# Patient Record
Sex: FEMALE | Race: WHITE | NOT HISPANIC OR LATINO | Employment: OTHER | URBAN - METROPOLITAN AREA
[De-identification: names, ages, dates, MRNs, and addresses within clinical notes are randomized per-mention and may not be internally consistent; named-entity substitution may affect disease eponyms.]

---

## 2018-03-12 ENCOUNTER — HOSPITAL ENCOUNTER (OUTPATIENT)
Dept: MAMMOGRAPHY | Facility: CLINIC | Age: 68
Discharge: HOME | End: 2018-03-12
Payer: MEDICARE

## 2018-03-12 DIAGNOSIS — Z12.31 SCREENING MAMMOGRAM, ENCOUNTER FOR: ICD-10-CM

## 2018-03-12 PROCEDURE — 77067 SCR MAMMO BI INCL CAD: CPT

## 2018-04-09 ENCOUNTER — TRANSCRIBE ORDERS (OUTPATIENT)
Dept: ADMINISTRATIVE | Facility: HOSPITAL | Age: 68
End: 2018-04-09

## 2018-04-09 DIAGNOSIS — M54.32 BILATERAL SCIATICA: ICD-10-CM

## 2018-04-09 DIAGNOSIS — M54.5 LOW BACK PAIN, UNSPECIFIED BACK PAIN LATERALITY, UNSPECIFIED CHRONICITY, WITH SCIATICA PRESENCE UNSPECIFIED: Primary | ICD-10-CM

## 2018-04-09 DIAGNOSIS — M54.31 BILATERAL SCIATICA: ICD-10-CM

## 2018-06-01 ENCOUNTER — APPOINTMENT (OUTPATIENT)
Dept: PHYSICAL THERAPY | Facility: CLINIC | Age: 68
End: 2018-06-01
Payer: MEDICARE

## 2018-08-31 ENCOUNTER — TRANSCRIBE ORDERS (OUTPATIENT)
Dept: ADMINISTRATIVE | Facility: HOSPITAL | Age: 68
End: 2018-08-31

## 2018-08-31 DIAGNOSIS — R10.9 STOMACH ACHE: Primary | ICD-10-CM

## 2018-09-02 ENCOUNTER — HOSPITAL ENCOUNTER (OUTPATIENT)
Dept: ULTRASOUND IMAGING | Facility: HOSPITAL | Age: 68
Discharge: HOME | End: 2018-09-02
Payer: MEDICARE

## 2018-09-02 DIAGNOSIS — R10.9 STOMACH ACHE: ICD-10-CM

## 2018-09-02 PROCEDURE — 76700 US EXAM ABDOM COMPLETE: CPT

## 2018-11-15 ENCOUNTER — APPOINTMENT (OUTPATIENT)
Dept: LAB | Facility: CLINIC | Age: 68
End: 2018-11-15
Payer: MEDICARE

## 2018-11-15 ENCOUNTER — TRANSCRIBE ORDERS (OUTPATIENT)
Dept: ADMINISTRATIVE | Facility: HOSPITAL | Age: 68
End: 2018-11-15

## 2018-11-15 DIAGNOSIS — I51.9 MYXEDEMA HEART DISEASE: ICD-10-CM

## 2018-11-15 DIAGNOSIS — E03.9 MYXEDEMA HEART DISEASE: ICD-10-CM

## 2018-11-15 DIAGNOSIS — E78.5 HYPERLIPIDEMIA, UNSPECIFIED HYPERLIPIDEMIA TYPE: ICD-10-CM

## 2018-11-15 DIAGNOSIS — R53.83 OTHER FATIGUE: ICD-10-CM

## 2018-11-15 DIAGNOSIS — R52 GENERALIZED BODY ACHES: ICD-10-CM

## 2018-11-15 DIAGNOSIS — K21.9 GASTROESOPHAGEAL REFLUX DISEASE WITHOUT ESOPHAGITIS: ICD-10-CM

## 2018-11-15 DIAGNOSIS — K21.9 GASTROESOPHAGEAL REFLUX DISEASE WITHOUT ESOPHAGITIS: Primary | ICD-10-CM

## 2018-11-15 PROCEDURE — 36415 COLL VENOUS BLD VENIPUNCTURE: CPT

## 2018-11-15 PROCEDURE — 84436 ASSAY OF TOTAL THYROXINE: CPT

## 2018-11-15 PROCEDURE — 85025 COMPLETE CBC W/AUTO DIFF WBC: CPT

## 2018-11-15 PROCEDURE — 80061 LIPID PANEL: CPT

## 2018-11-15 PROCEDURE — 86618 LYME DISEASE ANTIBODY: CPT

## 2018-11-15 PROCEDURE — 80053 COMPREHEN METABOLIC PANEL: CPT

## 2018-11-15 PROCEDURE — 84443 ASSAY THYROID STIM HORMONE: CPT

## 2018-11-16 LAB
ALBUMIN SERPL BCP-MCNC: 4 G/DL (ref 3.5–5)
ALP SERPL-CCNC: 102 U/L (ref 46–116)
ALT SERPL W P-5'-P-CCNC: 29 U/L (ref 12–78)
ANION GAP SERPL CALCULATED.3IONS-SCNC: 7 MMOL/L (ref 4–13)
AST SERPL W P-5'-P-CCNC: 20 U/L (ref 5–45)
BASOPHILS # BLD AUTO: 0.03 THOUSANDS/ΜL (ref 0–0.1)
BASOPHILS NFR BLD AUTO: 1 % (ref 0–1)
BILIRUB SERPL-MCNC: 0.43 MG/DL (ref 0.2–1)
BUN SERPL-MCNC: 10 MG/DL (ref 5–25)
CALCIUM SERPL-MCNC: 8.8 MG/DL (ref 8.3–10.1)
CHLORIDE SERPL-SCNC: 103 MMOL/L (ref 100–108)
CHOLEST SERPL-MCNC: 220 MG/DL (ref 50–200)
CO2 SERPL-SCNC: 28 MMOL/L (ref 21–32)
CREAT SERPL-MCNC: 0.62 MG/DL (ref 0.6–1.3)
EOSINOPHIL # BLD AUTO: 0.04 THOUSAND/ΜL (ref 0–0.61)
EOSINOPHIL NFR BLD AUTO: 1 % (ref 0–6)
ERYTHROCYTE [DISTWIDTH] IN BLOOD BY AUTOMATED COUNT: 12 % (ref 11.6–15.1)
GFR SERPL CREATININE-BSD FRML MDRD: 94 ML/MIN/1.73SQ M
GLUCOSE P FAST SERPL-MCNC: 87 MG/DL (ref 65–99)
HCT VFR BLD AUTO: 40.7 % (ref 34.8–46.1)
HDLC SERPL-MCNC: 54 MG/DL (ref 40–60)
HGB BLD-MCNC: 12.9 G/DL (ref 11.5–15.4)
IMM GRANULOCYTES # BLD AUTO: 0.01 THOUSAND/UL (ref 0–0.2)
IMM GRANULOCYTES NFR BLD AUTO: 0 % (ref 0–2)
LDLC SERPL CALC-MCNC: 148 MG/DL (ref 0–100)
LYMPHOCYTES # BLD AUTO: 1.23 THOUSANDS/ΜL (ref 0.6–4.47)
LYMPHOCYTES NFR BLD AUTO: 25 % (ref 14–44)
MCH RBC QN AUTO: 31.1 PG (ref 26.8–34.3)
MCHC RBC AUTO-ENTMCNC: 31.7 G/DL (ref 31.4–37.4)
MCV RBC AUTO: 98 FL (ref 82–98)
MONOCYTES # BLD AUTO: 0.41 THOUSAND/ΜL (ref 0.17–1.22)
MONOCYTES NFR BLD AUTO: 9 % (ref 4–12)
NEUTROPHILS # BLD AUTO: 3.13 THOUSANDS/ΜL (ref 1.85–7.62)
NEUTS SEG NFR BLD AUTO: 64 % (ref 43–75)
NONHDLC SERPL-MCNC: 166 MG/DL
NRBC BLD AUTO-RTO: 0 /100 WBCS
PLATELET # BLD AUTO: 263 THOUSANDS/UL (ref 149–390)
PMV BLD AUTO: 10.3 FL (ref 8.9–12.7)
POTASSIUM SERPL-SCNC: 4.8 MMOL/L (ref 3.5–5.3)
PROT SERPL-MCNC: 7.4 G/DL (ref 6.4–8.2)
RBC # BLD AUTO: 4.15 MILLION/UL (ref 3.81–5.12)
SODIUM SERPL-SCNC: 138 MMOL/L (ref 136–145)
T4 SERPL-MCNC: 10.1 UG/DL (ref 4.7–13.3)
TRIGL SERPL-MCNC: 90 MG/DL
TSH SERPL DL<=0.05 MIU/L-ACNC: 1.94 UIU/ML (ref 0.36–3.74)
WBC # BLD AUTO: 4.85 THOUSAND/UL (ref 4.31–10.16)

## 2018-11-17 LAB
B BURGDOR IGG SER IA-ACNC: 0.07
B BURGDOR IGM SER IA-ACNC: 0.79

## 2018-12-06 ENCOUNTER — OFFICE VISIT (OUTPATIENT)
Dept: FAMILY MEDICINE CLINIC | Facility: CLINIC | Age: 68
End: 2018-12-06
Payer: MEDICARE

## 2018-12-06 VITALS
WEIGHT: 161 LBS | OXYGEN SATURATION: 98 % | HEART RATE: 69 BPM | BODY MASS INDEX: 25.88 KG/M2 | HEIGHT: 66 IN | SYSTOLIC BLOOD PRESSURE: 112 MMHG | DIASTOLIC BLOOD PRESSURE: 78 MMHG

## 2018-12-06 DIAGNOSIS — K21.9 GASTROESOPHAGEAL REFLUX DISEASE WITHOUT ESOPHAGITIS: Primary | ICD-10-CM

## 2018-12-06 DIAGNOSIS — K13.0 RASH ON LIPS: ICD-10-CM

## 2018-12-06 DIAGNOSIS — M54.16 LUMBAR RADICULOPATHY: ICD-10-CM

## 2018-12-06 PROBLEM — M25.559 HIP PAIN: Status: ACTIVE | Noted: 2018-12-06

## 2018-12-06 PROBLEM — M54.50 LOW BACK PAIN: Status: ACTIVE | Noted: 2018-12-06

## 2018-12-06 PROBLEM — IMO0002 DEGENERATION OF INTERVERTEBRAL DISC: Status: ACTIVE | Noted: 2018-12-06

## 2018-12-06 PROCEDURE — 99214 OFFICE O/P EST MOD 30 MIN: CPT | Performed by: FAMILY MEDICINE

## 2018-12-06 RX ORDER — PANTOPRAZOLE SODIUM 20 MG/1
10 TABLET, DELAYED RELEASE ORAL DAILY
COMMUNITY
Start: 2018-08-31 | End: 2019-02-21

## 2018-12-06 RX ORDER — OMEPRAZOLE 20 MG/1
20 CAPSULE, DELAYED RELEASE ORAL DAILY
Qty: 30 CAPSULE | Refills: 5 | Status: SHIPPED | OUTPATIENT
Start: 2018-12-06 | End: 2020-06-22 | Stop reason: ALTCHOICE

## 2018-12-06 NOTE — PROGRESS NOTES
Assessment/Plan:       Problem List Items Addressed This Visit     Lumbar radiculopathy     Patient is medically stable from the low back pain standpoint will continue to do home exercises and stretching         Gastroesophageal reflux disease without esophagitis - Primary     Patient is doing well regarding dyspepsia and GERD symptoms on the omeprazole will continue this and refill this at this time         Relevant Medications    pantoprazole (PROTONIX) 20 mg tablet    Rash on lips     Patient will use a prescription for hydrocortisone to have percent for her lips uses each night  If this does not resolve within 1 week she will contact the office                 Subjective:      Patient ID: Bear Estvees is a 79 y o  female  Patient presents for general checkup today review of lab work at this time  Renewal of medications  No new medical issues to discuss beyond current medications and blood work  Rash   Pertinent negatives include no congestion, cough, fatigue, fever, rhinorrhea, shortness of breath or sore throat  The following portions of the patient's history were reviewed and updated as appropriate: allergies, current medications, past family history, past medical history, past social history, past surgical history and problem list     Review of Systems   Constitutional: Negative for chills, fatigue and fever  HENT: Negative for congestion, nosebleeds, rhinorrhea, sinus pressure and sore throat  Eyes: Negative for discharge and redness  Respiratory: Negative for cough and shortness of breath  Cardiovascular: Negative for chest pain, palpitations and leg swelling  Gastrointestinal: Negative for abdominal pain, blood in stool and nausea  Endocrine: Negative for cold intolerance, heat intolerance and polyuria  Genitourinary: Negative for dysuria and frequency  Musculoskeletal: Positive for back pain  Negative for arthralgias and myalgias  Skin: Positive for rash  Neurological: Negative for dizziness, weakness and headaches  Hematological: Negative for adenopathy  Psychiatric/Behavioral: Negative for behavioral problems and sleep disturbance  The patient is not nervous/anxious  Objective:      /78   Pulse 69   Ht 5' 5 5" (1 664 m)   Wt 73 kg (161 lb)   SpO2 98%   BMI 26 38 kg/m²        Physical Exam   Constitutional: She is oriented to person, place, and time  She appears well-developed and well-nourished  No distress  HENT:   Head: Normocephalic and atraumatic  Right Ear: External ear normal    Left Ear: External ear normal    Nose: Nose normal    Mouth/Throat: Oropharynx is clear and moist  No oropharyngeal exudate  Eyes: Pupils are equal, round, and reactive to light  Conjunctivae and EOM are normal  Right eye exhibits no discharge  Left eye exhibits no discharge  No scleral icterus  Neck: Normal range of motion  No JVD present  No thyromegaly present  Cardiovascular: Normal rate, regular rhythm and normal heart sounds  No murmur heard  Pulmonary/Chest: Effort normal  She has no wheezes  She has no rales  She exhibits no tenderness  Abdominal: Soft  Bowel sounds are normal  She exhibits no distension and no mass  There is no tenderness  Musculoskeletal: Normal range of motion  She exhibits no edema, tenderness or deformity  Lymphadenopathy:     She has no cervical adenopathy  Neurological: She is alert and oriented to person, place, and time  She has normal reflexes  No cranial nerve deficit  Coordination normal    Skin: Skin is warm and dry  No rash noted  Psychiatric: She has a normal mood and affect  Her behavior is normal  Judgment and thought content normal    Nursing note and vitals reviewed  Data:    Laboratory Results: I have personally reviewed the pertinent laboratory results/reports   Radiology/Other Diagnostic Testing Results: I have personally reviewed pertinent reports         Lab Results   Component Value Date    WBC 4 85 11/15/2018    HGB 12 9 11/15/2018    HCT 40 7 11/15/2018    MCV 98 11/15/2018     11/15/2018     Lab Results   Component Value Date    K 4 8 11/15/2018     11/15/2018    CO2 28 11/15/2018    BUN 10 11/15/2018    CREATININE 0 62 11/15/2018    GLUF 87 11/15/2018    CALCIUM 8 8 11/15/2018    AST 20 11/15/2018    ALT 29 11/15/2018    ALKPHOS 102 11/15/2018    EGFR 94 11/15/2018     Lab Results   Component Value Date    CHOLESTEROL 220 (H) 11/15/2018     Lab Results   Component Value Date    HDL 54 11/15/2018     Lab Results   Component Value Date    LDLCALC 148 (H) 11/15/2018     Lab Results   Component Value Date    TRIG 90 11/15/2018     No results found for: [place], [place]  Lab Results   Component Value Date    XYY9YDJLAASK 1 940 11/15/2018     No results found for: HGBA1C  No results found for: GABE Bustillos DO

## 2018-12-06 NOTE — ASSESSMENT & PLAN NOTE
Patient will use a prescription for hydrocortisone to have percent for her lips uses each night    If this does not resolve within 1 week she will contact the office

## 2018-12-06 NOTE — ASSESSMENT & PLAN NOTE
Patient is doing well regarding dyspepsia and GERD symptoms on the omeprazole will continue this and refill this at this time

## 2018-12-06 NOTE — PATIENT INSTRUCTIONS
Acute Low Back Pain   AMBULATORY CARE:   Acute low back pain  is sudden discomfort in your lower back area that lasts for up to 6 weeks  The discomfort makes it difficult to tolerate activity  Common symptoms include the following:   · Back stiffness or spasms    · Pain down the back or side of one leg    · Holding yourself in an unusual position or posture to decrease your back pain    · Not being able to find a sitting position that is comfortable    · Slow increase in your pain for 24 to 48 hours after you stress your back    · Tenderness on your lower back or severe pain when you move your back  Seek immediate care for the following symptoms:   · Severe pain    · Sudden stiffness and heaviness in both buttocks down to both legs    · Numbness or weakness in one leg, or pain in both legs    · Numbness in your genital area or across your lower back    · Unable to control your urine or bowel movements  Contact your healthcare provider if:   · You have a fever  · You have pain at night or when you rest     · Your pain does not get better with treatment  · You have pain that worsens when you cough or sneeze  · You suddenly feel something pop or snap in your back  · You have questions or concerns about your condition or care  The goal of treatment for acute low back pain  is to relieve your pain and help you tolerate activity  Most people with acute lower back pain get better within 4 to 6 weeks  You may need any of the following:  · Acetaminophen  decreases pain  It is available without a doctor's order  Ask how much to take and how often to take it  Follow directions  Acetaminophen can cause liver damage if not taken correctly  · NSAIDs  help decrease swelling and pain  This medicine is available with or without a doctor's order  NSAIDs can cause stomach bleeding or kidney problems in certain people   If you take blood thinner medicine, always ask your healthcare provider if NSAIDs are safe for you  Always read the medicine label and follow directions  · Prescription pain medicine  may be given  Ask your healthcare provider how to take this medicine safely  · Muscle relaxers  decrease pain by relaxing the muscles in your lower spine  Manage your symptoms:   · Stay active  as much as you can without causing more pain  Bed rest could make your back pain worse  Start with some light exercises such as walking  Avoid heavy lifting until your pain is gone  Ask for more information about the activities or exercises that are right for you  · Ice  helps decrease swelling, pain, and muscle spams  Put crushed ice in a plastic bag  Cover it with a towel  Place it on your lower back for 20 to 30 minutes every 2 hours  Do this for about 2 to 3 days after your pain starts, or as directed  · Heat  helps decrease pain and muscle spasms  Start to use heat after treatment with ice has stopped  Use a small towel dampened with warm water or a heating pad, or sit in a warm bath  Apply heat on the area for 20 to 30 minutes every 2 hours for as many days as directed  Alternate heat and ice  Prevent acute low back pain:   · Use proper body mechanics  ¨ Bend at the hips and knees when you  objects  Do not bend from the waist  Use your leg muscles as you lift the load  Do not use your back  Keep the object close to your chest as you lift it  Try not to twist or lift anything above your waist     ¨ Change your position often when you stand for long periods of time  Rest one foot on a small box or footrest, and then switch to the other foot often  ¨ Try not to sit for long periods of time  When you do, sit in a straight-backed chair with your feet flat on the floor  Never reach, pull, or push while you are sitting  · Do exercises that strengthen your back muscles  Warm up before you exercise  Ask your healthcare provider the best exercises for you  · Maintain a healthy weight    Ask your healthcare provider how much you should weigh  Ask him to help you create a weight loss plan if you are overweight  Follow up with your healthcare provider as directed:  Return for a follow-up visit if you still have pain after 1 to 3 weeks of treatment  You may need to visit an orthopedist if your back pain lasts more than 12 weeks  Write down your questions so you remember to ask them during your visits  © 2017 [employer] Information is for End User's use only and may not be sold, redistributed or otherwise used for commercial purposes  All illustrations and images included in CareNotes® are the copyrighted property of [employer]  or Y[employer]uf [employer]  The above information is an  only  It is not intended as medical advice for individual conditions or treatments  Talk to your doctor, nurse or pharmacist before following any medical regimen to see if it is safe and effective for you

## 2018-12-06 NOTE — ASSESSMENT & PLAN NOTE
Patient is medically stable from the low back pain standpoint will continue to do home exercises and stretching

## 2019-02-21 ENCOUNTER — OFFICE VISIT (OUTPATIENT)
Dept: URGENT CARE | Facility: CLINIC | Age: 69
End: 2019-02-21
Payer: MEDICARE

## 2019-02-21 VITALS
HEIGHT: 62 IN | SYSTOLIC BLOOD PRESSURE: 132 MMHG | RESPIRATION RATE: 16 BRPM | DIASTOLIC BLOOD PRESSURE: 82 MMHG | HEART RATE: 83 BPM | TEMPERATURE: 97.4 F | WEIGHT: 160 LBS | OXYGEN SATURATION: 97 % | BODY MASS INDEX: 29.44 KG/M2

## 2019-02-21 DIAGNOSIS — H69.82 EUSTACHIAN TUBE DYSFUNCTION, LEFT: Primary | ICD-10-CM

## 2019-02-21 PROCEDURE — 99203 OFFICE O/P NEW LOW 30 MIN: CPT | Performed by: PHYSICIAN ASSISTANT

## 2019-02-21 PROCEDURE — G0463 HOSPITAL OUTPT CLINIC VISIT: HCPCS | Performed by: PHYSICIAN ASSISTANT

## 2019-02-21 RX ORDER — LORATADINE 10 MG/1
10 TABLET ORAL DAILY
COMMUNITY

## 2019-02-21 RX ORDER — UBIDECARENONE 75 MG
CAPSULE ORAL AS NEEDED
COMMUNITY
End: 2022-05-30

## 2019-02-21 RX ORDER — MELATONIN
1000 DAILY
COMMUNITY

## 2019-02-21 RX ORDER — DOXYCYCLINE HYCLATE 50 MG/1
324 CAPSULE, GELATIN COATED ORAL
COMMUNITY
End: 2020-06-22 | Stop reason: ALTCHOICE

## 2019-02-21 RX ORDER — METHYLPREDNISOLONE 4 MG/1
TABLET ORAL
Qty: 1 EACH | Refills: 0 | Status: SHIPPED | OUTPATIENT
Start: 2019-02-21 | End: 2019-12-12 | Stop reason: ALTCHOICE

## 2019-02-21 RX ORDER — CHLORAL HYDRATE 500 MG
1000 CAPSULE ORAL DAILY
COMMUNITY
End: 2020-06-22 | Stop reason: ALTCHOICE

## 2019-02-22 NOTE — PROGRESS NOTES
[facility]        NAME: Bhupendra Brown is a 79 y o  female  : 1950    MRN: [id number]  DATE: 2019  TIME: 7:35 PM    Assessment and Plan   Eustachian tube dysfunction, left [H69 82]  1  Eustachian tube dysfunction, left  methylPREDNISolone 4 MG tablet therapy pack     Patient will follow up with ENT in 2 weeks    Patient Instructions       Follow up with PCP in 3-5 days  Proceed to  ER if symptoms worsen  Chief Complaint     Chief Complaint   Patient presents with    Earache     left ear x 2 weeks, tried a warm compress/claritin         History of Present Illness       82-year-old female presents with left ear pain for 2 weeks  Patient states she wears headphones on a daily basis for work she works for [employer] for the last year  She has been using warm compresses and ferritin with relief  She states it feels like her ears needs a pop  She has not seen ENT for this  She does complain of slight decrease in hearing  Denies tinnitus  Denies fever or chills  Review of Systems   Review of Systems   Constitutional: Negative for chills, fatigue and fever  HENT: Positive for ear pain (blocked ears)  Negative for congestion, sinus pain, sore throat and trouble swallowing  Eyes: Negative for pain, discharge and redness  Respiratory: Negative for cough, chest tightness, shortness of breath and wheezing  Cardiovascular: Negative for chest pain, palpitations and leg swelling  Gastrointestinal: Negative for abdominal pain, diarrhea, nausea and vomiting  Musculoskeletal: Negative for arthralgias, joint swelling and myalgias  Skin: Negative for rash  Neurological: Negative for dizziness, weakness, numbness and headaches           Current Medications       Current Outpatient Medications:     Biotin 5000 MCG CAPS, 5000 mcg 1 qd, Disp: , Rfl:     cholecalciferol (VITAMIN D3) 1,000 units tablet, Take 1,000 Units by mouth daily, Disp: , Rfl:     co-enzyme Q-10 30 MG capsule, Take 30 mg by mouth 3 (three) times a day, Disp: , Rfl:     cyanocobalamin (VITAMIN B-12) 100 mcg tablet, Take by mouth daily, Disp: , Rfl:     Evening Primrose Oil 1000 MG CAPS, Take by mouth, Disp: , Rfl:     ferrous gluconate (FERGON) 324 mg tablet, Take 324 mg by mouth daily with breakfast, Disp: , Rfl:     Garlic 6008 MG CAPS, 6368 mg 1 qd, Disp: , Rfl:     loratadine (CLARITIN) 10 mg tablet, Take 10 mg by mouth daily, Disp: , Rfl:     Omega-3 Fatty Acids (FISH OIL) 1,000 mg, Take 1,000 mg by mouth daily, Disp: , Rfl:     Probiotic Product (PROBIOTIC ACIDOPHILUS BEADS PO), Take by mouth, Disp: , Rfl:     vitamin E 100 UNIT capsule, Take 100 Units by mouth daily, Disp: , Rfl:     hydrocortisone 2 5 % cream, Apply at nighttime and in a m   To affected area on the lips, Disp: 30 g, Rfl: 0    methylPREDNISolone 4 MG tablet therapy pack, Use as directed on package, Disp: 1 each, Rfl: 0    omeprazole (PriLOSEC) 20 mg delayed release capsule, Take 1 capsule (20 mg total) by mouth daily, Disp: 30 capsule, Rfl: 5    Current Allergies     Allergies as of 02/21/2019 - Reviewed 02/21/2019   Allergen Reaction Noted    Etodolac  02/14/2016    Nitrofurantoin Other (See Comments)     Sulfa antibiotics Hives     Levofloxacin Dizziness     Pregabalin Chest Pain     Aspirin      Eggs or egg-derived products  11/24/2015    Ibuprofen      Naproxen      Penicillin g  02/14/2016            The following portions of the patient's history were reviewed and updated as appropriate: allergies, current medications, past family history, past medical history, past social history, past surgical history and problem list      Past Medical History:   Diagnosis Date    GERD (gastroesophageal reflux disease)     Hyperlipidemia        Past Surgical History:   Procedure Laterality Date    BLADDER REPAIR      sling    REMOVAL OF INTRAUTERINE DEVICE (IUD)      in abd area    TONSILLECTOMY         Family History Problem Relation Age of Onset    Hypertension Mother         Pulmonary    Osteoporosis Mother     Heart failure Mother     Heart attack Father     Hypertension Father     Stroke Father          Medications have been verified  Objective   /82 (BP Location: Left arm, Patient Position: Sitting, Cuff Size: Standard)   Pulse 83   Temp (!) 97 4 °F (36 3 °C) (Tympanic)   Resp 16   Ht 5' 1 5" (1 562 m)   Wt 72 6 kg (160 lb)   SpO2 97%   BMI 29 74 kg/m²        Physical Exam     Physical Exam   Constitutional: She is oriented to person, place, and time  She appears well-developed and well-nourished  No distress  HENT:   Head: Normocephalic  Right Ear: External ear normal    Left Ear: External ear normal  No foreign bodies  No mastoid tenderness  Tympanic membrane is scarred  Tympanic membrane is not injected, not erythematous, not retracted and not bulging  Tympanic membrane mobility is abnormal   No middle ear effusion  Decreased hearing is noted  Mouth/Throat: Oropharynx is clear and moist    Eyes: Pupils are equal, round, and reactive to light  Conjunctivae and EOM are normal    Neck: Normal range of motion  Neck supple  Cardiovascular: Normal rate, regular rhythm and normal heart sounds  No murmur heard  Pulmonary/Chest: Effort normal and breath sounds normal  No respiratory distress  She has no wheezes  Abdominal: Soft  Bowel sounds are normal  There is no tenderness  Musculoskeletal: Normal range of motion  Lymphadenopathy:     She has no cervical adenopathy  Neurological: She is alert and oriented to person, place, and time  She has normal reflexes  Skin: Skin is warm and dry  Psychiatric: She has a normal mood and affect  Nursing note and vitals reviewed

## 2019-02-23 ENCOUNTER — APPOINTMENT (EMERGENCY)
Dept: RADIOLOGY | Facility: HOSPITAL | Age: 69
End: 2019-02-23
Payer: MEDICARE

## 2019-02-23 ENCOUNTER — HOSPITAL ENCOUNTER (EMERGENCY)
Facility: HOSPITAL | Age: 69
Discharge: HOME | End: 2019-02-23
Attending: EMERGENCY MEDICINE | Admitting: EMERGENCY MEDICINE
Payer: MEDICARE

## 2019-02-23 VITALS
OXYGEN SATURATION: 97 % | RESPIRATION RATE: 18 BRPM | BODY MASS INDEX: 29.74 KG/M2 | SYSTOLIC BLOOD PRESSURE: 137 MMHG | WEIGHT: 160 LBS | HEART RATE: 66 BPM | TEMPERATURE: 97.5 F | DIASTOLIC BLOOD PRESSURE: 78 MMHG

## 2019-02-23 DIAGNOSIS — R51.9 CEPHALALGIA: Primary | ICD-10-CM

## 2019-02-23 DIAGNOSIS — R51.9 HEADACHE: ICD-10-CM

## 2019-02-23 LAB
ALBUMIN SERPL BCP-MCNC: 3.6 G/DL (ref 3.5–5)
ALP SERPL-CCNC: 102 U/L (ref 46–116)
ALT SERPL W P-5'-P-CCNC: 31 U/L (ref 12–78)
ANION GAP SERPL CALCULATED.3IONS-SCNC: 6 MMOL/L (ref 4–13)
AST SERPL W P-5'-P-CCNC: 18 U/L (ref 5–45)
ATRIAL RATE: 65 BPM
BASOPHILS # BLD AUTO: 0.03 THOUSANDS/ΜL (ref 0–0.1)
BASOPHILS NFR BLD AUTO: 1 % (ref 0–1)
BILIRUB SERPL-MCNC: 0.26 MG/DL (ref 0.2–1)
BUN SERPL-MCNC: 13 MG/DL (ref 5–25)
CALCIUM SERPL-MCNC: 8.6 MG/DL (ref 8.3–10.1)
CHLORIDE SERPL-SCNC: 105 MMOL/L (ref 100–108)
CK SERPL-CCNC: 56 U/L (ref 26–192)
CO2 SERPL-SCNC: 29 MMOL/L (ref 21–32)
CREAT SERPL-MCNC: 0.75 MG/DL (ref 0.6–1.3)
EOSINOPHIL # BLD AUTO: 0.06 THOUSAND/ΜL (ref 0–0.61)
EOSINOPHIL NFR BLD AUTO: 1 % (ref 0–6)
ERYTHROCYTE [DISTWIDTH] IN BLOOD BY AUTOMATED COUNT: 12.5 % (ref 11.6–15.1)
GFR SERPL CREATININE-BSD FRML MDRD: 83 ML/MIN/1.73SQ M
GLUCOSE SERPL-MCNC: 102 MG/DL (ref 65–140)
HCT VFR BLD AUTO: 36.6 % (ref 34.8–46.1)
HGB BLD-MCNC: 12.1 G/DL (ref 11.5–15.4)
IMM GRANULOCYTES # BLD AUTO: 0.01 THOUSAND/UL (ref 0–0.2)
IMM GRANULOCYTES NFR BLD AUTO: 0 % (ref 0–2)
LYMPHOCYTES # BLD AUTO: 1.89 THOUSANDS/ΜL (ref 0.6–4.47)
LYMPHOCYTES NFR BLD AUTO: 30 % (ref 14–44)
MCH RBC QN AUTO: 30.6 PG (ref 26.8–34.3)
MCHC RBC AUTO-ENTMCNC: 33.1 G/DL (ref 31.4–37.4)
MCV RBC AUTO: 92 FL (ref 82–98)
MONOCYTES # BLD AUTO: 0.56 THOUSAND/ΜL (ref 0.17–1.22)
MONOCYTES NFR BLD AUTO: 9 % (ref 4–12)
NEUTROPHILS # BLD AUTO: 3.66 THOUSANDS/ΜL (ref 1.85–7.62)
NEUTS SEG NFR BLD AUTO: 59 % (ref 43–75)
NRBC BLD AUTO-RTO: 0 /100 WBCS
P AXIS: 58 DEGREES
PLATELET # BLD AUTO: 228 THOUSANDS/UL (ref 149–390)
PMV BLD AUTO: 9.8 FL (ref 8.9–12.7)
POTASSIUM SERPL-SCNC: 3.6 MMOL/L (ref 3.5–5.3)
PR INTERVAL: 188 MS
PROT SERPL-MCNC: 6.9 G/DL (ref 6.4–8.2)
QRS AXIS: 38 DEGREES
QRSD INTERVAL: 80 MS
QT INTERVAL: 412 MS
QTC INTERVAL: 428 MS
RBC # BLD AUTO: 3.96 MILLION/UL (ref 3.81–5.12)
SODIUM SERPL-SCNC: 140 MMOL/L (ref 136–145)
T WAVE AXIS: 60 DEGREES
TROPONIN I SERPL-MCNC: <0.02 NG/ML
TSH SERPL DL<=0.05 MIU/L-ACNC: 3.01 UIU/ML (ref 0.36–3.74)
VENTRICULAR RATE: 65 BPM
WBC # BLD AUTO: 6.21 THOUSAND/UL (ref 4.31–10.16)

## 2019-02-23 PROCEDURE — 36415 COLL VENOUS BLD VENIPUNCTURE: CPT | Performed by: EMERGENCY MEDICINE

## 2019-02-23 PROCEDURE — 71046 X-RAY EXAM CHEST 2 VIEWS: CPT

## 2019-02-23 PROCEDURE — 82550 ASSAY OF CK (CPK): CPT | Performed by: EMERGENCY MEDICINE

## 2019-02-23 PROCEDURE — 70450 CT HEAD/BRAIN W/O DYE: CPT

## 2019-02-23 PROCEDURE — 93010 ELECTROCARDIOGRAM REPORT: CPT | Performed by: INTERNAL MEDICINE

## 2019-02-23 PROCEDURE — 84484 ASSAY OF TROPONIN QUANT: CPT | Performed by: EMERGENCY MEDICINE

## 2019-02-23 PROCEDURE — 85025 COMPLETE CBC W/AUTO DIFF WBC: CPT | Performed by: EMERGENCY MEDICINE

## 2019-02-23 PROCEDURE — 80053 COMPREHEN METABOLIC PANEL: CPT | Performed by: EMERGENCY MEDICINE

## 2019-02-23 PROCEDURE — 93005 ELECTROCARDIOGRAM TRACING: CPT

## 2019-02-23 PROCEDURE — 84443 ASSAY THYROID STIM HORMONE: CPT | Performed by: EMERGENCY MEDICINE

## 2019-02-23 PROCEDURE — 99284 EMERGENCY DEPT VISIT MOD MDM: CPT

## 2019-02-23 RX ORDER — LIDOCAINE 50 MG/G
1 PATCH TOPICAL ONCE
Status: DISCONTINUED | OUTPATIENT
Start: 2019-02-23 | End: 2019-02-23 | Stop reason: HOSPADM

## 2019-02-23 RX ORDER — ACETAMINOPHEN 325 MG/1
650 TABLET ORAL ONCE
Status: COMPLETED | OUTPATIENT
Start: 2019-02-23 | End: 2019-02-23

## 2019-02-23 RX ADMIN — ACETAMINOPHEN 650 MG: 325 TABLET ORAL at 20:41

## 2019-02-23 RX ADMIN — LIDOCAINE 1 PATCH: 50 PATCH TOPICAL at 20:41

## 2019-02-24 NOTE — ED PROVIDER NOTES
History  Chief Complaint   Patient presents with    Headache     Pt c/o head pressure and left arm pain  Pt was at urgent care for left ear pain a few days ago  Patient was seen about a week ago for a left ear complaint was called in steroids but did not fill it  She states she comes in for 5 days of head pressure  Has not had this prior to 5 days ago  Checked her blood pressure as thought possibly could be the etiology  Her systolic blood pressures in the 140s which she attributes to why she is having a squeezing sensation  She states these 5 days feels pain shooting down her neck into her shoulder and into her arm  Denies any chest pain shortness of breath palpitations; denies any abdominal pain nausea vomiting  No personal history of ACS but there is a family history  No hypertension dyslipidemia diabetes or smoking  She has not really tried any alleviating factors as she likes doing all natural remedies at home  No fevers chills or neck stiffness  No falls accidents or trauma  Prior to Admission Medications   Prescriptions Last Dose Informant Patient Reported? Taking? Biotin 5000 MCG CAPS   Yes No   Si mcg 1 qd   Evening Primrose Oil 1000 MG CAPS  Self Yes No   Sig: Take by mouth   Garlic 3454 MG CAPS   Yes No   Si mg 1 qd   Omega-3 Fatty Acids (FISH OIL) 1,000 mg  Self Yes No   Sig: Take 1,000 mg by mouth daily   Probiotic Product (PROBIOTIC ACIDOPHILUS BEADS PO)  Self Yes No   Sig: Take by mouth   cholecalciferol (VITAMIN D3) 1,000 units tablet  Self Yes No   Sig: Take 1,000 Units by mouth daily   co-enzyme Q-10 30 MG capsule  Self Yes No   Sig: Take 30 mg by mouth 3 (three) times a day   cyanocobalamin (VITAMIN B-12) 100 mcg tablet  Self Yes No   Sig: Take by mouth daily   ferrous gluconate (FERGON) 324 mg tablet  Self Yes No   Sig: Take 324 mg by mouth daily with breakfast   hydrocortisone 2 5 % cream   No No   Sig: Apply at nighttime and in a m   To affected area on the lips   loratadine (CLARITIN) 10 mg tablet  Self Yes No   Sig: Take 10 mg by mouth daily   methylPREDNISolone 4 MG tablet therapy pack   No No   Sig: Use as directed on package   omeprazole (PriLOSEC) 20 mg delayed release capsule   No No   Sig: Take 1 capsule (20 mg total) by mouth daily   vitamin E 100 UNIT capsule  Self Yes No   Sig: Take 100 Units by mouth daily      Facility-Administered Medications: None       Past Medical History:   Diagnosis Date    GERD (gastroesophageal reflux disease)     Hyperlipidemia        Past Surgical History:   Procedure Laterality Date    BLADDER REPAIR      sling    REMOVAL OF INTRAUTERINE DEVICE (IUD)      in abd area    TONSILLECTOMY         Family History   Problem Relation Age of Onset    Hypertension Mother         Pulmonary    Osteoporosis Mother     Heart failure Mother     Heart attack Father     Hypertension Father     Stroke Father      I have reviewed and agree with the history as documented  Social History     Tobacco Use    Smoking status: Never Smoker    Smokeless tobacco: Never Used   Substance Use Topics    Alcohol use: Never     Frequency: Never    Drug use: Never        Review of Systems   Constitutional: Negative for activity change, appetite change, chills and fever  HENT: Negative for congestion, ear pain, rhinorrhea, sore throat and trouble swallowing  Eyes: Negative for photophobia and pain  Respiratory: Negative for cough, chest tightness, shortness of breath and wheezing  Cardiovascular: Negative for chest pain and palpitations  Gastrointestinal: Negative for abdominal distention, abdominal pain, constipation, diarrhea, nausea and vomiting  Genitourinary: Negative for dysuria, flank pain, hematuria and urgency  Musculoskeletal: Positive for myalgias  Negative for arthralgias, back pain, joint swelling, neck pain and neck stiffness  Skin: Negative for color change, pallor and rash     Neurological: Positive for headaches  Negative for dizziness, seizures, weakness, light-headedness and numbness  Psychiatric/Behavioral: Negative for confusion, hallucinations and self-injury  Physical Exam  ED Triage Vitals [02/23/19 1935]   Temperature Pulse Respirations Blood Pressure SpO2   97 5 °F (36 4 °C) 76 18 155/79 99 %      Temp Source Heart Rate Source Patient Position - Orthostatic VS BP Location FiO2 (%)   Oral Monitor Sitting Left arm --      Pain Score       7           Orthostatic Vital Signs  Vitals:    02/23/19 1935 02/23/19 1956 02/23/19 2100   BP: 155/79 146/78 137/78   Pulse: 76 67 66   Patient Position - Orthostatic VS: Sitting         Physical Exam   Constitutional: She is oriented to person, place, and time  She appears well-developed and well-nourished  No distress  No distress talkative   HENT:   Head: Normocephalic and atraumatic  Mouth/Throat: No oropharyngeal exudate  Eyes: EOM are normal  Right eye exhibits no discharge  Left eye exhibits no discharge  Neck: Normal range of motion  Neck supple  No tracheal deviation present  No thyromegaly present  No neck tenderness  Is very tender along the left trapezius muscle   Cardiovascular: Normal rate and normal heart sounds  No murmur heard  Pulmonary/Chest: Effort normal and breath sounds normal  No respiratory distress  She has no wheezes  She has no rales  Abdominal: Soft  Bowel sounds are normal  She exhibits no distension  There is no tenderness  There is no rebound and no guarding  Soft no tenderness   Musculoskeletal: Normal range of motion  She exhibits no edema or deformity  Motor symmetrical no swelling no rash upper extremities   Lymphadenopathy:     She has no cervical adenopathy  Neurological: She is alert and oriented to person, place, and time  She exhibits normal muscle tone  GCS 15 talkative no dysarthria normal cranial nerve exam motor 5/5 symmetrical upper and lower extremities   Skin: Skin is warm   Capillary refill takes less than 2 seconds  No rash noted  She is not diaphoretic  Psychiatric: She has a normal mood and affect  Her behavior is normal        ED Medications  Medications   acetaminophen (TYLENOL) tablet 650 mg (650 mg Oral Given 2/23/19 2041)       Diagnostic Studies  Results Reviewed     Procedure Component Value Units Date/Time    Troponin I [[id number]]  (Normal) Collected:  02/23/19 2028    Lab Status:  Final result Specimen:  Blood from Arm, Right Updated:  02/23/19 2115     Troponin I <0 02 ng/mL     TSH, 3rd generation with Free T4 reflex [[id number]]  (Normal) Collected:  02/23/19 2028    Lab Status:  Final result Specimen:  Blood from Arm, Right Updated:  02/23/19 2109     TSH 3RD GENERATON 3 010 uIU/mL     Narrative:       Patients undergoing fluorescein dye angiography may retain small amounts of fluorescein in the body for 48-72 hours post procedure  Samples containing fluorescein can produce falsely depressed TSH values  If the patient had this procedure,a specimen should be resubmitted post fluorescein clearance      CK (with reflex to MB) [[id number]]  (Normal) Collected:  02/23/19 2028    Lab Status:  Final result Specimen:  Blood from Arm, Right Updated:  02/23/19 2109     Total CK 56 U/L     Comprehensive metabolic panel [[id number]] Collected:  02/23/19 2028    Lab Status:  Final result Specimen:  Blood from Arm, Right Updated:  02/23/19 2056     Sodium 140 mmol/L      Potassium 3 6 mmol/L      Chloride 105 mmol/L      CO2 29 mmol/L      ANION GAP 6 mmol/L      BUN 13 mg/dL      Creatinine 0 75 mg/dL      Glucose 102 mg/dL      Calcium 8 6 mg/dL      AST 18 U/L      ALT 31 U/L      Alkaline Phosphatase 102 U/L      Total Protein 6 9 g/dL      Albumin 3 6 g/dL      Total Bilirubin 0 26 mg/dL      eGFR 83 ml/min/1 73sq m     Narrative:       National Kidney Disease Education Program recommendations are as follows:  GFR calculation is accurate only with a steady state creatinine  Chronic Kidney disease less than 60 ml/min/1 73 sq  meters  Kidney failure less than 15 ml/min/1 73 sq  meters  CBC and differential [[id number]] Collected:  02/23/19 2028    Lab Status:  Final result Specimen:  Blood from Arm, Right Updated:  02/23/19 2040     WBC 6 21 Thousand/uL      RBC 3 96 Million/uL      Hemoglobin 12 1 g/dL      Hematocrit 36 6 %      MCV 92 fL      MCH 30 6 pg      MCHC 33 1 g/dL      RDW 12 5 %      MPV 9 8 fL      Platelets 229 Thousands/uL      nRBC 0 /100 WBCs      Neutrophils Relative 59 %      Immat GRANS % 0 %      Lymphocytes Relative 30 %      Monocytes Relative 9 %      Eosinophils Relative 1 %      Basophils Relative 1 %      Neutrophils Absolute 3 66 Thousands/µL      Immature Grans Absolute 0 01 Thousand/uL      Lymphocytes Absolute 1 89 Thousands/µL      Monocytes Absolute 0 56 Thousand/µL      Eosinophils Absolute 0 06 Thousand/µL      Basophils Absolute 0 03 Thousands/µL                  CT head without contrast   Final Result by Pio Estrada MD (02/23 2125)      No acute intracranial hemorrhage, midline shift, or mass effect  Workstation performed: [id number]         XR chest 2 views   ED Interpretation by Prabha Lombardi DO (02/23 2109)   Wet read no acute findings      Final Result by Rudi Litten, MD (02/24 1424)      No acute cardiopulmonary disease  Workstation performed: [id number]               Procedures  Procedures      Phone Consults  ED Phone Contact    ED Course  ED Course as of Feb 25 0642   Sat Feb 23, 2019 2048 EKG shows sinus rhythm at a rate of 65  Normal axis  No ST or T-wave changes  2127 CTH normal              Identification of Seniors at Risk      Most Recent Value   (ISAR) Identification of Seniors at Risk   Before the illness or injury that brought you to the Emergency, did you need someone to help you on a regular basis?   0 Filed at: 02/23/2019 1937   In the last 24 hours, have you needed more help than usual?  0 Filed at: 02/23/2019 1937   Have you been hospitalized for one or more nights during the past 6 months? 0 Filed at: 02/23/2019 1937   In general, do you see well?  0 Filed at: 02/23/2019 1937   In general, do you have serious problems with your memory? 0 Filed at: 02/23/2019 0234   Do you take more than three different medications every day?  0 Filed at: 02/23/2019 1937   ISAR Score  0 Filed at: 02/23/2019 1937                          [facility]  Number of Diagnoses or Management Options  Cephalalgia:   Headache:   Diagnosis management comments: Possibly tension headache  However no history of similar  CT head performed with no acute findings  Patient concerned about her blood pressure however systolics in the 737M while she is in the department for the most part  She has no focal findings on neurological exam she also has possible radicular symptoms shooting down from her head to her neck to her shoulder and into her arm  No significant ACS risk factors personally although there is a family history - EKG troponin and labs sent without acute findings  Discussed PCP follow-up  Disposition  Final diagnoses:   Cephalalgia   Headache     Time reflects when diagnosis was documented in both MDM as applicable and the Disposition within this note     Time User Action Codes Description Comment    2/23/2019  9:36 PM Yakov Sonja Add [R51] Cephalalgia     2/23/2019  9:36 PM Yakov Sonja Add [R51] Headache       ED Disposition     ED Disposition Condition Date/Time Comment    Discharge Good Sat Feb 23, 2019  9:36 PM Adrian Zamarripa discharge to home/self care  Follow-up Information     Follow up With Specialties Details Why Contact Info Additional Information    Josefina Robertson DO Family Medicine Schedule an appointment as soon as possible for a visit in 2 days Make follow-up appointment with PCP on Monday [address]  [address]  [address] [facility] Emergency Medicine Go in 1 day As needed [address]  [address], [address] , [place], [place], [address]          Discharge Medication List as of 2/23/2019  9:38 PM      START taking these medications    Details   diclofenac sodium (VOLTAREN) 1 % Apply 2 g topically 4 (four) times a day, Starting Sat 2/23/2019, Print         CONTINUE these medications which have NOT CHANGED    Details   Biotin 5000 MCG CAPS 5000 mcg 1 qd, Historical Med      cholecalciferol (VITAMIN D3) 1,000 units tablet Take 1,000 Units by mouth daily, Historical Med      co-enzyme Q-10 30 MG capsule Take 30 mg by mouth 3 (three) times a day, Historical Med      cyanocobalamin (VITAMIN B-12) 100 mcg tablet Take by mouth daily, Historical Med      Evening Primrose Oil 1000 MG CAPS Take by mouth, Historical Med      ferrous gluconate (FERGON) 324 mg tablet Take 324 mg by mouth daily with breakfast, Historical Med      Garlic 9157 MG CAPS 0212 mg 1 qd, Historical Med      hydrocortisone 2 5 % cream Apply at nighttime and in a m  To affected area on the lips, Normal      loratadine (CLARITIN) 10 mg tablet Take 10 mg by mouth daily, Historical Med      methylPREDNISolone 4 MG tablet therapy pack Use as directed on package, Normal      Omega-3 Fatty Acids (FISH OIL) 1,000 mg Take 1,000 mg by mouth daily, Historical Med      omeprazole (PriLOSEC) 20 mg delayed release capsule Take 1 capsule (20 mg total) by mouth daily, Starting Thu 12/6/2018, Normal      Probiotic Product (PROBIOTIC ACIDOPHILUS BEADS PO) Take by mouth, Historical Med      vitamin E 100 UNIT capsule Take 100 Units by mouth daily, Historical Med           No discharge procedures on file  ED Provider  Attending physically available and evaluated Ngocderickrahul Bard [facility] managed the patient along with the ED Attending      Electronically Signed by         Ursula Cummings DO  02/25/19 5167

## 2019-02-24 NOTE — ED ATTENDING ATTESTATION
Moshe Mackey MD, saw and evaluated the patient  I have discussed the patient with the resident/non-physician practitioner and agree with the resident's/non-physician practitioner's findings, Plan of Care, and MDM as documented in the resident's/non-physician practitioner's note, except where noted  All available labs and Radiology studies were reviewed  At this point I agree with the current assessment done in the Emergency Department  I have conducted an independent evaluation of this patient a history and physical is as follows:       The patient presents for evaluation of headache that she describes the pressure in her head no neck pain no visual changes no difficulty speaking no focal neurologic symptoms no vomiting no fever or chills no chest pain or shortness of breath patient also complains of pain in her left trapezius area that is worse if she touches the area or moves her shoulder  No difficulty walking no vertigo  Only medical problems patient has are related to cervical disc disease and lumbar disc disease  She has no complaints of back pain  Exam well-appearing female no acute distress vital signs are stable she is afebrile HEENT pupils equal reactive to light extraocular muscles are intact face is symmetric no JVD noted neck supple no carotid bruits noted lungs clear heart regular with no murmurs gallops rubs abdomen soft nontender extremities no calf tenderness normal pulses symmetric   Tender left trapezius from of left shoulder   Neurologic exam cranial nerves 2-12 intact motor 5/5 sensory grossly intact gait normal toe walking heel walking normal  CT scan of head Negative  EKG shows no acute ischemic changes troponin negative labs including TSH normal  Impression headache    Critical Care Time  Procedures

## 2019-02-28 ENCOUNTER — OFFICE VISIT (OUTPATIENT)
Dept: FAMILY MEDICINE CLINIC | Facility: CLINIC | Age: 69
End: 2019-02-28
Payer: MEDICARE

## 2019-02-28 VITALS
WEIGHT: 158.2 LBS | HEART RATE: 88 BPM | SYSTOLIC BLOOD PRESSURE: 120 MMHG | BODY MASS INDEX: 29.11 KG/M2 | HEIGHT: 62 IN | OXYGEN SATURATION: 98 % | DIASTOLIC BLOOD PRESSURE: 84 MMHG

## 2019-02-28 DIAGNOSIS — M54.5 LOW BACK PAIN, UNSPECIFIED BACK PAIN LATERALITY, UNSPECIFIED CHRONICITY, WITH SCIATICA PRESENCE UNSPECIFIED: ICD-10-CM

## 2019-02-28 DIAGNOSIS — M54.2 NECK PAIN: Primary | ICD-10-CM

## 2019-02-28 DIAGNOSIS — J32.0 CHRONIC MAXILLARY SINUSITIS: ICD-10-CM

## 2019-02-28 PROCEDURE — 99213 OFFICE O/P EST LOW 20 MIN: CPT | Performed by: FAMILY MEDICINE

## 2019-02-28 NOTE — ASSESSMENT & PLAN NOTE
Low back pain is chronic she will continue and follow up with chiropractic care do home exercises and stretching for this

## 2019-02-28 NOTE — PATIENT INSTRUCTIONS
Neck Pain   WHAT YOU NEED TO KNOW:   You may have sudden neck pain that increases quickly  You may instead feel pain build slowly over time  Neck pain may go away in a few days or weeks, or it may continue for months  The pain may come and go, or be worse with certain movements  The pain may be only in your neck, or it may move to your arms, back, or shoulders  You may also have pain that starts in another body area and moves to your neck  Some types of neck pain are permanent  DISCHARGE INSTRUCTIONS:   Return to the emergency department if:   · You have an injury that causes neck pain and shooting pain down your arms or legs  · Your neck pain suddenly becomes severe  · You have neck pain along with numbness, tingling, or weakness in your arms or legs  · You have a stiff neck, a headache, and a fever  Contact your healthcare provider if:   · You have new or worsening symptoms  · Your symptoms continue even after treatment  · You have questions or concerns about your condition or care  Medicines: You may need any of the following:  · NSAIDs  , such as ibuprofen, help decrease swelling, pain, and fever  This medicine is available without a doctor's order  Ask your healthcare provider which medicine to take and how often to take it  Follow directions  NSAIDs can cause stomach bleeding or kidney problems if not taken correctly  If you take blood thinner medicine, always ask if NSAIDs are safe for you  · Acetaminophen  helps decrease pain and fever  Ask your healthcare provider how much to take and how often to take it  Follow directions  Acetaminophen can cause liver damage if not taken correctly  · Steroid medicine  may be used to reduce inflammation  This can help relieve pain caused by swelling  · Take your medicine as directed  Contact your healthcare provider if you think your medicine is not helping or if you have side effects  Tell him or her if you are allergic to any medicine  Keep a list of the medicines, vitamins, and herbs you take  Include the amounts, and when and why you take them  Bring the list or the pill bottles to follow-up visits  Carry your medicine list with you in case of an emergency  Manage or prevent neck pain:   · Rest your neck as directed  Do not make sudden movements, such as turning your head quickly  Your healthcare provider may recommend you wear a cervical collar for a short time  The collar will prevent you from moving your head  This will give your neck time to heal if an injury is causing your neck pain  Ask your healthcare provider when you can return to sports or other normal daily activities  · Apply heat as directed  Heat helps relieve pain and swelling  Use a heat wrap, or soak a small towel in warm water  Wring out the extra water  Apply the heat wrap or towel for 20 minutes every hour, or as directed  · Apply ice as directed  Ice helps relieve pain and swelling, and can help prevent tissue damage  Use an ice pack, or put ice in a bag  Cover the ice pack or back with a towel before you apply it to your neck  Apply the ice pack or ice for 15 minutes every hour, or as directed  Your healthcare provider can tell you how often to apply ice  · Do neck exercises as directed  Neck exercises help strengthen the muscles and increase range of motion  Your healthcare provider will tell you which exercises are right for you  He may give you instructions, or he may recommend that you work with a physical therapist  Your healthcare provider or therapist can make sure you are doing the exercises correctly  · Maintain good posture  Try to keep your head and shoulders lifted when you sit  If you work in front of a computer, make sure the monitor is at the right level  You should not need to look up down to see the screen  You should also not have to lean forward to be able to read what is on the screen   Make sure your keyboard, mouse, and other computer items are placed where you do not have to extend your shoulder to reach them  Get up often if you work in front of a computer or sit for long periods of time  Stretch or walk around to keep your neck muscles loose  Follow up with your healthcare provider as directed: Your healthcare provider may refer you to a specialist if your pain does not get better with treatment  Write down your questions so you remember to ask them during your visits  © 2017 [employer] Information is for End User's use only and may not be sold, redistributed or otherwise used for commercial purposes  All illustrations and images included in CareNotes® are the copyrighted property of [employer]  or [employer]uf [employer]  The above information is an  only  It is not intended as medical advice for individual conditions or treatments  Talk to your doctor, nurse or pharmacist before following any medical regimen to see if it is safe and effective for you

## 2019-02-28 NOTE — ASSESSMENT & PLAN NOTE
Maxillary sinusitis allergic in nature she is continuing with Claritin-D will take Benadryl at night for this, add nasal saline

## 2019-02-28 NOTE — ASSESSMENT & PLAN NOTE
The patient has neck pain she was seen in the emergency room last week with severe headache and pain radiating into the left arm and sent home after that for musculoskeletal issues  She will follow up with chiropractic manipulation next week  I did some manipulation therapy on her at this time with minimal relief  She still has discomfort but the pain is better than last week

## 2019-03-20 PROBLEM — J32.0 CHRONIC MAXILLARY SINUSITIS: Status: RESOLVED | Noted: 2019-02-28 | Resolved: 2019-03-20

## 2019-03-27 ENCOUNTER — OFFICE VISIT (OUTPATIENT)
Dept: FAMILY MEDICINE CLINIC | Facility: CLINIC | Age: 69
End: 2019-03-27
Payer: MEDICARE

## 2019-03-27 VITALS
BODY MASS INDEX: 30.02 KG/M2 | OXYGEN SATURATION: 99 % | WEIGHT: 159 LBS | DIASTOLIC BLOOD PRESSURE: 80 MMHG | HEART RATE: 79 BPM | SYSTOLIC BLOOD PRESSURE: 118 MMHG | TEMPERATURE: 98.4 F | HEIGHT: 61 IN

## 2019-03-27 DIAGNOSIS — J01.00 ACUTE NON-RECURRENT MAXILLARY SINUSITIS: Primary | ICD-10-CM

## 2019-03-27 PROCEDURE — 99213 OFFICE O/P EST LOW 20 MIN: CPT | Performed by: FAMILY MEDICINE

## 2019-03-27 RX ORDER — CEPHALEXIN 500 MG/1
500 CAPSULE ORAL EVERY 8 HOURS SCHEDULED
Qty: 30 CAPSULE | Refills: 0 | Status: SHIPPED | OUTPATIENT
Start: 2019-03-27 | End: 2019-04-06

## 2019-03-27 NOTE — ASSESSMENT & PLAN NOTE
Patient comes in for acute sinusitis sore throat cough over the past 3-4 days her  has had similar symptoms in the presented together at this time  She has no specific fevers or chills but admits to being more fatigued and has some dizziness associated with this  At this point on examination she reveals a maxillary sinusitis with pain over the maxillary sinuses she has difficulty with pain full teeth in the upper posterior molar region bilaterally  I will prescribe antibiotics at this point if she does not improve in 3-5 days she will call the office or return for follow-up evaluation

## 2019-03-27 NOTE — PROGRESS NOTES
Assessment/Plan:       Problem List Items Addressed This Visit        Respiratory    Acute non-recurrent maxillary sinusitis - Primary      Patient comes in for acute sinusitis sore throat cough over the past 3-4 days her  has had similar symptoms in the presented together at this time  She has no specific fevers or chills but admits to being more fatigued and has some dizziness associated with this  At this point on examination she reveals a maxillary sinusitis with pain over the maxillary sinuses she has difficulty with pain full teeth in the upper posterior molar region bilaterally  I will prescribe antibiotics at this point if she does not improve in 3-5 days she will call the office or return for follow-up evaluation  Relevant Medications    cephalexin (KEFLEX) 500 mg capsule            Subjective:      Patient ID: Mally Wolff is a 79 y o  female  Patient presents today for 4-5 days of nasal congestion sore throat and cough that has not improve she feels generally weak with this as a result she has not had significant fevers or chills associated with this but is concerned about worsening infection      The following portions of the patient's history were reviewed and updated as appropriate: allergies, current medications, past family history, past medical history, past social history, past surgical history and problem list     Review of Systems   Constitutional: Positive for fatigue  Negative for chills and fever  HENT: Positive for postnasal drip, rhinorrhea, sinus pressure and sore throat  Negative for congestion and nosebleeds  Eyes: Negative for discharge and redness  Respiratory: Positive for cough  Negative for shortness of breath  Cardiovascular: Negative for chest pain, palpitations and leg swelling  Gastrointestinal: Negative for abdominal pain, blood in stool and nausea  Endocrine: Negative for cold intolerance, heat intolerance and polyuria     Genitourinary: Negative for dysuria and frequency  Musculoskeletal: Negative for arthralgias, back pain and myalgias  Skin: Negative for rash  Neurological: Positive for dizziness and weakness  Negative for headaches  Hematological: Negative for adenopathy  Psychiatric/Behavioral: Negative for behavioral problems and sleep disturbance  The patient is not nervous/anxious  Objective:      /80 (BP Location: Left arm, Patient Position: Sitting)   Pulse 79   Temp 98 4 °F (36 9 °C) (Tympanic)   Ht 5' 1" (1 549 m)   Wt 72 1 kg (159 lb)   SpO2 99%   BMI 30 04 kg/m²        Physical Exam   Constitutional: She is oriented to person, place, and time  She appears well-developed and well-nourished  No distress  HENT:   Head: Normocephalic and atraumatic  Right Ear: External ear normal    Left Ear: External ear normal    Nose: Nose normal    Mouth/Throat: Oropharynx is clear and moist  No oropharyngeal exudate  Conjunctival hyperemia on the right eye with nasal congestion bilaterally with turbinate hypertrophy and thick mucus exudates in both the nose and throat   Eyes: Pupils are equal, round, and reactive to light  Conjunctivae and EOM are normal  Right eye exhibits no discharge  Left eye exhibits no discharge  No scleral icterus  Neck: Normal range of motion  No JVD present  No thyromegaly present  Cardiovascular: Normal rate, regular rhythm and normal heart sounds  No murmur heard  Pulmonary/Chest: Effort normal  She has no wheezes  She has no rales  She exhibits no tenderness  Abdominal: Soft  Bowel sounds are normal  She exhibits no distension and no mass  There is no tenderness  Musculoskeletal: Normal range of motion  She exhibits no edema, tenderness or deformity  Lymphadenopathy:     She has no cervical adenopathy  Neurological: She is alert and oriented to person, place, and time  She has normal reflexes  She displays normal reflexes  No cranial nerve deficit   Coordination normal  Skin: Skin is warm and dry  No rash noted  Psychiatric: She has a normal mood and affect  Her behavior is normal  Judgment and thought content normal    Nursing note and vitals reviewed  Data:    Laboratory Results: I have personally reviewed the pertinent laboratory results/reports   Radiology/Other Diagnostic Testing Results: I have personally reviewed pertinent reports         Lab Results   Component Value Date    WBC 6 21 02/23/2019    HGB 12 1 02/23/2019    HCT 36 6 02/23/2019    MCV 92 02/23/2019     02/23/2019     Lab Results   Component Value Date    K 3 6 02/23/2019     02/23/2019    CO2 29 02/23/2019    BUN 13 02/23/2019    CREATININE 0 75 02/23/2019    GLUF 87 11/15/2018    CALCIUM 8 6 02/23/2019    AST 18 02/23/2019    ALT 31 02/23/2019    ALKPHOS 102 02/23/2019    EGFR 83 02/23/2019     Lab Results   Component Value Date    CHOLESTEROL 220 (H) 11/15/2018     Lab Results   Component Value Date    HDL 54 11/15/2018     Lab Results   Component Value Date    LDLCALC 148 (H) 11/15/2018     Lab Results   Component Value Date    TRIG 90 11/15/2018     No results found for: [place], [place]  Lab Results   Component Value Date    IWO8PTSCUFDI 3 010 02/23/2019     No results found for: HGBA1C  No results found for: GABE Luis DO

## 2019-03-27 NOTE — PATIENT INSTRUCTIONS
Acute Cough   AMBULATORY CARE:   An acute cough  can last up to 3 weeks  Common causes of an acute cough include a cold, allergies, or a lung infection  Seek care immediately if:   · You have trouble breathing or feel short of breath  · You cough up blood, or you see blood in your mucus  · You faint or feel weak or dizzy  · You have chest pain when you cough or take a deep breath  · You have new wheezing  Contact your healthcare provider if:   · You have a fever  · Your cough lasts longer than 4 weeks  · Your symptoms do not improve with treatment  · You have questions or concerns about your condition or care  Treatment:  An acute cough usually goes away on its own  Ask your healthcare provider about medicines you can take to decrease your cough  You may need medicine to stop the cough, decrease swelling in your airways, or help open your airways  Medicine may also be given to help you cough up mucus  If you have an infection caused by bacteria, you may need antibiotics  Manage your symptoms:   · Do not smoke and stay away from others who smoke  Nicotine and other chemicals in cigarettes and cigars can cause lung damage and make your cough worse  Ask your healthcare provider for information if you currently smoke and need help to quit  E-cigarettes or smokeless tobacco still contain nicotine  Talk to your healthcare provider before you use these products  · Drink extra liquids as directed  Liquids will help thin and loosen mucus so you can cough it up  Liquids will also help prevent dehydration  Examples of good liquids to drink include water, fruit juice, and broth  Do not drink liquids that contain caffeine  Caffeine can increase your risk for dehydration  Ask your healthcare provider how much liquid to drink each day  · Rest as directed  Do not do activities that make your cough worse, such as exercise  · Use a humidifier or vaporizer    Use a cool mist humidifier or a vaporizer to increase air moisture in your home  This may make it easier for you to breathe and help decrease your cough  · Eat 2 to 5 mL of honey 2 times each day  Honey can help thin mucus and decrease your cough  · Use cough drops or lozenges  These can help decrease throat irritation and your cough  Follow up with your healthcare provider as directed:  Write down your questions so you remember to ask them during your visits  © 2017 [facility] Information is for End User's use only and may not be sold, redistributed or otherwise used for commercial purposes  All illustrations and images included in CareNotes® are the copyrighted property of A D A M , Inc  or Yusuf Ramirez  The above information is an  only  It is not intended as medical advice for individual conditions or treatments  Talk to your doctor, nurse or pharmacist before following any medical regimen to see if it is safe and effective for you

## 2019-04-09 ENCOUNTER — TELEPHONE (OUTPATIENT)
Dept: FAMILY MEDICINE CLINIC | Facility: CLINIC | Age: 69
End: 2019-04-09

## 2019-05-03 ENCOUNTER — TELEPHONE (OUTPATIENT)
Dept: FAMILY MEDICINE CLINIC | Facility: CLINIC | Age: 69
End: 2019-05-03

## 2019-05-06 DIAGNOSIS — Z01.84 IMMUNITY TO MEASLES, MUMPS, AND RUBELLA DETERMINED BY SEROLOGIC TEST: Primary | ICD-10-CM

## 2019-05-06 DIAGNOSIS — Z01.84 IMMUNITY STATUS TESTING: Primary | ICD-10-CM

## 2019-05-09 ENCOUNTER — TELEPHONE (OUTPATIENT)
Dept: FAMILY MEDICINE CLINIC | Facility: CLINIC | Age: 69
End: 2019-05-09

## 2019-05-29 ENCOUNTER — CLINICAL SUPPORT (OUTPATIENT)
Dept: FAMILY MEDICINE CLINIC | Facility: CLINIC | Age: 69
End: 2019-05-29
Payer: MEDICARE

## 2019-05-29 DIAGNOSIS — Z23 ENCOUNTER FOR VACCINATION: Primary | ICD-10-CM

## 2019-05-29 PROCEDURE — 90471 IMMUNIZATION ADMIN: CPT

## 2019-05-29 PROCEDURE — 90715 TDAP VACCINE 7 YRS/> IM: CPT

## 2019-11-24 ENCOUNTER — APPOINTMENT (EMERGENCY)
Dept: CT IMAGING | Facility: HOSPITAL | Age: 69
End: 2019-11-24
Payer: MEDICARE

## 2019-11-24 ENCOUNTER — HOSPITAL ENCOUNTER (EMERGENCY)
Facility: HOSPITAL | Age: 69
Discharge: HOME | End: 2019-11-24
Attending: EMERGENCY MEDICINE
Payer: MEDICARE

## 2019-11-24 VITALS
DIASTOLIC BLOOD PRESSURE: 61 MMHG | HEIGHT: 62 IN | OXYGEN SATURATION: 95 % | HEART RATE: 66 BPM | WEIGHT: 160 LBS | SYSTOLIC BLOOD PRESSURE: 108 MMHG | BODY MASS INDEX: 29.44 KG/M2 | TEMPERATURE: 98.4 F | RESPIRATION RATE: 18 BRPM

## 2019-11-24 DIAGNOSIS — R11.0 NAUSEA: ICD-10-CM

## 2019-11-24 DIAGNOSIS — J34.89 SINUS PRESSURE: ICD-10-CM

## 2019-11-24 DIAGNOSIS — R11.10 VOMITING: ICD-10-CM

## 2019-11-24 DIAGNOSIS — R51.9 HEADACHE: Primary | ICD-10-CM

## 2019-11-24 LAB
ALBUMIN SERPL BCP-MCNC: 4.2 G/DL (ref 3.5–5.7)
ALP SERPL-CCNC: 73 U/L (ref 55–165)
ALT SERPL W P-5'-P-CCNC: 14 U/L (ref 7–52)
ANION GAP SERPL CALCULATED.3IONS-SCNC: 10 MMOL/L (ref 4–13)
APTT PPP: 38 SECONDS (ref 23–37)
AST SERPL W P-5'-P-CCNC: 15 U/L (ref 13–39)
BASOPHILS # BLD AUTO: 0 THOUSANDS/ΜL (ref 0–0.1)
BASOPHILS NFR BLD AUTO: 1 % (ref 0–2)
BILIRUB SERPL-MCNC: 0.8 MG/DL (ref 0.2–1)
BUN SERPL-MCNC: 11 MG/DL (ref 7–25)
CALCIUM SERPL-MCNC: 9.2 MG/DL (ref 8.6–10.5)
CHLORIDE SERPL-SCNC: 93 MMOL/L (ref 98–107)
CO2 SERPL-SCNC: 24 MMOL/L (ref 21–31)
CREAT SERPL-MCNC: 0.56 MG/DL (ref 0.6–1.2)
EOSINOPHIL # BLD AUTO: 0 THOUSAND/ΜL (ref 0–0.61)
EOSINOPHIL NFR BLD AUTO: 0 % (ref 0–5)
ERYTHROCYTE [DISTWIDTH] IN BLOOD BY AUTOMATED COUNT: 12.6 % (ref 11.5–14.5)
GFR SERPL CREATININE-BSD FRML MDRD: 97 ML/MIN/1.73SQ M
GLUCOSE SERPL-MCNC: 129 MG/DL (ref 65–99)
HCT VFR BLD AUTO: 38.2 % (ref 42–47)
HGB BLD-MCNC: 12.8 G/DL (ref 12–16)
INR PPP: 1.09 (ref 0.9–1.5)
LIPASE SERPL-CCNC: <10 U/L (ref 11–82)
LYMPHOCYTES # BLD AUTO: 1 THOUSANDS/ΜL (ref 0.6–4.47)
LYMPHOCYTES NFR BLD AUTO: 14 % (ref 21–51)
MCH RBC QN AUTO: 31.5 PG (ref 26–34)
MCHC RBC AUTO-ENTMCNC: 33.5 G/DL (ref 31–37)
MCV RBC AUTO: 94 FL (ref 81–99)
MONOCYTES # BLD AUTO: 0.7 THOUSAND/ΜL (ref 0.17–1.22)
MONOCYTES NFR BLD AUTO: 9 % (ref 2–12)
NEUTROPHILS # BLD AUTO: 5.5 THOUSANDS/ΜL (ref 1.4–6.5)
NEUTS SEG NFR BLD AUTO: 77 % (ref 42–75)
PLATELET # BLD AUTO: 219 THOUSANDS/UL (ref 149–390)
PMV BLD AUTO: 7.9 FL (ref 8.6–11.7)
POTASSIUM SERPL-SCNC: 3.6 MMOL/L (ref 3.5–5.5)
PROT SERPL-MCNC: 7.1 G/DL (ref 6.4–8.9)
PROTHROMBIN TIME: 12.6 SECONDS (ref 10.2–13)
RBC # BLD AUTO: 4.06 MILLION/UL (ref 3.9–5.2)
SODIUM SERPL-SCNC: 127 MMOL/L (ref 134–143)
TROPONIN I SERPL-MCNC: <0.03 NG/ML
WBC # BLD AUTO: 7.2 THOUSAND/UL (ref 4.8–10.8)

## 2019-11-24 PROCEDURE — 96365 THER/PROPH/DIAG IV INF INIT: CPT

## 2019-11-24 PROCEDURE — 99284 EMERGENCY DEPT VISIT MOD MDM: CPT

## 2019-11-24 PROCEDURE — 85610 PROTHROMBIN TIME: CPT | Performed by: EMERGENCY MEDICINE

## 2019-11-24 PROCEDURE — 96375 TX/PRO/DX INJ NEW DRUG ADDON: CPT

## 2019-11-24 PROCEDURE — 99284 EMERGENCY DEPT VISIT MOD MDM: CPT | Performed by: EMERGENCY MEDICINE

## 2019-11-24 PROCEDURE — 80053 COMPREHEN METABOLIC PANEL: CPT | Performed by: EMERGENCY MEDICINE

## 2019-11-24 PROCEDURE — 36415 COLL VENOUS BLD VENIPUNCTURE: CPT | Performed by: EMERGENCY MEDICINE

## 2019-11-24 PROCEDURE — 85025 COMPLETE CBC W/AUTO DIFF WBC: CPT | Performed by: EMERGENCY MEDICINE

## 2019-11-24 PROCEDURE — 84484 ASSAY OF TROPONIN QUANT: CPT | Performed by: EMERGENCY MEDICINE

## 2019-11-24 PROCEDURE — 93005 ELECTROCARDIOGRAM TRACING: CPT

## 2019-11-24 PROCEDURE — 83690 ASSAY OF LIPASE: CPT | Performed by: EMERGENCY MEDICINE

## 2019-11-24 PROCEDURE — 85730 THROMBOPLASTIN TIME PARTIAL: CPT | Performed by: EMERGENCY MEDICINE

## 2019-11-24 PROCEDURE — 70496 CT ANGIOGRAPHY HEAD: CPT

## 2019-11-24 RX ORDER — MAGNESIUM SULFATE HEPTAHYDRATE 40 MG/ML
2 INJECTION, SOLUTION INTRAVENOUS ONCE
Status: COMPLETED | OUTPATIENT
Start: 2019-11-24 | End: 2019-11-24

## 2019-11-24 RX ORDER — ONDANSETRON 2 MG/ML
4 INJECTION INTRAMUSCULAR; INTRAVENOUS ONCE
Status: COMPLETED | OUTPATIENT
Start: 2019-11-24 | End: 2019-11-24

## 2019-11-24 RX ORDER — DIPHENHYDRAMINE HYDROCHLORIDE 50 MG/ML
25 INJECTION INTRAMUSCULAR; INTRAVENOUS ONCE
Status: COMPLETED | OUTPATIENT
Start: 2019-11-24 | End: 2019-11-24

## 2019-11-24 RX ORDER — METOCLOPRAMIDE HYDROCHLORIDE 5 MG/ML
10 INJECTION INTRAMUSCULAR; INTRAVENOUS ONCE
Status: COMPLETED | OUTPATIENT
Start: 2019-11-24 | End: 2019-11-24

## 2019-11-24 RX ORDER — ONDANSETRON 4 MG/1
4 TABLET, FILM COATED ORAL EVERY 6 HOURS
Qty: 12 TABLET | Refills: 0 | Status: SHIPPED | OUTPATIENT
Start: 2019-11-24 | End: 2020-06-22 | Stop reason: ALTCHOICE

## 2019-11-24 RX ORDER — ACETAMINOPHEN 325 MG/1
650 TABLET ORAL ONCE
Status: COMPLETED | OUTPATIENT
Start: 2019-11-24 | End: 2019-11-24

## 2019-11-24 RX ORDER — FENTANYL CITRATE 50 UG/ML
50 INJECTION, SOLUTION INTRAMUSCULAR; INTRAVENOUS ONCE
Status: DISCONTINUED | OUTPATIENT
Start: 2019-11-24 | End: 2019-11-24

## 2019-11-24 RX ADMIN — ONDANSETRON 4 MG: 2 INJECTION INTRAMUSCULAR; INTRAVENOUS at 19:27

## 2019-11-24 RX ADMIN — SODIUM CHLORIDE 1000 ML: 0.9 INJECTION, SOLUTION INTRAVENOUS at 20:43

## 2019-11-24 RX ADMIN — DIPHENHYDRAMINE HYDROCHLORIDE 25 MG: 50 INJECTION INTRAMUSCULAR; INTRAVENOUS at 20:47

## 2019-11-24 RX ADMIN — ACETAMINOPHEN 650 MG: 325 TABLET ORAL at 19:26

## 2019-11-24 RX ADMIN — IOHEXOL 85 ML: 350 INJECTION, SOLUTION INTRAVENOUS at 20:51

## 2019-11-24 RX ADMIN — MAGNESIUM SULFATE HEPTAHYDRATE 2 G: 40 INJECTION, SOLUTION INTRAVENOUS at 20:52

## 2019-11-24 RX ADMIN — METOCLOPRAMIDE 10 MG: 5 INJECTION, SOLUTION INTRAMUSCULAR; INTRAVENOUS at 20:48

## 2019-11-24 NOTE — ED PROVIDER NOTES
History  Chief Complaint   Patient presents with    Nausea     nausea, headache since this am       History provided by:  Patient  Headache   Pain location:  Generalized  Radiates to:  Does not radiate  Severity currently:  9/10  Onset quality:  Gradual  Duration:  1 day  Timing:  Constant  Progression:  Worsening  Chronicity:  New  Context: not activity, not caffeine, not coughing, not eating, not stress, not exposure to cold air, not intercourse, not loud noise and not straining    Relieved by:  Nothing  Worsened by:  Light and activity  Ineffective treatments:  Acetaminophen  Associated symptoms: diarrhea, nausea, photophobia and vomiting    Associated symptoms: no abdominal pain, no back pain, no congestion, no dizziness, no fever, no sore throat and no weakness    Risk factors: no family hx of SAH       Exposed to dirty house x 2 days, wearing masks  Yesterday sinus pressure, runny nose  This am +nausea, fatigue, and the headache  Pain has getting worse through out the day  Pt does not normally get headaches  Pt has taken no medicine for pain, decreased po intake  Prior to Admission Medications   Prescriptions Last Dose Informant Patient Reported? Taking?    Biotin 5000 MCG CAPS  Self Yes No   Si mcg 1 qd   Calcium Citrate-Vitamin D (CITRACAL/VITAMIN D) 250-200 MG-UNIT TABS   Yes No   Sig: Take by mouth   Cranberry-Vitamin C 36385-718 MG CAPS   Yes No   Sig: Take by mouth   Evening Primrose Oil 1000 MG CAPS  Self Yes No   Sig: Take by mouth   Garlic 2891 MG CAPS  Self Yes No   Si mg 1 qd   Omega-3 Fatty Acids (FISH OIL) 1,000 mg  Self Yes No   Sig: Take 1,000 mg by mouth daily   Probiotic Product (PROBIOTIC ACIDOPHILUS BEADS PO)  Self Yes No   Sig: Take by mouth   cholecalciferol (VITAMIN D3) 1,000 units tablet  Self Yes No   Sig: Take 1,000 Units by mouth daily   co-enzyme Q-10 30 MG capsule  Self Yes No   Sig: Take 30 mg by mouth 3 (three) times a day   cyanocobalamin (VITAMIN B-12) 100 mcg tablet  Self Yes No   Sig: Take by mouth daily   diclofenac sodium (VOLTAREN) 1 %  Self No No   Sig: Apply 2 g topically 4 (four) times a day   ferrous gluconate (FERGON) 324 mg tablet  Self Yes No   Sig: Take 324 mg by mouth daily with breakfast   hydrocortisone 2 5 % cream  Self No No   Sig: Apply at nighttime and in a m  To affected area on the lips   loratadine (CLARITIN) 10 mg tablet  Self Yes No   Sig: Take 10 mg by mouth daily   methylPREDNISolone 4 MG tablet therapy pack  Self No No   Sig: Use as directed on package   Patient not taking: Reported on 3/20/2019   omeprazole (PriLOSEC) 20 mg delayed release capsule  Self No No   Sig: Take 1 capsule (20 mg total) by mouth daily   Patient not taking: Reported on 3/20/2019   vitamin E 100 UNIT capsule  Self Yes No   Sig: Take 100 Units by mouth daily      Facility-Administered Medications: None       Past Medical History:   Diagnosis Date    GERD (gastroesophageal reflux disease)     Herniated disc, cervical     Hyperlipidemia        Past Surgical History:   Procedure Laterality Date    BLADDER REPAIR      sling    REMOVAL OF INTRAUTERINE DEVICE (IUD)      in abd area    TONSILLECTOMY      TUMOR REMOVAL      pinky finger 1977    WISDOM TOOTH EXTRACTION         Family History   Problem Relation Age of Onset    Hypertension Mother         Pulmonary    Osteoporosis Mother     Heart failure Mother     Heart attack Father     Hypertension Father     Stroke Father      I have reviewed and agree with the history as documented  Social History     Tobacco Use    Smoking status: Never Smoker    Smokeless tobacco: Never Used   Substance Use Topics    Alcohol use: Never     Frequency: Never    Drug use: Never        Review of Systems   Constitutional: Negative  Negative for chills and fever  HENT: Negative  Negative for congestion and sore throat  Eyes: Positive for photophobia  Negative for discharge and redness  Respiratory: Negative  Negative for chest tightness and shortness of breath  Cardiovascular: Negative  Negative for chest pain and palpitations  Gastrointestinal: Positive for diarrhea, nausea and vomiting  Negative for abdominal pain  Endocrine: Negative  Negative for cold intolerance and polyphagia  Genitourinary: Negative  Negative for difficulty urinating and dysuria  Musculoskeletal: Negative  Negative for arthralgias and back pain  Skin: Negative  Negative for color change and wound  Allergic/Immunologic: Negative  Negative for environmental allergies  Neurological: Positive for headaches  Negative for dizziness and weakness  Hematological: Negative  Psychiatric/Behavioral: Negative  Negative for behavioral problems  The patient is not nervous/anxious  All other systems reviewed and are negative  Physical Exam  Physical Exam   Constitutional: She is oriented to person, place, and time  She appears well-developed and well-nourished  No distress  HENT:   Head: Normocephalic and atraumatic  Right Ear: External ear normal    Left Ear: External ear normal    Mouth/Throat: Oropharynx is clear and moist    Eyes: Pupils are equal, round, and reactive to light  Conjunctivae and EOM are normal  Right eye exhibits no discharge  Left eye exhibits no discharge  No scleral icterus  Neck: Normal range of motion  Neck supple  No tracheal deviation present  No thyromegaly present  Cardiovascular: Normal rate, regular rhythm and intact distal pulses  Exam reveals no gallop and no friction rub  No murmur heard  Pulmonary/Chest: Effort normal and breath sounds normal  No stridor  No respiratory distress  She has no wheezes  She has no rales  Abdominal: Soft  Bowel sounds are normal  She exhibits no distension  There is no tenderness  There is no rebound and no guarding  Musculoskeletal: Normal range of motion  She exhibits no edema or deformity     Neurological: She is alert and oriented to person, place, and time  No cranial nerve deficit  Skin: Skin is warm and dry  No rash noted  She is not diaphoretic  No erythema  Psychiatric: She has a normal mood and affect  Her behavior is normal  Thought content normal    Nursing note and vitals reviewed        Vital Signs  ED Triage Vitals   Temperature Pulse Respirations Blood Pressure SpO2   11/24/19 1848 11/24/19 1848 11/24/19 1848 11/24/19 1848 11/24/19 1848   98 4 °F (36 9 °C) 76 16 110/62 99 %      Temp src Heart Rate Source Patient Position - Orthostatic VS BP Location FiO2 (%)   -- 11/24/19 2100 11/24/19 2000 11/24/19 2000 --    Monitor Lying Right arm       Pain Score       11/24/19 1848       Worst Possible Pain           Vitals:    11/24/19 2000 11/24/19 2045 11/24/19 2100 11/24/19 2130   BP: 117/69 118/59 107/56 108/61   Pulse: 62 65 71 66   Patient Position - Orthostatic VS: Lying Lying Lying Lying         Visual Acuity      ED Medications  Medications   ondansetron (ZOFRAN) injection 4 mg (4 mg Intravenous Given 11/24/19 1927)   acetaminophen (TYLENOL) tablet 650 mg (650 mg Oral Given 11/24/19 1926)   sodium chloride 0 9 % bolus 1,000 mL (0 mL Intravenous Stopped 11/24/19 2143)   magnesium sulfate 2 g/50 mL IVPB (premix) 2 g (0 g Intravenous Stopped 11/24/19 2122)   metoclopramide (REGLAN) injection 10 mg (10 mg Intravenous Given 11/24/19 2048)   diphenhydrAMINE (BENADRYL) injection 25 mg (25 mg Intravenous Given 11/24/19 2047)   iohexol (OMNIPAQUE) 350 MG/ML injection (SINGLE-DOSE) 85 mL (85 mL Intravenous Given 11/24/19 2051)       Diagnostic Studies  Results Reviewed     Procedure Component Value Units Date/Time    Troponin I [[id number]]  (Normal) Collected:  11/24/19 1922    Lab Status:  Final result Specimen:  Blood from Arm, Left Updated:  11/24/19 1950     Troponin I <0 03 ng/mL     Lipase [[id number]]  (Abnormal) Collected:  11/24/19 1922    Lab Status:  Final result Specimen:  Blood from Arm, Left Updated:  11/24/19 1946     Lipase <10 u/L Comprehensive metabolic panel [[id number]]  (Abnormal) Collected:  11/24/19 1922    Lab Status:  Final result Specimen:  Blood from Arm, Left Updated:  11/24/19 1946     Sodium 127 mmol/L      Potassium 3 6 mmol/L      Chloride 93 mmol/L      CO2 24 mmol/L      ANION GAP 10 mmol/L      BUN 11 mg/dL      Creatinine 0 56 mg/dL      Glucose 129 mg/dL      Calcium 9 2 mg/dL      AST 15 U/L      ALT 14 U/L      Alkaline Phosphatase 73 U/L      Total Protein 7 1 g/dL      Albumin 4 2 g/dL      Total Bilirubin 0 80 mg/dL      eGFR 97 ml/min/1 73sq m     Narrative:       National Kidney Disease Foundation guidelines for Chronic Kidney Disease (CKD):     Stage 1 with normal or high GFR (GFR > 90 mL/min/1 73 square meters)    Stage 2 Mild CKD (GFR = 60-89 mL/min/1 73 square meters)    Stage 3A Moderate CKD (GFR = 45-59 mL/min/1 73 square meters)    Stage 3B Moderate CKD (GFR = 30-44 mL/min/1 73 square meters)    Stage 4 Severe CKD (GFR = 15-29 mL/min/1 73 square meters)    Stage 5 End Stage CKD (GFR <15 mL/min/1 73 square meters)  Note: GFR calculation is accurate only with a steady state creatinine    Protime-INR [[id number]]  (Normal) Collected:  11/24/19 1922    Lab Status:  Final result Specimen:  Blood from Arm, Left Updated:  11/24/19 1946     Protime 12 6 seconds      INR 1 09    APTT [[id number]]  (Abnormal) Collected:  11/24/19 1922    Lab Status:  Final result Specimen:  Blood from Arm, Left Updated:  11/24/19 1946     PTT 38 seconds     CBC and differential [[id number]]  (Abnormal) Collected:  11/24/19 1922    Lab Status:  Final result Specimen:  Blood from Arm, Left Updated:  11/24/19 1932     WBC 7 20 Thousand/uL      RBC 4 06 Million/uL      Hemoglobin 12 8 g/dL      Hematocrit 38 2 %      MCV 94 fL      MCH 31 5 pg      MCHC 33 5 g/dL      RDW 12 6 %      MPV 7 9 fL      Platelets 629 Thousands/uL      Neutrophils Relative 77 %      Lymphocytes Relative 14 %      Monocytes Relative 9 %      Eosinophils Relative 0 %      Basophils Relative 1 %      Neutrophils Absolute 5 50 Thousands/µL      Lymphocytes Absolute 1 00 Thousands/µL      Monocytes Absolute 0 70 Thousand/µL      Eosinophils Absolute 0 00 Thousand/µL      Basophils Absolute 0 00 Thousands/µL                  CTA head with and without contrast   Final Result by Valeria Crawford MD (11/24 2124)      1  No acute intracranial hemorrhage, midline shift, or mass effect  2   Chronic small vessel ischemic changes  3   Mucosal thickening within the left maxillary sinus and scattered ethmoid air cells  4   1 to 2 mm infundibulum are seen at the bilateral posterior communicating artery origins otherwise no significant intracranial aneurysm  Workstation performed: [id number]                    Procedures  Procedures       ED Course  ED Course as of Nov 24 2159   Albino Scale Nov 24, 2019   241 81year-old woman comes in for evaluation of headache with nausea  Patient states that she was working this morning in her uncle's house which was quite 30, she had sinus pressure yesterday and then started with a runny nose pain with nausea  Does not have history of headaches does have history of sinus disease  She cannot tell me when the pain was maximal in onset  Denies any neck pain, pain was nonexertional   Denies chest pain shortness of breath endorses nausea, denies abdominal pain  Discussed with the patient that given her age and how the pain started, that she could have a head bleed to evaluate this I would need to do a CT of her head  Given the duration of symptoms though I cannot rule out her bleed completely with just a cleaned CT without contrast   Discussed risks benefits alternatives of doing a CTA verses an LP  I did discuss that standard of care would be to do an LP and that is slightly more sensitive for picking up a subarachnoid hemorrhage however doing of CTA could get us within 99%  Patient was fine with this    I did state that this risk of doing this test was that we could find something incidentally and then would need to follow-up  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  Case was discussed with on-call Radiology  Anything less than 3 mm is subclinical and technically not any aneurysm  Will tell the patient, will have her med should to primary care but likely requires no further workup  Reassess the patient after Zofran, Reglan Benadryl magnesium IV fluids Tylenol  Her headache has completely resolved  She is still slightly nauseous  Will give her 1 dose of Toradol and will discharge her on Zofran  We did discuss return precautions and patient verbalized understanding  2145 I personally discussed return precautions with this patient and family  I provided the patient with written discharge instructions and particularly highlighted specific areas of interest to this patient, including but not limited to: medications for symptom managment, follow up recommendations, and return precautions  Patient and family are in agreement with this plan as outlined above  2145 This EKG was interpreted by me  The EKG demonstrates Normal sinus rhythm, normal intervals and axis, normal QRS, no acute ST changes present  2147 Patient does not want Toradol  Will just discharged on the Zofran              HEART Risk Score      Most Recent Value   History  0 Filed at: 11/24/2019 2145   ECG  0 Filed at: 11/24/2019 2145   Age  2 Filed at: 11/24/2019 2145   Risk Factors  1 Filed at: 11/24/2019 2145   Troponin  0 Filed at: 11/24/2019 2145   Heart Score Risk Calculator   History  0 Filed at: 11/24/2019 2145   ECG  0 Filed at: 11/24/2019 2145   Age  2 Filed at: 11/24/2019 2145   Risk Factors  1 Filed at: 11/24/2019 2145   Troponin  0 Filed at: 11/24/2019 2145   HEART Score  3 Filed at: 11/24/2019 2145   HEART Score  3 Filed at: 11/24/2019 2145 MDM  Number of Diagnoses or Management Options  Headache:   Nausea:   Sinus pressure:   Vomiting:   Diagnosis management comments: This is a 82-year-old woman who presents for evaluation of nausea vomiting and headache  Cannot tell me chronicity  Will do a CT CTA  Did discuss LP versus CTA  Patient decided with CTA  Will get a CBC, CMP, coags, troponin EKG  She has no chest pains willing do 1 troponin EKg  Will treat with migraine cocktail  Disposition will be pending results of workup  Disposition  Final diagnoses:   Nausea   Vomiting   Headache   Sinus pressure     Time reflects when diagnosis was documented in both MDM as applicable and the Disposition within this note     Time User Action Codes Description Comment    11/24/2019  9:48 PM Jessica Barrettyd [R11 0] Nausea     11/24/2019  9:48 PM Jessica Quinton [R11 10] Vomiting     11/24/2019  9:49 PM Toula Isael Add [R51] Headache     11/24/2019  9:49 PM Jailyn Chant [R11 0] Nausea     11/24/2019  9:49 PM Toula Isael Modify [R51] Headache     11/24/2019  9:50 PM Toula Isael Add [C67 31] Sinus pressure       ED Disposition     ED Disposition Condition Date/Time Comment    Discharge Stable Sun Nov 24, 2019  9:48 PM Horace Meals discharge to home/self care  Follow-up Information     Follow up With Specialties Details Why Contact Rich Holland DO Family Medicine Schedule an appointment as soon as possible for a visit in 3 days follow up being seen in the emergency department [address]  [address]   [address]  Emergency Department Emergency Medicine Go to  As needed, If symptoms worsen [address] [facility] [address] [phone]          Patient's Medications   Discharge Prescriptions    ONDANSETRON (ZOFRAN) 4 MG TABLET    Take 1 tablet (4 mg total) by mouth every 6 (six) hours       Start Date: 11/24/2019End Date: --       Order Dose: 4 mg       Quantity: 12 tablet    Refills: 0     No discharge procedures on file      ED Provider  Electronically Signed by           Sonia Cruz MD  11/24/19 3362

## 2019-11-25 LAB
ATRIAL RATE: 65 BPM
P AXIS: 35 DEGREES
PR INTERVAL: 190 MS
QRS AXIS: 43 DEGREES
QRSD INTERVAL: 80 MS
QT INTERVAL: 430 MS
QTC INTERVAL: 447 MS
T WAVE AXIS: 62 DEGREES
VENTRICULAR RATE: 65 BPM

## 2019-11-25 PROCEDURE — 93010 ELECTROCARDIOGRAM REPORT: CPT | Performed by: INTERNAL MEDICINE

## 2019-11-25 NOTE — DISCHARGE INSTRUCTIONS
Please follow-up with the primary care provider in the next 3 days, anything changes or worsens, please return emergency department

## 2019-11-26 ENCOUNTER — OFFICE VISIT (OUTPATIENT)
Dept: FAMILY MEDICINE CLINIC | Facility: CLINIC | Age: 69
End: 2019-11-26
Payer: MEDICARE

## 2019-11-26 VITALS
HEART RATE: 87 BPM | TEMPERATURE: 99.2 F | HEIGHT: 62 IN | SYSTOLIC BLOOD PRESSURE: 120 MMHG | WEIGHT: 158.2 LBS | OXYGEN SATURATION: 96 % | BODY MASS INDEX: 29.11 KG/M2 | DIASTOLIC BLOOD PRESSURE: 80 MMHG

## 2019-11-26 DIAGNOSIS — J06.9 VIRAL URI: ICD-10-CM

## 2019-11-26 DIAGNOSIS — E87.1 HYPONATREMIA: Primary | ICD-10-CM

## 2019-11-26 PROCEDURE — 99213 OFFICE O/P EST LOW 20 MIN: CPT | Performed by: NURSE PRACTITIONER

## 2019-11-26 NOTE — LETTER
November 26, 2019     Patient: Magdiel Whyte   YOB: 1950   Date of Visit: 11/26/2019       To Whom it May Concern:    Татьяна Jenkins is under my professional care  She was seen in my office on 11/26/2019  If you have any questions or concerns, please don't hesitate to call           Sincerely,          KATHRIN Laguna        CC: No Recipients

## 2019-11-26 NOTE — PATIENT INSTRUCTIONS
Hyponatremia   WHAT YOU NEED TO KNOW:   Hyponatremia occurs when the amount of sodium (salt) in your blood is lower than normal  Sodium is an electrolyte (mineral) that helps your muscles, heart, and digestive system work properly  It helps control blood pressure and fluid balance  DISCHARGE INSTRUCTIONS:   Follow up with your healthcare provider as directed: You may need to return for more tests  Write down your questions so you remember to ask them during your visits  Nutrition:  You may need to increase your intake of sodium  Foods that are high in sodium include milk, packaged snacks such as pretzels, or processed meats (seo, sausage, and ham)  Ask your dietitian to help you create a meal plan that is right for you  Liquids: Follow your healthcare provider's advice if you need to limit the amount of liquid you drink  Ask how much liquid to drink each day and which liquids are best for you  You may be asked to drink liquids that have water, sugar, and salt, such as juices, milk, or sports drinks  These liquids help your body hold in fluid and prevent dehydration  Contact your healthcare provider if:   · You have muscle cramps or twitching  · You feel very weak or tired  · You have nausea or are vomiting  · You have questions or concerns about your condition or care  Return to the emergency department if:   · You have a seizure  · You have an irregular heartbeat  · You have trouble breathing  · You cannot move your arms and legs  · You are confused or cannot think clearly  © 2017 [address] [employer] Information is for End User's use only and may not be sold, redistributed or otherwise used for commercial purposes  All illustrations and images included in CareNotes® are the copyrighted property of A D [employer] M [employer]  or Yusuf Ramirez  The above information is an  only  It is not intended as medical advice for individual conditions or treatments   Talk to your doctor, nurse or pharmacist before following any medical regimen to see if it is safe and effective for you

## 2019-11-26 NOTE — PROGRESS NOTES
OFFICE VISIT  Dex Ryan 81 y o  female MRN: [id number]          Assessment / Plan:  Problem List Items Addressed This Visit        Respiratory    Viral URI     Rest, fluids, supportive care  Other    Hyponatremia - Primary     Sodium level in , will repeat level next week  May eat salty foods for one week  Relevant Orders    Basic metabolic panel            Reason For Visit / Chief Complaint  Chief Complaint   Patient presents with    Follow-up     pt is in office today for [place] and head ache pt states that she has some chills  pt was seen 11/24 [facility]        HPI:  Dex Ryan is a 81 y o  female who presents today for follow up from ED  She reports having a URI on Saturday, feeling ill, chills  She reports getting a headache, nausea, sinus pain, She went to ED  She received IV fluids, zofran and Reglan  Nausea subsided  She had a CT scan completed  Today, she reports trying to eat and increase fluids  She has a slight cough  She reports feeling slightly better  She has not taken any further zofran       Historical Information   Past Medical History:   Diagnosis Date    GERD (gastroesophageal reflux disease)     Herniated disc, cervical     Hyperlipidemia      Past Surgical History:   Procedure Laterality Date    BLADDER REPAIR      sling    REMOVAL OF INTRAUTERINE DEVICE (IUD)      in abd area    TONSILLECTOMY      TUMOR REMOVAL      pinky finger 1977    WISDOM TOOTH EXTRACTION       Social History   Social History     Substance and Sexual Activity   Alcohol Use Never    Frequency: Never     Social History     Substance and Sexual Activity   Drug Use Never     Social History     Tobacco Use   Smoking Status Never Smoker   Smokeless Tobacco Never Used     Family History   Problem Relation Age of Onset    Hypertension Mother         Pulmonary    Osteoporosis Mother     Heart failure Mother     Heart attack Father     Hypertension Father     Stroke Father Meds/Allergies   Allergies   Allergen Reactions    Etodolac      Other reaction(s): chest pain, dizziness, nausea    Nitrofurantoin Other (See Comments)    Sulfa Antibiotics Hives    Levofloxacin Dizziness    Pregabalin Chest Pain    Aspirin      Other reaction(s): GI upset    Eggs Or Egg-Derived Products     Ibuprofen      Other reaction(s): GI upset    Naproxen      Other reaction(s): GI upset    Penicillin G      Other reaction(s): swollen lips, tingling around mouth       Meds:    Current Outpatient Medications:     Biotin 5000 MCG CAPS, 5000 mcg 1 qd, Disp: , Rfl:     Calcium Citrate-Vitamin D (CITRACAL/VITAMIN D) 250-200 MG-UNIT TABS, Take by mouth, Disp: , Rfl:     cholecalciferol (VITAMIN D3) 1,000 units tablet, Take 1,000 Units by mouth daily, Disp: , Rfl:     co-enzyme Q-10 30 MG capsule, Take 30 mg by mouth 3 (three) times a day, Disp: , Rfl:     Cranberry-Vitamin C 89443-907 MG CAPS, Take by mouth, Disp: , Rfl:     cyanocobalamin (VITAMIN B-12) 100 mcg tablet, Take by mouth daily, Disp: , Rfl:     diclofenac sodium (VOLTAREN) 1 %, Apply 2 g topically 4 (four) times a day, Disp: 1 Tube, Rfl: 0    Evening Primrose Oil 1000 MG CAPS, Take by mouth, Disp: , Rfl:     ferrous gluconate (FERGON) 324 mg tablet, Take 324 mg by mouth daily with breakfast, Disp: , Rfl:     Garlic 5177 MG CAPS, 1543 mg 1 qd, Disp: , Rfl:     hydrocortisone 2 5 % cream, Apply at nighttime and in a m   To affected area on the lips, Disp: 30 g, Rfl: 0    loratadine (CLARITIN) 10 mg tablet, Take 10 mg by mouth daily, Disp: , Rfl:     Omega-3 Fatty Acids (FISH OIL) 1,000 mg, Take 1,000 mg by mouth daily, Disp: , Rfl:     ondansetron (ZOFRAN) 4 mg tablet, Take 1 tablet (4 mg total) by mouth every 6 (six) hours, Disp: 12 tablet, Rfl: 0    Probiotic Product (PROBIOTIC ACIDOPHILUS BEADS PO), Take by mouth, Disp: , Rfl:     vitamin E 100 UNIT capsule, Take 100 Units by mouth daily, Disp: , Rfl:    methylPREDNISolone 4 MG tablet therapy pack, Use as directed on package (Patient not taking: Reported on 3/20/2019), Disp: 1 each, Rfl: 0    omeprazole (PriLOSEC) 20 mg delayed release capsule, Take 1 capsule (20 mg total) by mouth daily (Patient not taking: Reported on 3/20/2019), Disp: 30 capsule, Rfl: 5      REVIEW OF SYSTEMS  Review of Systems   Constitutional: Negative for chills, fatigue and fever  HENT: Negative for congestion, ear discharge, ear pain, sore throat, trouble swallowing and voice change  Eyes: Negative for pain and redness  Respiratory: Negative for cough, chest tightness, shortness of breath and wheezing  Gastrointestinal: Positive for nausea  Negative for abdominal pain, blood in stool, constipation, diarrhea and vomiting  Endocrine: Negative for cold intolerance, heat intolerance, polydipsia, polyphagia and polyuria  Genitourinary: Negative for decreased urine volume, dysuria, frequency and urgency  Musculoskeletal: Negative for arthralgias, back pain, myalgias and neck pain  Skin: Negative for color change and rash  Neurological: Positive for dizziness  Negative for syncope, weakness, light-headedness, numbness and headaches  Psychiatric/Behavioral: Negative for sleep disturbance and suicidal ideas  The patient is not nervous/anxious  Current Vitals:   Blood Pressure: 120/80 (11/26/19 1211)  Pulse: 87 (11/26/19 1211)  Temperature: 99 2 °F (37 3 °C) (11/26/19 1211)  Height: 5' 2" (157 5 cm) (11/26/19 1211)  Weight - Scale: 71 8 kg (158 lb 3 2 oz)(dizzy couldnt stand) (11/26/19 1211)  SpO2: 96 % (11/26/19 1211)  [unfilled]    PHYSICAL EXAMS:  Physical Exam   Constitutional: She is oriented to person, place, and time  She appears well-developed and well-nourished  HENT:   Head: Normocephalic     Right Ear: External ear normal    Left Ear: External ear normal    Mouth/Throat: Oropharynx is clear and moist    Eyes: Pupils are equal, round, and reactive to light  Conjunctivae are normal    Neck: Neck supple  Cardiovascular: Normal rate and regular rhythm  Pulmonary/Chest: Effort normal and breath sounds normal    Abdominal: Soft  Bowel sounds are normal  She exhibits no distension  There is no tenderness  Musculoskeletal: Normal range of motion  Neurological: She is alert and oriented to person, place, and time  Skin: Skin is warm and dry  Psychiatric: She has a normal mood and affect  Lab, imaging and other studies: I have personally reviewed pertinent reports  Ruel Esquivel

## 2019-12-03 ENCOUNTER — APPOINTMENT (OUTPATIENT)
Dept: LAB | Facility: CLINIC | Age: 69
End: 2019-12-03
Payer: MEDICARE

## 2019-12-03 DIAGNOSIS — E87.1 HYPONATREMIA: ICD-10-CM

## 2019-12-03 PROCEDURE — 36415 COLL VENOUS BLD VENIPUNCTURE: CPT

## 2019-12-04 ENCOUNTER — TELEPHONE (OUTPATIENT)
Dept: FAMILY MEDICINE CLINIC | Facility: CLINIC | Age: 69
End: 2019-12-04

## 2019-12-05 ENCOUNTER — APPOINTMENT (OUTPATIENT)
Dept: LAB | Facility: HOSPITAL | Age: 69
End: 2019-12-05
Payer: MEDICARE

## 2019-12-05 DIAGNOSIS — E87.1 HYPONATREMIA: ICD-10-CM

## 2019-12-05 LAB
ANION GAP SERPL CALCULATED.3IONS-SCNC: 2 MMOL/L (ref 4–13)
BUN SERPL-MCNC: 13 MG/DL (ref 5–25)
CALCIUM SERPL-MCNC: 9.4 MG/DL (ref 8.3–10.1)
CHLORIDE SERPL-SCNC: 106 MMOL/L (ref 100–108)
CO2 SERPL-SCNC: 32 MMOL/L (ref 21–32)
CREAT SERPL-MCNC: 0.72 MG/DL (ref 0.6–1.3)
GFR SERPL CREATININE-BSD FRML MDRD: 87 ML/MIN/1.73SQ M
GLUCOSE P FAST SERPL-MCNC: 95 MG/DL (ref 65–99)
POTASSIUM SERPL-SCNC: 4.7 MMOL/L (ref 3.5–5.3)
SODIUM SERPL-SCNC: 140 MMOL/L (ref 136–145)

## 2019-12-05 PROCEDURE — 36415 COLL VENOUS BLD VENIPUNCTURE: CPT

## 2019-12-05 PROCEDURE — 80048 BASIC METABOLIC PNL TOTAL CA: CPT

## 2019-12-12 ENCOUNTER — OFFICE VISIT (OUTPATIENT)
Dept: FAMILY MEDICINE CLINIC | Facility: CLINIC | Age: 69
End: 2019-12-12
Payer: MEDICARE

## 2019-12-12 VITALS
HEART RATE: 74 BPM | HEIGHT: 62 IN | BODY MASS INDEX: 30.07 KG/M2 | DIASTOLIC BLOOD PRESSURE: 80 MMHG | SYSTOLIC BLOOD PRESSURE: 138 MMHG | OXYGEN SATURATION: 97 % | WEIGHT: 163.4 LBS

## 2019-12-12 DIAGNOSIS — K21.9 GASTROESOPHAGEAL REFLUX DISEASE WITHOUT ESOPHAGITIS: ICD-10-CM

## 2019-12-12 DIAGNOSIS — K13.0 RASH ON LIPS: Primary | ICD-10-CM

## 2019-12-12 DIAGNOSIS — K59.01 SLOW TRANSIT CONSTIPATION: ICD-10-CM

## 2019-12-12 PROCEDURE — 99214 OFFICE O/P EST MOD 30 MIN: CPT | Performed by: FAMILY MEDICINE

## 2019-12-12 RX ORDER — BETA-CAROTENE 7500 MCG
CAPSULE ORAL
COMMUNITY
End: 2020-06-22 | Stop reason: ALTCHOICE

## 2019-12-12 RX ORDER — PYRIDOXINE HCL (VITAMIN B6) 100 MG
TABLET ORAL
COMMUNITY
End: 2020-06-22 | Stop reason: ALTCHOICE

## 2019-12-12 NOTE — PROGRESS NOTES
Assessment/Plan:       Problem List Items Addressed This Visit        Digestive    Gastroesophageal reflux disease without esophagitis     GERD symptoms patient notes that she has had more acid reflux but she is only taking the omeprazole every other day or about 3 times a week I did recommend that she take this daily especially with the stress in her life and resume and follow up with me in 3 months         Rash on lips - Primary     Swollen irritated lower lip read inflamed I discussed snoring saliva excessive licking of her lips she will use Vaseline lip therapy hydrocortisone cream she will avoid touching her lips and watch her hands closely and follow up with me give me a call in 1 week if not improved can also refer to Dermatology if needed but I will in decrease her hydrocortisone cream if necessary         Relevant Medications    Liniments (SALONPAS ARTHRITIS PAIN RELIEF EX)    Slow transit constipation     Slow transit constipation continue with current medications stool softeners and follow up with me in 3 months at fiber to the diet and I will see her back in 3 months                 Subjective:      Patient ID: Radha Pena is a 81 y o  female  Patient presents today for follow-up evaluation she has had a swollen lower lip and is concerned about what it is causing this additionally more stress in her life with her 's recent surgery and an uncle with dementia that they are trying to care for    On questioning she has been using [employer]  at work frequently she uses the microphone to speak in to through her job as a  and additionally notes that she does touch ir lip and face at times with her hand while she is working      The following portions of the patient's history were reviewed and updated as appropriate: allergies, current medications, past family history, past medical history, past social history, past surgical history and problem list     Review of Systems Constitutional: Negative for chills, fatigue and fever  HENT: Negative for congestion, nosebleeds, rhinorrhea, sinus pressure and sore throat  Eyes: Negative for discharge and redness  Respiratory: Negative for cough and shortness of breath  Cardiovascular: Negative for chest pain, palpitations and leg swelling  Gastrointestinal: Negative for abdominal pain, blood in stool and nausea  Endocrine: Negative for cold intolerance, heat intolerance and polyuria  Genitourinary: Negative for dysuria and frequency  Musculoskeletal: Negative for arthralgias, back pain and myalgias  Skin: Negative for rash  Swollen dry cracked lower lip for 2 months   Neurological: Negative for dizziness, weakness and headaches  Hematological: Negative for adenopathy  Psychiatric/Behavioral: Negative for behavioral problems and sleep disturbance  The patient is not nervous/anxious  Objective:      /80   Pulse 74   Ht 5' 2" (1 575 m)   Wt 74 1 kg (163 lb 6 4 oz)   SpO2 97%   BMI 29 89 kg/m²        Physical Exam   Constitutional: She is oriented to person, place, and time  She appears well-developed and well-nourished  No distress  HENT:   Head: Normocephalic and atraumatic  Right Ear: External ear normal    Left Ear: External ear normal    Nose: Nose normal    Mouth/Throat: Oropharynx is clear and moist  No oropharyngeal exudate  Eyes: Pupils are equal, round, and reactive to light  Conjunctivae and EOM are normal  Right eye exhibits no discharge  Left eye exhibits no discharge  No scleral icterus  Neck: Normal range of motion  No JVD present  No thyromegaly present  Cardiovascular: Normal rate, regular rhythm and normal heart sounds  No murmur heard  Pulmonary/Chest: Effort normal  She has no wheezes  She has no rales  She exhibits no tenderness  Abdominal: Soft  Bowel sounds are normal  She exhibits no distension and no mass  There is no tenderness     Musculoskeletal: Normal range of motion  She exhibits no edema, tenderness or deformity  Lymphadenopathy:     She has no cervical adenopathy  Neurological: She is alert and oriented to person, place, and time  She has normal reflexes  She displays normal reflexes  No cranial nerve deficit  Coordination normal    Skin: Skin is warm and dry  No rash noted  Swollen dry lower lip inflamed cracking   Psychiatric: She has a normal mood and affect  Her behavior is normal  Judgment and thought content normal    Nursing note and vitals reviewed  Data:    Laboratory Results: I have personally reviewed the pertinent laboratory results/reports   Radiology/Other Diagnostic Testing Results: I have personally reviewed pertinent reports         Lab Results   Component Value Date    WBC 7 20 11/24/2019    HGB 12 8 11/24/2019    HCT 38 2 (L) 11/24/2019    MCV 94 11/24/2019     11/24/2019     Lab Results   Component Value Date    K 4 7 12/05/2019     12/05/2019    CO2 32 12/05/2019    BUN 13 12/05/2019    CREATININE 0 72 12/05/2019    GLUF 95 12/05/2019    CALCIUM 9 4 12/05/2019    AST 15 11/24/2019    ALT 14 11/24/2019    ALKPHOS 73 11/24/2019    EGFR 87 12/05/2019     Lab Results   Component Value Date    CHOLESTEROL 220 (H) 11/15/2018     Lab Results   Component Value Date    HDL 54 11/15/2018     Lab Results   Component Value Date    LDLCALC 148 (H) 11/15/2018     Lab Results   Component Value Date    TRIG 90 11/15/2018     No results found for: [place], [place]  Lab Results   Component Value Date    [id number] 3 010 02/23/2019     No results found for: HGBA1C  No results found for: GABE Gramajo DO

## 2019-12-12 NOTE — ASSESSMENT & PLAN NOTE
Swollen irritated lower lip read inflamed I discussed snoring saliva excessive licking of her lips she will use Vaseline lip therapy hydrocortisone cream she will avoid touching her lips and watch her hands closely and follow up with me give me a call in 1 week if not improved can also refer to Dermatology if needed but I will in decrease her hydrocortisone cream if necessary

## 2019-12-12 NOTE — ASSESSMENT & PLAN NOTE
Slow transit constipation continue with current medications stool softeners and follow up with me in 3 months at fiber to the diet and I will see her back in 3 months

## 2019-12-12 NOTE — ASSESSMENT & PLAN NOTE
GERD symptoms patient notes that she has had more acid reflux but she is only taking the omeprazole every other day or about 3 times a week I did recommend that she take this daily especially with the stress in her life and resume and follow up with me in 3 months

## 2019-12-12 NOTE — PROGRESS NOTES
BMI Counseling: Body mass index is 29 89 kg/m²  The BMI is above normal  Nutrition recommendations include 3-5 servings of fruits/vegetables daily

## 2019-12-17 ENCOUNTER — TELEPHONE (OUTPATIENT)
Dept: FAMILY MEDICINE CLINIC | Facility: CLINIC | Age: 69
End: 2019-12-17

## 2019-12-17 NOTE — TELEPHONE ENCOUNTER
You had her start omeprazole and she is feeling weak and nauseous, headache, intestinal pain so she wants to stop it

## 2019-12-19 NOTE — TELEPHONE ENCOUNTER
Patient notified, she is not sure if has taken before so she will try it and let us know on Monday how she did with it

## 2019-12-23 ENCOUNTER — TELEPHONE (OUTPATIENT)
Dept: FAMILY MEDICINE CLINIC | Facility: CLINIC | Age: 69
End: 2019-12-23

## 2019-12-23 NOTE — TELEPHONE ENCOUNTER
Bottom lip slightly swelling   Using the cream  No as "Rashy"  Did not start the pepic yet  Will start on Friday  Watching diet  Just an FYI

## 2020-01-20 ENCOUNTER — OFFICE VISIT (OUTPATIENT)
Dept: FAMILY MEDICINE CLINIC | Facility: CLINIC | Age: 70
End: 2020-01-20
Payer: MEDICARE

## 2020-01-20 VITALS
WEIGHT: 161.4 LBS | DIASTOLIC BLOOD PRESSURE: 84 MMHG | HEART RATE: 80 BPM | HEIGHT: 62 IN | OXYGEN SATURATION: 98 % | SYSTOLIC BLOOD PRESSURE: 124 MMHG | BODY MASS INDEX: 29.7 KG/M2

## 2020-01-20 DIAGNOSIS — K12.1: ICD-10-CM

## 2020-01-20 DIAGNOSIS — Z12.39 SCREENING FOR BREAST CANCER: Primary | ICD-10-CM

## 2020-01-20 PROCEDURE — 99213 OFFICE O/P EST LOW 20 MIN: CPT | Performed by: FAMILY MEDICINE

## 2020-01-20 RX ORDER — ECHINACEA 400 MG
CAPSULE ORAL AS NEEDED
COMMUNITY
End: 2022-05-30

## 2020-01-20 RX ORDER — CALCIUM CARBONATE 200(500)MG
1 TABLET,CHEWABLE ORAL AS NEEDED
COMMUNITY
End: 2022-02-17 | Stop reason: HOSPADM

## 2020-01-20 RX ORDER — FLUCONAZOLE 100 MG/1
100 TABLET ORAL DAILY
Qty: 7 TABLET | Refills: 1 | Status: SHIPPED | OUTPATIENT
Start: 2020-01-20 | End: 2020-01-27

## 2020-01-20 NOTE — ASSESSMENT & PLAN NOTE
This contact stomatitis possibly as a result of yeast on the skin mouth lips and near her nose at this point she will stop the hydrocortisone cream she notes tingling in her lips at times I would like her to try 1 week of antifungal tablets I will give her fluconazole 100 mg daily for 1 week and see if symptoms improve overall  She can use Aquaphor lip therapy on her lips and keep in touch with me if symptoms do not improve    I can have her follow-up with Dermatology if needed

## 2020-01-20 NOTE — PROGRESS NOTES
Assessment/Plan:       Problem List Items Addressed This Visit        Digestive    Contact stomatitis     This contact stomatitis possibly as a result of yeast on the skin mouth lips and near her nose at this point she will stop the hydrocortisone cream she notes tingling in her lips at times I would like her to try 1 week of antifungal tablets I will give her fluconazole 100 mg daily for 1 week and see if symptoms improve overall  She can use Aquaphor lip therapy on her lips and keep in touch with me if symptoms do not improve  I can have her follow-up with Dermatology if needed         Relevant Medications    fluconazole (DIFLUCAN) 100 mg tablet      Other Visit Diagnoses     Screening for breast cancer    -  Primary    Relevant Orders    Mammo screening bilateral w 3d & cad            Subjective:      Patient ID: Maureen Land is a 81 y o  female  Patient presents today for continuous rash it has improved somewhat but it is near her nose and she has tingling around the lips at times and still has itching with this she was using hydrocortisone cream with some improvement  The following portions of the patient's history were reviewed and updated as appropriate: allergies, current medications, past family history, past medical history, past social history, past surgical history and problem list     Review of Systems   Skin: Positive for rash  Objective:      /84   Pulse 80   Ht 5' 2" (1 575 m)   Wt 73 2 kg (161 lb 6 4 oz)   SpO2 98%   BMI 29 52 kg/m²        Physical Exam   Constitutional: She is oriented to person, place, and time  She appears well-developed and well-nourished  No distress  HENT:   Head: Normocephalic and atraumatic  Right Ear: External ear normal    Left Ear: External ear normal    Nose: Nose normal    Mouth/Throat: Oropharynx is clear and moist  No oropharyngeal exudate  Eyes: Pupils are equal, round, and reactive to light   Conjunctivae and EOM are normal  Right eye exhibits no discharge  Left eye exhibits no discharge  No scleral icterus  Neck: Normal range of motion  No JVD present  No thyromegaly present  Cardiovascular: Normal rate, regular rhythm and normal heart sounds  No murmur heard  Pulmonary/Chest: Effort normal  She has no wheezes  She has no rales  She exhibits no tenderness  Abdominal: Soft  Bowel sounds are normal  She exhibits no distension and no mass  There is no tenderness  Musculoskeletal: Normal range of motion  She exhibits no edema, tenderness or deformity  Lymphadenopathy:     She has no cervical adenopathy  Neurological: She is alert and oriented to person, place, and time  She has normal reflexes  She displays normal reflexes  No cranial nerve deficit  Coordination normal    Skin: Skin is warm and dry  No rash noted  Psychiatric: She has a normal mood and affect  Her behavior is normal  Judgment and thought content normal    Nursing note and vitals reviewed         Data:    Laboratory Results:  Radiology/Other Diagnostic Testing Results:      Lab Results   Component Value Date    WBC 7 20 11/24/2019    HGB 12 8 11/24/2019    HCT 38 2 (L) 11/24/2019    MCV 94 11/24/2019     11/24/2019     Lab Results   Component Value Date    K 4 7 12/05/2019     12/05/2019    CO2 32 12/05/2019    BUN 13 12/05/2019    CREATININE 0 72 12/05/2019    GLUF 95 12/05/2019    CALCIUM 9 4 12/05/2019    AST 15 11/24/2019    ALT 14 11/24/2019    ALKPHOS 73 11/24/2019    EGFR 87 12/05/2019     Lab Results   Component Value Date    CHOLESTEROL 220 (H) 11/15/2018     Lab Results   Component Value Date    HDL 54 11/15/2018     Lab Results   Component Value Date    LDLCALC 148 (H) 11/15/2018     Lab Results   Component Value Date    TRIG 90 11/15/2018     No results found for: [place], [place]  Lab Results   Component Value Date    [id number] 3 010 02/23/2019     No results found for: HGBA1C  No results found for: PSA    Daniel MITCHELL'Pao, DO

## 2020-03-04 ENCOUNTER — APPOINTMENT (EMERGENCY)
Dept: CT IMAGING | Facility: HOSPITAL | Age: 70
End: 2020-03-04
Payer: MEDICARE

## 2020-03-04 ENCOUNTER — HOSPITAL ENCOUNTER (EMERGENCY)
Facility: HOSPITAL | Age: 70
Discharge: HOME | End: 2020-03-04
Attending: EMERGENCY MEDICINE | Admitting: EMERGENCY MEDICINE
Payer: MEDICARE

## 2020-03-04 VITALS
SYSTOLIC BLOOD PRESSURE: 136 MMHG | DIASTOLIC BLOOD PRESSURE: 70 MMHG | HEART RATE: 60 BPM | WEIGHT: 159.61 LBS | BODY MASS INDEX: 29.37 KG/M2 | OXYGEN SATURATION: 100 % | RESPIRATION RATE: 14 BRPM | TEMPERATURE: 97.6 F | HEIGHT: 62 IN

## 2020-03-04 DIAGNOSIS — R10.9 ABDOMINAL PAIN: Primary | ICD-10-CM

## 2020-03-04 DIAGNOSIS — R11.0 NAUSEA: ICD-10-CM

## 2020-03-04 LAB
ALBUMIN SERPL BCP-MCNC: 3.8 G/DL (ref 3.5–5)
ALP SERPL-CCNC: 89 U/L (ref 46–116)
ALT SERPL W P-5'-P-CCNC: 33 U/L (ref 12–78)
ANION GAP SERPL CALCULATED.3IONS-SCNC: 9 MMOL/L (ref 4–13)
AST SERPL W P-5'-P-CCNC: 21 U/L (ref 5–45)
BASOPHILS # BLD AUTO: 0.03 THOUSANDS/ΜL (ref 0–0.1)
BASOPHILS NFR BLD AUTO: 1 % (ref 0–1)
BILIRUB SERPL-MCNC: 0.4 MG/DL (ref 0.2–1)
BILIRUB UR QL STRIP: NEGATIVE
BUN SERPL-MCNC: 9 MG/DL (ref 5–25)
CALCIUM SERPL-MCNC: 9.1 MG/DL (ref 8.3–10.1)
CHLORIDE SERPL-SCNC: 102 MMOL/L (ref 100–108)
CLARITY UR: CLEAR
CO2 SERPL-SCNC: 29 MMOL/L (ref 21–32)
COLOR UR: NORMAL
CREAT SERPL-MCNC: 0.68 MG/DL (ref 0.6–1.3)
EOSINOPHIL # BLD AUTO: 0.01 THOUSAND/ΜL (ref 0–0.61)
EOSINOPHIL NFR BLD AUTO: 0 % (ref 0–6)
ERYTHROCYTE [DISTWIDTH] IN BLOOD BY AUTOMATED COUNT: 13 % (ref 11.6–15.1)
GFR SERPL CREATININE-BSD FRML MDRD: 91 ML/MIN/1.73SQ M
GLUCOSE SERPL-MCNC: 107 MG/DL (ref 65–140)
GLUCOSE UR STRIP-MCNC: NEGATIVE MG/DL
HCT VFR BLD AUTO: 43 % (ref 34.8–46.1)
HGB BLD-MCNC: 14.1 G/DL (ref 11.5–15.4)
HGB UR QL STRIP.AUTO: NEGATIVE
IMM GRANULOCYTES # BLD AUTO: 0.01 THOUSAND/UL (ref 0–0.2)
IMM GRANULOCYTES NFR BLD AUTO: 0 % (ref 0–2)
KETONES UR STRIP-MCNC: NEGATIVE MG/DL
LEUKOCYTE ESTERASE UR QL STRIP: NEGATIVE
LIPASE SERPL-CCNC: 69 U/L (ref 73–393)
LYMPHOCYTES # BLD AUTO: 1.34 THOUSANDS/ΜL (ref 0.6–4.47)
LYMPHOCYTES NFR BLD AUTO: 22 % (ref 14–44)
MCH RBC QN AUTO: 31.2 PG (ref 26.8–34.3)
MCHC RBC AUTO-ENTMCNC: 32.8 G/DL (ref 31.4–37.4)
MCV RBC AUTO: 95 FL (ref 82–98)
MONOCYTES # BLD AUTO: 0.37 THOUSAND/ΜL (ref 0.17–1.22)
MONOCYTES NFR BLD AUTO: 6 % (ref 4–12)
NEUTROPHILS # BLD AUTO: 4.21 THOUSANDS/ΜL (ref 1.85–7.62)
NEUTS SEG NFR BLD AUTO: 71 % (ref 43–75)
NITRITE UR QL STRIP: NEGATIVE
NRBC BLD AUTO-RTO: 0 /100 WBCS
PH UR STRIP.AUTO: 5.5 [PH]
PLATELET # BLD AUTO: 262 THOUSANDS/UL (ref 149–390)
PMV BLD AUTO: 9.5 FL (ref 8.9–12.7)
POTASSIUM SERPL-SCNC: 4.1 MMOL/L (ref 3.5–5.3)
PROT SERPL-MCNC: 7.9 G/DL (ref 6.4–8.2)
PROT UR STRIP-MCNC: NEGATIVE MG/DL
RBC # BLD AUTO: 4.52 MILLION/UL (ref 3.81–5.12)
SODIUM SERPL-SCNC: 140 MMOL/L (ref 136–145)
SP GR UR STRIP.AUTO: <=1.005 (ref 1–1.03)
UROBILINOGEN UR QL STRIP.AUTO: 0.2 E.U./DL
WBC # BLD AUTO: 5.97 THOUSAND/UL (ref 4.31–10.16)

## 2020-03-04 PROCEDURE — 80053 COMPREHEN METABOLIC PANEL: CPT | Performed by: EMERGENCY MEDICINE

## 2020-03-04 PROCEDURE — 74177 CT ABD & PELVIS W/CONTRAST: CPT

## 2020-03-04 PROCEDURE — 96374 THER/PROPH/DIAG INJ IV PUSH: CPT

## 2020-03-04 PROCEDURE — 83690 ASSAY OF LIPASE: CPT | Performed by: EMERGENCY MEDICINE

## 2020-03-04 PROCEDURE — 81003 URINALYSIS AUTO W/O SCOPE: CPT | Performed by: EMERGENCY MEDICINE

## 2020-03-04 PROCEDURE — 99284 EMERGENCY DEPT VISIT MOD MDM: CPT | Performed by: EMERGENCY MEDICINE

## 2020-03-04 PROCEDURE — 36415 COLL VENOUS BLD VENIPUNCTURE: CPT | Performed by: EMERGENCY MEDICINE

## 2020-03-04 PROCEDURE — 85025 COMPLETE CBC W/AUTO DIFF WBC: CPT | Performed by: EMERGENCY MEDICINE

## 2020-03-04 PROCEDURE — 99284 EMERGENCY DEPT VISIT MOD MDM: CPT

## 2020-03-04 RX ORDER — ONDANSETRON 4 MG/1
4 TABLET, ORALLY DISINTEGRATING ORAL EVERY 8 HOURS PRN
Qty: 20 TABLET | Refills: 0 | Status: SHIPPED | OUTPATIENT
Start: 2020-03-04 | End: 2020-06-22 | Stop reason: ALTCHOICE

## 2020-03-04 RX ORDER — DICYCLOMINE HCL 20 MG
20 TABLET ORAL 2 TIMES DAILY
Qty: 20 TABLET | Refills: 0 | Status: SHIPPED | OUTPATIENT
Start: 2020-03-04 | End: 2020-06-22 | Stop reason: ALTCHOICE

## 2020-03-04 RX ORDER — ONDANSETRON 2 MG/ML
4 INJECTION INTRAMUSCULAR; INTRAVENOUS ONCE
Status: COMPLETED | OUTPATIENT
Start: 2020-03-04 | End: 2020-03-04

## 2020-03-04 RX ORDER — DICYCLOMINE HCL 20 MG
20 TABLET ORAL ONCE
Status: COMPLETED | OUTPATIENT
Start: 2020-03-04 | End: 2020-03-04

## 2020-03-04 RX ADMIN — ONDANSETRON 4 MG: 2 INJECTION INTRAMUSCULAR; INTRAVENOUS at 07:35

## 2020-03-04 RX ADMIN — IOHEXOL 100 ML: 350 INJECTION, SOLUTION INTRAVENOUS at 08:30

## 2020-03-04 RX ADMIN — DICYCLOMINE HYDROCHLORIDE 20 MG: 20 TABLET ORAL at 07:36

## 2020-03-04 NOTE — DISCHARGE INSTRUCTIONS
Take Zofran as needed for nausea, Bentyl for abdominal pain, call the number to follow up with GI as soon as possible and return to ED for worsening

## 2020-03-04 NOTE — ED PROVIDER NOTES
History  Chief Complaint   Patient presents with    Abdominal Pain     pt c/o RLQ abd pain for "weeks" that has gotten worse  states that she has been constipated and now c/o N/V     HPI  59-year-old female presents with right lower quadrant abdominal pain for several weeks originally with constipation and now with diarrhea and nausea/vomiting  She states that she was taking multiple stool softeners in order to improve her constipation, this morning had diarrhea and nausea with episode of vomiting  She states the pain is in right lower quadrant, radiates to back, is sharp and constant, moderate  She denies dysuria, frequency, fevers or chills  No chest pain or shortness of breath  Prior to Admission Medications   Prescriptions Last Dose Informant Patient Reported? Taking?    Biotin 5000 MCG CAPS  Self Yes No   Si mcg 1 qd   Black Elderberry,Berry-Flower, 575 MG CAPS   Yes No   Sig: Take by mouth   Calcium Citrate-Vitamin D (CITRACAL/VITAMIN D) 250-200 MG-UNIT TABS   Yes No   Sig: Take by mouth   Cranberry 500 MG CAPS   Yes No   Sig: cranberry   15,000mg 1 qd   Cranberry-Vitamin C 78891-409 MG CAPS   Yes No   Sig: Take by mouth   Evening Primrose Oil 1000 MG CAPS  Self Yes No   Sig: Take by mouth   Garlic 3451 MG CAPS  Self Yes No   Si mg 1 qd   Xenia, Zingiber officinalis, (XENIA ROOT PO)   Yes No   Si mg 1 qd   Liniments (SALONPAS ARTHRITIS PAIN RELIEF EX)   Yes No   Sig: Salonpas (capsaicin-menthol) 0 025 %-1 25 % topical patch   Omega-3 Fatty Acids (FISH OIL) 1,000 mg  Self Yes No   Sig: Take 1,000 mg by mouth daily   Probiotic Product (PROBIOTIC ACIDOPHILUS BEADS PO)  Self Yes No   Sig: Take by mouth   beta carotene 39113 UNIT capsule   Yes No   Si,000 IU 1 qd   calcium carbonate (TUMS) 500 mg chewable tablet   Yes No   Sig: Chew 1 tablet daily   cholecalciferol (VITAMIN D3) 1,000 units tablet  Self Yes No   Sig: Take 1,000 Units by mouth daily   co-enzyme Q-10 30 MG capsule  Self Yes No   Sig: Take 30 mg by mouth 3 (three) times a day   cyanocobalamin (VITAMIN B-12) 100 mcg tablet  Self Yes No   Sig: Take by mouth daily   diclofenac sodium (VOLTAREN) 1 %  Self No No   Sig: Apply 2 g topically 4 (four) times a day   ferrous gluconate (FERGON) 324 mg tablet  Self Yes No   Sig: Take 324 mg by mouth daily with breakfast   hydrocortisone 2 5 % cream  Self No No   Sig: Apply at nighttime and in a m  To affected area on the lips   loratadine (CLARITIN) 10 mg tablet  Self Yes No   Sig: Take 10 mg by mouth daily   omeprazole (PriLOSEC) 20 mg delayed release capsule  Self No No   Sig: Take 1 capsule (20 mg total) by mouth daily   Patient not taking: Reported on 1/20/2020   ondansetron (ZOFRAN) 4 mg tablet   No No   Sig: Take 1 tablet (4 mg total) by mouth every 6 (six) hours   vitamin E 100 UNIT capsule  Self Yes No   Sig: Take 100 Units by mouth daily      Facility-Administered Medications: None       Past Medical History:   Diagnosis Date    GERD (gastroesophageal reflux disease)     Herniated disc, cervical     Hyperlipidemia        Past Surgical History:   Procedure Laterality Date    BLADDER REPAIR      sling    REMOVAL OF INTRAUTERINE DEVICE (IUD)      in abd area    TONSILLECTOMY      TUMOR REMOVAL      pinky finger 1977    WISDOM TOOTH EXTRACTION         Family History   Problem Relation Age of Onset    Hypertension Mother         Pulmonary    Osteoporosis Mother     Heart failure Mother     Heart attack Father     Hypertension Father     Stroke Father      I have reviewed and agree with the history as documented  E-Cigarette/Vaping    E-Cigarette Use Never User      E-Cigarette/Vaping Substances     Social History     Tobacco Use    Smoking status: Never Smoker    Smokeless tobacco: Never Used   Substance Use Topics    Alcohol use: Never     Frequency: Never    Drug use: Never       Review of Systems   Constitutional: Negative for chills and fever     HENT: Negative for dental problem and ear pain  Eyes: Negative for pain and redness  Respiratory: Negative for cough and shortness of breath  Cardiovascular: Negative for chest pain and palpitations  Gastrointestinal: Positive for abdominal pain, constipation, diarrhea, nausea and vomiting  Endocrine: Negative for polydipsia and polyphagia  Genitourinary: Negative for dysuria and frequency  Musculoskeletal: Negative for arthralgias and joint swelling  Skin: Negative for color change and rash  Neurological: Negative for dizziness and headaches  Psychiatric/Behavioral: Negative for behavioral problems and confusion  All other systems reviewed and are negative  Physical Exam  Physical Exam   Constitutional: She is oriented to person, place, and time  She appears well-developed and well-nourished  No distress  HENT:   Head: Atraumatic  Right Ear: External ear normal    Left Ear: External ear normal    Nose: Nose normal    Eyes: Pupils are equal, round, and reactive to light  Conjunctivae and EOM are normal    Neck: Normal range of motion  Neck supple  No JVD present  Cardiovascular: Normal rate, regular rhythm and normal heart sounds  No murmur heard  Pulmonary/Chest: Effort normal and breath sounds normal  No respiratory distress  She has no wheezes  Abdominal: Soft  Bowel sounds are normal  She exhibits no distension  There is tenderness in the right lower quadrant  Musculoskeletal: Normal range of motion  She exhibits no edema  Neurological: She is alert and oriented to person, place, and time  No cranial nerve deficit  Skin: Skin is warm and dry  Capillary refill takes less than 2 seconds  She is not diaphoretic  Psychiatric: She has a normal mood and affect  Her behavior is normal    Nursing note and vitals reviewed        Vital Signs  ED Triage Vitals [03/04/20 0714]   Temperature Pulse Respirations Blood Pressure SpO2   97 6 °F (36 4 °C) 70 16 140/73 98 %      Temp Source Heart Rate Source Patient Position - Orthostatic VS BP Location FiO2 (%)   Oral Monitor Sitting Right arm --      Pain Score       6           Vitals:    03/04/20 0714 03/04/20 0830 03/04/20 0942   BP: 140/73 133/63 136/70   Pulse: 70 66 60   Patient Position - Orthostatic VS: Sitting Sitting Sitting         Visual Acuity      ED Medications  Medications   dicyclomine (BENTYL) tablet 20 mg (20 mg Oral Given 3/4/20 0736)   ondansetron (ZOFRAN) injection 4 mg (4 mg Intravenous Given 3/4/20 0735)   iohexol (OMNIPAQUE) 350 MG/ML injection (MULTI-DOSE) 100 mL (100 mL Intravenous Given 3/4/20 0830)       Diagnostic Studies  Results Reviewed     Procedure Component Value Units Date/Time    UA w Reflex to Microscopic w Reflex to Culture [[id number]] Collected:  03/04/20 0847    Lab Status:  Final result Specimen:  Urine, Clean Catch Updated:  03/04/20 0854     Color, UA Light Yellow     Clarity, UA Clear     Specific Gravity, UA <=1 005     pH, UA 5 5     Leukocytes, UA Negative     Nitrite, UA Negative     Protein, UA Negative mg/dl      Glucose, UA Negative mg/dl      Ketones, UA Negative mg/dl      Urobilinogen, UA 0 2 E U /dl      Bilirubin, UA Negative     Blood, UA Negative    Comprehensive metabolic panel [[id number]] Collected:  03/04/20 0735    Lab Status:  Final result Specimen:  Blood from Arm, Right Updated:  03/04/20 3420     Sodium 140 mmol/L      Potassium 4 1 mmol/L      Chloride 102 mmol/L      CO2 29 mmol/L      ANION GAP 9 mmol/L      BUN 9 mg/dL      Creatinine 0 68 mg/dL      Glucose 107 mg/dL      Calcium 9 1 mg/dL      AST 21 U/L      ALT 33 U/L      Alkaline Phosphatase 89 U/L      Total Protein 7 9 g/dL      Albumin 3 8 g/dL      Total Bilirubin 0 40 mg/dL      eGFR 91 ml/min/1 73sq m     Narrative:       [place] guidelines for Chronic Kidney Disease (CKD):     Stage 1 with normal or high GFR (GFR > 90 mL/min/1 73 square meters)    Stage 2 Mild CKD (GFR = 60-89 mL/min/1 73 square meters)    Stage 3A Moderate CKD (GFR = 45-59 mL/min/1 73 square meters)    Stage 3B Moderate CKD (GFR = 30-44 mL/min/1 73 square meters)    Stage 4 Severe CKD (GFR = 15-29 mL/min/1 73 square meters)    Stage 5 End Stage CKD (GFR <15 mL/min/1 73 square meters)  Note: GFR calculation is accurate only with a steady state creatinine    Lipase [[id number]]  (Abnormal) Collected:  03/04/20 0735    Lab Status:  Final result Specimen:  Blood from Arm, Right Updated:  03/04/20 0812     Lipase 69 u/L     CBC and differential [[id number]] Collected:  03/04/20 0735    Lab Status:  Final result Specimen:  Blood from Arm, Right Updated:  03/04/20 0742     WBC 5 97 Thousand/uL      RBC 4 52 Million/uL      Hemoglobin 14 1 g/dL      Hematocrit 43 0 %      MCV 95 fL      MCH 31 2 pg      MCHC 32 8 g/dL      RDW 13 0 %      MPV 9 5 fL      Platelets 944 Thousands/uL      nRBC 0 /100 WBCs      Neutrophils Relative 71 %      Immat GRANS % 0 %      Lymphocytes Relative 22 %      Monocytes Relative 6 %      Eosinophils Relative 0 %      Basophils Relative 1 %      Neutrophils Absolute 4 21 Thousands/µL      Immature Grans Absolute 0 01 Thousand/uL      Lymphocytes Absolute 1 34 Thousands/µL      Monocytes Absolute 0 37 Thousand/µL      Eosinophils Absolute 0 01 Thousand/µL      Basophils Absolute 0 03 Thousands/µL                  CT abdomen pelvis with contrast   Final Result by Tiana Simmonds, MD (24/07 3390)   No acute findings  Workstation performed: [id number]                    Procedures  Procedures         ED Course           Identification of Seniors at Risk      Most Recent Value   (ISAR) Identification of Seniors at Risk   Before the illness or injury that brought you to the Emergency, did you need someone to help you on a regular basis?   0 Filed at: 03/04/2020 0718   In the last 24 hours, have you needed more help than usual?  0 Filed at: 03/04/2020 5544   Have you been hospitalized for one or more nights during the past 6 months? 0 Filed at: 03/04/2020 1480   In general, do you see well?  0 Filed at: 03/04/2020 2949   In general, do you have serious problems with your memory? 0 Filed at: 03/04/2020 5094   Do you take more than three different medications every day?  0 Filed at: 03/04/2020 1638   ISAR Score  0 Filed at: 03/04/2020 2692                          [facility]  43-year-old female presents with abdominal pain for the last several weeks, initially constipated and now with episode of diarrhea  CT scan shows no acute findings  Labs are unremarkable  Abdominal exam is benign, doubt surgical etiology  Discussed follow-up with GI for recheck  Will treat with Bentyl and Zofran as needed and return precautions given  Disposition  Final diagnoses:   Abdominal pain   Nausea     Time reflects when diagnosis was documented in both MDM as applicable and the Disposition within this note     Time User Action Codes Description Comment    3/4/2020  9:36 AM Chalice [place] Add [R10 9] Abdominal pain     3/4/2020  9:36 AM Chalice [place] Add [R11 0] Nausea       ED Disposition     ED Disposition Condition Date/Time Comment    Discharge Stable Wed Mar 4, 2020  9:36 AM Melvenia Allis discharge to home/self care              Follow-up Information     Follow up With Specialties Details Why Contact Info Additional [facility] Gastroenterology Call  schedule appointment [address] [place] [address]  [address] [place] [phone]  [address] [facility], [address] [place] [address]  [address], [facility], [place], [address] [facility]          Discharge Medication List as of 3/4/2020  9:37 AM      START taking these medications    Details   dicyclomine (BENTYL) 20 mg tablet Take 1 tablet (20 mg total) by mouth 2 (two) times a day, Starting Wed 3/4/2020, Print      ondansetron (ZOFRAN-ODT) 4 mg disintegrating tablet Take 1 tablet (4 mg total) by mouth every 8 (eight) hours as needed for nausea, Starting Wed 3/4/2020, Print         CONTINUE these medications which have NOT CHANGED    Details   beta carotene 06989 UNIT capsule 25,000 IU 1 qd, Historical Med      Biotin 5000 MCG CAPS 5000 mcg 1 qd, Historical Med      Black Elderberry,Berry-Flower, 575 MG CAPS Take by mouth, Historical Med      calcium carbonate (TUMS) 500 mg chewable tablet Chew 1 tablet daily, Historical Med      Calcium Citrate-Vitamin D (CITRACAL/VITAMIN D) 250-200 MG-UNIT TABS Take by mouth, Historical Med      cholecalciferol (VITAMIN D3) 1,000 units tablet Take 1,000 Units by mouth daily, Historical Med      co-enzyme Q-10 30 MG capsule Take 30 mg by mouth 3 (three) times a day, Historical Med      Cranberry 500 MG CAPS cranberry   15,000mg 1 qd, Historical Med      Cranberry-Vitamin C 45164-967 MG CAPS Take by mouth, Historical Med      cyanocobalamin (VITAMIN B-12) 100 mcg tablet Take by mouth daily, Historical Med      diclofenac sodium (VOLTAREN) 1 % Apply 2 g topically 4 (four) times a day, Starting Sat 2/23/2019, Print      Evening Primrose Oil 1000 MG CAPS Take by mouth, Historical Med      ferrous gluconate (FERGON) 324 mg tablet Take 324 mg by mouth daily with breakfast, Historical Med      Garlic 3344 MG CAPS 9689 mg 1 qd, Historical Med      Ginger, Zingiber officinalis, (GINGER ROOT PO) 550 mg 1 qd, Historical Med      hydrocortisone 2 5 % cream Apply at nighttime and in a m   To affected area on the lips, Normal      Liniments (SALONPAS ARTHRITIS PAIN RELIEF EX) Salonpas (capsaicin-menthol) 0 025 %-1 25 % topical patch, Historical Med      loratadine (CLARITIN) 10 mg tablet Take 10 mg by mouth daily, Historical Med      Omega-3 Fatty Acids (FISH OIL) 1,000 mg Take 1,000 mg by mouth daily, Historical Med      omeprazole (PriLOSEC) 20 mg delayed release capsule Take 1 capsule (20 mg total) by mouth daily, Starting u 12/6/2018, Normal      ondansetron (ZOFRAN) 4 mg tablet Take 1 tablet (4 mg total) by mouth every 6 (six) hours, Starting Sun 11/24/2019, Normal      Probiotic Product (PROBIOTIC ACIDOPHILUS BEADS PO) Take by mouth, Historical Med      vitamin E 100 UNIT capsule Take 100 Units by mouth daily, Historical Med           No discharge procedures on file      PDMP Review     None          ED Provider  Electronically Signed by           Emelyn Flores MD  03/04/20 2065

## 2020-03-06 ENCOUNTER — OFFICE VISIT (OUTPATIENT)
Dept: GASTROENTEROLOGY | Facility: CLINIC | Age: 70
End: 2020-03-06
Payer: MEDICARE

## 2020-03-06 VITALS
HEART RATE: 71 BPM | SYSTOLIC BLOOD PRESSURE: 128 MMHG | BODY MASS INDEX: 29.56 KG/M2 | WEIGHT: 159 LBS | DIASTOLIC BLOOD PRESSURE: 80 MMHG

## 2020-03-06 DIAGNOSIS — R19.8 CHANGE IN BOWEL FUNCTION: ICD-10-CM

## 2020-03-06 DIAGNOSIS — R10.31 RIGHT LOWER QUADRANT ABDOMINAL PAIN: ICD-10-CM

## 2020-03-06 DIAGNOSIS — K21.9 GASTROESOPHAGEAL REFLUX DISEASE WITHOUT ESOPHAGITIS: Primary | ICD-10-CM

## 2020-03-06 DIAGNOSIS — R10.11 RIGHT UPPER QUADRANT ABDOMINAL PAIN: ICD-10-CM

## 2020-03-06 PROCEDURE — 99203 OFFICE O/P NEW LOW 30 MIN: CPT | Performed by: PHYSICIAN ASSISTANT

## 2020-03-06 RX ORDER — SUCRALFATE ORAL 1 G/10ML
1 SUSPENSION ORAL 4 TIMES DAILY
Qty: 420 ML | Refills: 0 | Status: SHIPPED | OUTPATIENT
Start: 2020-03-06 | End: 2020-06-22 | Stop reason: ALTCHOICE

## 2020-03-06 NOTE — PATIENT INSTRUCTIONS
Abdominal Pain   WHAT YOU NEED TO KNOW:   Abdominal pain can be dull, achy, or sharp  You may have pain in one area of your abdomen, or in your entire abdomen  Your pain may be caused by a condition such as constipation, food sensitivity or poisoning, infection, or a blockage  Abdominal pain can also be from a hernia, appendicitis, or an ulcer  Liver, gallbladder, or kidney conditions can also cause abdominal pain  The cause of your abdominal pain may be unknown  DISCHARGE INSTRUCTIONS:   Return to the emergency department if:   · You have new chest pain or shortness of breath  · You have pulsing pain in your upper abdomen or lower back that suddenly becomes constant  · Your pain is in the right lower abdominal area and worsens with movement  · You have a fever over 100 4°F (38°C) or shaking chills  · You are vomiting and cannot keep food or liquids down  · Your pain does not improve or gets worse over the next 8 to 12 hours  · You see blood in your vomit or bowel movements, or they look black and tarry  · Your skin or the whites of your eyes turn yellow  · You are a woman and have a large amount of vaginal bleeding that is not your monthly period  Contact your healthcare provider if:   · You have pain in your lower back  · You are a man and have pain in your testicles  · You have pain when you urinate  · You have questions or concerns about your condition or care  Follow up with your healthcare provider within 24 hours or as directed:  Write down your questions so you remember to ask them during your visits  Medicines:   · Medicines  may be given to calm your stomach and prevent vomiting or to decrease pain  Ask how to take pain medicine safely  · Take your medicine as directed  Contact your healthcare provider if you think your medicine is not helping or if you have side effects  Tell him of her if you are allergic to any medicine   Keep a list of the medicines, vitamins, and herbs you take  Include the amounts, and when and why you take them  Bring the list or the pill bottles to follow-up visits  Carry your medicine list with you in case of an emergency  © 2017 [employer] Information is for End User's use only and may not be sold, redistributed or otherwise used for commercial purposes  All illustrations and images included in CareNotes® are the copyrighted property of [employer]  or Yusuf Ramirez  The above information is an  only  It is not intended as medical advice for individual conditions or treatments  Talk to your doctor, nurse or pharmacist before following any medical regimen to see if it is safe and effective for you

## 2020-03-06 NOTE — PROGRESS NOTES
[facility] - Outpatient Consultation  Skip Richard 81 y o  female MRN: [id number]  Encounter: [id number]          ASSESSMENT AND PLAN:      1  Gastroesophageal reflux disease without esophagitis  2  Right upper quadrant abdominal pain  3  Right lower quadrant abdominal pain  4  Change in bowel function  Will plan EGD and colonoscopy with biopsies  Will plan right upper quadrant  Will start Carafate q i d   Patient will utilize Maalox and Mylanta  Ultrasound hepatobiliary scan with CCK  Follow-up after all testing     ______________________________________________________________________    HPI:    17-year-old female who is here with 6 months of gastrointestinal symptoms  Patient reports that there has been a significant amount of stress in her life in her home life and work life  She reports that the tipping point is when she had to go to the emergency department on Wednesday after sitting on the toilet having acute onset of nausea diarrhea right lower quadrant pain and sweating  Patient did go to the [address] [place] [address] Emergency Department CT scan was performed that showed no acute intra-abdominal abnormality  Patient reports daily GERD symptoms and epigastric abdominal pain  She reports she has failed Prilosec and Protonix  She reports that the Pepcid actually made her feel worse  Patient has never had upper endoscopy in the past   Patient reports nightly regurgitation  Patient's last colonoscopy was in 2014  Patient denies any documented fevers at home  Patient did have a right upper quadrant ultrasound performed in 2018 that showed 2 hepatic cysts  REVIEW OF SYSTEMS:    CONSTITUTIONAL: Denies any fever, chills, rigors, and weight loss  HEENT: No earache or tinnitus  Denies hearing loss or visual disturbances  CARDIOVASCULAR: No chest pain or palpitations  RESPIRATORY: Denies any cough, hemoptysis, shortness of breath or dyspnea on exertion    GASTROINTESTINAL: As noted in the History of Present Illness  GENITOURINARY: No problems with urination  Denies any hematuria or dysuria  NEUROLOGIC: No dizziness or vertigo, denies headaches  MUSCULOSKELETAL: Denies any muscle or joint pain  SKIN: Denies skin rashes or itching  ENDOCRINE: Denies excessive thirst  Denies intolerance to heat or cold  PSYCHOSOCIAL: Denies depression or anxiety  Denies any recent memory loss         Historical Information   Past Medical History:   Diagnosis Date    GERD (gastroesophageal reflux disease)     Herniated disc, cervical     Hyperlipidemia      Past Surgical History:   Procedure Laterality Date    BLADDER REPAIR      sling    REMOVAL OF INTRAUTERINE DEVICE (IUD)      in abd area    TONSILLECTOMY      TUMOR REMOVAL      pinky finger 1977    WISDOM TOOTH EXTRACTION       Social History   Social History     Substance and Sexual Activity   Alcohol Use Never    Frequency: Never     Social History     Substance and Sexual Activity   Drug Use Never     Social History     Tobacco Use   Smoking Status Never Smoker   Smokeless Tobacco Never Used     Family History   Problem Relation Age of Onset    Hypertension Mother         Pulmonary    Osteoporosis Mother     Heart failure Mother     Heart attack Father     Hypertension Father     Stroke Father        Meds/Allergies       Current Outpatient Medications:     beta carotene 33603 UNIT capsule    Biotin 5000 MCG CAPS    Black Elderberry,Berry-Flower, 575 MG CAPS    calcium carbonate (TUMS) 500 mg chewable tablet    Calcium Citrate-Vitamin D (CITRACAL/VITAMIN D) 250-200 MG-UNIT TABS    cholecalciferol (VITAMIN D3) 1,000 units tablet    co-enzyme Q-10 30 MG capsule    Cranberry 500 MG CAPS    Cranberry-Vitamin C 07173-446 MG CAPS    cyanocobalamin (VITAMIN B-12) 100 mcg tablet    diclofenac sodium (VOLTAREN) 1 %    dicyclomine (BENTYL) 20 mg tablet    Evening Primrose Oil 1000 MG CAPS    ferrous gluconate (FERGON) 324 mg tablet    Garlic 5147 MG CAPS    Xenia, Zingiber officinalis, (XENIA ROOT PO)    hydrocortisone 2 5 % cream    Liniments (SALONPAS ARTHRITIS PAIN RELIEF EX)    loratadine (CLARITIN) 10 mg tablet    Omega-3 Fatty Acids (FISH OIL) 1,000 mg    omeprazole (PriLOSEC) 20 mg delayed release capsule    ondansetron (ZOFRAN) 4 mg tablet    ondansetron (ZOFRAN-ODT) 4 mg disintegrating tablet    Probiotic Product (PROBIOTIC ACIDOPHILUS BEADS PO)    vitamin E 100 UNIT capsule    sucralfate (CARAFATE) 1 g/10 mL suspension    Allergies   Allergen Reactions    Eggs Or Egg-Derived Products Other (See Comments)    Etodolac      Other reaction(s): chest pain, dizziness, nausea    Nitrofurantoin Other (See Comments)    Sulfa Antibiotics Hives and Other (See Comments)    Levofloxacin Dizziness    Pregabalin Chest Pain    Aspirin      Other reaction(s): GI upset    Chocolate     Ibuprofen      Other reaction(s): GI upset    Naproxen      Other reaction(s): GI upset    Penicillin G      Other reaction(s): swollen lips, tingling around mouth           Objective     Blood pressure 128/80, pulse 71, weight 72 1 kg (159 lb)  Body mass index is 29 56 kg/m²  PHYSICAL EXAM:      General Appearance:   Alert, cooperative, no distress   HEENT:   Normocephalic, atraumatic, anicteric      Neck:  Supple, symmetrical, trachea midline   Lungs:   Clear to auscultation bilaterally; no rales, rhonchi or wheezing; respirations unlabored    Heart[de-identified]   Regular rate and rhythm; no murmur, rub, or gallop  Abdomen:   Soft, non-tender, non-distended; normal bowel sounds; no masses, no organomegaly    Genitalia:   Deferred    Rectal:   Deferred    Extremities:  No cyanosis, clubbing or edema    Pulses:  2+ and symmetric    Skin:  No jaundice, rashes, or lesions    Lymph nodes:  No palpable cervical lymphadenopathy        Lab Results:   No visits with results within 1 Day(s) from this visit     Latest known visit with results is: Admission on 03/04/2020, Discharged on 03/04/2020   Component Date Value    WBC 03/04/2020 5 97     RBC 03/04/2020 4 52     Hemoglobin 03/04/2020 14 1     Hematocrit 03/04/2020 43 0     MCV 03/04/2020 95     MCH 03/04/2020 31 2     MCHC 03/04/2020 32 8     RDW 03/04/2020 13 0     MPV 03/04/2020 9 5     Platelets 34/56/6635 262     nRBC 03/04/2020 0     Neutrophils Relative 03/04/2020 71     Immat GRANS % 03/04/2020 0     Lymphocytes Relative 03/04/2020 22     Monocytes Relative 03/04/2020 6     Eosinophils Relative 03/04/2020 0     Basophils Relative 03/04/2020 1     Neutrophils Absolute 03/04/2020 4 21     Immature Grans Absolute 03/04/2020 0 01     Lymphocytes Absolute 03/04/2020 1 34     Monocytes Absolute 03/04/2020 0 37     Eosinophils Absolute 03/04/2020 0 01     Basophils Absolute 03/04/2020 0 03     Sodium 03/04/2020 140     Potassium 03/04/2020 4 1     Chloride 03/04/2020 102     CO2 03/04/2020 29     ANION GAP 03/04/2020 9     BUN 03/04/2020 9     Creatinine 03/04/2020 0 68     Glucose 03/04/2020 107     Calcium 03/04/2020 9 1     AST 03/04/2020 21     ALT 03/04/2020 33     Alkaline Phosphatase 03/04/2020 89     Total Protein 03/04/2020 7 9     Albumin 03/04/2020 3 8     Total Bilirubin 03/04/2020 0 40     eGFR 03/04/2020 91     Lipase 03/04/2020 69*    Color, UA 03/04/2020 Light Yellow     Clarity, UA 03/04/2020 Clear     Specific Gravity, UA 03/04/2020 <=1 005     pH, UA 03/04/2020 5 5     Leukocytes, UA 03/04/2020 Negative     Nitrite, UA 03/04/2020 Negative     Protein, UA 03/04/2020 Negative     Glucose, UA 03/04/2020 Negative     Ketones, UA 03/04/2020 Negative     Urobilinogen, UA 03/04/2020 0 2     Bilirubin, UA 03/04/2020 Negative     Blood, UA 03/04/2020 Negative          Radiology Results:   Ct Abdomen Pelvis With Contrast    Result Date: 3/4/2020  Narrative: CT ABDOMEN AND PELVIS WITH IV CONTRAST INDICATION:   RLQ abdominal pain   COMPARISON: None  TECHNIQUE:  CT examination of the abdomen and pelvis was performed  Axial, sagittal, and coronal 2D reformatted images were created from the source data and submitted for interpretation  Radiation dose length product (DLP) for this visit:  468 mGy-cm   This examination, like all CT scans performed in the [facility], was performed utilizing techniques to minimize radiation dose exposure, including the use of iterative reconstruction and automated exposure control  IV Contrast:  100 mL of iohexol (OMNIPAQUE) Enteric Contrast:  Enteric contrast was not administered  FINDINGS: ABDOMEN LOWER CHEST:  No clinically significant abnormality identified in the visualized lower chest  LIVER/BILIARY TREE:  Fatty liver with small cysts noted  GALLBLADDER:  No calcified gallstones  No pericholecystic inflammatory change  SPLEEN:  Unremarkable  PANCREAS:  Unremarkable  ADRENAL GLANDS:  Unremarkable  KIDNEYS/URETERS:  One or more sharply circumscribed subcentimeter renal hypodensities are noted  These lesions are too small to accurately characterize, but are statistically most likely to represent benign cortical renal cyst(s)  According to the guidelines published in the [employer] [facility] Paper of the ACR Incidental Findings Committee (Radiology 2010), no further workup of these lesions is recommended  STOMACH AND BOWEL:  Unremarkable  APPENDIX:  No findings to suggest appendicitis  ABDOMINOPELVIC CAVITY:  No ascites or free intraperitoneal air  No lymphadenopathy  VESSELS:  Unremarkable for patient's age  PELVIS REPRODUCTIVE ORGANS:  Unremarkable for patient's age  URINARY BLADDER:  Unremarkable  ABDOMINAL WALL/INGUINAL REGIONS:  Small fat-containing umbilical hernia noted  OSSEOUS STRUCTURES:  No acute fracture or destructive osseous lesion  Impression: No acute findings   Workstation performed: [id number]

## 2020-03-06 NOTE — LETTER
March 6, 2020     Indira De La Cruz, [address] [address]    Patient: Charlie Peña   YOB: 1950   Date of Visit: 3/6/2020       Dear Dr Dyana Zabala: Thank you for referring Macho Stock to me for evaluation  Below are my notes for this consultation  If you have questions, please do not hesitate to call me  I look forward to following your patient along with you  Sincerely,        James Harp PA-C        CC: Indira De La Cruz, DO  James Harp PA-C  3/6/2020 12:13 PM  Sign at close encounter  [facility] - Outpatient Consultation  Charlie Peña 81 y o  female MRN: [id number]  Encounter: [id number]          ASSESSMENT AND PLAN:      1  Gastroesophageal reflux disease without esophagitis  2  Right upper quadrant abdominal pain  3  Right lower quadrant abdominal pain  4  Change in bowel function  Will plan EGD and colonoscopy with biopsies  Will plan right upper quadrant  Will start Carafate q i d   Patient will utilize Maalox and Mylanta  Ultrasound hepatobiliary scan with CCK  Follow-up after all testing     ______________________________________________________________________    HPI:    26-year-old female who is here with 6 months of gastrointestinal symptoms  Patient reports that there has been a significant amount of stress in her life in her home life and work life  She reports that the tipping point is when she had to go to the emergency department on Wednesday after sitting on the toilet having acute onset of nausea diarrhea right lower quadrant pain and sweating  Patient did go to the [facility] Emergency Department CT scan was performed that showed no acute intra-abdominal abnormality  Patient reports daily GERD symptoms and epigastric abdominal pain  She reports she has failed Prilosec and Protonix  She reports that the Pepcid actually made her feel worse    Patient has never had upper endoscopy in the past  Patient reports nightly regurgitation  Patient's last colonoscopy was in 2014  Patient denies any documented fevers at home  Patient did have a right upper quadrant ultrasound performed in 2018 that showed 2 hepatic cysts  REVIEW OF SYSTEMS:    CONSTITUTIONAL: Denies any fever, chills, rigors, and weight loss  HEENT: No earache or tinnitus  Denies hearing loss or visual disturbances  CARDIOVASCULAR: No chest pain or palpitations  RESPIRATORY: Denies any cough, hemoptysis, shortness of breath or dyspnea on exertion  GASTROINTESTINAL: As noted in the History of Present Illness  GENITOURINARY: No problems with urination  Denies any hematuria or dysuria  NEUROLOGIC: No dizziness or vertigo, denies headaches  MUSCULOSKELETAL: Denies any muscle or joint pain  SKIN: Denies skin rashes or itching  ENDOCRINE: Denies excessive thirst  Denies intolerance to heat or cold  PSYCHOSOCIAL: Denies depression or anxiety  Denies any recent memory loss         Historical Information   Past Medical History:   Diagnosis Date    GERD (gastroesophageal reflux disease)     Herniated disc, cervical     Hyperlipidemia      Past Surgical History:   Procedure Laterality Date    BLADDER REPAIR      sling    REMOVAL OF INTRAUTERINE DEVICE (IUD)      in abd area    TONSILLECTOMY      TUMOR REMOVAL      pinky finger 1977    WISDOM TOOTH EXTRACTION       Social History   Social History     Substance and Sexual Activity   Alcohol Use Never    Frequency: Never     Social History     Substance and Sexual Activity   Drug Use Never     Social History     Tobacco Use   Smoking Status Never Smoker   Smokeless Tobacco Never Used     Family History   Problem Relation Age of Onset    Hypertension Mother         Pulmonary    Osteoporosis Mother     Heart failure Mother     Heart attack Father     Hypertension Father     Stroke Father        Meds/Allergies       Current Outpatient Medications:     beta carotene 33732 UNIT capsule    Biotin 5000 MCG CAPS    Black Elderberry,Berry-Flower, 575 MG CAPS    calcium carbonate (TUMS) 500 mg chewable tablet    Calcium Citrate-Vitamin D (CITRACAL/VITAMIN D) 250-200 MG-UNIT TABS    cholecalciferol (VITAMIN D3) 1,000 units tablet    co-enzyme Q-10 30 MG capsule    Cranberry 500 MG CAPS    Cranberry-Vitamin C 30918-586 MG CAPS    cyanocobalamin (VITAMIN B-12) 100 mcg tablet    diclofenac sodium (VOLTAREN) 1 %    dicyclomine (BENTYL) 20 mg tablet    Evening Primrose Oil 1000 MG CAPS    ferrous gluconate (FERGON) 324 mg tablet    Garlic 2485 MG CAPS    Xenia, Zingiber officinalis, (XENIA ROOT PO)    hydrocortisone 2 5 % cream    Liniments (SALONPAS ARTHRITIS PAIN RELIEF EX)    loratadine (CLARITIN) 10 mg tablet    Omega-3 Fatty Acids (FISH OIL) 1,000 mg    omeprazole (PriLOSEC) 20 mg delayed release capsule    ondansetron (ZOFRAN) 4 mg tablet    ondansetron (ZOFRAN-ODT) 4 mg disintegrating tablet    Probiotic Product (PROBIOTIC ACIDOPHILUS BEADS PO)    vitamin E 100 UNIT capsule    sucralfate (CARAFATE) 1 g/10 mL suspension    Allergies   Allergen Reactions    Eggs Or Egg-Derived Products Other (See Comments)    Etodolac      Other reaction(s): chest pain, dizziness, nausea    Nitrofurantoin Other (See Comments)    Sulfa Antibiotics Hives and Other (See Comments)    Levofloxacin Dizziness    Pregabalin Chest Pain    Aspirin      Other reaction(s): GI upset    Chocolate     Ibuprofen      Other reaction(s): GI upset    Naproxen      Other reaction(s): GI upset    Penicillin G      Other reaction(s): swollen lips, tingling around mouth           Objective     Blood pressure 128/80, pulse 71, weight 72 1 kg (159 lb)  Body mass index is 29 56 kg/m²          PHYSICAL EXAM:      General Appearance:   Alert, cooperative, no distress   HEENT:   Normocephalic, atraumatic, anicteric      Neck:  Supple, symmetrical, trachea midline   Lungs:   Clear to auscultation bilaterally; no rales, rhonchi or wheezing; respirations unlabored    Heart[de-identified]   Regular rate and rhythm; no murmur, rub, or gallop  Abdomen:   Soft, non-tender, non-distended; normal bowel sounds; no masses, no organomegaly    Genitalia:   Deferred    Rectal:   Deferred    Extremities:  No cyanosis, clubbing or edema    Pulses:  2+ and symmetric    Skin:  No jaundice, rashes, or lesions    Lymph nodes:  No palpable cervical lymphadenopathy        Lab Results:   No visits with results within 1 Day(s) from this visit     Latest known visit with results is:   Admission on 03/04/2020, Discharged on 03/04/2020   Component Date Value    WBC 03/04/2020 5 97     RBC 03/04/2020 4 52     Hemoglobin 03/04/2020 14 1     Hematocrit 03/04/2020 43 0     MCV 03/04/2020 95     MCH 03/04/2020 31 2     MCHC 03/04/2020 32 8     RDW 03/04/2020 13 0     MPV 03/04/2020 9 5     Platelets 99/09/5739 262     nRBC 03/04/2020 0     Neutrophils Relative 03/04/2020 71     Immat GRANS % 03/04/2020 0     Lymphocytes Relative 03/04/2020 22     Monocytes Relative 03/04/2020 6     Eosinophils Relative 03/04/2020 0     Basophils Relative 03/04/2020 1     Neutrophils Absolute 03/04/2020 4 21     Immature Grans Absolute 03/04/2020 0 01     Lymphocytes Absolute 03/04/2020 1 34     Monocytes Absolute 03/04/2020 0 37     Eosinophils Absolute 03/04/2020 0 01     Basophils Absolute 03/04/2020 0 03     Sodium 03/04/2020 140     Potassium 03/04/2020 4 1     Chloride 03/04/2020 102     CO2 03/04/2020 29     ANION GAP 03/04/2020 9     BUN 03/04/2020 9     Creatinine 03/04/2020 0 68     Glucose 03/04/2020 107     Calcium 03/04/2020 9 1     AST 03/04/2020 21     ALT 03/04/2020 33     Alkaline Phosphatase 03/04/2020 89     Total Protein 03/04/2020 7 9     Albumin 03/04/2020 3 8     Total Bilirubin 03/04/2020 0 40     eGFR 03/04/2020 91     Lipase 03/04/2020 69*    Color, UA 03/04/2020 Light Yellow     Clarity, UA 03/04/2020 Clear     Specific Gravity, UA 03/04/2020 <=1 005     pH, UA 03/04/2020 5 5     Leukocytes, UA 03/04/2020 Negative     Nitrite, UA 03/04/2020 Negative     Protein, UA 03/04/2020 Negative     Glucose, UA 03/04/2020 Negative     Ketones, UA 03/04/2020 Negative     Urobilinogen, UA 03/04/2020 0 2     Bilirubin, UA 03/04/2020 Negative     Blood, UA 03/04/2020 Negative          Radiology Results:   Ct Abdomen Pelvis With Contrast    Result Date: 3/4/2020  Narrative: CT ABDOMEN AND PELVIS WITH IV CONTRAST INDICATION:   RLQ abdominal pain  COMPARISON:  None  TECHNIQUE:  CT examination of the abdomen and pelvis was performed  Axial, sagittal, and coronal 2D reformatted images were created from the source data and submitted for interpretation  Radiation dose length product (DLP) for this visit:  468 mGy-cm   This examination, like all CT scans performed in the [facility], was performed utilizing techniques to minimize radiation dose exposure, including the use of iterative reconstruction and automated exposure control  IV Contrast:  100 mL of iohexol (OMNIPAQUE) Enteric Contrast:  Enteric contrast was not administered  FINDINGS: ABDOMEN LOWER CHEST:  No clinically significant abnormality identified in the visualized lower chest  LIVER/BILIARY TREE:  Fatty liver with small cysts noted  GALLBLADDER:  No calcified gallstones  No pericholecystic inflammatory change  SPLEEN:  Unremarkable  PANCREAS:  Unremarkable  ADRENAL GLANDS:  Unremarkable  KIDNEYS/URETERS:  One or more sharply circumscribed subcentimeter renal hypodensities are noted  These lesions are too small to accurately characterize, but are statistically most likely to represent benign cortical renal cyst(s)  According to the guidelines published in the [facility] Paper of the ACR Incidental Findings Committee (Radiology 2010), no further workup of these lesions is recommended  STOMACH AND BOWEL:  Unremarkable   APPENDIX:  No findings to suggest appendicitis  ABDOMINOPELVIC CAVITY:  No ascites or free intraperitoneal air  No lymphadenopathy  VESSELS:  Unremarkable for patient's age  PELVIS REPRODUCTIVE ORGANS:  Unremarkable for patient's age  URINARY BLADDER:  Unremarkable  ABDOMINAL WALL/INGUINAL REGIONS:  Small fat-containing umbilical hernia noted  OSSEOUS STRUCTURES:  No acute fracture or destructive osseous lesion  Impression: No acute findings   Workstation performed: [id number]

## 2020-03-09 ENCOUNTER — TELEPHONE (OUTPATIENT)
Dept: GASTROENTEROLOGY | Facility: CLINIC | Age: 70
End: 2020-03-09

## 2020-03-09 NOTE — TELEPHONE ENCOUNTER
Luh Husbands - Patient went to  the carafate, $175  Patient would like to know if there in anyway we can get the ramirez down, since Luh Husbands told patient this is the best medication   Please call Tyler Zuniga at [phone] ty

## 2020-03-16 ENCOUNTER — TELEPHONE (OUTPATIENT)
Dept: GASTROENTEROLOGY | Facility: CLINIC | Age: 70
End: 2020-03-16

## 2020-03-16 NOTE — TELEPHONE ENCOUNTER
[place]-patient called to let you know she stopped her sucralfate it was giving her head and neck pain but what she did take relieved her symptoms

## 2020-03-25 NOTE — TELEPHONE ENCOUNTER
Called pt to see how we can help  Pt stated she will keep her US that is scheduled due to she also has to bring in her Mother in Law for a CT at the same time  Pt wants to space out her other tests such as her Hyrdo test and her Colon/EGD     Gave pt central scheduling number and told pt will have Talat Waite call her to reschedule her Colon/EGD  Routing to Talat Waite

## 2020-03-25 NOTE — TELEPHONE ENCOUNTER
[address] [place] [facility] [place] wanted to talk to lester about what is going on   Please call Elly Cunha at [phone] ty

## 2020-03-27 ENCOUNTER — HOSPITAL ENCOUNTER (OUTPATIENT)
Dept: ULTRASOUND IMAGING | Facility: HOSPITAL | Age: 70
Discharge: HOME | End: 2020-03-27
Payer: MEDICARE

## 2020-03-27 DIAGNOSIS — K21.9 GASTROESOPHAGEAL REFLUX DISEASE WITHOUT ESOPHAGITIS: ICD-10-CM

## 2020-03-27 PROCEDURE — 76705 ECHO EXAM OF ABDOMEN: CPT

## 2020-03-30 ENCOUNTER — TELEPHONE (OUTPATIENT)
Dept: GASTROENTEROLOGY | Facility: CLINIC | Age: 70
End: 2020-03-30

## 2020-03-30 NOTE — TELEPHONE ENCOUNTER
----- Message from Aranza Santana PA-C sent at 3/30/2020  3:48 PM EDT -----  Please inform patient that her US was normal Thanks

## 2020-04-08 ENCOUNTER — TELEPHONE (OUTPATIENT)
Dept: GASTROENTEROLOGY | Facility: CLINIC | Age: 70
End: 2020-04-08

## 2020-05-05 ENCOUNTER — TELEPHONE (OUTPATIENT)
Dept: GASTROENTEROLOGY | Facility: CLINIC | Age: 70
End: 2020-05-05

## 2020-05-11 ENCOUNTER — TELEMEDICINE (OUTPATIENT)
Dept: FAMILY MEDICINE CLINIC | Facility: CLINIC | Age: 70
End: 2020-05-11
Payer: MEDICARE

## 2020-05-11 ENCOUNTER — TELEPHONE (OUTPATIENT)
Dept: FAMILY MEDICINE CLINIC | Facility: CLINIC | Age: 70
End: 2020-05-11

## 2020-05-11 DIAGNOSIS — Z20.828 EXPOSURE TO SARS-ASSOCIATED CORONAVIRUS: ICD-10-CM

## 2020-05-11 DIAGNOSIS — Z20.822 SUSPECTED COVID-19 VIRUS INFECTION: Primary | ICD-10-CM

## 2020-05-11 PROCEDURE — 99442 PR PHYS/QHP TELEPHONE EVALUATION 11-20 MIN: CPT | Performed by: FAMILY MEDICINE

## 2020-05-11 PROCEDURE — U0003 INFECTIOUS AGENT DETECTION BY NUCLEIC ACID (DNA OR RNA); SEVERE ACUTE RESPIRATORY SYNDROME CORONAVIRUS 2 (SARS-COV-2) (CORONAVIRUS DISEASE [COVID-19]), AMPLIFIED PROBE TECHNIQUE, MAKING USE OF HIGH THROUGHPUT TECHNOLOGIES AS DESCRIBED BY CMS-2020-01-R: HCPCS

## 2020-05-12 LAB — SARS-COV-2 RNA SPEC QL NAA+PROBE: NOT DETECTED

## 2020-06-16 ENCOUNTER — TELEPHONE (OUTPATIENT)
Dept: FAMILY MEDICINE CLINIC | Facility: CLINIC | Age: 70
End: 2020-06-16

## 2020-06-17 ENCOUNTER — OFFICE VISIT (OUTPATIENT)
Dept: FAMILY MEDICINE CLINIC | Facility: CLINIC | Age: 70
End: 2020-06-17
Payer: MEDICARE

## 2020-06-17 VITALS
BODY MASS INDEX: 29.08 KG/M2 | TEMPERATURE: 97.6 F | HEIGHT: 62 IN | OXYGEN SATURATION: 98 % | DIASTOLIC BLOOD PRESSURE: 80 MMHG | WEIGHT: 158 LBS | HEART RATE: 82 BPM | SYSTOLIC BLOOD PRESSURE: 122 MMHG

## 2020-06-17 DIAGNOSIS — M85.89 OSTEOPENIA OF MULTIPLE SITES: ICD-10-CM

## 2020-06-17 DIAGNOSIS — K59.01 SLOW TRANSIT CONSTIPATION: ICD-10-CM

## 2020-06-17 DIAGNOSIS — E66.3 OVERWEIGHT WITH BODY MASS INDEX (BMI) OF 29 TO 29.9 IN ADULT: ICD-10-CM

## 2020-06-17 DIAGNOSIS — E55.9 VITAMIN D DEFICIENCY: ICD-10-CM

## 2020-06-17 DIAGNOSIS — Z83.49 FAMILY HISTORY OF THYROID DISEASE IN MOTHER: ICD-10-CM

## 2020-06-17 DIAGNOSIS — Z00.00 MEDICARE ANNUAL WELLNESS VISIT, SUBSEQUENT: ICD-10-CM

## 2020-06-17 DIAGNOSIS — Z11.59 ENCOUNTER FOR HEPATITIS C SCREENING TEST FOR LOW RISK PATIENT: ICD-10-CM

## 2020-06-17 PROBLEM — J01.00 ACUTE NON-RECURRENT MAXILLARY SINUSITIS: Status: RESOLVED | Noted: 2019-03-27 | Resolved: 2020-06-17

## 2020-06-17 PROBLEM — K13.0 RASH ON LIPS: Status: RESOLVED | Noted: 2018-12-06 | Resolved: 2020-06-17

## 2020-06-17 PROBLEM — J06.9 VIRAL URI: Status: RESOLVED | Noted: 2019-11-26 | Resolved: 2020-06-17

## 2020-06-17 PROBLEM — Z20.822 SUSPECTED COVID-19 VIRUS INFECTION: Status: RESOLVED | Noted: 2020-05-11 | Resolved: 2020-06-17

## 2020-06-17 PROCEDURE — 99214 OFFICE O/P EST MOD 30 MIN: CPT | Performed by: NURSE PRACTITIONER

## 2020-06-17 PROCEDURE — 1170F FXNL STATUS ASSESSED: CPT | Performed by: NURSE PRACTITIONER

## 2020-06-17 PROCEDURE — 1123F ACP DISCUSS/DSCN MKR DOCD: CPT | Performed by: NURSE PRACTITIONER

## 2020-06-17 PROCEDURE — 1160F RVW MEDS BY RX/DR IN RCRD: CPT | Performed by: NURSE PRACTITIONER

## 2020-06-17 PROCEDURE — 1036F TOBACCO NON-USER: CPT | Performed by: NURSE PRACTITIONER

## 2020-06-17 PROCEDURE — 1125F AMNT PAIN NOTED PAIN PRSNT: CPT | Performed by: NURSE PRACTITIONER

## 2020-06-17 PROCEDURE — G0438 PPPS, INITIAL VISIT: HCPCS | Performed by: NURSE PRACTITIONER

## 2020-06-17 PROCEDURE — 3008F BODY MASS INDEX DOCD: CPT | Performed by: NURSE PRACTITIONER

## 2020-06-17 RX ORDER — MAGNESIUM HYDROXIDE/ALUMINUM HYDROXICE/SIMETHICONE 120; 1200; 1200 MG/30ML; MG/30ML; MG/30ML
30 SUSPENSION ORAL EVERY 4 HOURS PRN
COMMUNITY
End: 2020-12-14 | Stop reason: ALTCHOICE

## 2020-06-22 ENCOUNTER — OFFICE VISIT (OUTPATIENT)
Dept: FAMILY MEDICINE CLINIC | Facility: CLINIC | Age: 70
End: 2020-06-22
Payer: MEDICARE

## 2020-06-22 VITALS
DIASTOLIC BLOOD PRESSURE: 78 MMHG | SYSTOLIC BLOOD PRESSURE: 124 MMHG | WEIGHT: 157.6 LBS | BODY MASS INDEX: 29.3 KG/M2 | TEMPERATURE: 98 F | HEART RATE: 69 BPM | OXYGEN SATURATION: 97 %

## 2020-06-22 DIAGNOSIS — H25.813 COMBINED FORMS OF AGE-RELATED CATARACT OF BOTH EYES: ICD-10-CM

## 2020-06-22 DIAGNOSIS — H92.02 LEFT EAR PAIN: Primary | ICD-10-CM

## 2020-06-22 DIAGNOSIS — K21.9 GASTROESOPHAGEAL REFLUX DISEASE WITHOUT ESOPHAGITIS: ICD-10-CM

## 2020-06-22 PROCEDURE — 1160F RVW MEDS BY RX/DR IN RCRD: CPT | Performed by: FAMILY MEDICINE

## 2020-06-22 PROCEDURE — 99214 OFFICE O/P EST MOD 30 MIN: CPT | Performed by: FAMILY MEDICINE

## 2020-06-22 PROCEDURE — 1170F FXNL STATUS ASSESSED: CPT | Performed by: FAMILY MEDICINE

## 2020-06-22 PROCEDURE — 1036F TOBACCO NON-USER: CPT | Performed by: FAMILY MEDICINE

## 2020-09-03 DIAGNOSIS — Z12.31 ENCOUNTER FOR SCREENING MAMMOGRAM FOR BREAST CANCER: Primary | ICD-10-CM

## 2020-11-18 ENCOUNTER — TELEMEDICINE (OUTPATIENT)
Dept: FAMILY MEDICINE CLINIC | Facility: CLINIC | Age: 70
End: 2020-11-18
Payer: MEDICARE

## 2020-11-18 DIAGNOSIS — Z20.822 EXPOSURE TO COVID-19 VIRUS: Primary | ICD-10-CM

## 2020-11-18 DIAGNOSIS — Z20.822 EXPOSURE TO COVID-19 VIRUS: ICD-10-CM

## 2020-11-18 PROCEDURE — 99214 OFFICE O/P EST MOD 30 MIN: CPT | Performed by: NURSE PRACTITIONER

## 2020-11-18 PROCEDURE — U0003 INFECTIOUS AGENT DETECTION BY NUCLEIC ACID (DNA OR RNA); SEVERE ACUTE RESPIRATORY SYNDROME CORONAVIRUS 2 (SARS-COV-2) (CORONAVIRUS DISEASE [COVID-19]), AMPLIFIED PROBE TECHNIQUE, MAKING USE OF HIGH THROUGHPUT TECHNOLOGIES AS DESCRIBED BY CMS-2020-01-R: HCPCS | Performed by: NURSE PRACTITIONER

## 2020-11-19 ENCOUNTER — TELEMEDICINE (OUTPATIENT)
Dept: FAMILY MEDICINE CLINIC | Facility: CLINIC | Age: 70
End: 2020-11-19
Payer: MEDICARE

## 2020-11-19 DIAGNOSIS — Z20.822 EXPOSURE TO COVID-19 VIRUS: Primary | ICD-10-CM

## 2020-11-19 PROCEDURE — 99212 OFFICE O/P EST SF 10 MIN: CPT | Performed by: NURSE PRACTITIONER

## 2020-11-20 ENCOUNTER — TELEMEDICINE (OUTPATIENT)
Dept: FAMILY MEDICINE CLINIC | Facility: CLINIC | Age: 70
End: 2020-11-20
Payer: MEDICARE

## 2020-11-20 DIAGNOSIS — B34.9 VIRAL SYNDROME: Primary | ICD-10-CM

## 2020-11-20 LAB — SARS-COV-2 RNA SPEC QL NAA+PROBE: NOT DETECTED

## 2020-11-20 PROCEDURE — 99212 OFFICE O/P EST SF 10 MIN: CPT | Performed by: NURSE PRACTITIONER

## 2020-11-27 ENCOUNTER — TELEPHONE (OUTPATIENT)
Dept: FAMILY MEDICINE CLINIC | Facility: CLINIC | Age: 70
End: 2020-11-27

## 2020-12-14 ENCOUNTER — TELEMEDICINE (OUTPATIENT)
Dept: FAMILY MEDICINE CLINIC | Facility: CLINIC | Age: 70
End: 2020-12-14
Payer: MEDICARE

## 2020-12-14 VITALS
SYSTOLIC BLOOD PRESSURE: 127 MMHG | WEIGHT: 157 LBS | BODY MASS INDEX: 28.89 KG/M2 | TEMPERATURE: 98.3 F | DIASTOLIC BLOOD PRESSURE: 76 MMHG | HEART RATE: 77 BPM | OXYGEN SATURATION: 96 % | HEIGHT: 62 IN

## 2020-12-14 DIAGNOSIS — Z20.822 EXPOSURE TO COVID-19 VIRUS: Primary | ICD-10-CM

## 2020-12-14 PROCEDURE — 99214 OFFICE O/P EST MOD 30 MIN: CPT | Performed by: FAMILY MEDICINE

## 2021-01-05 ENCOUNTER — LAB (OUTPATIENT)
Dept: LAB | Facility: CLINIC | Age: 71
End: 2021-01-05
Payer: MEDICARE

## 2021-01-05 DIAGNOSIS — Z83.49 FAMILY HISTORY OF THYROID DISEASE IN MOTHER: ICD-10-CM

## 2021-01-05 DIAGNOSIS — Z11.59 ENCOUNTER FOR HEPATITIS C SCREENING TEST FOR LOW RISK PATIENT: ICD-10-CM

## 2021-01-05 DIAGNOSIS — E66.3 OVERWEIGHT WITH BODY MASS INDEX (BMI) OF 29 TO 29.9 IN ADULT: ICD-10-CM

## 2021-01-05 DIAGNOSIS — Z20.822 EXPOSURE TO COVID-19 VIRUS: ICD-10-CM

## 2021-01-05 DIAGNOSIS — M85.89 OSTEOPENIA OF MULTIPLE SITES: ICD-10-CM

## 2021-01-05 DIAGNOSIS — E55.9 VITAMIN D DEFICIENCY: ICD-10-CM

## 2021-01-05 DIAGNOSIS — K59.01 SLOW TRANSIT CONSTIPATION: ICD-10-CM

## 2021-01-05 LAB
ALBUMIN SERPL BCP-MCNC: 3.9 G/DL (ref 3.5–5)
ALP SERPL-CCNC: 119 U/L (ref 46–116)
ALT SERPL W P-5'-P-CCNC: 26 U/L (ref 12–78)
ANION GAP SERPL CALCULATED.3IONS-SCNC: 2 MMOL/L (ref 4–13)
AST SERPL W P-5'-P-CCNC: 16 U/L (ref 5–45)
BASOPHILS # BLD AUTO: 0.03 THOUSANDS/ΜL (ref 0–0.1)
BASOPHILS NFR BLD AUTO: 1 % (ref 0–1)
BILIRUB SERPL-MCNC: 0.65 MG/DL (ref 0.2–1)
BUN SERPL-MCNC: 11 MG/DL (ref 5–25)
CALCIUM SERPL-MCNC: 9.2 MG/DL (ref 8.3–10.1)
CHLORIDE SERPL-SCNC: 107 MMOL/L (ref 100–108)
CHOLEST SERPL-MCNC: 220 MG/DL (ref 50–200)
CO2 SERPL-SCNC: 31 MMOL/L (ref 21–32)
CREAT SERPL-MCNC: 0.6 MG/DL (ref 0.6–1.3)
EOSINOPHIL # BLD AUTO: 0.07 THOUSAND/ΜL (ref 0–0.61)
EOSINOPHIL NFR BLD AUTO: 2 % (ref 0–6)
ERYTHROCYTE [DISTWIDTH] IN BLOOD BY AUTOMATED COUNT: 12.2 % (ref 11.6–15.1)
GFR SERPL CREATININE-BSD FRML MDRD: 94 ML/MIN/1.73SQ M
GLUCOSE P FAST SERPL-MCNC: 93 MG/DL (ref 65–99)
HCT VFR BLD AUTO: 39.1 % (ref 34.8–46.1)
HCV AB SER QL: NORMAL
HDLC SERPL-MCNC: 58 MG/DL
HGB BLD-MCNC: 12.7 G/DL (ref 11.5–15.4)
IMM GRANULOCYTES # BLD AUTO: 0.01 THOUSAND/UL (ref 0–0.2)
IMM GRANULOCYTES NFR BLD AUTO: 0 % (ref 0–2)
LDLC SERPL CALC-MCNC: 149 MG/DL (ref 0–100)
LYMPHOCYTES # BLD AUTO: 1.32 THOUSANDS/ΜL (ref 0.6–4.47)
LYMPHOCYTES NFR BLD AUTO: 29 % (ref 14–44)
MCH RBC QN AUTO: 30.7 PG (ref 26.8–34.3)
MCHC RBC AUTO-ENTMCNC: 32.5 G/DL (ref 31.4–37.4)
MCV RBC AUTO: 94 FL (ref 82–98)
MONOCYTES # BLD AUTO: 0.42 THOUSAND/ΜL (ref 0.17–1.22)
MONOCYTES NFR BLD AUTO: 9 % (ref 4–12)
NEUTROPHILS # BLD AUTO: 2.65 THOUSANDS/ΜL (ref 1.85–7.62)
NEUTS SEG NFR BLD AUTO: 59 % (ref 43–75)
NRBC BLD AUTO-RTO: 0 /100 WBCS
PLATELET # BLD AUTO: 273 THOUSANDS/UL (ref 149–390)
PMV BLD AUTO: 9.8 FL (ref 8.9–12.7)
POTASSIUM SERPL-SCNC: 4.8 MMOL/L (ref 3.5–5.3)
PROT SERPL-MCNC: 7.5 G/DL (ref 6.4–8.2)
RBC # BLD AUTO: 4.14 MILLION/UL (ref 3.81–5.12)
SODIUM SERPL-SCNC: 140 MMOL/L (ref 136–145)
TRIGL SERPL-MCNC: 64 MG/DL
TSH SERPL DL<=0.05 MIU/L-ACNC: 2.65 UIU/ML (ref 0.36–3.74)
WBC # BLD AUTO: 4.5 THOUSAND/UL (ref 4.31–10.16)

## 2021-01-05 PROCEDURE — 80061 LIPID PANEL: CPT

## 2021-01-05 PROCEDURE — 36415 COLL VENOUS BLD VENIPUNCTURE: CPT

## 2021-01-05 PROCEDURE — 86769 SARS-COV-2 COVID-19 ANTIBODY: CPT

## 2021-01-05 PROCEDURE — 80053 COMPREHEN METABOLIC PANEL: CPT

## 2021-01-05 PROCEDURE — 84443 ASSAY THYROID STIM HORMONE: CPT

## 2021-01-05 PROCEDURE — 86803 HEPATITIS C AB TEST: CPT

## 2021-01-05 PROCEDURE — 85025 COMPLETE CBC W/AUTO DIFF WBC: CPT

## 2021-01-06 ENCOUNTER — TELEPHONE (OUTPATIENT)
Dept: FAMILY MEDICINE CLINIC | Facility: CLINIC | Age: 71
End: 2021-01-06

## 2021-01-06 LAB — SARS-COV-2 IGG+IGM SERPL QL IA: NORMAL

## 2021-01-06 NOTE — TELEPHONE ENCOUNTER
----- Message from Sweta Garcia DO sent at 1/6/2021  1:59 PM EST -----   The antibiotic should not affect the results of the blood work however I do recommend that she wait 2 weeks before the blood work is done for the antibody if she is interested in that she can contact me in 2 weeks to place an order at that time  ----- Message -----  From: Jaqui Xavier MA  Sent: 1/4/2021   9:57 AM EST  To: Sweta Garcia DO    Pt called stating that her dentist put her on an antibiotic called Cefalexin for a dental infection  She wanted to know if she can get the plasm blood work done while on this med  She stated that you wanted her to get the plasm blood work done for covid  but did not see the order in her chart  If appropriate can you please order blood work as well   Ty

## 2021-01-14 ENCOUNTER — TELEPHONE (OUTPATIENT)
Dept: GASTROENTEROLOGY | Facility: CLINIC | Age: 71
End: 2021-01-14

## 2021-01-14 ENCOUNTER — HOSPITAL ENCOUNTER (OUTPATIENT)
Dept: NUCLEAR MEDICINE | Facility: HOSPITAL | Age: 71
Discharge: HOME | End: 2021-01-14
Payer: MEDICARE

## 2021-01-14 DIAGNOSIS — K21.9 GASTROESOPHAGEAL REFLUX DISEASE WITHOUT ESOPHAGITIS: ICD-10-CM

## 2021-01-14 PROCEDURE — 78227 HEPATOBIL SYST IMAGE W/DRUG: CPT

## 2021-01-14 PROCEDURE — A9537 TC99M MEBROFENIN: HCPCS

## 2021-01-14 RX ADMIN — SINCALIDE 1.4 MCG: 5 INJECTION, POWDER, LYOPHILIZED, FOR SOLUTION INTRAVENOUS at 09:30

## 2021-01-14 NOTE — TELEPHONE ENCOUNTER
----- Message from Jojo Garcia PA-C sent at 1/14/2021 11:26 AM EST -----  Please inform patient this examination is normal   Thank you

## 2021-03-26 ENCOUNTER — OFFICE VISIT (OUTPATIENT)
Dept: FAMILY MEDICINE CLINIC | Facility: CLINIC | Age: 71
End: 2021-03-26
Payer: MEDICARE

## 2021-03-26 VITALS
OXYGEN SATURATION: 98 % | HEART RATE: 70 BPM | HEIGHT: 62 IN | WEIGHT: 159 LBS | TEMPERATURE: 98.6 F | SYSTOLIC BLOOD PRESSURE: 122 MMHG | DIASTOLIC BLOOD PRESSURE: 84 MMHG | BODY MASS INDEX: 29.26 KG/M2

## 2021-03-26 DIAGNOSIS — K59.01 SLOW TRANSIT CONSTIPATION: ICD-10-CM

## 2021-03-26 DIAGNOSIS — K12.1: ICD-10-CM

## 2021-03-26 DIAGNOSIS — M54.16 LUMBAR RADICULOPATHY: ICD-10-CM

## 2021-03-26 DIAGNOSIS — M85.89 OSTEOPENIA OF MULTIPLE SITES: ICD-10-CM

## 2021-03-26 DIAGNOSIS — E87.1 HYPONATREMIA: ICD-10-CM

## 2021-03-26 DIAGNOSIS — K21.9 GASTROESOPHAGEAL REFLUX DISEASE WITHOUT ESOPHAGITIS: ICD-10-CM

## 2021-03-26 DIAGNOSIS — M54.50 LOW BACK PAIN, UNSPECIFIED BACK PAIN LATERALITY, UNSPECIFIED CHRONICITY, UNSPECIFIED WHETHER SCIATICA PRESENT: ICD-10-CM

## 2021-03-26 DIAGNOSIS — M54.2 NECK PAIN: ICD-10-CM

## 2021-03-26 DIAGNOSIS — H25.813 COMBINED FORMS OF AGE-RELATED CATARACT OF BOTH EYES: ICD-10-CM

## 2021-03-26 DIAGNOSIS — Z01.818 PRE-OP EXAMINATION: Primary | ICD-10-CM

## 2021-03-26 PROCEDURE — 93000 ELECTROCARDIOGRAM COMPLETE: CPT | Performed by: FAMILY MEDICINE

## 2021-03-26 PROCEDURE — 99215 OFFICE O/P EST HI 40 MIN: CPT | Performed by: FAMILY MEDICINE

## 2021-03-26 NOTE — PROGRESS NOTES
BMI Counseling: Body mass index is 29 56 kg/m²  The BMI is above normal  Nutrition recommendations include reducing portion sizes, 3-5 servings of fruits/vegetables daily, reducing fast food intake, consuming healthier snacks, decreasing soda and/or juice intake, moderation in carbohydrate intake, increasing intake of lean protein, reducing intake of saturated fat and trans fat and reducing intake of cholesterol  Exercise recommendations include exercising 3-5 times per week

## 2021-03-26 NOTE — LETTER
2021     Barbie Dorado, [address]  [address]    Patient: Ross Gomez   YOB: 1950   Date of Visit: 3/26/2021       Dear Dr Lugo Other Recipients: Thank you for referring Laura Baeza to me for evaluation  Below are my notes for this consultation  If you have questions, please do not hesitate to call me  I look forward to following your patient along with you  Sincerely,        Jonnathan Ocampo DO        CC: No Recipients  Jonnathan Ocampo DO  3/26/2021 11:26 AM  Incomplete  FAMILY MEDICINE PRE-OPERATIVE EVALUATION  [facility] - [address]    NAME: Ross Gomez  AGE: 78 y o  SEX: female  : 1950     DATE: 3/26/2021     Family Medicine Pre-Operative Evaluation:     Chief Complaint: Pre-operative Evaluation     Surgery: Bilateral cataract repair right eye initially followed by left eye  Anticipated Date of Surgery:   Referring Provider:  Dr Fabian He        History of Present Illness:     Ross Gomez is a 78 y o  female who presents to the office today for a preoperative consultation at the request of surgeon, Jacques Dodge, who plans on performing right cataract surgery on 2021 followed by left cataract surgery on 2021  Planned anesthesia is IV sedation  Patient has a bleeding risk of: no recent abnormal bleeding  Patient does not have objections to receiving blood products if needed  Current anti-platelet/anti-coagulation medications that the patient is prescribed includes: none          Review of Systems:     Review of Systems   Constitutional: Negative for chills, fatigue and fever  HENT: Negative for congestion, nosebleeds, rhinorrhea, sinus pressure and sore throat  Eyes: Positive for visual disturbance  Negative for discharge and redness  Respiratory: Negative for cough and shortness of breath      Cardiovascular: Negative for chest pain, palpitations and leg swelling  Gastrointestinal: Negative for abdominal pain, blood in stool and nausea  Endocrine: Negative for cold intolerance, heat intolerance and polyuria  Genitourinary: Negative for dysuria and frequency  Musculoskeletal: Negative for arthralgias, back pain and myalgias  Skin: Negative for rash  Neurological: Negative for dizziness, weakness and headaches  Hematological: Negative for adenopathy  Psychiatric/Behavioral: Negative for behavioral problems and sleep disturbance  The patient is not nervous/anxious  Problem List:     Patient Active Problem List   Diagnosis    Degeneration of intervertebral disc    Hip pain    Low back pain    Lumbar radiculopathy    Gastroesophageal reflux disease without esophagitis    Neck pain    Hyponatremia    Slow transit constipation    Contact stomatitis    Overweight with body mass index (BMI) of 29 to 29 9 in adult    Osteopenia of multiple sites    Family history of thyroid disease in mother    Left ear pain    Combined forms of age-related cataract of both eyes    Exposure to COVID-19 virus    Viral syndrome        Allergies:      Allergies   Allergen Reactions    Eggs Or Egg-Derived Products Other (See Comments)    Etodolac      Other reaction(s): chest pain, dizziness, nausea    Nitrofurantoin Other (See Comments)    Sucralfate Dizziness and Headache    Sulfa Antibiotics Hives and Other (See Comments)    Levofloxacin Dizziness    Omeprazole Headache and Fatigue    Pregabalin Chest Pain    Aspirin      Other reaction(s): GI upset    Chocolate     Ibuprofen      Other reaction(s): GI upset    Naproxen      Other reaction(s): GI upset    Penicillin G      Other reaction(s): swollen lips, tingling around mouth        Current Medications:       Current Outpatient Medications:     Biotin 5000 MCG CAPS, 5000 mcg 1 qd, Disp: , Rfl:     Black Elderberry,Berry-Flower, 575 MG CAPS, Take by mouth as needed , Disp: , Rfl:     calcium carbonate (TUMS) 500 mg chewable tablet, Chew 1 tablet as needed , Disp: , Rfl:     Capsaicin-Menthol 0 025-1 25 % PTCH, Salonpas (capsaicin-menthol) 0 025 %-1 25 % topical patch, Disp: , Rfl:     cholecalciferol (VITAMIN D3) 1,000 units tablet, Take 1,000 Units by mouth daily, Disp: , Rfl:     cyanocobalamin (VITAMIN B-12) 100 mcg tablet, Take by mouth as needed , Disp: , Rfl:     diclofenac sodium (VOLTAREN) 1 %, Apply 2 g topically 4 (four) times a day, Disp: 1 Tube, Rfl: 0    Ginger, Zingiber officinalis, (GINGER ROOT PO), as needed , Disp: , Rfl:     hydrocortisone 2 5 % cream, Apply at nighttime and in a m  To affected area on the lips (Patient taking differently: as needed Apply at nighttime and in a m   To affected area on the lips), Disp: 30 g, Rfl: 0    Liniments (SALONPAS ARTHRITIS PAIN RELIEF EX), as needed , Disp: , Rfl:     loratadine (CLARITIN) 10 mg tablet, Take 10 mg by mouth daily, Disp: , Rfl:     Probiotic Product (PROBIOTIC ACIDOPHILUS BEADS PO), Take by mouth, Disp: , Rfl:      Past History:     Past Medical History:   Diagnosis Date    GERD (gastroesophageal reflux disease)     Herniated disc, cervical     Hyperlipidemia         Past Surgical History:   Procedure Laterality Date    BLADDER REPAIR      sling    REMOVAL OF INTRAUTERINE DEVICE (IUD)      in abd area    TONSILLECTOMY      TUMOR REMOVAL      pinky finger 1977    WISDOM TOOTH EXTRACTION          Family History   Problem Relation Age of Onset    Hypertension Mother         Pulmonary    Osteoporosis Mother     Heart failure Mother     Heart attack Father     Hypertension Father     Stroke Father         Social History     Socioeconomic History    Marital status: /Civil Union     Spouse name: Not on file    Number of children: Not on file    Years of education: Not on file    Highest education level: Not on file   Occupational History    Not on file   Social Needs    Financial resource strain: Not on file    Food insecurity     Worry: Not on file     Inability: Not on file    Transportation needs     Medical: Not on file     Non-medical: Not on file   Tobacco Use    Smoking status: Never Smoker    Smokeless tobacco: Never Used   Substance and Sexual Activity    Alcohol use: Never     Frequency: Never    Drug use: Never    Sexual activity: Yes   Lifestyle    Physical activity     Days per week: Not on file     Minutes per session: Not on file    Stress: Not on file   Relationships    Social connections     Talks on phone: Not on file     Gets together: Not on file     Attends Anabaptism service: Not on file     Active member of club or organization: Not on file     Attends meetings of clubs or organizations: Not on file     Relationship status: Not on file    Intimate partner violence     Fear of current or ex partner: Not on file     Emotionally abused: Not on file     Physically abused: Not on file     Forced sexual activity: Not on file   Other Topics Concern    Not on file   Social History Narrative    Caffeine Consumption-1 cup of hot tea daily        Physical Exam:      /84   Pulse 70   Temp 98 6 °F (37 °C)   Ht 5' 1 5" (1 562 m)   Wt 72 1 kg (159 lb)   SpO2 98%   BMI 29 56 kg/m²     Physical Exam  Vitals signs and nursing note reviewed  Constitutional:       General: She is not in acute distress  Appearance: Normal appearance  She is well-developed and normal weight  HENT:      Head: Normocephalic and atraumatic  Right Ear: Tympanic membrane and external ear normal       Left Ear: Tympanic membrane and external ear normal       Nose: Nose normal       Mouth/Throat:      Mouth: Mucous membranes are moist       Pharynx: Oropharynx is clear  No oropharyngeal exudate  Eyes:      General: No scleral icterus  Right eye: No discharge  Left eye: No discharge        Conjunctiva/sclera: Conjunctivae normal       Pupils: Pupils are equal, round, and reactive to light  Neck:      Musculoskeletal: Normal range of motion  Thyroid: No thyromegaly  Vascular: No JVD  Cardiovascular:      Rate and Rhythm: Normal rate and regular rhythm  Heart sounds: Normal heart sounds  No murmur  Pulmonary:      Effort: Pulmonary effort is normal       Breath sounds: No wheezing or rales  Chest:      Chest wall: No tenderness  Abdominal:      General: Abdomen is flat  Bowel sounds are normal  There is no distension  Palpations: Abdomen is soft  There is no mass  Tenderness: There is no abdominal tenderness  Musculoskeletal: Normal range of motion  General: No tenderness or deformity  Lymphadenopathy:      Cervical: No cervical adenopathy  Skin:     General: Skin is warm and dry  Findings: No rash  Neurological:      General: No focal deficit present  Mental Status: She is alert and oriented to person, place, and time  Cranial Nerves: No cranial nerve deficit  Coordination: Coordination normal       Deep Tendon Reflexes: Reflexes are normal and symmetric  Reflexes normal    Psychiatric:         Mood and Affect: Mood normal          Behavior: Behavior normal          Thought Content: Thought content normal          Judgment: Judgment normal            Data:     Pre-operative work-up    Laboratory Results: I have personally reviewed the pertinent laboratory results/reports     EKG: I have personally reviewed pertinent reports  Chest x-ray: I have personally reviewed pertinent reports  Assessment:     1  Pre-op examination  POCT ECG   2  Gastroesophageal reflux disease without esophagitis     3  Slow transit constipation     4  Contact stomatitis     5  Lumbar radiculopathy     6  Osteopenia of multiple sites     7  Low back pain, unspecified back pain laterality, unspecified chronicity, unspecified whether sciatica present     8  Neck pain     9  Hyponatremia     10   Combined forms of age-related cataract of both eyes          Plan:     78 y o  female with planned surgery:  Bilateral cataract surgery right eye followed by left eye        Clearance  Patient is CLEARED for surgery without any additional cardiac testing  Niesha Engle DO  [place] [address]  [address] [place] [address]  Phone#  [phone]  Fax#  [phone]        Niesha Engle DO  3/26/2021 11:17 AM  Sign when Signing Visit  BMI Counseling: Body mass index is 29 56 kg/m²  The BMI is above normal  Nutrition recommendations include reducing portion sizes, 3-5 servings of fruits/vegetables daily, reducing fast food intake, consuming healthier snacks, decreasing soda and/or juice intake, moderation in carbohydrate intake, increasing intake of lean protein, reducing intake of saturated fat and trans fat and reducing intake of cholesterol  Exercise recommendations include exercising 3-5 times per week

## 2021-03-26 NOTE — PROGRESS NOTES
FAMILY MEDICINE PRE-OPERATIVE EVALUATION  [facility] - [facility]    NAME: Rajni Moseley  AGE: 78 y o  SEX: female  : 1950     DATE: 3/26/2021     Family Medicine Pre-Operative Evaluation:     Chief Complaint: Pre-operative Evaluation     Surgery: Bilateral cataract repair right eye initially followed by left eye  Anticipated Date of Surgery:   Referring Provider:  Dr Gissell Adam        History of Present Illness:     Rajni Moseley is a 78 y o  female who presents to the office today for a preoperative consultation at the request of surgeon, Bernardino Pineda, who plans on performing right cataract surgery on 2021 followed by left cataract surgery on 2021  Planned anesthesia is IV sedation  Patient has a bleeding risk of: no recent abnormal bleeding  Patient does not have objections to receiving blood products if needed  Current anti-platelet/anti-coagulation medications that the patient is prescribed includes: none          Review of Systems:     Review of Systems   Constitutional: Negative for chills, fatigue and fever  HENT: Negative for congestion, nosebleeds, rhinorrhea, sinus pressure and sore throat  Eyes: Positive for visual disturbance  Negative for discharge and redness  Respiratory: Negative for cough and shortness of breath  Cardiovascular: Negative for chest pain, palpitations and leg swelling  Gastrointestinal: Negative for abdominal pain, blood in stool and nausea  Endocrine: Negative for cold intolerance, heat intolerance and polyuria  Genitourinary: Negative for dysuria and frequency  Musculoskeletal: Negative for arthralgias, back pain and myalgias  Skin: Negative for rash  Neurological: Negative for dizziness, weakness and headaches  Hematological: Negative for adenopathy  Psychiatric/Behavioral: Negative for behavioral problems and sleep disturbance  The patient is not nervous/anxious  Problem List:     Patient Active Problem List   Diagnosis    Degeneration of intervertebral disc    Hip pain    Low back pain    Lumbar radiculopathy    Gastroesophageal reflux disease without esophagitis    Neck pain    Hyponatremia    Slow transit constipation    Contact stomatitis    Overweight with body mass index (BMI) of 29 to 29 9 in adult    Osteopenia of multiple sites    Family history of thyroid disease in mother    Left ear pain    Combined forms of age-related cataract of both eyes    Exposure to COVID-19 virus    Viral syndrome        Allergies:      Allergies   Allergen Reactions    Eggs Or Egg-Derived Products Other (See Comments)    Etodolac      Other reaction(s): chest pain, dizziness, nausea    Nitrofurantoin Other (See Comments)    Sucralfate Dizziness and Headache    Sulfa Antibiotics Hives and Other (See Comments)    Levofloxacin Dizziness    Omeprazole Headache and Fatigue    Pregabalin Chest Pain    Aspirin      Other reaction(s): GI upset    Chocolate     Ibuprofen      Other reaction(s): GI upset    Naproxen      Other reaction(s): GI upset    Penicillin G      Other reaction(s): swollen lips, tingling around mouth        Current Medications:       Current Outpatient Medications:     Biotin 5000 MCG CAPS, 5000 mcg 1 qd, Disp: , Rfl:     Black Elderberry,Berry-Flower, 575 MG CAPS, Take by mouth as needed , Disp: , Rfl:     calcium carbonate (TUMS) 500 mg chewable tablet, Chew 1 tablet as needed , Disp: , Rfl:     Capsaicin-Menthol 0 025-1 25 % PTCH, Salonpas (capsaicin-menthol) 0 025 %-1 25 % topical patch, Disp: , Rfl:     cholecalciferol (VITAMIN D3) 1,000 units tablet, Take 1,000 Units by mouth daily, Disp: , Rfl:     cyanocobalamin (VITAMIN B-12) 100 mcg tablet, Take by mouth as needed , Disp: , Rfl:     diclofenac sodium (VOLTAREN) 1 %, Apply 2 g topically 4 (four) times a day, Disp: 1 Tube, Rfl: 0    Ginger, Zingiber officinalis, (GINGER ROOT PO), as needed , Disp: , Rfl:     hydrocortisone 2 5 % cream, Apply at nighttime and in a m  To affected area on the lips (Patient taking differently: as needed Apply at nighttime and in a m   To affected area on the lips), Disp: 30 g, Rfl: 0    Liniments (SALONPAS ARTHRITIS PAIN RELIEF EX), as needed , Disp: , Rfl:     loratadine (CLARITIN) 10 mg tablet, Take 10 mg by mouth daily, Disp: , Rfl:     Probiotic Product (PROBIOTIC ACIDOPHILUS BEADS PO), Take by mouth, Disp: , Rfl:      Past History:     Past Medical History:   Diagnosis Date    GERD (gastroesophageal reflux disease)     Herniated disc, cervical     Hyperlipidemia         Past Surgical History:   Procedure Laterality Date    BLADDER REPAIR      sling    REMOVAL OF INTRAUTERINE DEVICE (IUD)      in abd area    TONSILLECTOMY      TUMOR REMOVAL      pinky finger 1977    WISDOM TOOTH EXTRACTION          Family History   Problem Relation Age of Onset    Hypertension Mother         Pulmonary    Osteoporosis Mother     Heart failure Mother     Heart attack Father     Hypertension Father     Stroke Father         Social History     Socioeconomic History    Marital status: /Civil Union     Spouse name: Not on file    Number of children: Not on file    Years of education: Not on file    Highest education level: Not on file   Occupational History    Not on file   Social Needs    Financial resource strain: Not on file    Food insecurity     Worry: Not on file     Inability: Not on file   [employer] needs     Medical: Not on file     Non-medical: Not on file   Tobacco Use    Smoking status: Never Smoker    Smokeless tobacco: Never Used   Substance and Sexual Activity    Alcohol use: Never     Frequency: Never    Drug use: Never    Sexual activity: Yes   Lifestyle    Physical activity     Days per week: Not on file     Minutes per session: Not on file    Stress: Not on file   Relationships    Social connections     Talks on phone: Not on file     Gets together: Not on file     Attends Latter-day service: Not on file     Active member of club or organization: Not on file     Attends meetings of clubs or organizations: Not on file     Relationship status: Not on file    Intimate partner violence     Fear of current or ex partner: Not on file     Emotionally abused: Not on file     Physically abused: Not on file     Forced sexual activity: Not on file   Other Topics Concern    Not on file   Social History Narrative    Caffeine Consumption-1 cup of hot tea daily        Physical Exam:      /84   Pulse 70   Temp 98 6 °F (37 °C)   Ht 5' 1 5" (1 562 m)   Wt 72 1 kg (159 lb)   SpO2 98%   BMI 29 56 kg/m²     Physical Exam  Vitals signs and nursing note reviewed  Constitutional:       General: She is not in acute distress  Appearance: Normal appearance  She is well-developed and normal weight  HENT:      Head: Normocephalic and atraumatic  Right Ear: Tympanic membrane and external ear normal       Left Ear: Tympanic membrane and external ear normal       Nose: Nose normal       Mouth/Throat:      Mouth: Mucous membranes are moist       Pharynx: Oropharynx is clear  No oropharyngeal exudate  Eyes:      General: No scleral icterus  Right eye: No discharge  Left eye: No discharge  Conjunctiva/sclera: Conjunctivae normal       Pupils: Pupils are equal, round, and reactive to light  Neck:      Musculoskeletal: Normal range of motion  Thyroid: No thyromegaly  Vascular: No JVD  Cardiovascular:      Rate and Rhythm: Normal rate and regular rhythm  Heart sounds: Normal heart sounds  No murmur  Pulmonary:      Effort: Pulmonary effort is normal       Breath sounds: No wheezing or rales  Chest:      Chest wall: No tenderness  Abdominal:      General: Abdomen is flat  Bowel sounds are normal  There is no distension  Palpations: Abdomen is soft  There is no mass        Tenderness: There is no abdominal tenderness  Musculoskeletal: Normal range of motion  General: No tenderness or deformity  Lymphadenopathy:      Cervical: No cervical adenopathy  Skin:     General: Skin is warm and dry  Findings: No rash  Neurological:      General: No focal deficit present  Mental Status: She is alert and oriented to person, place, and time  Cranial Nerves: No cranial nerve deficit  Coordination: Coordination normal       Deep Tendon Reflexes: Reflexes are normal and symmetric  Reflexes normal    Psychiatric:         Mood and Affect: Mood normal          Behavior: Behavior normal          Thought Content: Thought content normal          Judgment: Judgment normal            Data:     Pre-operative work-up    Laboratory Results: I have personally reviewed the pertinent laboratory results/reports     EKG: I have personally reviewed pertinent reports  Chest x-ray: I have personally reviewed pertinent reports  Assessment:     1  Pre-op examination  POCT ECG   2  Gastroesophageal reflux disease without esophagitis     3  Slow transit constipation     4  Contact stomatitis     5  Lumbar radiculopathy     6  Osteopenia of multiple sites     7  Low back pain, unspecified back pain laterality, unspecified chronicity, unspecified whether sciatica present     8  Neck pain     9  Hyponatremia     10  Combined forms of age-related cataract of both eyes          Plan:     78 y o  female with planned surgery:  Bilateral cataract surgery right eye followed by left eye        Clearance  Patient is CLEARED for surgery without any additional cardiac testing       DO TANG [address]  [address] [facility] [address]  Phone#  [phone]  Fax#  [phone]

## 2021-05-08 ENCOUNTER — APPOINTMENT (EMERGENCY)
Dept: RADIOLOGY | Facility: HOSPITAL | Age: 71
End: 2021-05-08
Payer: MEDICARE

## 2021-05-08 ENCOUNTER — HOSPITAL ENCOUNTER (EMERGENCY)
Facility: HOSPITAL | Age: 71
Discharge: HOME | End: 2021-05-08
Attending: EMERGENCY MEDICINE
Payer: MEDICARE

## 2021-05-08 VITALS
WEIGHT: 160.5 LBS | HEIGHT: 62 IN | OXYGEN SATURATION: 98 % | TEMPERATURE: 97.6 F | RESPIRATION RATE: 18 BRPM | BODY MASS INDEX: 29.53 KG/M2 | DIASTOLIC BLOOD PRESSURE: 76 MMHG | HEART RATE: 65 BPM | SYSTOLIC BLOOD PRESSURE: 149 MMHG

## 2021-05-08 DIAGNOSIS — M77.9 TENDONITIS: Primary | ICD-10-CM

## 2021-05-08 PROCEDURE — 73630 X-RAY EXAM OF FOOT: CPT

## 2021-05-08 PROCEDURE — 99284 EMERGENCY DEPT VISIT MOD MDM: CPT | Performed by: EMERGENCY MEDICINE

## 2021-05-08 PROCEDURE — 99283 EMERGENCY DEPT VISIT LOW MDM: CPT

## 2021-05-08 NOTE — ED PROVIDER NOTES
History  Chief Complaint   Patient presents with    Foot Pain     pt c/o left foot pain for the past 3-4 days, states pain is getting worse  pt states pain started after she was gardening  73year old female patient presents emergency department for evaluation of dorsal left foot pain  This been ongoing for the past several days  She was concerned for underlying fracture came in for evaluation  There is no swelling, no ecchymosis, no deformities of the soft tissue underlying bone, no evidence of plantar fasciitis  X-ray will be done the patient be placed on topical anti-inflammatories  History provided by:  Patient   used: No    Foot Injury - Major  Location:  Foot  Foot location:  L foot  Pain details:     Quality:  Aching    Radiates to:  Does not radiate    Severity:  Mild    Onset quality:  Gradual    Timing:  Constant    Progression:  Unchanged  Chronicity:  New  Prior injury to area:  No  Relieved by:  Nothing  Worsened by:  Nothing      Prior to Admission Medications   Prescriptions Last Dose Informant Patient Reported? Taking?    Biotin 5000 MCG CAPS  Self Yes No   Si mcg 1 qd   Black Elderberry,Berry-Flower, 575 MG CAPS  Self Yes No   Sig: Take by mouth as needed    Capsaicin-Menthol 0 025-1 25 % PTCH   Yes No   Sig: Salonpas (capsaicin-menthol) 0 025 %-1 25 % topical patch   Ginger, Zingiber officinalis, (GINGER ROOT PO)  Self Yes No   Sig: as needed    Liniments (SALONPAS ARTHRITIS PAIN RELIEF EX)  Self Yes No   Sig: as needed    Probiotic Product (PROBIOTIC ACIDOPHILUS BEADS PO)  Self Yes No   Sig: Take by mouth   calcium carbonate (TUMS) 500 mg chewable tablet  Self Yes No   Sig: Chew 1 tablet as needed    cholecalciferol (VITAMIN D3) 1,000 units tablet  Self Yes No   Sig: Take 1,000 Units by mouth daily   cyanocobalamin (VITAMIN B-12) 100 mcg tablet  Self Yes No   Sig: Take by mouth as needed    diclofenac sodium (VOLTAREN) 1 %  Self No No   Sig: Apply 2 g topically 4 (four) times a day   hydrocortisone 2 5 % cream  Self No No   Sig: Apply at nighttime and in a m  To affected area on the lips   Patient taking differently: as needed Apply at nighttime and in a m  To affected area on the lips   loratadine (CLARITIN) 10 mg tablet  Self Yes No   Sig: Take 10 mg by mouth daily      Facility-Administered Medications: None       Past Medical History:   Diagnosis Date    GERD (gastroesophageal reflux disease)     Herniated disc, cervical     Hyperlipidemia        Past Surgical History:   Procedure Laterality Date    BLADDER REPAIR      sling    REMOVAL OF INTRAUTERINE DEVICE (IUD)      in abd area    TONSILLECTOMY      TUMOR REMOVAL      pinky finger 1977    WISDOM TOOTH EXTRACTION         Family History   Problem Relation Age of Onset    Hypertension Mother         Pulmonary    Osteoporosis Mother     Heart failure Mother     Heart attack Father     Hypertension Father     Stroke Father      I have reviewed and agree with the history as documented  E-Cigarette/Vaping    E-Cigarette Use Never User      E-Cigarette/Vaping Substances    Nicotine No     THC No     CBD No     Flavoring No     Other No     Unknown No      Social History     Tobacco Use    Smoking status: Never Smoker    Smokeless tobacco: Never Used   Substance Use Topics    Alcohol use: Never     Frequency: Never    Drug use: Never       Review of Systems   All other systems reviewed and are negative  Physical Exam  Physical Exam  Vitals signs and nursing note reviewed  Constitutional:       Appearance: She is well-developed  HENT:      Head: Normocephalic and atraumatic  Right Ear: External ear normal       Left Ear: External ear normal    Eyes:      Conjunctiva/sclera: Conjunctivae normal    Neck:      Thyroid: No thyromegaly  Vascular: No JVD  Trachea: No tracheal deviation  Cardiovascular:      Rate and Rhythm: Normal rate     Pulmonary:      Effort: Pulmonary effort is normal       Breath sounds: Normal breath sounds  No stridor  Abdominal:      General: There is no distension  Palpations: Abdomen is soft  There is no mass  Tenderness: There is no abdominal tenderness  There is no guarding  Hernia: No hernia is present  Musculoskeletal: Normal range of motion  General: No tenderness or deformity  Lymphadenopathy:      Cervical: No cervical adenopathy  Skin:     General: Skin is warm  Coloration: Skin is not pale  Findings: No erythema or rash  Neurological:      Mental Status: She is alert and oriented to person, place, and time  Psychiatric:         Behavior: Behavior normal          Vital Signs  ED Triage Vitals [05/08/21 0846]   Temperature Pulse Respirations Blood Pressure SpO2   97 6 °F (36 4 °C) 65 18 149/76 98 %      Temp Source Heart Rate Source Patient Position - Orthostatic VS BP Location FiO2 (%)   Temporal Monitor Sitting Left arm --      Pain Score       --           Vitals:    05/08/21 0846   BP: 149/76   Pulse: 65   Patient Position - Orthostatic VS: Sitting         Visual Acuity      ED Medications  Medications - No data to display    Diagnostic Studies  Results Reviewed     None                 XR foot 3+ views LEFT   Final Result by Rajan Hsieh MD (05/08 2032)      No acute osseous abnormality  Workstation performed: [id number]                    Procedures  Procedures         ED Course               Identification of Seniors at Risk      Most Recent Value   (ISAR) Identification of Seniors at Risk   Before the illness or injury that brought you to the Emergency, did you need someone to help you on a regular basis? 0 Filed at: 05/08/2021 0848   In the last 24 hours, have you needed more help than usual?  0 Filed at: 05/08/2021 6936   Have you been hospitalized for one or more nights during the past 6 months?   0 Filed at: 05/08/2021 0848   In general, do you see well?  0 Filed at: 05/08/2021 0848   In general, do you have serious problems with your memory? 0 Filed at: 05/08/2021 0848   Do you take more than three different medications every day?  0 Filed at: 05/08/2021 0848   ISAR Score  0 Filed at: 05/08/2021 0848                    SBIRT 22yo+      Most Recent Value   SBIRT (25 yo +)   In order to provide better care to our patients, we are screening all of our patients for alcohol and drug use  Would it be okay to ask you these screening questions? Yes Filed at: 05/08/2021 0848   Initial Alcohol Screen: US AUDIT-C    1  How often do you have a drink containing alcohol?  0 Filed at: 05/08/2021 0848   2  How many drinks containing alcohol do you have on a typical day you are drinking? 0 Filed at: 05/08/2021 0848   3a  Male UNDER 65: How often do you have five or more drinks on one occasion? 0 Filed at: 05/08/2021 0848   3b  FEMALE Any Age, or MALE 65+: How often do you have 4 or more drinks on one occassion? 0 Filed at: 05/08/2021 0848   Audit-C Score  0 Filed at: 05/08/2021 3920   DEVIKA: How many times in the past year have you    Used an illegal drug or used a prescription medication for non-medical reasons? Never Filed at: 05/08/2021 0848                    MDM  Number of Diagnoses or Management Options  Tendonitis: new and requires workup     Amount and/or Complexity of Data Reviewed  Tests in the radiology section of CPT®: reviewed and ordered        Disposition  Final diagnoses:   Tendonitis     Time reflects when diagnosis was documented in both MDM as applicable and the Disposition within this note     Time User Action Codes Description Comment    5/8/2021  8:55 AM Anna Doyle [M77 9] Tendonitis       ED Disposition     ED Disposition Condition Date/Time Comment    Discharge Stable Sat May 8, 2021  8:55 AM Kristin Hernandez discharge to home/self care              Follow-up Information     Follow up With Specialties Details [address], DO Family Medicine   [address]  [address]  [address] [facility] [address]  [phone]            Discharge Medication List as of 5/8/2021  9:14 AM      START taking these medications    Details   Diclofenac Sodium (VOLTAREN) 1 % Apply 2 g topically 4 (four) times a day, Starting Sat 5/8/2021, Normal         CONTINUE these medications which have NOT CHANGED    Details   Biotin 5000 MCG CAPS 5000 mcg 1 qd, Historical Med      Black Elderberry,Berry-Flower, 575 MG CAPS Take by mouth as needed , Historical Med      calcium carbonate (TUMS) 500 mg chewable tablet Chew 1 tablet as needed , Historical Med      Capsaicin-Menthol 0 025-1 25 % PTCH Salonpas (capsaicin-menthol) 0 025 %-1 25 % topical patch, Historical Med      cholecalciferol (VITAMIN D3) 1,000 units tablet Take 1,000 Units by mouth daily, Historical Med      cyanocobalamin (VITAMIN B-12) 100 mcg tablet Take by mouth as needed , Historical Med      diclofenac sodium (VOLTAREN) 1 % Apply 2 g topically 4 (four) times a day, Starting Sat 2/23/2019, Print      Ginger, Zingiber officinalis, (GINGER ROOT PO) as needed , Historical Med      hydrocortisone 2 5 % cream Apply at nighttime and in a m  To affected area on the lips, Normal      Liniments (SALONPAS ARTHRITIS PAIN RELIEF EX) as needed , Historical Med      loratadine (CLARITIN) 10 mg tablet Take 10 mg by mouth daily, Historical Med      Probiotic Product (PROBIOTIC ACIDOPHILUS BEADS PO) Take by mouth, Historical Med           No discharge procedures on file      PDMP Review     None          ED Provider  Electronically Signed by           Berta Walton DO  05/10/21 1333

## 2021-06-08 ENCOUNTER — OFFICE VISIT (OUTPATIENT)
Dept: OBGYN CLINIC | Facility: CLINIC | Age: 71
End: 2021-06-08
Payer: MEDICARE

## 2021-06-08 VITALS
HEART RATE: 61 BPM | SYSTOLIC BLOOD PRESSURE: 152 MMHG | HEIGHT: 61 IN | BODY MASS INDEX: 30.06 KG/M2 | WEIGHT: 159.2 LBS | DIASTOLIC BLOOD PRESSURE: 83 MMHG

## 2021-06-08 DIAGNOSIS — M76.72 PERONEAL TENDONITIS, LEFT: Primary | ICD-10-CM

## 2021-06-08 PROCEDURE — 99203 OFFICE O/P NEW LOW 30 MIN: CPT | Performed by: ORTHOPAEDIC SURGERY

## 2021-06-08 NOTE — PROGRESS NOTES
Orthopaedics Office Visit - New Patient Visit    ASSESSMENT/PLAN:    Assessment:   Left peroneal tendonitis     Plan:   The patient has an examination consistent with left peroneal tendonitis  I have discussed with the patient the pathophysiology of this diagnosis and reviewed how the examination correlates with this diagnosis  Treatment options were discussed at length and after discussing these treatment options, the patient was provided with a script to PT  She was instructed to wear a good supportive sneaker  She can use Voltaren gel OTC  To Do Next Visit:  If symptoms persist we can consider a US guided CS injection    _____________________________________________________  CHIEF COMPLAINT:  Chief Complaint   Patient presents with    Left Ankle - Pain         SUBJECTIVE:  Alejandra Jones is a 73 y o  female who presents chief complaint of left foot and ankle pain  The pain began 1 month(s) ago and is not associated with an acute injury  The patient describes the pain as aching, dull and sharp in intensity,  intermittent in timing, and localizes the pain to the  left lateral midfoot and ankle  The pain is worse with standing, walking and weight bearing activiities  and relieved by rest   The pain is not associated with numbness and tingling  The pain is not associated with constitutional symptoms  The patient is not awoken at night by the pain  The patient was seen in the ED and referred here today for follow up           PAST MEDICAL HISTORY:  Past Medical History:   Diagnosis Date    GERD (gastroesophageal reflux disease)     Herniated disc, cervical     Hyperlipidemia        PAST SURGICAL HISTORY:  Past Surgical History:   Procedure Laterality Date    BLADDER REPAIR      sling    REMOVAL OF INTRAUTERINE DEVICE (IUD)      in abd area    TONSILLECTOMY      TUMOR REMOVAL      pinky finger 1977    WISDOM TOOTH EXTRACTION         FAMILY HISTORY:  Family History   Problem Relation Age of Onset    Hypertension Mother         Pulmonary    Osteoporosis Mother     Heart failure Mother     Heart attack Father     Hypertension Father     Stroke Father        SOCIAL HISTORY:  Social History     Tobacco Use    Smoking status: Never Smoker    Smokeless tobacco: Never Used   Substance Use Topics    Alcohol use: Never     Frequency: Never    Drug use: Never       MEDICATIONS:    Current Outpatient Medications:     Biotin 5000 MCG CAPS, 5000 mcg 1 qd, Disp: , Rfl:     Black Elderberry,Berry-Flower, 575 MG CAPS, Take by mouth as needed , Disp: , Rfl:     calcium carbonate (TUMS) 500 mg chewable tablet, Chew 1 tablet as needed , Disp: , Rfl:     Capsaicin-Menthol 0 025-1 25 % PTCH, Salonpas (capsaicin-menthol) 0 025 %-1 25 % topical patch, Disp: , Rfl:     cholecalciferol (VITAMIN D3) 1,000 units tablet, Take 1,000 Units by mouth daily, Disp: , Rfl:     cyanocobalamin (VITAMIN B-12) 100 mcg tablet, Take by mouth as needed , Disp: , Rfl:     diclofenac sodium (VOLTAREN) 1 %, Apply 2 g topically 4 (four) times a day, Disp: 1 Tube, Rfl: 0    Diclofenac Sodium (VOLTAREN) 1 %, Apply 2 g topically 4 (four) times a day, Disp: 100 g, Rfl: 0    Ginger, Zingiber officinalis, (GINGER ROOT PO), as needed , Disp: , Rfl:     hydrocortisone 2 5 % cream, Apply at nighttime and in a m  To affected area on the lips (Patient taking differently: as needed Apply at nighttime and in a m   To affected area on the lips), Disp: 30 g, Rfl: 0    Liniments (SALONPAS ARTHRITIS PAIN RELIEF EX), as needed , Disp: , Rfl:     loratadine (CLARITIN) 10 mg tablet, Take 10 mg by mouth daily, Disp: , Rfl:     Probiotic Product (PROBIOTIC ACIDOPHILUS BEADS PO), Take by mouth, Disp: , Rfl:     ALLERGIES:  Allergies   Allergen Reactions    Eggs Or Egg-Derived Products - Food Allergy Other (See Comments)    Etodolac      Other reaction(s): chest pain, dizziness, nausea    Nitrofurantoin Other (See Comments)    Sucralfate Dizziness and Headache    Sulfa Antibiotics Hives and Other (See Comments)    Levofloxacin Dizziness    Omeprazole Headache and Fatigue    Pregabalin Chest Pain    Aspirin      Other reaction(s): GI upset    Chocolate - Food Allergy     Ibuprofen      Other reaction(s): GI upset    Naproxen      Other reaction(s): GI upset    Penicillin G      Other reaction(s): swollen lips, tingling around mouth       REVIEW OF SYSTEMS:  MSK: see below  Neuro: normal  Pertinent items are otherwise noted in HPI  A comprehensive review of systems was otherwise negative  LABS:  HgA1c: No results found for: HGBA1C  BMP:   Lab Results   Component Value Date    CALCIUM 9 2 01/05/2021    K 4 8 01/05/2021    CO2 31 01/05/2021     01/05/2021    BUN 11 01/05/2021    CREATININE 0 60 01/05/2021     CBC: No components found for: CBC    _____________________________________________________  PHYSICAL EXAMINATION:  Vital signs: /83   Pulse 61   Ht 5' 1" (1 549 m)   Wt 72 2 kg (159 lb 3 2 oz)   BMI 30 08 kg/m²   General: No acute distress, awake and alert  Psychiatric: Mood and affect appear appropriate  HEENT: Trachea Midline, No torticollis, no apparent facial trauma  Cardiovascular: No audible murmurs; Extremities appear perfused  Pulmonary: No audible wheezing or stridor  Skin: No open lesions; see further details (if any) below    MUSCULOSKELETAL EXAMINATION:  Extremities:  Left foot and ankle  Left Ankle Exam     Tenderness   The patient is experiencing tenderness in the ATF (peroneal tendon, base of the 5th MT)  Swelling: mild    Range of Motion   The patient has normal left ankle ROM       Muscle Strength   Dorsiflexion:  5/5   Plantar flexion:  5/5   Peroneal muscle:  4/5    Tests   Anterior drawer: negative    Other   Erythema: absent  Sensation: normal  Pulse: present              _____________________________________________________  STUDIES REVIEWED:  I personally reviewed the images and interpretation is as follows: left foot x-rays demonstrates no fracture or dislocation       PROCEDURES PERFORMED:  Procedures    Nadir Reilly MD

## 2021-06-08 NOTE — PATIENT INSTRUCTIONS
[employer], [employer], [employer] are good brands but I recommend going to a dedicate shoe store (not [employer] or [employer] ) At these types of stores, they have experts that can fit you for shoes appropriate for your foot problem  [employer]  [address] [place] Ma[address] Jos[address], [facility], [address] [place] [facility], [address] [place], [address] [facility]    [employer]   [address]   [facility], [facility]  [address] [facility], [place], [address] [facility] [place] [address]    [employer]  [address] [place], [place]

## 2021-06-18 ENCOUNTER — OFFICE VISIT (OUTPATIENT)
Dept: FAMILY MEDICINE CLINIC | Facility: CLINIC | Age: 71
End: 2021-06-18
Payer: MEDICARE

## 2021-06-18 VITALS
SYSTOLIC BLOOD PRESSURE: 132 MMHG | DIASTOLIC BLOOD PRESSURE: 84 MMHG | HEART RATE: 66 BPM | BODY MASS INDEX: 30.23 KG/M2 | WEIGHT: 160 LBS | TEMPERATURE: 98.3 F | OXYGEN SATURATION: 98 %

## 2021-06-18 DIAGNOSIS — M85.89 OSTEOPENIA OF MULTIPLE SITES: ICD-10-CM

## 2021-06-18 DIAGNOSIS — K21.9 GASTROESOPHAGEAL REFLUX DISEASE WITHOUT ESOPHAGITIS: ICD-10-CM

## 2021-06-18 DIAGNOSIS — K59.01 SLOW TRANSIT CONSTIPATION: ICD-10-CM

## 2021-06-18 DIAGNOSIS — M76.72 PERONEAL TENDONITIS, LEFT: ICD-10-CM

## 2021-06-18 DIAGNOSIS — E78.2 MIXED HYPERLIPIDEMIA: ICD-10-CM

## 2021-06-18 DIAGNOSIS — Z12.31 ENCOUNTER FOR SCREENING MAMMOGRAM FOR MALIGNANT NEOPLASM OF BREAST: Primary | ICD-10-CM

## 2021-06-18 DIAGNOSIS — M54.16 LUMBAR RADICULOPATHY: ICD-10-CM

## 2021-06-18 DIAGNOSIS — Z00.00 MEDICARE ANNUAL WELLNESS VISIT, SUBSEQUENT: ICD-10-CM

## 2021-06-18 PROCEDURE — 99214 OFFICE O/P EST MOD 30 MIN: CPT | Performed by: FAMILY MEDICINE

## 2021-06-18 PROCEDURE — G0439 PPPS, SUBSEQ VISIT: HCPCS | Performed by: FAMILY MEDICINE

## 2021-06-18 PROCEDURE — 1123F ACP DISCUSS/DSCN MKR DOCD: CPT | Performed by: FAMILY MEDICINE

## 2021-06-18 RX ORDER — VIT C/E/CUPERIC/ZINC/LUTEIN 226-90-0.8
CAPSULE ORAL
COMMUNITY

## 2021-06-18 RX ORDER — OMEGA-3 FATTY ACIDS CAP DELAYED RELEASE 1000 MG 1000 MG
CAPSULE DELAYED RELEASE ORAL
COMMUNITY

## 2021-06-18 NOTE — PROGRESS NOTES
Assessment and Plan:     Problem List Items Addressed This Visit     None           Preventive health issues were discussed with patient, and age appropriate screening tests were ordered as noted in patient's After Visit Summary  Personalized health advice and appropriate referrals for health education or preventive services given if needed, as noted in patient's After Visit Summary       History of Present Illness:     Patient presents for Medicare Annual Wellness visit    Patient Care Team:  Rikki Taylor DO as PCP - General (Family Medicine)  Domenica Mallory PA-C as Physician Assistant (Physician Assistant)     Problem List:     Patient Active Problem List   Diagnosis    Degeneration of intervertebral disc    Hip pain    Low back pain    Lumbar radiculopathy    Gastroesophageal reflux disease without esophagitis    Neck pain    Hyponatremia    Slow transit constipation    Contact stomatitis    Overweight with body mass index (BMI) of 29 to 29 9 in adult    Osteopenia of multiple sites    Family history of thyroid disease in mother    Left ear pain    Combined forms of age-related cataract of both eyes    Exposure to COVID-19 virus    Viral syndrome    Peroneal tendonitis, left      Past Medical and Surgical History:     Past Medical History:   Diagnosis Date    GERD (gastroesophageal reflux disease)     Herniated disc, cervical     Hyperlipidemia      Past Surgical History:   Procedure Laterality Date    BLADDER REPAIR      sling    REMOVAL OF INTRAUTERINE DEVICE (IUD)      in abd area    TONSILLECTOMY      TUMOR REMOVAL      pinky finger 1977    WISDOM TOOTH EXTRACTION        Family History:     Family History   Problem Relation Age of Onset    Hypertension Mother         Pulmonary    Osteoporosis Mother     Heart failure Mother     Heart attack Father     Hypertension Father     Stroke Father       Social History:     Social History     Socioeconomic History    Marital status: /Civil Union     Spouse name: Not on file    Number of children: Not on file    Years of education: Not on file    Highest education level: Not on file   Occupational History    Not on file   Tobacco Use    Smoking status: Never Smoker    Smokeless tobacco: Never Used   Vaping Use    Vaping Use: Never used   Substance and Sexual Activity    Alcohol use: Never    Drug use: Never    Sexual activity: Yes   Other Topics Concern    Not on file   Social History Narrative    Caffeine Consumption-1 cup of hot tea daily     Social Determinants of Health     Financial Resource Strain:     Difficulty of Paying Living Expenses:    Food Insecurity:     Worried About Running Out of Food in the Last Year:     [address] in the Last Year:    Transportation Needs:     Lack of Transportation (Medical):      Lack of Transportation (Non-Medical):    Physical Activity:     Days of Exercise per Week:     Minutes of Exercise per Session:    Stress:     Feeling of Stress :    Social Connections:     Frequency of Communication with Friends and Family:     Frequency of Social Gatherings with Friends and Family:     Attends Denominational Services:     Active Member of Clubs or Organizations:     Attends Club or Organization Meetings:     Marital Status:    Intimate Partner Violence:     Fear of Current or Ex-Partner:     Emotionally Abused:     Physically Abused:     Sexually Abused:       Medications and Allergies:     Current Outpatient Medications   Medication Sig Dispense Refill    Biotin 5000 MCG CAPS 5000 mcg 1 qd      Black Elderberry,Berry-Flower, 575 MG CAPS Take by mouth as needed       calcium carbonate (TUMS) 500 mg chewable tablet Chew 1 tablet as needed       Capsaicin-Menthol 0 025-1 25 % PTCH Salonpas (capsaicin-menthol) 0 025 %-1 25 % topical patch      cholecalciferol (VITAMIN D3) 1,000 units tablet Take 1,000 Units by mouth daily      cyanocobalamin (VITAMIN B-12) 100 mcg tablet Take by mouth as needed       diclofenac sodium (VOLTAREN) 1 % Apply 2 g topically 4 (four) times a day 1 Tube 0    Diclofenac Sodium (VOLTAREN) 1 % Apply 2 g topically 4 (four) times a day 100 g 0    Ginger, Zingiber officinalis, (GINGER ROOT PO) as needed       hydrocortisone 2 5 % cream Apply at nighttime and in a m  To affected area on the lips (Patient taking differently: as needed Apply at nighttime and in a m  To affected area on the lips) 30 g 0    Liniments (SALONPAS ARTHRITIS PAIN RELIEF EX) as needed       loratadine (CLARITIN) 10 mg tablet Take 10 mg by mouth daily      Probiotic Product (PROBIOTIC ACIDOPHILUS BEADS PO) Take by mouth       No current facility-administered medications for this visit  Allergies   Allergen Reactions    Eggs Or Egg-Derived Products - Food Allergy Other (See Comments)    Etodolac      Other reaction(s): chest pain, dizziness, nausea    Nitrofurantoin Other (See Comments)    Sucralfate Dizziness and Headache    Sulfa Antibiotics Hives and Other (See Comments)    Levofloxacin Dizziness    Omeprazole Headache and Fatigue    Pregabalin Chest Pain    Aspirin      Other reaction(s): GI upset    Chocolate - Food Allergy     Ibuprofen      Other reaction(s): GI upset    Naproxen      Other reaction(s): GI upset    Penicillin G      Other reaction(s): swollen lips, tingling around mouth      Immunizations:     Immunization History   Administered Date(s) Administered    Tdap 05/29/2019      Health Maintenance:         Topic Date Due    MAMMOGRAM  03/12/2019    Colorectal Cancer Screening  10/27/2024    Hepatitis C Screening  Completed         Topic Date Due    COVID-19 Vaccine (1) Never done    Pneumococcal Vaccine: 65+ Years (1 of 1 - PPSV23) Never done    Influenza Vaccine (Season Ended) 09/01/2021      Medicare Health Risk Assessment:     There were no vitals taken for this visit  Karen William is here for her Subsequent Wellness visit       Health Risk Assessment:   Patient rates overall health as good  Patient feels that their physical health rating is same  Patient is satisfied with their life  Eyesight was rated as same  Hearing was rated as same  Patient feels that their emotional and mental health rating is slightly better  Patients states they are never, rarely angry  Patient states they are sometimes unusually tired/fatigued  Pain experienced in the last 7 days has been some  Patient's pain rating has been 4/10  Patient states that she has experienced no weight loss or gain in last 6 months  Depression Screening:   PHQ-2 Score: 0      Fall Risk Screening: In the past year, patient has experienced: no history of falling in past year      Urinary Incontinence Screening:   Patient has leaked urine accidently in the last six months  Home Safety:  Patient does not have trouble with stairs inside or outside of their home  Patient has working smoke alarms and has working carbon monoxide detector  Home safety hazards include: none  Nutrition:   Current diet is Regular  Medications:   Patient is currently taking over-the-counter supplements  OTC medications include: see medication list  Patient is able to manage medications  Activities of Daily Living (ADLs)/Instrumental Activities of Daily Living (IADLs):   Walk and transfer into and out of bed and chair?: Yes  Dress and groom yourself?: Yes    Bathe or shower yourself?: Yes    Feed yourself? Yes  Do your laundry/housekeeping?: Yes  Manage your money, pay your bills and track your expenses?: Yes  Make your own meals?: Yes    Do your own shopping?: Yes    Previous Hospitalizations:   Any hospitalizations or ED visits within the last 12 months?: Yes    How many hospitalizations have you had in the last year?: 1-2    Advance Care Planning:   Living will: Yes    Durable POA for healthcare:  Yes    Advanced directive: Yes      PREVENTIVE SCREENINGS      Cardiovascular Screening:    General: Screening Current      Diabetes Screening:     General: Screening Current      Colorectal Cancer Screening:     General: Screening Current      Cervical Cancer Screening:    General: Screening Not Indicated      Lung Cancer Screening:     General: Screening Not Indicated      Hepatitis C Screening:    General: Screening Current    Screening, Brief Intervention, and Referral to Treatment (SBIRT)    Screening  Typical number of drinks in a day: 0  Typical number of drinks in a week: 0  Interpretation: Low risk drinking behavior        Daniel Gramajo DO

## 2021-06-18 NOTE — ASSESSMENT & PLAN NOTE
Continue with low back exercises stretching and strengthening regularly follow-up with physical therapy as needed

## 2021-06-18 NOTE — ASSESSMENT & PLAN NOTE
Patient is doing better overall with her over-the-counter supplements and herbal products recommend continuation of diet high in fiber

## 2021-06-18 NOTE — PROGRESS NOTES
Assessment/Plan:       Problem List Items Addressed This Visit        Digestive    Gastroesophageal reflux disease without esophagitis      GERD symptoms stable continue with current diet regimen avoiding excess nonsteroidal anti-inflammatory medications or foods rich in acid do not  Eat late at night re-evaluate at next visit         Slow transit constipation      Patient is doing better overall with her over-the-counter supplements and herbal products recommend continuation of diet high in fiber            Nervous and Auditory    Lumbar radiculopathy      Continue with low back exercises stretching and strengthening regularly follow-up with physical therapy as needed            Musculoskeletal and Integument    Osteopenia of multiple sites      Follow-up with bone DXA scan continue with calcium and vitamin-D         Peroneal tendonitis, left     Left ankle evaluated xray neg  And now wearing good shoes [employer]  Other    Medicare annual wellness visit, subsequent      Other Visit Diagnoses     Encounter for screening mammogram for malignant neoplasm of breast    -  Primary    Relevant Orders    Mammo screening bilateral w 3d & cad            Subjective:      Patient ID: Jolynn Corley is a 73 y o  female  Patient is here for general checkup Medicare wellness visit      The following portions of the patient's history were reviewed and updated as appropriate: allergies, current medications, past family history, past medical history, past social history, past surgical history and problem list     Review of Systems   Constitutional: Positive for fatigue  Negative for chills and fever  HENT: Negative for congestion, nosebleeds, rhinorrhea, sinus pressure and sore throat  Eyes: Negative for discharge and redness  Respiratory: Negative for cough and shortness of breath  Cardiovascular: Negative for chest pain, palpitations and leg swelling     Gastrointestinal: Negative for abdominal pain, blood in stool and nausea  Endocrine: Negative for cold intolerance, heat intolerance and polyuria  Genitourinary: Negative for dysuria and frequency  Musculoskeletal: Positive for arthralgias  Negative for back pain and myalgias  Skin: Negative for rash  Neurological: Negative for dizziness, weakness and headaches  Hematological: Negative for adenopathy  Psychiatric/Behavioral: Negative for behavioral problems and sleep disturbance  The patient is not nervous/anxious  Objective:      /84   Pulse 66   Temp 98 3 °F (36 8 °C)   Wt 72 6 kg (160 lb)   SpO2 98%   BMI 30 23 kg/m²        Physical Exam  Vitals and nursing note reviewed  Constitutional:       General: She is not in acute distress  Appearance: Normal appearance  She is well-developed and normal weight  HENT:      Head: Normocephalic and atraumatic  Right Ear: Tympanic membrane, ear canal and external ear normal       Left Ear: Tympanic membrane, ear canal and external ear normal       Nose: Nose normal       Mouth/Throat:      Mouth: Mucous membranes are moist       Pharynx: Oropharynx is clear  No oropharyngeal exudate  Eyes:      General: No scleral icterus  Right eye: No discharge  Left eye: No discharge  Conjunctiva/sclera: Conjunctivae normal       Pupils: Pupils are equal, round, and reactive to light  Neck:      Thyroid: No thyromegaly  Vascular: No JVD  Cardiovascular:      Rate and Rhythm: Normal rate and regular rhythm  Pulses: Normal pulses  Heart sounds: Normal heart sounds  No murmur heard  Pulmonary:      Effort: Pulmonary effort is normal       Breath sounds: No wheezing or rales  Chest:      Chest wall: No tenderness  Abdominal:      General: Bowel sounds are normal  There is no distension  Palpations: Abdomen is soft  There is no mass  Tenderness: There is no abdominal tenderness     Musculoskeletal:         General: No tenderness or deformity  Normal range of motion  Cervical back: Normal range of motion  Lymphadenopathy:      Cervical: No cervical adenopathy  Skin:     General: Skin is warm and dry  Capillary Refill: Capillary refill takes less than 2 seconds  Findings: No rash  Neurological:      General: No focal deficit present  Mental Status: She is alert and oriented to person, place, and time  Mental status is at baseline  Cranial Nerves: No cranial nerve deficit  Coordination: Coordination normal       Deep Tendon Reflexes: Reflexes are normal and symmetric  Reflexes normal    Psychiatric:         Mood and Affect: Mood normal          Behavior: Behavior normal          Thought Content: Thought content normal          Judgment: Judgment normal           Data:    Laboratory Results: I have personally reviewed the pertinent laboratory results/reports   Radiology/Other Diagnostic Testing Results: I have personally reviewed pertinent reports         Lab Results   Component Value Date    WBC 4 50 01/05/2021    HGB 12 7 01/05/2021    HCT 39 1 01/05/2021    MCV 94 01/05/2021     01/05/2021     Lab Results   Component Value Date    K 4 8 01/05/2021     01/05/2021    CO2 31 01/05/2021    BUN 11 01/05/2021    CREATININE 0 60 01/05/2021    GLUF 93 01/05/2021    CALCIUM 9 2 01/05/2021    AST 16 01/05/2021    ALT 26 01/05/2021    ALKPHOS 119 (H) 01/05/2021    EGFR 94 01/05/2021     Lab Results   Component Value Date    CHOLESTEROL 220 (H) 01/05/2021    CHOLESTEROL 220 (H) 11/15/2018     Lab Results   Component Value Date    HDL 58 01/05/2021    HDL 54 11/15/2018     Lab Results   Component Value Date    LDLCALC 149 (H) 01/05/2021    LDLCALC 148 (H) 11/15/2018     Lab Results   Component Value Date    TRIG 64 01/05/2021    TRIG 90 11/15/2018     No results found for: [place], [place]  Lab Results   Component Value Date    IAM5JIVZCYRA 2 650 01/05/2021     No results found for: HGBA1C  No results found for: PSA    Obie Wiseman, DO

## 2021-06-18 NOTE — ASSESSMENT & PLAN NOTE
GERD symptoms stable continue with current diet regimen avoiding excess nonsteroidal anti-inflammatory medications or foods rich in acid do not  Eat late at night re-evaluate at next visit

## 2021-06-21 ENCOUNTER — EVALUATION (OUTPATIENT)
Dept: PHYSICAL THERAPY | Age: 71
End: 2021-06-21
Payer: MEDICARE

## 2021-06-21 DIAGNOSIS — M79.672 LEFT FOOT PAIN: ICD-10-CM

## 2021-06-21 DIAGNOSIS — M54.16 LUMBAR RADICULOPATHY: Primary | ICD-10-CM

## 2021-06-21 DIAGNOSIS — M76.72 PERONEAL TENDONITIS, LEFT: ICD-10-CM

## 2021-06-21 PROCEDURE — 97162 PT EVAL MOD COMPLEX 30 MIN: CPT | Performed by: PHYSICAL THERAPIST

## 2021-06-21 PROCEDURE — 97110 THERAPEUTIC EXERCISES: CPT | Performed by: PHYSICAL THERAPIST

## 2021-06-21 PROCEDURE — 97112 NEUROMUSCULAR REEDUCATION: CPT | Performed by: PHYSICAL THERAPIST

## 2021-06-21 PROCEDURE — 97140 MANUAL THERAPY 1/> REGIONS: CPT | Performed by: PHYSICAL THERAPIST

## 2021-06-21 NOTE — PROGRESS NOTES
PT Evaluation     Today's date: 2021  Patient name: Moose Escalante  : 1950  MRN: [id number]  Referring provider: Armen Borrero MD  Dx:   Encounter Diagnosis     ICD-10-CM    1  Lumbar radiculopathy  M54 16    2  Peroneal tendonitis, left  M76 72 Ambulatory referral to Physical Therapy   3  Left foot pain  M79 672        Start Time: 1645  Stop Time: 1750  Total time in clinic (min): 65 minutes    Assessment  Assessment details: Moose Escalante is a 73 y o  female who presents with pain, decreased strength, decreased ROM, ambulatory dysfunction, tenderness and edema  Due to these impairments, Patient has difficulty performing a/iadls and recreational activities  Patient's clinical presentation is consistent with their referring diagnosis of peroneal tendonitis and lumbar radiculapathy  Reviewed HEP with patient  Advised to ice left foot 3xday  Patient would benefit from skilled physical therapy to address their aforementioned impairments, improve their level of function and to improve their overall quality of life  Impairments: abnormal gait, abnormal or restricted ROM, activity intolerance, impaired balance, impaired physical strength, lacks appropriate home exercise program, pain with function, poor posture  and poor body mechanics    Symptom irritability: moderateUnderstanding of Dx/Px/POC: excellent  Goals  ST-3 WEEKS  1  Decrease pain in left foot < 6/10 on VAS at its worst   2   Increase ROM left foot by > 5 deg in all deficients planes  3   Increase strength left foot  by 1/2 MMT grade in all deficient planes  LT-6 WEEKS  1  Patient to be independent with a/iadls  2  Increase functional activities for leisure and home activities to previous LOF    3  Independent with HEP and/or fitness program     Plan  Patient would benefit from: skilled physical therapy  Planned modality interventions: cryotherapy, thermotherapy: hydrocollator packs and unattended electrical stimulation  Planned therapy interventions: activity modification, behavior modification, body mechanics training, aquatic therapy, flexibility, functional ROM exercises, home exercise program, IADL retraining, joint mobilization, manual therapy, neuromuscular re-education, patient education, postural training, strengthening, stretching, therapeutic activities, therapeutic exercise, balance/weight bearing training and gait training  Frequency: 2-3x week  Duration in weeks: 12  Plan of Care beginning date: 2021  Plan of Care expiration date: 2021  Treatment plan discussed with: patient        Subjective Evaluation    History of Present Illness  Mechanism of injury: Patient reports her left ankle was paining  She had cataract surgery and foot was put on hold  Then she went to ER in May due to increased pain  xrays were negative  She went to ortho and She was referred to [facility]  She followed up with PCP who added lumbar radiculopathy  She has had history of back pain and able to manage but since her foot pain, she is unable to resolve her back pain  Pain  Current pain rating: 3  At worst pain ratin  Location: left lateral foot  Quality: radiating and sharp  Relieving factors: rest  Aggravating factors: stair climbing and walking  Progression: no change    Social Support  Steps to enter house: yes  Stairs in house: no   Lives in: [facility]  Lives with: spouse    Employment status: not working    Diagnostic Tests  X-ray: normal    FCE comments: Foot xrays were negative  No recent tests on back  Treatments  Current treatment: chiropractic  Patient Goals  Patient goals for therapy: decreased pain, increased motion, improved balance, increased strength and independence with ADLs/IADLs  Patient goal: walk better, garden        Objective     Static Posture   General Observations  Scoliosis  Lumbar Spine   Increased lordosis       Postural Observations  Seated posture: good  Standing posture: good        Observations   Left Ankle/Foot   Positive for edema  Additional Observation Details  5th left metatarsal swelling    Palpation   Left   Tenderness of the peroneus  Tenderness     Left Hip   Tenderness in the PSIS  Right Hip   Tenderness in the PSIS  Left Ankle/Foot   Tenderness in the fifth metatarsal base and peroneal tendon  Additional Tenderness Details  Lateral lower left leg tenderness and pain to palpation along peroneal tendon    Neurological Testing     Sensation     Lumbar   Left   Intact: light touch and hot/cold discrimination    Right   Intact: light touch and hot/cold discrimination    Ankle/Foot   Left Ankle/Foot   Intact: light touch and hot/cold discrimination    Active Range of Motion     Lumbar   Flexion: Active lumbar flexion: tips to distal shins  Extension:  WFL  Left lateral flexion:  Restriction level: minimal  Right lateral flexion:  WFL  Left Ankle/Foot   Dorsiflexion (ke): 5 degrees with pain  Plantar flexion: 45 degrees   Inversion: 20 degrees   Eversion: 20 degrees with pain    Right Ankle/Foot   Dorsiflexion (ke): 10 degrees   Plantar flexion: 52 degrees   Inversion: 20 degrees   Eversion: 12 degrees     Additional Active Range of Motion Details  Hamstring flexibility: 72° bilateral    Strength/Myotome Testing     Left Hip   Planes of Motion   Flexion: 4+  Extension: 4+  Abduction: 4+  Adduction: 5    Right Hip   Planes of Motion   Flexion: 4+  Extension: 4+  Abduction: 4+  Adduction: 5    Left Knee   Flexion: 4+  Extension: 5    Right Knee   Flexion: 4+  Extension: 5    Left Ankle/Foot   Dorsiflexion: 4+  Plantar flexion: 4+  Inversion: 4+  Eversion: 4    Right Ankle/Foot   Normal strength    Tests     Lumbar   Negative SIJ compression  Left   Positive passive SLR  Left Hip   Negative FCO and FADIR  Right Hip   Negative FCO and FADIR       Ambulation   Weight-Bearing Status   Assistive device used: none    Ambulation: Level Surfaces Ambulation without assistive device: independent    Observational Gait   Gait: antalgic   Decreased walking speed, stride length and left stance time       Functional Assessment        Single Leg Stance   Left: 2 seconds  Right: 6 seconds    General Comments:    Lower quarter screen   Hips: unremarkable  Knees: unremarkable    Ankle/Foot Comments   Pocket of fluid/swelling at 5th metatarsal dorsal aspect      Flowsheet Rows      Most Recent Value   PT/OT G-Codes   Current Score  47 [left foot]   Projected Score  63   Assessment Type  Evaluation             Precautions: HNP L 5-S1/pt, cataract sx,      Manuals 6/21            LB/ LE's 10            L ankle 5            KT lateral L 8                         Neuro Re-Ed                                                                                                        Ther Ex             HEP 8            B heel slides 30x             Hip abd             Hip add             Slant board             Nustep                                       Ther Activity                                       Gait Training                                       Modalities                          Ice after 10'

## 2021-06-23 ENCOUNTER — TELEPHONE (OUTPATIENT)
Dept: FAMILY MEDICINE CLINIC | Facility: CLINIC | Age: 71
End: 2021-06-23

## 2021-06-23 ENCOUNTER — APPOINTMENT (OUTPATIENT)
Dept: PHYSICAL THERAPY | Age: 71
End: 2021-06-23
Payer: MEDICARE

## 2021-06-23 ENCOUNTER — HOSPITAL ENCOUNTER (EMERGENCY)
Facility: HOSPITAL | Age: 71
Discharge: HOME | End: 2021-06-23
Attending: EMERGENCY MEDICINE
Payer: MEDICARE

## 2021-06-23 ENCOUNTER — APPOINTMENT (EMERGENCY)
Dept: RADIOLOGY | Facility: HOSPITAL | Age: 71
End: 2021-06-23
Payer: MEDICARE

## 2021-06-23 VITALS
TEMPERATURE: 99.4 F | HEART RATE: 70 BPM | RESPIRATION RATE: 19 BRPM | DIASTOLIC BLOOD PRESSURE: 79 MMHG | OXYGEN SATURATION: 93 % | SYSTOLIC BLOOD PRESSURE: 121 MMHG

## 2021-06-23 DIAGNOSIS — B34.9 VIRAL SYNDROME: Primary | ICD-10-CM

## 2021-06-23 LAB
ALBUMIN SERPL BCP-MCNC: 3.7 G/DL (ref 3.5–5)
ALP SERPL-CCNC: 108 U/L (ref 46–116)
ALT SERPL W P-5'-P-CCNC: 33 U/L (ref 12–78)
ANION GAP SERPL CALCULATED.3IONS-SCNC: 9 MMOL/L (ref 4–13)
AST SERPL W P-5'-P-CCNC: 20 U/L (ref 5–45)
BASOPHILS # BLD AUTO: 0.02 THOUSANDS/ΜL (ref 0–0.1)
BASOPHILS NFR BLD AUTO: 1 % (ref 0–1)
BILIRUB SERPL-MCNC: 0.3 MG/DL (ref 0.2–1)
BUN SERPL-MCNC: 9 MG/DL (ref 5–25)
CALCIUM SERPL-MCNC: 8.9 MG/DL (ref 8.3–10.1)
CHLORIDE SERPL-SCNC: 99 MMOL/L (ref 100–108)
CO2 SERPL-SCNC: 26 MMOL/L (ref 21–32)
CREAT SERPL-MCNC: 0.75 MG/DL (ref 0.6–1.3)
EOSINOPHIL # BLD AUTO: 0 THOUSAND/ΜL (ref 0–0.61)
EOSINOPHIL NFR BLD AUTO: 0 % (ref 0–6)
ERYTHROCYTE [DISTWIDTH] IN BLOOD BY AUTOMATED COUNT: 12.6 % (ref 11.6–15.1)
GFR SERPL CREATININE-BSD FRML MDRD: 82 ML/MIN/1.73SQ M
GLUCOSE SERPL-MCNC: 122 MG/DL (ref 65–140)
HCT VFR BLD AUTO: 37.9 % (ref 34.8–46.1)
HGB BLD-MCNC: 12.5 G/DL (ref 11.5–15.4)
IMM GRANULOCYTES # BLD AUTO: 0.02 THOUSAND/UL (ref 0–0.2)
IMM GRANULOCYTES NFR BLD AUTO: 1 % (ref 0–2)
INR PPP: 1.05 (ref 0.84–1.19)
LYMPHOCYTES # BLD AUTO: 0.34 THOUSANDS/ΜL (ref 0.6–4.47)
LYMPHOCYTES NFR BLD AUTO: 9 % (ref 14–44)
MCH RBC QN AUTO: 29.9 PG (ref 26.8–34.3)
MCHC RBC AUTO-ENTMCNC: 33 G/DL (ref 31.4–37.4)
MCV RBC AUTO: 91 FL (ref 82–98)
MONOCYTES # BLD AUTO: 0.25 THOUSAND/ΜL (ref 0.17–1.22)
MONOCYTES NFR BLD AUTO: 6 % (ref 4–12)
NEUTROPHILS # BLD AUTO: 3.39 THOUSANDS/ΜL (ref 1.85–7.62)
NEUTS SEG NFR BLD AUTO: 83 % (ref 43–75)
NRBC BLD AUTO-RTO: 0 /100 WBCS
PLATELET # BLD AUTO: 172 THOUSANDS/UL (ref 149–390)
PMV BLD AUTO: 9.5 FL (ref 8.9–12.7)
POTASSIUM SERPL-SCNC: 3.8 MMOL/L (ref 3.5–5.3)
PROT SERPL-MCNC: 7.5 G/DL (ref 6.4–8.2)
PROTHROMBIN TIME: 13.2 SECONDS (ref 11.6–14.5)
RBC # BLD AUTO: 4.18 MILLION/UL (ref 3.81–5.12)
SARS-COV-2 RNA RESP QL NAA+PROBE: NEGATIVE
SODIUM SERPL-SCNC: 134 MMOL/L (ref 136–145)
TROPONIN I SERPL-MCNC: <0.02 NG/ML
WBC # BLD AUTO: 4.02 THOUSAND/UL (ref 4.31–10.16)

## 2021-06-23 PROCEDURE — 71045 X-RAY EXAM CHEST 1 VIEW: CPT

## 2021-06-23 PROCEDURE — 36415 COLL VENOUS BLD VENIPUNCTURE: CPT | Performed by: EMERGENCY MEDICINE

## 2021-06-23 PROCEDURE — 84484 ASSAY OF TROPONIN QUANT: CPT | Performed by: EMERGENCY MEDICINE

## 2021-06-23 PROCEDURE — 85610 PROTHROMBIN TIME: CPT | Performed by: EMERGENCY MEDICINE

## 2021-06-23 PROCEDURE — U0003 INFECTIOUS AGENT DETECTION BY NUCLEIC ACID (DNA OR RNA); SEVERE ACUTE RESPIRATORY SYNDROME CORONAVIRUS 2 (SARS-COV-2) (CORONAVIRUS DISEASE [COVID-19]), AMPLIFIED PROBE TECHNIQUE, MAKING USE OF HIGH THROUGHPUT TECHNOLOGIES AS DESCRIBED BY CMS-2020-01-R: HCPCS | Performed by: EMERGENCY MEDICINE

## 2021-06-23 PROCEDURE — 96375 TX/PRO/DX INJ NEW DRUG ADDON: CPT

## 2021-06-23 PROCEDURE — 86618 LYME DISEASE ANTIBODY: CPT | Performed by: EMERGENCY MEDICINE

## 2021-06-23 PROCEDURE — U0005 INFEC AGEN DETEC AMPLI PROBE: HCPCS | Performed by: EMERGENCY MEDICINE

## 2021-06-23 PROCEDURE — 80053 COMPREHEN METABOLIC PANEL: CPT | Performed by: EMERGENCY MEDICINE

## 2021-06-23 PROCEDURE — 93005 ELECTROCARDIOGRAM TRACING: CPT

## 2021-06-23 PROCEDURE — 99284 EMERGENCY DEPT VISIT MOD MDM: CPT

## 2021-06-23 PROCEDURE — 96361 HYDRATE IV INFUSION ADD-ON: CPT

## 2021-06-23 PROCEDURE — 85025 COMPLETE CBC W/AUTO DIFF WBC: CPT | Performed by: EMERGENCY MEDICINE

## 2021-06-23 PROCEDURE — 96374 THER/PROPH/DIAG INJ IV PUSH: CPT

## 2021-06-23 PROCEDURE — 99284 EMERGENCY DEPT VISIT MOD MDM: CPT | Performed by: EMERGENCY MEDICINE

## 2021-06-23 RX ORDER — ONDANSETRON 2 MG/ML
4 INJECTION INTRAMUSCULAR; INTRAVENOUS ONCE
Status: COMPLETED | OUTPATIENT
Start: 2021-06-23 | End: 2021-06-23

## 2021-06-23 RX ORDER — KETOROLAC TROMETHAMINE 30 MG/ML
15 INJECTION, SOLUTION INTRAMUSCULAR; INTRAVENOUS ONCE
Status: COMPLETED | OUTPATIENT
Start: 2021-06-23 | End: 2021-06-23

## 2021-06-23 RX ORDER — ONDANSETRON 4 MG/1
4 TABLET, ORALLY DISINTEGRATING ORAL EVERY 8 HOURS PRN
Qty: 20 TABLET | Refills: 0 | Status: SHIPPED | OUTPATIENT
Start: 2021-06-23 | End: 2022-02-17 | Stop reason: HOSPADM

## 2021-06-23 RX ADMIN — FAMOTIDINE 20 MG: 10 INJECTION INTRAVENOUS at 22:33

## 2021-06-23 RX ADMIN — SODIUM CHLORIDE 1000 ML: 0.9 INJECTION, SOLUTION INTRAVENOUS at 19:37

## 2021-06-23 RX ADMIN — ONDANSETRON 4 MG: 2 INJECTION INTRAMUSCULAR; INTRAVENOUS at 19:37

## 2021-06-23 RX ADMIN — KETOROLAC TROMETHAMINE 15 MG: 30 INJECTION, SOLUTION INTRAMUSCULAR at 22:33

## 2021-06-23 NOTE — PROGRESS NOTES
Daily Note     Today's date: 2021  Patient name: Celeste Johnson  : 1950  MRN: [id number]  Referring provider: Simone Gale MD  Dx:   Encounter Diagnosis     ICD-10-CM    1  Peroneal tendonitis, left  M76 72    2  Lumbar radiculopathy  M54 16    3  Left foot pain  M79 672                   Subjective: ***      Objective: See treatment diary below      Assessment: Tolerated treatment {Tolerated treatment :[id number]}   Patient {assessment:[id number]}      Plan: {PLAN:[id number]}     Precautions: HNP L 5-S1/pt, cataract sx,      Manuals             LB/ LE's 10            L ankle 5            KT lateral L 8                         Neuro Re-Ed                                                                                                        Ther Ex             HEP 8            B heel slides 30x             Hip abd             Hip add             Slant board             Nustep                                       Ther Activity                                       Gait Training                                       Modalities                          Ice after 10'

## 2021-06-23 NOTE — TELEPHONE ENCOUNTER
She should observe for clinical symptoms over the next 24 hours if change or worsening symptoms she can come in and see myself or Vidya to check the site of the tick bite possibly order a blood test if she is concerned about Lyme  disease but this appears to be unrelated to her fever and she may be fighting off a mild cold which could resolve in the next day or 2   At any time if she would like to be evaluated then she may come in as her symptoms are vague and could represent multiple etiologies

## 2021-06-23 NOTE — TELEPHONE ENCOUNTER
Pt stating that she has a slight fever of 99 3, as she did have a wood tick on her there other day, it did bite her but was attached, but was not engorged  She is achy, tired and had slight congestion in her head  Please advise

## 2021-06-24 ENCOUNTER — HOSPITAL ENCOUNTER (EMERGENCY)
Facility: HOSPITAL | Age: 71
Discharge: HOME | End: 2021-06-24
Attending: EMERGENCY MEDICINE
Payer: MEDICARE

## 2021-06-24 VITALS
SYSTOLIC BLOOD PRESSURE: 98 MMHG | OXYGEN SATURATION: 98 % | HEART RATE: 70 BPM | RESPIRATION RATE: 20 BRPM | TEMPERATURE: 99.9 F | DIASTOLIC BLOOD PRESSURE: 56 MMHG

## 2021-06-24 DIAGNOSIS — R11.2 NAUSEA AND VOMITING: ICD-10-CM

## 2021-06-24 DIAGNOSIS — R53.81 MALAISE: ICD-10-CM

## 2021-06-24 DIAGNOSIS — B34.9 VIRAL SYNDROME: Primary | ICD-10-CM

## 2021-06-24 DIAGNOSIS — A77.49 ANAPLASMOSIS: ICD-10-CM

## 2021-06-24 LAB
% PARASITEMIA: 0
ALBUMIN SERPL BCP-MCNC: 3.5 G/DL (ref 3.5–5)
ALP SERPL-CCNC: 95 U/L (ref 46–116)
ALT SERPL W P-5'-P-CCNC: 31 U/L (ref 12–78)
ANION GAP SERPL CALCULATED.3IONS-SCNC: 8 MMOL/L (ref 4–13)
AST SERPL W P-5'-P-CCNC: 26 U/L (ref 5–45)
ATRIAL RATE: 77 BPM
ATRIAL RATE: 81 BPM
B BURGDOR IGG+IGM SER-ACNC: 12
BACTERIA UR QL AUTO: NORMAL /HPF
BASOPHILS # BLD AUTO: 0.02 THOUSANDS/ΜL (ref 0–0.1)
BASOPHILS NFR BLD AUTO: 1 % (ref 0–1)
BILIRUB SERPL-MCNC: 0.25 MG/DL (ref 0.2–1)
BILIRUB UR QL STRIP: NEGATIVE
BUN SERPL-MCNC: 10 MG/DL (ref 5–25)
CALCIUM SERPL-MCNC: 8.7 MG/DL (ref 8.3–10.1)
CHLORIDE SERPL-SCNC: 101 MMOL/L (ref 100–108)
CLARITY UR: CLEAR
CO2 SERPL-SCNC: 27 MMOL/L (ref 21–32)
COLOR UR: YELLOW
CREAT SERPL-MCNC: 0.72 MG/DL (ref 0.6–1.3)
EOSINOPHIL # BLD AUTO: 0 THOUSAND/ΜL (ref 0–0.61)
EOSINOPHIL NFR BLD AUTO: 0 % (ref 0–6)
ERYTHROCYTE [DISTWIDTH] IN BLOOD BY AUTOMATED COUNT: 12.8 % (ref 11.6–15.1)
GFR SERPL CREATININE-BSD FRML MDRD: 86 ML/MIN/1.73SQ M
GLUCOSE SERPL-MCNC: 116 MG/DL (ref 65–140)
GLUCOSE UR STRIP-MCNC: NEGATIVE MG/DL
HCT VFR BLD AUTO: 37.6 % (ref 34.8–46.1)
HGB BLD-MCNC: 12.5 G/DL (ref 11.5–15.4)
HGB UR QL STRIP.AUTO: NEGATIVE
IMM GRANULOCYTES # BLD AUTO: 0.02 THOUSAND/UL (ref 0–0.2)
IMM GRANULOCYTES NFR BLD AUTO: 1 % (ref 0–2)
KETONES UR STRIP-MCNC: NEGATIVE MG/DL
LACTATE SERPL-SCNC: 0.9 MMOL/L (ref 0.5–2)
LEUKOCYTE ESTERASE UR QL STRIP: ABNORMAL
LIPASE SERPL-CCNC: 46 U/L (ref 73–393)
LYMPHOCYTES # BLD AUTO: 0.28 THOUSANDS/ΜL (ref 0.6–4.47)
LYMPHOCYTES NFR BLD AUTO: 9 % (ref 14–44)
MCH RBC QN AUTO: 30.3 PG (ref 26.8–34.3)
MCHC RBC AUTO-ENTMCNC: 33.2 G/DL (ref 31.4–37.4)
MCV RBC AUTO: 91 FL (ref 82–98)
MONOCYTES # BLD AUTO: 0.13 THOUSAND/ΜL (ref 0.17–1.22)
MONOCYTES NFR BLD AUTO: 4 % (ref 4–12)
NEUTROPHILS # BLD AUTO: 2.67 THOUSANDS/ΜL (ref 1.85–7.62)
NEUTS SEG NFR BLD AUTO: 85 % (ref 43–75)
NITRITE UR QL STRIP: NEGATIVE
NON-SQ EPI CELLS URNS QL MICRO: NORMAL /HPF
NRBC BLD AUTO-RTO: 0 /100 WBCS
P AXIS: 42 DEGREES
P AXIS: 56 DEGREES
PARASITE BLD: NO
PATHOLOGIST INTERPRETATION: NORMAL
PH UR STRIP.AUTO: 6 [PH]
PLATELET # BLD AUTO: 143 THOUSANDS/UL (ref 149–390)
PMV BLD AUTO: 9.7 FL (ref 8.9–12.7)
POTASSIUM SERPL-SCNC: 3.7 MMOL/L (ref 3.5–5.3)
PR INTERVAL: 184 MS
PR INTERVAL: 188 MS
PROT SERPL-MCNC: 7.2 G/DL (ref 6.4–8.2)
PROT UR STRIP-MCNC: NEGATIVE MG/DL
QRS AXIS: 21 DEGREES
QRS AXIS: 48 DEGREES
QRSD INTERVAL: 80 MS
QRSD INTERVAL: 80 MS
QT INTERVAL: 384 MS
QT INTERVAL: 400 MS
QTC INTERVAL: 434 MS
QTC INTERVAL: 464 MS
RBC # BLD AUTO: 4.12 MILLION/UL (ref 3.81–5.12)
RBC #/AREA URNS AUTO: NORMAL /HPF
SARS-COV-2 RNA RESP QL NAA+PROBE: NEGATIVE
SODIUM SERPL-SCNC: 136 MMOL/L (ref 136–145)
SP GR UR STRIP.AUTO: <=1.005 (ref 1–1.03)
T WAVE AXIS: 36 DEGREES
T WAVE AXIS: 63 DEGREES
TROPONIN I SERPL-MCNC: <0.02 NG/ML
UROBILINOGEN UR QL STRIP.AUTO: 0.2 E.U./DL
VENTRICULAR RATE: 77 BPM
VENTRICULAR RATE: 81 BPM
WBC # BLD AUTO: 3.12 THOUSAND/UL (ref 4.31–10.16)
WBC #/AREA URNS AUTO: NORMAL /HPF

## 2021-06-24 PROCEDURE — 83690 ASSAY OF LIPASE: CPT | Performed by: PHYSICIAN ASSISTANT

## 2021-06-24 PROCEDURE — 93005 ELECTROCARDIOGRAM TRACING: CPT

## 2021-06-24 PROCEDURE — 93010 ELECTROCARDIOGRAM REPORT: CPT | Performed by: INTERNAL MEDICINE

## 2021-06-24 PROCEDURE — 85025 COMPLETE CBC W/AUTO DIFF WBC: CPT | Performed by: PHYSICIAN ASSISTANT

## 2021-06-24 PROCEDURE — 96361 HYDRATE IV INFUSION ADD-ON: CPT

## 2021-06-24 PROCEDURE — U0003 INFECTIOUS AGENT DETECTION BY NUCLEIC ACID (DNA OR RNA); SEVERE ACUTE RESPIRATORY SYNDROME CORONAVIRUS 2 (SARS-COV-2) (CORONAVIRUS DISEASE [COVID-19]), AMPLIFIED PROBE TECHNIQUE, MAKING USE OF HIGH THROUGHPUT TECHNOLOGIES AS DESCRIBED BY CMS-2020-01-R: HCPCS | Performed by: PHYSICIAN ASSISTANT

## 2021-06-24 PROCEDURE — 84484 ASSAY OF TROPONIN QUANT: CPT | Performed by: PHYSICIAN ASSISTANT

## 2021-06-24 PROCEDURE — 99284 EMERGENCY DEPT VISIT MOD MDM: CPT

## 2021-06-24 PROCEDURE — 96365 THER/PROPH/DIAG IV INF INIT: CPT

## 2021-06-24 PROCEDURE — U0005 INFEC AGEN DETEC AMPLI PROBE: HCPCS | Performed by: PHYSICIAN ASSISTANT

## 2021-06-24 PROCEDURE — 83605 ASSAY OF LACTIC ACID: CPT | Performed by: PHYSICIAN ASSISTANT

## 2021-06-24 PROCEDURE — 96366 THER/PROPH/DIAG IV INF ADDON: CPT

## 2021-06-24 PROCEDURE — 87207 SMEAR SPECIAL STAIN: CPT | Performed by: PHYSICIAN ASSISTANT

## 2021-06-24 PROCEDURE — 36415 COLL VENOUS BLD VENIPUNCTURE: CPT | Performed by: PHYSICIAN ASSISTANT

## 2021-06-24 PROCEDURE — 81001 URINALYSIS AUTO W/SCOPE: CPT | Performed by: PHYSICIAN ASSISTANT

## 2021-06-24 PROCEDURE — 96375 TX/PRO/DX INJ NEW DRUG ADDON: CPT

## 2021-06-24 PROCEDURE — 99285 EMERGENCY DEPT VISIT HI MDM: CPT | Performed by: PHYSICIAN ASSISTANT

## 2021-06-24 PROCEDURE — 80053 COMPREHEN METABOLIC PANEL: CPT | Performed by: PHYSICIAN ASSISTANT

## 2021-06-24 PROCEDURE — 87798 DETECT AGENT NOS DNA AMP: CPT | Performed by: PHYSICIAN ASSISTANT

## 2021-06-24 RX ORDER — DIPHENHYDRAMINE HYDROCHLORIDE 50 MG/ML
25 INJECTION INTRAMUSCULAR; INTRAVENOUS ONCE
Status: COMPLETED | OUTPATIENT
Start: 2021-06-24 | End: 2021-06-24

## 2021-06-24 RX ORDER — ACETAMINOPHEN 325 MG/1
650 TABLET ORAL ONCE
Status: COMPLETED | OUTPATIENT
Start: 2021-06-24 | End: 2021-06-24

## 2021-06-24 RX ORDER — METOCLOPRAMIDE 10 MG/1
10 TABLET ORAL EVERY 6 HOURS PRN
Qty: 30 TABLET | Refills: 0 | Status: SHIPPED | OUTPATIENT
Start: 2021-06-24 | End: 2021-07-15

## 2021-06-24 RX ORDER — METOCLOPRAMIDE HYDROCHLORIDE 5 MG/ML
10 INJECTION INTRAMUSCULAR; INTRAVENOUS ONCE
Status: COMPLETED | OUTPATIENT
Start: 2021-06-24 | End: 2021-06-24

## 2021-06-24 RX ORDER — MAGNESIUM SULFATE HEPTAHYDRATE 40 MG/ML
2 INJECTION, SOLUTION INTRAVENOUS ONCE
Status: COMPLETED | OUTPATIENT
Start: 2021-06-24 | End: 2021-06-24

## 2021-06-24 RX ADMIN — MAGNESIUM SULFATE HEPTAHYDRATE 2 G: 40 INJECTION, SOLUTION INTRAVENOUS at 09:27

## 2021-06-24 RX ADMIN — SODIUM CHLORIDE 1000 ML: 0.9 INJECTION, SOLUTION INTRAVENOUS at 07:31

## 2021-06-24 RX ADMIN — DIPHENHYDRAMINE HYDROCHLORIDE 25 MG: 50 INJECTION, SOLUTION INTRAMUSCULAR; INTRAVENOUS at 07:32

## 2021-06-24 RX ADMIN — SODIUM CHLORIDE 1000 ML: 0.9 INJECTION, SOLUTION INTRAVENOUS at 09:27

## 2021-06-24 RX ADMIN — METOCLOPRAMIDE HYDROCHLORIDE 10 MG: 5 INJECTION INTRAMUSCULAR; INTRAVENOUS at 07:34

## 2021-06-24 RX ADMIN — ACETAMINOPHEN 650 MG: 325 TABLET, FILM COATED ORAL at 10:45

## 2021-06-24 NOTE — ED NOTES
Discharge instructions reviewed, patient has no new complaints or concerns at time of discharge  Patient ambulated out of department, steady gait, vss  Patient accompanied out of department by her         Arina Vega RN  06/24/21 [id number]

## 2021-06-24 NOTE — DISCHARGE INSTRUCTIONS
Take Reglan as needed for nausea  Drink small sips of Pedialyte every 10-15 minutes  Advance slowly to a bland diet (rice, applesauce, toast, bananas)  Please call your family doctor today to schedule a follow-up appointment in the next 2-3 days  Return to the ER with any worsening symptoms

## 2021-06-24 NOTE — ED PROVIDER NOTES
History  Chief Complaint   Patient presents with    Vomiting     Pt was seen here yesterday for fever  Started today with vomiting and chills      70yo with a history of hyperlipidemia and GERD presenting with her  for evaluation of nausea and vomiting  Patient has been feeling unwell and fatigued over the past several days  She states that she had a tick removed from her right arm several days   states the tick was superficial but somewhat engorged  Yesterday morning, patient began with nausea and vomiting  She is having difficulty with PO intake due to her symptoms and feeling unwell  Patient also endorses low grade fevers at home and a mild-moderate diffuse headache  Patient was seen here last night for the same  She was tested for Lyme and COVID which both came back negative  Patient was diagnosed with a viral syndrome and discharged  She has been vomiting since discharge and decided to come back for re-evaluation  No new symptoms from yesterday  Her main complaint at this time is nausea  No suspicious food intake  No abdominal pain or diarrhea  History provided by:  Patient, medical records and spouse   used: No    Vomiting  Severity:  Moderate  Duration:  1 day  Timing:  Constant  Quality:  Stomach contents  Progression:  Worsening  Chronicity:  New  Recent urination:  Normal  Context: not post-tussive and not self-induced    Relieved by:  Nothing  Worsened by:  Nothing  Associated symptoms: chills, headaches and myalgias    Associated symptoms: no abdominal pain, no cough, no diarrhea, no fever, no sore throat and no URI    Risk factors: no sick contacts and no suspect food intake        Prior to Admission Medications   Prescriptions Last Dose Informant Patient Reported? Taking?    Biotin 5000 MCG CAPS  Self Yes No   Si mcg 1 qd   Black Elderberry,Berry-Flower, 575 MG CAPS  Self Yes No   Sig: Take by mouth as needed    Capsaicin-Menthol 0 025-1 25 % PTCH   Yes No   Sig: Salonpas (capsaicin-menthol) 0 025 %-1 25 % topical patch   Diclofenac Sodium (VOLTAREN) 1 %   No No   Sig: Apply 2 g topically 4 (four) times a day   Ginger, Zingiber officinalis, (GINGER ROOT PO)  Self Yes No   Sig: as needed    Liniments (SALONPAS ARTHRITIS PAIN RELIEF EX)  Self Yes No   Sig: as needed    Multiple Vitamins-Minerals (PreserVision/Lutein) CAPS   Yes No   Sig: Take by mouth   Omega-3 Fatty Acids (Fish Oil) 1000 MG CPDR   Yes No   Sig: Take by mouth   Probiotic Product (PROBIOTIC ACIDOPHILUS BEADS PO)  Self Yes No   Sig: Take by mouth   calcium carbonate (TUMS) 500 mg chewable tablet  Self Yes No   Sig: Chew 1 tablet as needed    cholecalciferol (VITAMIN D3) 1,000 units tablet  Self Yes No   Sig: Take 1,000 Units by mouth daily   cyanocobalamin (VITAMIN B-12) 100 mcg tablet  Self Yes No   Sig: Take by mouth as needed    diclofenac sodium (VOLTAREN) 1 %  Self No No   Sig: Apply 2 g topically 4 (four) times a day   hydrocortisone 2 5 % cream  Self No No   Sig: Apply at nighttime and in a m  To affected area on the lips   Patient taking differently: as needed Apply at nighttime and in a m   To affected area on the lips   loratadine (CLARITIN) 10 mg tablet  Self Yes No   Sig: Take 10 mg by mouth daily   ondansetron (ZOFRAN-ODT) 4 mg disintegrating tablet   No No   Sig: Take 1 tablet (4 mg total) by mouth every 8 (eight) hours as needed for nausea or vomiting      Facility-Administered Medications: None       Past Medical History:   Diagnosis Date    GERD (gastroesophageal reflux disease)     Herniated disc, cervical     Hyperlipidemia        Past Surgical History:   Procedure Laterality Date    BLADDER REPAIR      sling    CATARACT EXTRACTION, BILATERAL      REMOVAL OF INTRAUTERINE DEVICE (IUD)      in abd area    TONSILLECTOMY      TUMOR REMOVAL      pinky finger 1977    WISDOM TOOTH EXTRACTION         Family History   Problem Relation Age of Onset    Hypertension Mother Pulmonary    Osteoporosis Mother     Heart failure Mother     Heart attack Father     Hypertension Father     Stroke Father      I have reviewed and agree with the history as documented  E-Cigarette/Vaping    E-Cigarette Use Never User      E-Cigarette/Vaping Substances    Nicotine No     THC No     CBD No     Flavoring No     Other No     Unknown No      Social History     Tobacco Use    Smoking status: Never Smoker    Smokeless tobacco: Never Used   Vaping Use    Vaping Use: Never used   Substance Use Topics    Alcohol use: Never    Drug use: Never       Review of Systems   Constitutional: Positive for activity change, appetite change, chills and fatigue  Negative for fever  HENT: Negative for drooling, sore throat and voice change  Eyes: Negative for discharge and redness  Respiratory: Negative for cough, shortness of breath and stridor  Cardiovascular: Negative for chest pain and leg swelling  Gastrointestinal: Positive for nausea and vomiting  Negative for abdominal pain and diarrhea  Musculoskeletal: Positive for myalgias  Negative for neck pain and neck stiffness  Skin: Negative for color change and rash  Neurological: Positive for weakness and headaches  Negative for seizures and syncope  Psychiatric/Behavioral: Negative for confusion  The patient is not nervous/anxious  All other systems reviewed and are negative  Physical Exam  Physical Exam  Vitals and nursing note reviewed  Constitutional:       General: She is not in acute distress  Appearance: She is well-developed  She is not diaphoretic  Comments: Non-toxic appearing   HENT:      Head: Normocephalic and atraumatic  Right Ear: External ear normal       Left Ear: External ear normal       Nose: Nose normal    Eyes:      General: No scleral icterus  Right eye: No discharge  Left eye: No discharge        Conjunctiva/sclera: Conjunctivae normal    Neck:      Comments: No meningismus  Cardiovascular:      Rate and Rhythm: Normal rate and regular rhythm  Heart sounds: Normal heart sounds  No murmur heard  Pulmonary:      Effort: Pulmonary effort is normal  No respiratory distress  Breath sounds: Normal breath sounds  No stridor  No wheezing or rales  Abdominal:      General: Bowel sounds are normal  There is no distension  Palpations: Abdomen is soft  Tenderness: There is no abdominal tenderness  There is no guarding  Comments: Abdomen soft, non-tender   Musculoskeletal:         General: No deformity  Normal range of motion  Cervical back: Normal range of motion and neck supple  No rigidity  Right lower leg: No edema  Left lower leg: No edema  Lymphadenopathy:      Cervical: No cervical adenopathy  Skin:     General: Skin is warm and dry  Neurological:      General: No focal deficit present  Mental Status: She is alert  She is not disoriented  GCS: GCS eye subscore is 4  GCS verbal subscore is 5  GCS motor subscore is 6     Psychiatric:         Mood and Affect: Mood normal          Behavior: Behavior normal          Vital Signs  ED Triage Vitals [06/24/21 0637]   Temperature Pulse Respirations Blood Pressure SpO2   99 9 °F (37 7 °C) 87 16 103/57 97 %      Temp Source Heart Rate Source Patient Position - Orthostatic VS BP Location FiO2 (%)   Oral Monitor Sitting Right arm --      Pain Score       --           Vitals:    06/24/21 0637 06/24/21 0830 06/24/21 1030 06/24/21 1100   BP: 103/57 103/56 102/56 98/56   Pulse: 87 68 65 70   Patient Position - Orthostatic VS: Sitting Lying Lying Lying         Visual Acuity      ED Medications  Medications   sodium chloride 0 9 % bolus 1,000 mL (0 mL Intravenous Stopped 6/24/21 0831)   metoclopramide (REGLAN) injection 10 mg (10 mg Intravenous Given 6/24/21 0734)   diphenhydrAMINE (BENADRYL) injection 25 mg (25 mg Intravenous Given 6/24/21 0732)   magnesium sulfate 2 g/50 mL IVPB (premix) 2 g (0 g Intravenous Stopped 6/24/21 1113)   sodium chloride 0 9 % bolus 1,000 mL (0 mL Intravenous Stopped 6/24/21 1045)   acetaminophen (TYLENOL) tablet 650 mg (650 mg Oral Given 6/24/21 1045)       Diagnostic Studies  Results Reviewed     Procedure Component Value Units Date/Time    Path Slide Review [[id number]] Collected: 06/24/21 0926    Lab Status: In process Specimen: Blood from Arm, Right Updated: 06/24/21 1450    Urine Microscopic [[id number]]  (Normal) Collected: 06/24/21 0926    Lab Status: Final result Specimen: Urine, Clean Catch Updated: 06/24/21 1005     RBC, UA 0-1 /hpf      WBC, UA 0-1 /hpf      Epithelial Cells Occasional /hpf      Bacteria, UA Occasional /hpf     UA w Reflex to Microscopic w Reflex to Culture [[id number]]  (Abnormal) Collected: 06/24/21 0926    Lab Status: Final result Specimen: Urine, Clean Catch Updated: 06/24/21 0945     Color, UA Yellow     Clarity, UA Clear     Specific Gravity, UA <=1 005     pH, UA 6 0     Leukocytes, UA Trace     Nitrite, UA Negative     Protein, UA Negative mg/dl      Glucose, UA Negative mg/dl      Ketones, UA Negative mg/dl      Urobilinogen, UA 0 2 E U /dl      Bilirubin, UA Negative     Blood, UA Negative    Anaplasma Phagocytophilum, PCR [[id number]] Collected: 06/24/21 0926    Lab Status: In process Specimen: Blood from Arm, Right Updated: 06/24/21 0933    Blood Parasite smear [[id number]] Collected: 06/24/21 0926    Lab Status: In process Specimen: Blood from Arm, Right Updated: 06/24/21 0933    Novel Coronavirus (Covid-19),PCR [facility] - 2 Hour Stat [[id number]]  (Normal) Collected: 06/24/21 0742    Lab Status: Final result Specimen: Nares from Nose Updated: 06/24/21 0843     SARS-CoV-2 Negative    Narrative:       The specimen collection materials, transport medium, and/or testing methodology utilized in the production of these test results have been proven to be reliable in a limited validation with an abbreviated program under the Emergency Utilization Authorization provided by the FDA  Testing reported as "Presumptive positive" will be confirmed with secondary testing to ensure result accuracy  Clinical caution and judgement should be used with the interpretation of these results with consideration of the clinical impression and other laboratory testing  Testing reported as "Positive" or "Negative" has been proven to be accurate according to standard laboratory validation requirements  All testing is performed with control materials showing appropriate reactivity at standard intervals        Comprehensive metabolic panel [[id number]] Collected: 06/24/21 0728    Lab Status: Final result Specimen: Blood from Arm, Right Updated: 06/24/21 0801     Sodium 136 mmol/L      Potassium 3 7 mmol/L      Chloride 101 mmol/L      CO2 27 mmol/L      ANION GAP 8 mmol/L      BUN 10 mg/dL      Creatinine 0 72 mg/dL      Glucose 116 mg/dL      Calcium 8 7 mg/dL      AST 26 U/L      ALT 31 U/L      Alkaline Phosphatase 95 U/L      Total Protein 7 2 g/dL      Albumin 3 5 g/dL      Total Bilirubin 0 25 mg/dL      eGFR 86 ml/min/1 73sq m     Narrative:      [place] guidelines for Chronic Kidney Disease (CKD):     Stage 1 with normal or high GFR (GFR > 90 mL/min/1 73 square meters)    Stage 2 Mild CKD (GFR = 60-89 mL/min/1 73 square meters)    Stage 3A Moderate CKD (GFR = 45-59 mL/min/1 73 square meters)    Stage 3B Moderate CKD (GFR = 30-44 mL/min/1 73 square meters)    Stage 4 Severe CKD (GFR = 15-29 mL/min/1 73 square meters)    Stage 5 End Stage CKD (GFR <15 mL/min/1 73 square meters)  Note: GFR calculation is accurate only with a steady state creatinine    Lipase [[id number]]  (Abnormal) Collected: 06/24/21 0728    Lab Status: Final result Specimen: Blood from Arm, Right Updated: 06/24/21 0801     Lipase 46 u/L     Lactic acid [[id number]]  (Normal) Collected: 06/24/21 0728    Lab Status: Final result Specimen: Blood from Arm, Right Updated: 06/24/21 0754     LACTIC ACID 0 9 mmol/L     Narrative:      Result may be elevated if tourniquet was used during collection      Troponin I [[id number]]  (Normal) Collected: 06/24/21 0728    Lab Status: Final result Specimen: Blood from Arm, Right Updated: 06/24/21 0753     Troponin I <0 02 ng/mL     CBC and differential [[id number]]  (Abnormal) Collected: 06/24/21 0728    Lab Status: Final result Specimen: Blood from Arm, Right Updated: 06/24/21 0735     WBC 3 12 Thousand/uL      RBC 4 12 Million/uL      Hemoglobin 12 5 g/dL      Hematocrit 37 6 %      MCV 91 fL      MCH 30 3 pg      MCHC 33 2 g/dL      RDW 12 8 %      MPV 9 7 fL      Platelets 409 Thousands/uL      nRBC 0 /100 WBCs      Neutrophils Relative 85 %      Immat GRANS % 1 %      Lymphocytes Relative 9 %      Monocytes Relative 4 %      Eosinophils Relative 0 %      Basophils Relative 1 %      Neutrophils Absolute 2 67 Thousands/µL      Immature Grans Absolute 0 02 Thousand/uL      Lymphocytes Absolute 0 28 Thousands/µL      Monocytes Absolute 0 13 Thousand/µL      Eosinophils Absolute 0 00 Thousand/µL      Basophils Absolute 0 02 Thousands/µL                  No orders to display              Procedures  ECG 12 Lead Documentation Only    Date/Time: 6/24/2021 6:41 PM  Performed by: Errol De La Fuente PA-C  Authorized by: Errol De La Fuente PA-C     Indications / Diagnosis:  Weakness  ECG reviewed by me, the ED Provider: yes    Patient location:  ED  Previous ECG:     Previous ECG:  Compared to current    Comparison ECG info:  EKG from yesterday    Similarity:  No change  Interpretation:     Interpretation: normal    Rate:     ECG rate:  77    ECG rate assessment: normal    Rhythm:     Rhythm: sinus rhythm    Ectopy:     Ectopy: none    QRS:     QRS axis:  Normal    QRS intervals:  Normal  Conduction:     Conduction: normal    ST segments:     ST segments:  Normal  T waves:     T waves: normal               ED Course  ED Course as of Jun 24 1837   Thu Gaetano 24, 2021   6091 SARS-COV-2: Negative   1011 Headache and nausea improved  She is tolerating fluids without difficulty  Patient observed for >4 hours without any episodes of vomiting  Patient stable for discharge  MDM  Number of Diagnoses or Management Options  Malaise: new and requires workup  Nausea and vomiting: new and requires workup  Viral syndrome: new and requires workup  Diagnosis management comments: 73yo female presenting for multiple complaints including fatigue, weakness, nausea, vomiting, headache  Patient reports a tick bite a few days ago  She was seen here yesterday for the same and was diagnosed with a viral syndrome and discharged  COVID and Lyme negative  Patient is afebrile here and hemodynamically stable  Abdomen is soft, non-tender  Differential diagnosis includes but is not limited to: viral syndrome, tick borne illness, gastroenteritis, dehydration, electrolyte abnormality, JADA    Initial ED plan: Check abdominal labs, lactate, troponin/EKG, UA  Will add Anaplasmosis and blood parasite smear to evaluate for other tick borne illness  Recheck COVID to r/o false negative  No indication for abdominal imaging at this time  IV Reglan/Benadryl and fluid bolus for symptoms  Final assessment: Labs reveal a mild leukopenia with a WBC of 3  There is also a very mild thrombocytopenia with a platelet count of 226  Hemoglobin normal  Renal function, electrolytes, LFTs, and lactate are normal  Troponin normal and EKG reveals NSR without ischemic changes  No ketones seen on UA  No evidence of UTI  COVID negative  Patient feeling improved after medications  She was observed for >5 hours without any episodes of vomiting  She is tolerating PO fluids here without difficulty  No indication for admission at this time  Script provided for Reglan  Advised Pedialyte and bland diet  Advised close PCP follow-up  ED return precautions discussed   Patient expressed understanding and is agreeable to plan  Patient discharged in stable condition  Amount and/or Complexity of Data Reviewed  Clinical lab tests: ordered and reviewed  Tests in the medicine section of CPT®: ordered and reviewed  Review and summarize past medical records: yes  Independent visualization of images, tracings, or specimens: yes    Risk of Complications, Morbidity, and/or Mortality  Presenting problems: moderate  Diagnostic procedures: moderate  Management options: moderate    Patient Progress  Patient progress: improved      Disposition  Final diagnoses:   Viral syndrome   Nausea and vomiting   Malaise     Time reflects when diagnosis was documented in both MDM as applicable and the Disposition within this note     Time User Action Codes Description Comment    6/24/2021 10:16 AM Lina Simpson Add [B34 9] Viral syndrome     6/24/2021 10:17 AM Lina Simpson Add [R11 2] Nausea and vomiting     6/24/2021 10:17 AM Lex Simpson Add [R53 81] [facility]       ED Disposition     ED Disposition Condition Date/Time Comment    Discharge Stable Thu Jun 24, 2021 10:16 AM Cristal Forbes discharge to home/self care  Follow-up Information     Follow up With Specialties Details Why Contact Info Additional Information    Isabel Stiles DO Family Medicine Schedule an appointment as soon as possible for a visit   [address]  [phone]  [phone] [facility] Emergency [facility]  If symptoms worsen [address] Emergency Department, [address] [place] [address], [phone]          Discharge Medication List as of 6/24/2021 10:19 AM      START taking these medications    Details   metoclopramide (REGLAN) 10 mg tablet Take 1 tablet (10 mg total) by mouth every 6 (six) hours as needed (nausea), Starting Thu 6/24/2021, Normal         CONTINUE these medications which have NOT CHANGED    Details   Biotin 5000 MCG CAPS 5000 mcg 1 qd, Historical Med      Black Elderberry,Berry-Flower, 575 MG CAPS Take by mouth as needed , Historical Med      calcium carbonate (TUMS) 500 mg chewable tablet Chew 1 tablet as needed , Historical Med      Capsaicin-Menthol 0 025-1 25 % PTCH Salonpas (capsaicin-menthol) 0 025 %-1 25 % topical patch, Historical Med      cholecalciferol (VITAMIN D3) 1,000 units tablet Take 1,000 Units by mouth daily, Historical Med      cyanocobalamin (VITAMIN B-12) 100 mcg tablet Take by mouth as needed , Historical Med      diclofenac sodium (VOLTAREN) 1 % Apply 2 g topically 4 (four) times a day, Starting Sat 2/23/2019, Print      Diclofenac Sodium (VOLTAREN) 1 % Apply 2 g topically 4 (four) times a day, Starting Sat 5/8/2021, Normal      Ginger, Zingiber officinalis, (GINGER ROOT PO) as needed , Historical Med      hydrocortisone 2 5 % cream Apply at nighttime and in a m  To affected area on the lips, Normal      Liniments (SALONPAS ARTHRITIS PAIN RELIEF EX) as needed , Historical Med      loratadine (CLARITIN) 10 mg tablet Take 10 mg by mouth daily, Historical Med      Multiple Vitamins-Minerals (PreserVision/Lutein) CAPS Take by mouth, Historical Med      Omega-3 Fatty Acids (Fish Oil) 1000 MG CPDR Take by mouth, Historical Med      ondansetron (ZOFRAN-ODT) 4 mg disintegrating tablet Take 1 tablet (4 mg total) by mouth every 8 (eight) hours as needed for nausea or vomiting, Starting Wed 6/23/2021, Normal      Probiotic Product (PROBIOTIC ACIDOPHILUS BEADS PO) Take by mouth, Historical Med           No discharge procedures on file      PDMP Review     None          ED Provider  Electronically Signed by           Yanet Chacon PA-C  06/24/21 6778

## 2021-06-26 NOTE — ED PROVIDER NOTES
History  Chief Complaint   Patient presents with    Flu Symptoms     bit by tick 2 days ago  complains of bodyaches, fever, chills, headache and nausea  73year old female patient presents emergency department for evaluation of generalized malaise, fatigue, muscle aches and pains, feeling of unwell  The patient states subjective fevers as well  Plans for evaluation with a differential diagnosis to include but not be limited to viral syndrome, dehydration, acute coronary syndrome  History provided by:  Patient   used: No    Flu Symptoms  Presenting symptoms: fatigue and fever    Severity:  Mild  Onset quality:  Gradual  Progression:  Worsening  Chronicity:  New  Relieved by:  Nothing  Worsened by:  Nothing  Associated symptoms: chills, decreased appetite and decreased physical activity    Associated symptoms: no ear pain        Prior to Admission Medications   Prescriptions Last Dose Informant Patient Reported? Taking?    Biotin 5000 MCG CAPS  Self Yes No   Si mcg 1 qd   Black Elderberry,Berry-Flower, 575 MG CAPS  Self Yes No   Sig: Take by mouth as needed    Capsaicin-Menthol 0 025-1 25 % PTCH   Yes No   Sig: Salonpas (capsaicin-menthol) 0 025 %-1 25 % topical patch   Diclofenac Sodium (VOLTAREN) 1 %   No No   Sig: Apply 2 g topically 4 (four) times a day   Ginger, Zingiber officinalis, (GINGER ROOT PO)  Self Yes No   Sig: as needed    Liniments (SALONPAS ARTHRITIS PAIN RELIEF EX)  Self Yes No   Sig: as needed    Multiple Vitamins-Minerals (PreserVision/Lutein) CAPS   Yes No   Sig: Take by mouth   Omega-3 Fatty Acids (Fish Oil) 1000 MG CPDR   Yes No   Sig: Take by mouth   Probiotic Product (PROBIOTIC ACIDOPHILUS BEADS PO)  Self Yes No   Sig: Take by mouth   calcium carbonate (TUMS) 500 mg chewable tablet  Self Yes No   Sig: Chew 1 tablet as needed    cholecalciferol (VITAMIN D3) 1,000 units tablet  Self Yes No   Sig: Take 1,000 Units by mouth daily   cyanocobalamin (VITAMIN B-12) 100 mcg tablet  Self Yes No   Sig: Take by mouth as needed    diclofenac sodium (VOLTAREN) 1 %  Self No No   Sig: Apply 2 g topically 4 (four) times a day   hydrocortisone 2 5 % cream  Self No No   Sig: Apply at nighttime and in a m  To affected area on the lips   Patient taking differently: as needed Apply at nighttime and in a m  To affected area on the lips   loratadine (CLARITIN) 10 mg tablet  Self Yes No   Sig: Take 10 mg by mouth daily      Facility-Administered Medications: None       Past Medical History:   Diagnosis Date    GERD (gastroesophageal reflux disease)     Herniated disc, cervical     Hyperlipidemia        Past Surgical History:   Procedure Laterality Date    BLADDER REPAIR      sling    CATARACT EXTRACTION, BILATERAL      REMOVAL OF INTRAUTERINE DEVICE (IUD)      in abd area    TONSILLECTOMY      TUMOR REMOVAL      pinky finger 1977    WISDOM TOOTH EXTRACTION         Family History   Problem Relation Age of Onset    Hypertension Mother         Pulmonary    Osteoporosis Mother     Heart failure Mother     Heart attack Father     Hypertension Father     Stroke Father      I have reviewed and agree with the history as documented  E-Cigarette/Vaping    E-Cigarette Use Never User      E-Cigarette/Vaping Substances    Nicotine No     THC No     CBD No     Flavoring No     Other No     Unknown No      Social History     Tobacco Use    Smoking status: Never Smoker    Smokeless tobacco: Never Used   Vaping Use    Vaping Use: Never used   Substance Use Topics    Alcohol use: Never    Drug use: Never       Review of Systems   Constitutional: Positive for chills, decreased appetite, fatigue and fever  HENT: Negative for ear pain  All other systems reviewed and are negative  Physical Exam  Physical Exam  Vitals and nursing note reviewed  Constitutional:       Appearance: She is well-developed  HENT:      Head: Normocephalic and atraumatic        Right Ear: External ear normal       Left Ear: External ear normal    Eyes:      Conjunctiva/sclera: Conjunctivae normal    Neck:      Thyroid: No thyromegaly  Vascular: No JVD  Trachea: No tracheal deviation  Cardiovascular:      Rate and Rhythm: Normal rate  Pulmonary:      Effort: Pulmonary effort is normal       Breath sounds: Normal breath sounds  No stridor  Abdominal:      General: There is no distension  Palpations: Abdomen is soft  There is no mass  Tenderness: There is no abdominal tenderness  There is no guarding  Hernia: No hernia is present  Musculoskeletal:         General: No tenderness or deformity  Normal range of motion  Lymphadenopathy:      Cervical: No cervical adenopathy  Skin:     General: Skin is warm  Coloration: Skin is not pale  Findings: No erythema or rash  Neurological:      Mental Status: She is alert and oriented to person, place, and time     Psychiatric:         Behavior: Behavior normal          Vital Signs  ED Triage Vitals   Temperature Pulse Respirations Blood Pressure SpO2   06/23/21 1827 06/23/21 1827 06/23/21 1827 06/23/21 1827 06/23/21 1827   99 4 °F (37 4 °C) 91 18 131/73 95 %      Temp Source Heart Rate Source Patient Position - Orthostatic VS BP Location FiO2 (%)   06/23/21 1827 06/23/21 1827 06/23/21 1827 06/23/21 1827 --   Oral Monitor Sitting Left arm       Pain Score       06/23/21 2056       No Pain           Vitals:    06/23/21 1827 06/23/21 2045   BP: 131/73 121/79   Pulse: 91 70   Patient Position - Orthostatic VS: Sitting Lying         Visual Acuity      ED Medications  Medications   sodium chloride 0 9 % bolus 1,000 mL (0 mL Intravenous Stopped 6/23/21 2056)   ondansetron (ZOFRAN) injection 4 mg (4 mg Intravenous Given 6/23/21 1937)   ketorolac (TORADOL) injection 15 mg (15 mg Intravenous Given 6/23/21 2233)   famotidine (PEPCID) injection 20 mg (20 mg Intravenous Given 6/23/21 2233)       Diagnostic Studies  Results Reviewed Procedure Component Value Units Date/Time    Lyme Total Antibody Profile with reflex to WB [[id number]]  (Normal) Collected: 06/23/21 1933    Lab Status: Final result Specimen: Blood from Arm, Right Updated: 06/24/21 0457     Lyme total antibody 12    Novel Coronavirus (Covid-19),PCR [facility] [[id number]]  (Normal) Collected: 06/23/21 1933    Lab Status: Final result Specimen: Nares from Nasopharyngeal Swab Updated: 06/23/21 2034     SARS-CoV-2 Negative    Narrative: The specimen collection materials, transport medium, and/or testing methodology utilized in the production of these test results have been proven to be reliable in a limited validation with an abbreviated program under the Emergency Utilization Authorization provided by the FDA  Testing reported as "Presumptive positive" will be confirmed with secondary testing to ensure result accuracy  Clinical caution and judgement should be used with the interpretation of these results with consideration of the clinical impression and other laboratory testing  Testing reported as "Positive" or "Negative" has been proven to be accurate according to standard laboratory validation requirements  All testing is performed with control materials showing appropriate reactivity at standard intervals        [facility]a Screen [[id number]]  (Normal) Collected: 06/23/21 1933    Lab Status: Final result Specimen: Blood from Arm, Right Updated: 06/23/21 2015     Protime 13 2 seconds      INR 1 05    Troponin I [[id number]]  (Normal) Collected: 06/23/21 1933    Lab Status: Final result Specimen: Blood from Arm, Right Updated: 06/23/21 2013     Troponin I <0 02 ng/mL     Comprehensive metabolic panel [[id number]]  (Abnormal) Collected: 06/23/21 1933    Lab Status: Final result Specimen: Blood from Arm, Right Updated: 06/1950     Sodium 134 mmol/L      Potassium 3 8 mmol/L      Chloride 99 mmol/L      CO2 26 mmol/L      ANION GAP 9 mmol/L      BUN 9 mg/dL      Creatinine 0 75 mg/dL      Glucose 122 mg/dL      Calcium 8 9 mg/dL      AST 20 U/L      ALT 33 U/L      Alkaline Phosphatase 108 U/L      Total Protein 7 5 g/dL      Albumin 3 7 g/dL      Total Bilirubin 0 30 mg/dL      eGFR 82 ml/min/1 73sq m     Narrative:      [place] guidelines for Chronic Kidney Disease (CKD):     Stage 1 with normal or high GFR (GFR > 90 mL/min/1 73 square meters)    Stage 2 Mild CKD (GFR = 60-89 mL/min/1 73 square meters)    Stage 3A Moderate CKD (GFR = 45-59 mL/min/1 73 square meters)    Stage 3B Moderate CKD (GFR = 30-44 mL/min/1 73 square meters)    Stage 4 Severe CKD (GFR = 15-29 mL/min/1 73 square meters)    Stage 5 End Stage CKD (GFR <15 mL/min/1 73 square meters)  Note: GFR calculation is accurate only with a steady state creatinine    CBC and differential [[id number]]  (Abnormal) Collected: 06/23/21 1933    Lab Status: Final result Specimen: Blood from Arm, Right Updated: 06/23/21 1945     WBC 4 02 Thousand/uL      RBC 4 18 Million/uL      Hemoglobin 12 5 g/dL      Hematocrit 37 9 %      MCV 91 fL      MCH 29 9 pg      MCHC 33 0 g/dL      RDW 12 6 %      MPV 9 5 fL      Platelets 642 Thousands/uL      nRBC 0 /100 WBCs      Neutrophils Relative 83 %      Immat GRANS % 1 %      Lymphocytes Relative 9 %      Monocytes Relative 6 %      Eosinophils Relative 0 %      Basophils Relative 1 %      Neutrophils Absolute 3 39 Thousands/µL      Immature Grans Absolute 0 02 Thousand/uL      Lymphocytes Absolute 0 34 Thousands/µL      Monocytes Absolute 0 25 Thousand/µL      Eosinophils Absolute 0 00 Thousand/µL      Basophils Absolute 0 02 Thousands/µL                  XR chest 1 view portable   Final Result by Ton Nuñez MD (06/24 0935)      No acute cardiopulmonary disease                  Workstation performed: [id number]                    Procedures  Procedures         ED Course                                           MDM  Number of Diagnoses or Management Options  Viral syndrome: new and requires workup     Amount and/or Complexity of Data Reviewed  Clinical lab tests: ordered and reviewed  Tests in the radiology section of CPT®: reviewed and ordered  Decide to obtain previous medical records or to obtain history from someone other than the patient: yes  Review and summarize past medical records: yes    Patient Progress  Patient progress: stable      Disposition  Final diagnoses:   Viral syndrome     Time reflects when diagnosis was documented in both MDM as applicable and the Disposition within this note     Time User Action Codes Description Comment    6/23/2021 10:12 PM Rigoberto Warren Vivek [B34 9] Viral syndrome       ED Disposition     ED Disposition Condition Date/Time Comment    Discharge Stable Wed Jun 23, 2021 10:12 PM Sherryle Mayhew discharge to home/self care  Follow-up Information     Follow up With Specialties Details Why Contact Info    Bryce Covington DO Family Medicine   [address]  [address]  [address] [facility]  [phone]            Discharge Medication List as of 6/23/2021 10:13 PM      START taking these medications    Details   ondansetron (ZOFRAN-ODT) 4 mg disintegrating tablet Take 1 tablet (4 mg total) by mouth every 8 (eight) hours as needed for nausea or vomiting, Starting Wed 6/23/2021, Normal         CONTINUE these medications which have NOT CHANGED    Details   Biotin 5000 MCG CAPS 5000 mcg 1 qd, Historical Med      Black Elderberry,Berry-Flower, 575 MG CAPS Take by mouth as needed , Historical Med      calcium carbonate (TUMS) 500 mg chewable tablet Chew 1 tablet as needed , Historical Med      Capsaicin-Menthol 0 025-1 25 % PTCH Salonpas (capsaicin-menthol) 0 025 %-1 25 % topical patch, Historical Med      cholecalciferol (VITAMIN D3) 1,000 units tablet Take 1,000 Units by mouth daily, Historical Med      cyanocobalamin (VITAMIN B-12) 100 mcg tablet Take by mouth as needed , Historical Med      diclofenac sodium (VOLTAREN) 1 % Apply 2 g topically 4 (four) times a day, Starting Sat 2/23/2019, Print      Diclofenac Sodium (VOLTAREN) 1 % Apply 2 g topically 4 (four) times a day, Starting Sat 5/8/2021, Normal      Ginger, Zingiber officinalis, (GINGER ROOT PO) as needed , Historical Med      hydrocortisone 2 5 % cream Apply at nighttime and in a m  To affected area on the lips, Normal      Liniments (SALONPAS ARTHRITIS PAIN RELIEF EX) as needed , Historical Med      loratadine (CLARITIN) 10 mg tablet Take 10 mg by mouth daily, Historical Med      Multiple Vitamins-Minerals (PreserVision/Lutein) CAPS Take by mouth, Historical Med      Omega-3 Fatty Acids (Fish Oil) 1000 MG CPDR Take by mouth, Historical Med      Probiotic Product (PROBIOTIC ACIDOPHILUS BEADS PO) Take by mouth, Historical Med           No discharge procedures on file      PDMP Review     None          ED Provider  Electronically Signed by           Florencia Zhao DO  06/26/21 7011

## 2021-06-28 ENCOUNTER — OFFICE VISIT (OUTPATIENT)
Dept: FAMILY MEDICINE CLINIC | Facility: CLINIC | Age: 71
End: 2021-06-28
Payer: MEDICARE

## 2021-06-28 VITALS
OXYGEN SATURATION: 97 % | HEART RATE: 81 BPM | HEIGHT: 61 IN | BODY MASS INDEX: 29.57 KG/M2 | DIASTOLIC BLOOD PRESSURE: 70 MMHG | WEIGHT: 156.6 LBS | TEMPERATURE: 99.2 F | SYSTOLIC BLOOD PRESSURE: 110 MMHG

## 2021-06-28 DIAGNOSIS — K21.9 CHRONIC GERD: ICD-10-CM

## 2021-06-28 DIAGNOSIS — D69.6 PLATELETS DECREASED (HCC): ICD-10-CM

## 2021-06-28 DIAGNOSIS — B34.9 VIRAL SYNDROME: Primary | ICD-10-CM

## 2021-06-28 PROCEDURE — 99214 OFFICE O/P EST MOD 30 MIN: CPT | Performed by: NURSE PRACTITIONER

## 2021-06-28 NOTE — ASSESSMENT & PLAN NOTE
Improving since ED visit, energy level is growing along with appetite   Stay hydrated, discussed options of electrolyte drinks

## 2021-06-28 NOTE — PROGRESS NOTES
OFFICE VISIT  Jeanmarie Dewey 73 y o  female MRN: [id number]          Assessment / Plan:  Problem List Items Addressed This Visit        Digestive    Chronic GERD     referral for GI and appt made for EGD, gerd lifestyle changes discussed          Relevant Orders    Ambulatory referral to Gastroenterology       Other    Viral syndrome - Primary     Improving since ED visit, energy level is growing along with appetite  Stay hydrated, discussed options of electrolyte drinks         Platelets decreased (HCC)     platelets on 1/70 878, dropped to 142 on 6/24  Will repeat for follow up at next office visit  Reason For Visit / Chief Complaint  Chief Complaint   Patient presents with    Follow-up     ER visit 6/23 & 6/24        HPI:  Jeanmarie Dewey is a 73 y o  female who presents today for followup  She reports on Monday she found a tick, engorged, removed  She had a lyme titer in the ED which was normal, she repots waking wednesday with body aches, weakness, nausea, HA  She went home with viral like symptoms, she went to the ED the following days, she reports having "violent shakes"  , vomiting  She was given reglan, last dose on Saturday  Toda,m she feeling jamie  Appetite is fair, today she ate oatmeal with apples, she also ate smashed yam in broth  She had a BM yesterday, hard stool      She complains of chronci gerd, unable to tolerate any PPIs, she reports a lot belching, had a hida scan 1/2021, did not have follow up with gastro       Historical Information   Past Medical History:   Diagnosis Date    GERD (gastroesophageal reflux disease)     Herniated disc, cervical     Hyperlipidemia      Past Surgical History:   Procedure Laterality Date    BLADDER REPAIR      sling    CATARACT EXTRACTION, BILATERAL      REMOVAL OF INTRAUTERINE DEVICE (IUD)      in abd area    TONSILLECTOMY      TUMOR REMOVAL      pinky finger 1977    WISDOM TOOTH EXTRACTION       Social History   Social History Substance and Sexual Activity   Alcohol Use Never     Social History     Substance and Sexual Activity   Drug Use Never     Social History     Tobacco Use   Smoking Status Never Smoker   Smokeless Tobacco Never Used     Family History   Problem Relation Age of Onset    Hypertension Mother         Pulmonary    Osteoporosis Mother     Heart failure Mother     Heart attack Father     Hypertension Father     Stroke Father        Meds/Allergies   Allergies   Allergen Reactions    Eggs Or Egg-Derived Products - Food Allergy Other (See Comments)    Etodolac      Other reaction(s): chest pain, dizziness, nausea    Nitrofurantoin Other (See Comments)    Sucralfate Dizziness and Headache    Sulfa Antibiotics Hives and Other (See Comments)    Levofloxacin Dizziness    Omeprazole Headache and Fatigue    Pregabalin Chest Pain    Aspirin      Other reaction(s): GI upset    Chocolate - Food Allergy     Ibuprofen      Other reaction(s): GI upset    Naproxen      Other reaction(s): GI upset    Penicillin G      Other reaction(s): swollen lips, tingling around mouth       Meds:    Current Outpatient Medications:     Biotin 5000 MCG CAPS, 5000 mcg 1 qd, Disp: , Rfl:     Black Elderberry,Berry-Flower, 575 MG CAPS, Take by mouth as needed , Disp: , Rfl:     calcium carbonate (TUMS) 500 mg chewable tablet, Chew 1 tablet as needed , Disp: , Rfl:     Capsaicin-Menthol 0 025-1 25 % PTCH, Salonpas (capsaicin-menthol) 0 025 %-1 25 % topical patch, Disp: , Rfl:     cholecalciferol (VITAMIN D3) 1,000 units tablet, Take 1,000 Units by mouth daily, Disp: , Rfl:     cyanocobalamin (VITAMIN B-12) 100 mcg tablet, Take by mouth as needed , Disp: , Rfl:     diclofenac sodium (VOLTAREN) 1 %, Apply 2 g topically 4 (four) times a day, Disp: 1 Tube, Rfl: 0    Diclofenac Sodium (VOLTAREN) 1 %, Apply 2 g topically 4 (four) times a day, Disp: 100 g, Rfl: 0    Ginger, Zingiber officinalis, (GINGER ROOT PO), as needed , Disp: , Rfl:     hydrocortisone 2 5 % cream, Apply at nighttime and in a m  To affected area on the lips (Patient taking differently: as needed Apply at nighttime and in a m  To affected area on the lips), Disp: 30 g, Rfl: 0    Liniments (SALONPAS ARTHRITIS PAIN RELIEF EX), as needed , Disp: , Rfl:     loratadine (CLARITIN) 10 mg tablet, Take 10 mg by mouth daily, Disp: , Rfl:     metoclopramide (REGLAN) 10 mg tablet, Take 1 tablet (10 mg total) by mouth every 6 (six) hours as needed (nausea), Disp: 30 tablet, Rfl: 0    Multiple Vitamins-Minerals (PreserVision/Lutein) CAPS, Take by mouth, Disp: , Rfl:     Omega-3 Fatty Acids (Fish Oil) 1000 MG CPDR, Take by mouth, Disp: , Rfl:     ondansetron (ZOFRAN-ODT) 4 mg disintegrating tablet, Take 1 tablet (4 mg total) by mouth every 8 (eight) hours as needed for nausea or vomiting, Disp: 20 tablet, Rfl: 0    Probiotic Product (PROBIOTIC ACIDOPHILUS BEADS PO), Take by mouth, Disp: , Rfl:       REVIEW OF SYSTEMS  Review of Systems   Constitutional: Negative for activity change, chills, fatigue and fever  HENT: Negative for congestion, ear discharge, ear pain, sinus pressure, sinus pain, sore throat, tinnitus and trouble swallowing  Eyes: Negative for photophobia, pain, discharge, itching and visual disturbance  Respiratory: Negative for cough, chest tightness, shortness of breath and wheezing  Cardiovascular: Negative for chest pain and leg swelling  Gastrointestinal: Positive for nausea  Negative for abdominal distention, abdominal pain, constipation, diarrhea and vomiting  Endocrine: Negative for polydipsia, polyphagia and polyuria  Genitourinary: Negative for dysuria and frequency  Musculoskeletal: Negative for arthralgias, myalgias, neck pain and neck stiffness  Skin: Negative for color change  Neurological: Positive for weakness  Negative for dizziness, syncope, numbness and headaches  Hematological: Does not bruise/bleed easily  Psychiatric/Behavioral: Negative for behavioral problems, confusion, self-injury, sleep disturbance and suicidal ideas  The patient is not nervous/anxious  Current Vitals:   Blood Pressure: 110/70 (06/28/21 1257)  Pulse: 81 (06/28/21 1257)  Temperature: 99 2 °F (37 3 °C) (06/28/21 1257)  Height: 5' 1" (154 9 cm) (06/28/21 1257)  Weight - Scale: 71 kg (156 lb 9 6 oz) (06/28/21 1257)  SpO2: 97 % (06/28/21 1257)  [unfilled]    PHYSICAL EXAMS:  Physical Exam  Constitutional:       Appearance: Normal appearance  She is well-developed  HENT:      Head: Normocephalic  Right Ear: Tympanic membrane, ear canal and external ear normal       Left Ear: Tympanic membrane, ear canal and external ear normal       Nose: Nose normal  No congestion or rhinorrhea  Mouth/Throat:      Mouth: Mucous membranes are moist       Pharynx: No oropharyngeal exudate or posterior oropharyngeal erythema  Eyes:      Extraocular Movements: Extraocular movements intact  Conjunctiva/sclera: Conjunctivae normal       Pupils: Pupils are equal, round, and reactive to light  Cardiovascular:      Rate and Rhythm: Normal rate and regular rhythm  Pulses: Normal pulses  Heart sounds: Normal heart sounds  Pulmonary:      Effort: Pulmonary effort is normal       Breath sounds: Normal breath sounds  No wheezing or rhonchi  Abdominal:      General: Bowel sounds are normal  There is no distension  Palpations: Abdomen is soft  Tenderness: There is no abdominal tenderness  Musculoskeletal:         General: No swelling, tenderness, deformity or signs of injury  Normal range of motion  Cervical back: Normal range of motion and neck supple  Right lower leg: No edema  Left lower leg: No edema  Skin:     General: Skin is warm and dry  Findings: No bruising, erythema, lesion or rash  Neurological:      General: No focal deficit present        Mental Status: She is alert and oriented to person, place, and time  Psychiatric:         Mood and Affect: Mood normal          Behavior: Behavior normal          Thought Content: Thought content normal          Judgment: Judgment normal              Lab, imaging and other studies: I have personally reviewed pertinent reports  Pablo Rankin

## 2021-06-29 LAB — A PHAGOCYTOPH DNA BLD QL NAA+PROBE: POSITIVE

## 2021-06-29 RX ORDER — DOXYCYCLINE 100 MG/1
100 CAPSULE ORAL 2 TIMES DAILY
Qty: 28 CAPSULE | Refills: 0 | Status: SHIPPED | OUTPATIENT
Start: 2021-06-29 | End: 2021-07-13

## 2021-06-29 RX ORDER — ONDANSETRON 4 MG/1
4 TABLET, ORALLY DISINTEGRATING ORAL EVERY 6 HOURS PRN
Qty: 20 TABLET | Refills: 0 | Status: SHIPPED | OUTPATIENT
Start: 2021-06-29 | End: 2021-12-09 | Stop reason: HOSPADM

## 2021-07-01 ENCOUNTER — TELEPHONE (OUTPATIENT)
Dept: FAMILY MEDICINE CLINIC | Facility: CLINIC | Age: 71
End: 2021-07-01

## 2021-07-01 NOTE — TELEPHONE ENCOUNTER
Pt called asking about the results she was given from the testing that was done on, medication is helping but still tired   If you like to speak to her she can be reached on her cell

## 2021-07-01 NOTE — TELEPHONE ENCOUNTER
The ED doctor did message me, if she would like a followup appt she can certainly have one, most importantly is taking the medication as prescribed and finishing the entire course

## 2021-07-06 ENCOUNTER — OFFICE VISIT (OUTPATIENT)
Dept: PHYSICAL THERAPY | Age: 71
End: 2021-07-06
Payer: MEDICARE

## 2021-07-06 DIAGNOSIS — M76.72 PERONEAL TENDONITIS, LEFT: Primary | ICD-10-CM

## 2021-07-06 DIAGNOSIS — M54.16 LUMBAR RADICULOPATHY: ICD-10-CM

## 2021-07-06 DIAGNOSIS — M79.672 LEFT FOOT PAIN: ICD-10-CM

## 2021-07-06 PROCEDURE — 97110 THERAPEUTIC EXERCISES: CPT | Performed by: PHYSICAL THERAPIST

## 2021-07-06 PROCEDURE — 97140 MANUAL THERAPY 1/> REGIONS: CPT | Performed by: PHYSICAL THERAPIST

## 2021-07-06 NOTE — PROGRESS NOTES
Daily Note     Today's date: 2021  Patient name: Ankush Villafana  : 1950  MRN: [id number]  Referring provider: Ana Cowan MD  Dx:   Encounter Diagnosis     ICD-10-CM    1  Peroneal tendonitis, left  M76 72    2  Lumbar radiculopathy  M54 16    3  Left foot pain  M79 672        Start Time: 1345  Stop Time: 1425  Total time in clinic (min): 40 minutes    Subjective: Patient reports she got bit by a tick 2 weeks ago and she developed a tick borne disease and was symptomatic  C/o fatigue and disconnected  Objective: See treatment diary below      Assessment: Tolerated treatment fair  Patient would benefit from continued PT  Patient has fatigue today  Plan: Continue per plan of care        Precautions: HNP L 5-S1/pt, cataract sx,      Manuals  7/6           LB/ LE's 10 10           L ankle 5 5           KT lateral L 8 10                        Neuro Re-Ed                                                                                                        Ther Ex             HEP 8            B heel slides 30x  20x           Hip abd  20x           Hip add  20x           Slant board  4x           Nustep  3'                                     Ther Activity                                       Gait Training                                       Modalities                          Ice after 10' home

## 2021-07-09 ENCOUNTER — OFFICE VISIT (OUTPATIENT)
Dept: PHYSICAL THERAPY | Age: 71
End: 2021-07-09
Payer: MEDICARE

## 2021-07-09 DIAGNOSIS — M54.16 LUMBAR RADICULOPATHY: ICD-10-CM

## 2021-07-09 DIAGNOSIS — M79.672 LEFT FOOT PAIN: ICD-10-CM

## 2021-07-09 DIAGNOSIS — M76.72 PERONEAL TENDONITIS, LEFT: Primary | ICD-10-CM

## 2021-07-09 PROCEDURE — 97140 MANUAL THERAPY 1/> REGIONS: CPT | Performed by: PHYSICAL THERAPIST

## 2021-07-09 PROCEDURE — 97110 THERAPEUTIC EXERCISES: CPT | Performed by: PHYSICAL THERAPIST

## 2021-07-09 NOTE — PROGRESS NOTES
Daily Note     Today's date: 2021  Patient name: Charmaine Pino  : 1950  MRN: [id number]  Referring provider: Graham Miguel MD  Dx:   Encounter Diagnosis     ICD-10-CM    1  Peroneal tendonitis, left  M76 72    2  Lumbar radiculopathy  M54 16    3  Left foot pain  M79 672        Start Time: 745  Stop Time: 825  Total time in clinic (min): 40 minutes    Subjective: Patient reports her neurological symptoms are better and meds are helping  She also feels the KT tape is helping  Objective: See treatment diary below      Assessment: Tolerated treatment well  Patient would benefit from continued PT  Advised patient to remove the tape this weekend, as still intact  Ankle IV and EV difficult  Tender along lateral lower leg with tightness in peroneal area on left  Plan: Continue per plan of care        Precautions: HNP L 5-S1/pt, cataract sx,      Manuals  7 7/          LB/ LE's 10 10 10          L ankle 5 5 5          KT lateral L 8 10 nt                       Neuro Re-Ed                                                                                                        Ther Ex             HEP 8            B heel slides 30x  20x 30x          Hip abd-band  20x 30x          Hip add-ball  20x 30x          Slant board  4x 4x          Nustep  3' 6'          bridge   25          Isolator x3   1/2# 30x          Ther Activity             Stand ball press   nv          rows   nv          Gait Training                                       Modalities                          Ice after 10' home

## 2021-07-12 ENCOUNTER — OFFICE VISIT (OUTPATIENT)
Dept: PHYSICAL THERAPY | Age: 71
End: 2021-07-12
Payer: MEDICARE

## 2021-07-12 DIAGNOSIS — M54.16 LUMBAR RADICULOPATHY: ICD-10-CM

## 2021-07-12 DIAGNOSIS — M79.672 LEFT FOOT PAIN: ICD-10-CM

## 2021-07-12 DIAGNOSIS — M76.72 PERONEAL TENDONITIS, LEFT: Primary | ICD-10-CM

## 2021-07-12 PROCEDURE — 97110 THERAPEUTIC EXERCISES: CPT | Performed by: PHYSICAL THERAPIST

## 2021-07-12 NOTE — PROGRESS NOTES
Daily Note     Today's date: 2021  Patient name: Archie Hendricks  : 1950  MRN: [id number]  Referring provider: Taylor Mejias MD  Dx:   Encounter Diagnosis     ICD-10-CM    1  Peroneal tendonitis, left  M76 72    2  Lumbar radiculopathy  M54 16    3  Left foot pain  M79 672        Start Time: 1610  Stop Time: 1700  Total time in clinic (min): 50 minutes    Subjective: Patient tired today, afternoon appt, better in the am        Objective: See treatment diary below      Assessment: Tolerated treatment well  Patient would benefit from continued PT less pain lateral left foot  Plan: Continue per plan of care        Precautions: HNP L 5-S1/pt, cataract sx,      Manuals          LB/ LE's 10 10 10 10         L ankle 5 5 5 5         KT lateral L 8 10 nt 10                      Neuro Re-Ed             SLS    nv                                                                                       Ther Ex             HEP 8            B heel slides 30x  20x 30x 30x         Hip abd-band  20x 30x 30x         Hip add-ball  20x 30x 30x         Slant board  4x 4x 4x         Nustep  3' 6' 6'         bridge   25 20x         Isolator x3   1/2# 30x 1/2# 30x         Ther Activity             Stand ball press   nv 3" 30x         rows   nv Blue 30x         Gait Training                                       Modalities                          Ice after 10' home

## 2021-07-15 ENCOUNTER — OFFICE VISIT (OUTPATIENT)
Dept: GASTROENTEROLOGY | Facility: CLINIC | Age: 71
End: 2021-07-15
Payer: MEDICARE

## 2021-07-15 VITALS
WEIGHT: 158 LBS | HEART RATE: 114 BPM | DIASTOLIC BLOOD PRESSURE: 80 MMHG | HEIGHT: 61 IN | SYSTOLIC BLOOD PRESSURE: 130 MMHG | BODY MASS INDEX: 29.83 KG/M2

## 2021-07-15 DIAGNOSIS — K21.9 CHRONIC GERD: ICD-10-CM

## 2021-07-15 DIAGNOSIS — K62.5 GASTROINTESTINAL HEMORRHAGE ASSOCIATED WITH ANORECTAL SOURCE: Primary | ICD-10-CM

## 2021-07-15 PROCEDURE — 99213 OFFICE O/P EST LOW 20 MIN: CPT | Performed by: PHYSICIAN ASSISTANT

## 2021-07-15 NOTE — PROGRESS NOTES
[facility] - Outpatient Follow-up Note  Archie Hendricks 73 y o  female MRN: [id number]  Encounter: [id number]          ASSESSMENT AND PLAN:      1  Chronic GERD  She has either not responded to or had side effects with most PPIs  She is taking Tums BID which controls the symptom for the most part  She does not eat late at night and sleeps elevated  Discussed possible trial of Reglan however she took this recently with an exacerbation of the neurological symptoms she was already experiencing  Will plan EGD    2  Gastrointestinal hemorrhage associated with anorectal source  Occasional  Likely hemorrhoidal  Will plan colonoscopy  ______________________________________________________________________    SUBJECTIVE:  65-year-old female with history of acid reflux presents for follow-up evaluation she was evaluated by [employer] last March just prior to the COVID-19 pandemic  At that time she was advised to undergo both an EGD and a colonoscopy which were scheduled however they were then canceled due to the  pandemic  By the time we were rescheduling she had too much going on in her life and did not have time to do this  At that time she was having dramatic symptoms such as abdominal pain, nausea, vomiting, dizziness and diarrhea  She reports that over the past year the symptoms have improved  She believes that they were secondary to stress both from her personal life and her work life  For her work she had a rotating shift where she would go from 1st and then to 2nd and 3rd shift  This obviously did not allow her to have a routine eating schedule  She did and scheduling HIDA scan in January and was reported as normal   She reports that at present she is having episodes of acid reflux and regurgitation  She has been on multiple proton pump inhibitors and H2 blockers in the past   She reports that even did not work or gave her side effects    She recently was in the emergency room with a viral type illness after suffering a tick bite  She was prescribed Reglan for nausea reports that her worsened her neurological symptoms  REVIEW OF SYSTEMS IS OTHERWISE NEGATIVE        Historical Information   Past Medical History:   Diagnosis Date    GERD (gastroesophageal reflux disease)     Herniated disc, cervical     Hyperlipidemia      Past Surgical History:   Procedure Laterality Date    BLADDER REPAIR      sling    CATARACT EXTRACTION, BILATERAL      REMOVAL OF INTRAUTERINE DEVICE (IUD)      in abd area    TONSILLECTOMY      TUMOR REMOVAL      pinky finger 1977    WISDOM TOOTH EXTRACTION       Social History   Social History     Substance and Sexual Activity   Alcohol Use Never     Social History     Substance and Sexual Activity   Drug Use Never     Social History     Tobacco Use   Smoking Status Never Smoker   Smokeless Tobacco Never Used     Family History   Problem Relation Age of Onset    Hypertension Mother         Pulmonary    Osteoporosis Mother     Heart failure Mother     Heart attack Father     Hypertension Father     Stroke Father        Meds/Allergies       Current Outpatient Medications:     Biotin 5000 MCG CAPS    Black Elderberry,Berry-Flower, 575 MG CAPS    calcium carbonate (TUMS) 500 mg chewable tablet    Capsaicin-Menthol 0 025-1 25 % PTCH    cholecalciferol (VITAMIN D3) 1,000 units tablet    cyanocobalamin (VITAMIN B-12) 100 mcg tablet    diclofenac sodium (VOLTAREN) 1 %    Diclofenac Sodium (VOLTAREN) 1 %    Xenia, Zingiber officinalis, (XENIA ROOT PO)    hydrocortisone 2 5 % cream    Liniments (SALONPAS ARTHRITIS PAIN RELIEF EX)    loratadine (CLARITIN) 10 mg tablet    Multiple Vitamins-Minerals (PreserVision/Lutein) CAPS    Omega-3 Fatty Acids (Fish Oil) 1000 MG CPDR    ondansetron (ZOFRAN-ODT) 4 mg disintegrating tablet    ondansetron (ZOFRAN-ODT) 4 mg disintegrating tablet    Probiotic Product (PROBIOTIC ACIDOPHILUS BEADS PO)    Allergies Allergen Reactions    Eggs Or Egg-Derived Products - Food Allergy Other (See Comments)    Etodolac      Other reaction(s): chest pain, dizziness, nausea    Nitrofurantoin Other (See Comments)    Sucralfate Dizziness and Headache    Sulfa Antibiotics Hives and Other (See Comments)    Levofloxacin Dizziness    Omeprazole Headache and Fatigue    Pregabalin Chest Pain    Aspirin      Other reaction(s): GI upset    Chocolate - Food Allergy     Ibuprofen      Other reaction(s): GI upset    Naproxen      Other reaction(s): GI upset    Penicillin G      Other reaction(s): swollen lips, tingling around mouth           Objective     Blood pressure 130/80, pulse (!) 114, height 5' 1" (1 549 m), weight 71 7 kg (158 lb)  Body mass index is 29 85 kg/m²  PHYSICAL EXAM:      General Appearance:   Alert, cooperative, no distress   HEENT:   Normocephalic, atraumatic, anicteric      Neck:  Supple, symmetrical, trachea midline   Lungs:   Clear to auscultation bilaterally; no rales, rhonchi or wheezing; respirations unlabored    Heart[de-identified]   Regular rate and rhythm; no murmur, rub, or gallop  Abdomen:   Soft, non-tender, non-distended; normal bowel sounds; no masses, no organomegaly    Genitalia:   Deferred    Rectal:   Deferred    Extremities:  No cyanosis, clubbing or edema    Pulses:  2+ and symmetric    Skin:  No jaundice, rashes, or lesions    Lymph nodes:  No palpable cervical lymphadenopathy        Lab Results:   No visits with results within 1 Day(s) from this visit     Latest known visit with results is:   Admission on 06/24/2021, Discharged on 06/24/2021   Component Date Value    WBC 06/24/2021 3 12*    RBC 06/24/2021 4 12     Hemoglobin 06/24/2021 12 5     Hematocrit 06/24/2021 37 6     MCV 06/24/2021 91     MCH 06/24/2021 30 3     MCHC 06/24/2021 33 2     RDW 06/24/2021 12 8     MPV 06/24/2021 9 7     Platelets 81/07/0678 143*    nRBC 06/24/2021 0     Neutrophils Relative 06/24/2021 85*    Immat GRANS % 06/24/2021 1     Lymphocytes Relative 06/24/2021 9*    Monocytes Relative 06/24/2021 4     Eosinophils Relative 06/24/2021 0     Basophils Relative 06/24/2021 1     Neutrophils Absolute 06/24/2021 2 67     Immature Grans Absolute 06/24/2021 0 02     Lymphocytes Absolute 06/24/2021 0 28*    Monocytes Absolute 06/24/2021 0 13*    Eosinophils Absolute 06/24/2021 0 00     Basophils Absolute 06/24/2021 0 02     Sodium 06/24/2021 136     Potassium 06/24/2021 3 7     Chloride 06/24/2021 101     CO2 06/24/2021 27     ANION GAP 06/24/2021 8     BUN 06/24/2021 10     Creatinine 06/24/2021 0 72     Glucose 06/24/2021 116     Calcium 06/24/2021 8 7     AST 06/24/2021 26     ALT 06/24/2021 31     Alkaline Phosphatase 06/24/2021 95     Total Protein 06/24/2021 7 2     Albumin 06/24/2021 3 5     Total Bilirubin 06/24/2021 0 25     eGFR 06/24/2021 86     LACTIC ACID 06/24/2021 0 9     Troponin I 06/24/2021 <0 02     Lipase 06/24/2021 46*    Color, UA 06/24/2021 Yellow     Clarity, UA 06/24/2021 Clear     Specific Gravity, UA 06/24/2021 <=1 005     pH, UA 06/24/2021 6 0     Leukocytes, UA 06/24/2021 Trace*    Nitrite, UA 06/24/2021 Negative     Protein, UA 06/24/2021 Negative     Glucose, UA 06/24/2021 Negative     Ketones, UA 06/24/2021 Negative     Urobilinogen, UA 06/24/2021 0 2     Bilirubin, UA 06/24/2021 Negative     Blood, UA 06/24/2021 Negative     SARS-CoV-2 06/24/2021 Negative     Ventricular Rate 06/24/2021 77     Atrial Rate 06/24/2021 77     TN Interval 06/24/2021 188     QRSD Interval 06/24/2021 80     QT Interval 06/24/2021 384     QTC Interval 06/24/2021 434     P Axis 06/24/2021 42     QRS Axis 06/24/2021 21     T Wave Axis 06/24/2021 36     Anaplasma phagocytophilum 06/24/2021 Positive*    % Parasitemia 06/24/2021 0     Is Blood Parasite Present 06/24/2021 No     RBC, UA 06/24/2021 0-1     WBC, UA 06/24/2021 0-1     Epithelial Cells 06/24/2021 Occasional     Bacteria, UA 06/24/2021 Occasional     Path Review 06/24/2021 No parasites seen  Dr Selene Gomez          Radiology Results:   XR chest 1 view portable    Result Date: 6/24/2021  Narrative: CHEST INDICATION:   covid  COMPARISON: Chest radiograph from 2/23/2019  EXAM PERFORMED/VIEWS:  XR CHEST PORTABLE  FINDINGS: Cardiomediastinal silhouette normal  Lungs clear  No effusion or pneumothorax  Osseous structures normal for age  Impression: No acute cardiopulmonary disease    Workstation performed: [id number]

## 2021-07-16 ENCOUNTER — OFFICE VISIT (OUTPATIENT)
Dept: PHYSICAL THERAPY | Age: 71
End: 2021-07-16
Payer: MEDICARE

## 2021-07-16 DIAGNOSIS — M76.72 PERONEAL TENDONITIS, LEFT: Primary | ICD-10-CM

## 2021-07-16 DIAGNOSIS — M54.16 LUMBAR RADICULOPATHY: ICD-10-CM

## 2021-07-16 DIAGNOSIS — M79.672 LEFT FOOT PAIN: ICD-10-CM

## 2021-07-16 PROCEDURE — 97110 THERAPEUTIC EXERCISES: CPT | Performed by: PHYSICAL THERAPIST

## 2021-07-16 PROCEDURE — 97112 NEUROMUSCULAR REEDUCATION: CPT | Performed by: PHYSICAL THERAPIST

## 2021-07-16 PROCEDURE — 97140 MANUAL THERAPY 1/> REGIONS: CPT | Performed by: PHYSICAL THERAPIST

## 2021-07-16 NOTE — PROGRESS NOTES
Daily Note     Today's date: 2021  Patient name: Ankush Villafana  : 1950  MRN: [id number]  Referring provider: Ana Cowan MD  Dx:   Encounter Diagnosis     ICD-10-CM    1  Peroneal tendonitis, left  M76 72    2  Lumbar radiculopathy  M54 16    3  Left foot pain  M79 672        Start Time: 0700  Stop Time: 0800  Total time in clinic (min): 60 minutes    Subjective: Patient report she is tired yet, lightheaded this am  She will follow up with PCP today  Objective: See treatment diary below      Assessment: Tolerated treatment fair  Patient would benefit from continued PT  Patient  C/o pain in neck and back on left side  She feels she is overdoing it  Plan: Continue per plan of care        Precautions: HNP L 5-S1/pt, cataract sx,      Manuals  7 7/16        LB/ LE's 10 10 10 10 10        L ankle 5 5 5 5 5        KT lateral L 8 10 nt 10 10                     Neuro Re-Ed             SLS    nv 10' 3x        Side/side     1x        Tandem for/back     1x                                                            Ther Ex             HEP 8            B heel slides 30x  20x 30x 30x 30x        Hip abd-band  20x 30x 30x 30x        Hip add-ball  20x 30x 30x 30x        Slant board  4x 4x 4x 4x        Nustep  3' 6' 6' 6'        bridge   25 20x 20x        Isolator x3   1/2# 30x 1/2# 30x 30x ea        Ther Activity             Stand ball press   nv 3" 30x 3" 30x        rows   nv Blue 30x 30x        Gait Training                                       Modalities                          Ice after 10' home

## 2021-07-20 ENCOUNTER — OFFICE VISIT (OUTPATIENT)
Dept: PHYSICAL THERAPY | Age: 71
End: 2021-07-20
Payer: MEDICARE

## 2021-07-20 DIAGNOSIS — M54.16 LUMBAR RADICULOPATHY: ICD-10-CM

## 2021-07-20 DIAGNOSIS — M76.72 PERONEAL TENDONITIS, LEFT: Primary | ICD-10-CM

## 2021-07-20 DIAGNOSIS — M79.672 LEFT FOOT PAIN: ICD-10-CM

## 2021-07-20 PROCEDURE — 97140 MANUAL THERAPY 1/> REGIONS: CPT | Performed by: PHYSICAL THERAPIST

## 2021-07-20 PROCEDURE — 97110 THERAPEUTIC EXERCISES: CPT | Performed by: PHYSICAL THERAPIST

## 2021-07-20 NOTE — PROGRESS NOTES
Daily Note     Today's date: 2021  Patient name: Celeste Johnson  : 1950  MRN: [id number]  Referring provider: Simone Gale MD  Dx:   Encounter Diagnosis     ICD-10-CM    1  Peroneal tendonitis, left  M76 72    2  Lumbar radiculopathy  M54 16    3  Left foot pain  M79 672        Start Time: 0700  Stop Time: 0744  Total time in clinic (min): 44 minutes    Subjective: Patient c/o head and neck pressure and stiffness bad over the weekend  She did contact PCP last week and yesterday again  She also stated she feels her swelling in LB also and using ice  She states the bridges caused neck pain  Objective: See treatment diary below      Assessment: Tolerated treatment well  Patient would benefit from continued PT omitted bridges from program today  Patient notices symptoms in lateral left foot and leg  Plan: Continue per plan of care        Precautions: HNP L 5-S1/pt, cataract sx,      Manuals        LB/ LE's 10 10 10 10 10 10       L ankle 5 5 5 5 5 5       KT lateral L 8 10 nt 10 10 nt                    Neuro Re-Ed             SLS    nv 10' 3x 10" 3x       Side/side     1x 2x       Tandem for/back     1x 2x                                                           Ther Ex             HEP 8            B heel slides 30x  20x 30x 30x 30x 30x       Hip abd-band  20x 30x 30x 30x 30x       Hip add-ball  20x 30x 30x 30x 30x       Slant board  4x 4x 4x 4x 4x       Nustep  3' 6' 6' 6' 5'       bridge   25 20x 20x deffer       Isolator x3   1/2# 30x 1/2# 30x 30x ea 30x       Ther Activity             Stand ball press   nv 3" 30x 3" 30x 3" 20x       rows   nv Blue 30x 30x 30x       Gait Training                                       Modalities                          Ice after 10' home

## 2021-07-23 ENCOUNTER — OFFICE VISIT (OUTPATIENT)
Dept: PHYSICAL THERAPY | Age: 71
End: 2021-07-23
Payer: MEDICARE

## 2021-07-23 DIAGNOSIS — M54.16 LUMBAR RADICULOPATHY: ICD-10-CM

## 2021-07-23 DIAGNOSIS — M76.72 PERONEAL TENDONITIS, LEFT: Primary | ICD-10-CM

## 2021-07-23 DIAGNOSIS — M79.672 LEFT FOOT PAIN: ICD-10-CM

## 2021-07-23 PROCEDURE — 97110 THERAPEUTIC EXERCISES: CPT | Performed by: PHYSICAL THERAPIST

## 2021-07-23 NOTE — PROGRESS NOTES
Daily Note     Today's date: 2021  Patient name: Aneta Sherman  : 1950  MRN: [id number]  Referring provider: Izzy Foster MD  Dx:   Encounter Diagnosis     ICD-10-CM    1  Peroneal tendonitis, left  M76 72    2  Lumbar radiculopathy  M54 16    3  Left foot pain  M79 672        Start Time: 0700  Stop Time: 0745  Total time in clinic (min): 45 minutes    Subjective: Patient reports she was in a "fog" yesterday  She is better today "so far"  C/o right LB pain       Objective: See treatment diary below      Assessment: Tolerated treatment well  Patient would benefit from continued PT      Plan: Continue per plan of care        Precautions: HNP L 5-S1/pt, cataract sx,      Manuals       LB/ LE's 10 10 10 10 10 10 10      L ankle 5 5 5 5 5 5 5      KT lateral L 8 10 nt 10 10 nt                    Neuro Re-Ed             SLS    nv 10' 3x 10" 3x 10" 3x      Side/side     1x 2x 2x      Tandem for/back     1x 2x 2x      pods       2'                                             Ther Ex             HEP 8            B heel slides 30x  20x 30x 30x 30x 30x 30x      Hip abd-band  20x 30x 30x 30x 30x 30x      Hip add-ball  20x 30x 30x 30x 30x 30x      Slant board  4x 4x 4x 4x 4x 4x      Nustep  3' 6' 6' 6' 5' 5'      bridge   25 20x 20x deffer nt      Isolator x3   1/2# 30x 1/2# 30x 30x ea 30x 30xea      Ther Activity             Stand ball press   nv 3" 30x 3" 30x 3" 20x 3"20x      rows   nv Blue 30x 30x 30x nt                                Gait Training             Modalities                          Ice after 10' home

## 2021-07-26 ENCOUNTER — OFFICE VISIT (OUTPATIENT)
Dept: PHYSICAL THERAPY | Age: 71
End: 2021-07-26
Payer: MEDICARE

## 2021-07-26 DIAGNOSIS — M54.16 LUMBAR RADICULOPATHY: ICD-10-CM

## 2021-07-26 DIAGNOSIS — M76.72 PERONEAL TENDONITIS, LEFT: Primary | ICD-10-CM

## 2021-07-26 DIAGNOSIS — M79.672 LEFT FOOT PAIN: ICD-10-CM

## 2021-07-26 PROCEDURE — 97110 THERAPEUTIC EXERCISES: CPT | Performed by: PHYSICAL THERAPIST

## 2021-07-26 NOTE — PROGRESS NOTES
Daily Note     Today's date: 2021  Patient name: Victoria Dong  : 1950  MRN: [id number]  Referring provider: Giovanni Zamorano MD  Dx:   Encounter Diagnosis     ICD-10-CM    1  Peroneal tendonitis, left  M76 72    2  Lumbar radiculopathy  M54 16    3  Left foot pain  M79 672        Start Time: 07  Stop Time: 08  Total time in clinic (min): 60 minutes    Subjective: Patient c/o LBP on right "sciatica" needed Ice this am  Ankle didn't bother her much this weekend  Objective: See treatment diary below      Assessment: Tolerated treatment well  Patient would benefit from continued PT  Good ROM in ankle and LE's  General deconditioning  Plan: Continue per plan of care        Precautions: HNP L 5-S1/pt, cataract sx,      Manuals      LB/ LE's 10 10 10 10 10 10 10 10     L ankle 5 5 5 5 5 5 5 5     KT lateral L 8 10 nt 10 10 nt  10                  Neuro Re-Ed             SLS    nv 10' 3x 10" 3x 10" 3x      Side/side     1x 2x 2x 2x     Tandem for/back     1x 2x 2x 2x     pods       2' 2x                                            Ther Ex             HEP 8            B heel slides 30x  20x 30x 30x 30x 30x 30x 30x     Hip abd-band  20x 30x 30x 30x 30x 30x 30x     Hip add-ball  20x 30x 30x 30x 30x 30x 30x     Slant board  4x 4x 4x 4x 4x 4x 4x     Nustep  3' 6' 6' 6' 5' 5' 7'     bridge   25 20x 20x deffer nt nt     Isolator x3   1/2# 30x 1/2# 30x 30x ea 30x 30xea 30xea     Ther Activity             Stand ball press   nv 3" 30x 3" 30x 3" 20x 3"20x 3" 20x     rows   nv Blue 30x 30x 30x nt 30x                               Gait Training             Modalities                          Ice after 10' home

## 2021-07-27 ENCOUNTER — OFFICE VISIT (OUTPATIENT)
Dept: OBGYN CLINIC | Facility: CLINIC | Age: 71
End: 2021-07-27
Payer: MEDICARE

## 2021-07-27 VITALS
WEIGHT: 158.2 LBS | BODY MASS INDEX: 29.87 KG/M2 | SYSTOLIC BLOOD PRESSURE: 130 MMHG | HEART RATE: 72 BPM | DIASTOLIC BLOOD PRESSURE: 79 MMHG | HEIGHT: 61 IN

## 2021-07-27 DIAGNOSIS — M76.72 PERONEAL TENDINITIS OF LEFT LOWER EXTREMITY: Primary | ICD-10-CM

## 2021-07-27 PROCEDURE — 99213 OFFICE O/P EST LOW 20 MIN: CPT | Performed by: ORTHOPAEDIC SURGERY

## 2021-07-27 NOTE — PROGRESS NOTES
Orthopaedics Office Visit - Follow Up Patient Visit    ASSESSMENT/PLAN:    Assessment:   Left peroneal tendinitis, sifnificanly improved adter PT     Plan:   · Pt has 50% improvement in symptoms  · Continue formal physical therapy until discharged by the therapist to home exercise program    · If symptoms worsen, we will obtain an US guided CSI with [facility]ology for pain relief  · Advise US guided CSI may not provided relief of symptoms as it is a diagnostic and therapeutic procedure  To Do Next Visit:  Return if symptoms return or worsen    _____________________________________________________  CHIEF COMPLAINT:  Chief Complaint   Patient presents with    Left Ankle - Follow-up         SUBJECTIVE:  Shaheen Locke is a 73 y o  female who presents follow up evaluation of left peroneal tendinitis  Patient was last seen in the clinic on  6/8/21, where she was advised to attend formal physical therapy  Patient states she has stopped using the Voltaren Gel and has been applying ice as needed  She states discontinuing using the cane for ambulation  She notes 50% improvement in symptoms  She has obtained new and supportive sneakers       PAST MEDICAL HISTORY:  Past Medical History:   Diagnosis Date    GERD (gastroesophageal reflux disease)     Herniated disc, cervical     Hyperlipidemia        PAST SURGICAL HISTORY:  Past Surgical History:   Procedure Laterality Date    BLADDER REPAIR      sling    CATARACT EXTRACTION, BILATERAL      REMOVAL OF INTRAUTERINE DEVICE (IUD)      in abd area    TONSILLECTOMY      TUMOR REMOVAL      pinky finger 1977    WISDOM TOOTH EXTRACTION         FAMILY HISTORY:  Family History   Problem Relation Age of Onset    Hypertension Mother         Pulmonary    Osteoporosis Mother     Heart failure Mother     Heart attack Father     Hypertension Father     Stroke Father        SOCIAL HISTORY:  Social History     Tobacco Use    Smoking status: Never Smoker    Smokeless tobacco: Never Used   Vaping Use    Vaping Use: Never used   Substance Use Topics    Alcohol use: Never    Drug use: Never       MEDICATIONS:    Current Outpatient Medications:     Biotin 5000 MCG CAPS, 5000 mcg 1 qd, Disp: , Rfl:     Black Elderberry,Berry-Flower, 575 MG CAPS, Take by mouth as needed , Disp: , Rfl:     calcium carbonate (TUMS) 500 mg chewable tablet, Chew 1 tablet as needed , Disp: , Rfl:     Capsaicin-Menthol 0 025-1 25 % PTCH, Salonpas (capsaicin-menthol) 0 025 %-1 25 % topical patch, Disp: , Rfl:     cholecalciferol (VITAMIN D3) 1,000 units tablet, Take 1,000 Units by mouth daily, Disp: , Rfl:     cyanocobalamin (VITAMIN B-12) 100 mcg tablet, Take by mouth as needed , Disp: , Rfl:     diclofenac sodium (VOLTAREN) 1 %, Apply 2 g topically 4 (four) times a day, Disp: 1 Tube, Rfl: 0    Diclofenac Sodium (VOLTAREN) 1 %, Apply 2 g topically 4 (four) times a day, Disp: 100 g, Rfl: 0    Ginger, Zingiber officinalis, (GINGER ROOT PO), as needed , Disp: , Rfl:     hydrocortisone 2 5 % cream, Apply at nighttime and in a m  To affected area on the lips (Patient taking differently: as needed Apply at nighttime and in a m   To affected area on the lips), Disp: 30 g, Rfl: 0    Liniments (SALONPAS ARTHRITIS PAIN RELIEF EX), as needed , Disp: , Rfl:     loratadine (CLARITIN) 10 mg tablet, Take 10 mg by mouth daily, Disp: , Rfl:     Multiple Vitamins-Minerals (PreserVision/Lutein) CAPS, Take by mouth, Disp: , Rfl:     Omega-3 Fatty Acids (Fish Oil) 1000 MG CPDR, Take by mouth, Disp: , Rfl:     ondansetron (ZOFRAN-ODT) 4 mg disintegrating tablet, Take 1 tablet (4 mg total) by mouth every 8 (eight) hours as needed for nausea or vomiting, Disp: 20 tablet, Rfl: 0    ondansetron (ZOFRAN-ODT) 4 mg disintegrating tablet, Take 1 tablet (4 mg total) by mouth every 6 (six) hours as needed for nausea or vomiting for up to 20 doses, Disp: 20 tablet, Rfl: 0    Probiotic Product (PROBIOTIC ACIDOPHILUS BEADS PO), Take by mouth, Disp: , Rfl:     ALLERGIES:  Allergies   Allergen Reactions    Eggs Or Egg-Derived Products - Food Allergy Other (See Comments)    Etodolac      Other reaction(s): chest pain, dizziness, nausea    Nitrofurantoin Other (See Comments)    Sucralfate Dizziness and Headache    Sulfa Antibiotics Hives and Other (See Comments)    Levofloxacin Dizziness    Omeprazole Headache and Fatigue    Pregabalin Chest Pain    Aspirin      Other reaction(s): GI upset    Chocolate - Food Allergy     Ibuprofen      Other reaction(s): GI upset    Naproxen      Other reaction(s): GI upset    Penicillin G      Other reaction(s): swollen lips, tingling around mouth       REVIEW OF SYSTEMS:  [facility]: left ankle pain  Neuro: WNL  Pertinent items are otherwise noted in HPI  A comprehensive review of systems was otherwise negative  LABS:  HgA1c: No results found for: HGBA1C  BMP:   Lab Results   Component Value Date    CALCIUM 8 7 06/24/2021    K 3 7 06/24/2021    CO2 27 06/24/2021     06/24/2021    BUN 10 06/24/2021    CREATININE 0 72 06/24/2021     CBC: No components found for: CBC    _____________________________________________________  PHYSICAL EXAMINATION:  Vital signs: /79   Pulse 72   Ht 5' 1" (1 549 m)   Wt 71 8 kg (158 lb 3 2 oz)   BMI 29 89 kg/m²   General: No acute distress, awake and alert  Psychiatric: Mood and affect appear appropriate  HEENT: Trachea Midline, No torticollis, no apparent facial trauma  Cardiovascular: No audible murmurs; Extremities appear perfused  Pulmonary: No audible wheezing or stridor  Skin: No open lesions; see further details (if any) below    MUSCULOSKELETAL EXAMINATION:  Extremities:  Left ankle  No gross deformities  Skin in tact   No erythema or ecchymosis  KT taping laterally  No tenderness    _____________________________________________________  STUDIES REVIEWED:  I personally reviewed the images and interpretation is as follows:   No new images reviewed      PROCEDURES PERFORMED:  Procedures    Scribe Attestation    I,:  Whit Aguilar MA am acting as a scribe while in the presence of the attending physician :       I,:  Elsa Zhang MD personally performed the services described in this documentation    as scribed in my presence :

## 2021-07-29 ENCOUNTER — OFFICE VISIT (OUTPATIENT)
Dept: PHYSICAL THERAPY | Age: 71
End: 2021-07-29
Payer: MEDICARE

## 2021-07-29 DIAGNOSIS — M54.16 LUMBAR RADICULOPATHY: ICD-10-CM

## 2021-07-29 DIAGNOSIS — M79.672 LEFT FOOT PAIN: ICD-10-CM

## 2021-07-29 DIAGNOSIS — M76.72 PERONEAL TENDONITIS, LEFT: Primary | ICD-10-CM

## 2021-07-29 PROCEDURE — 97110 THERAPEUTIC EXERCISES: CPT

## 2021-07-29 PROCEDURE — 97112 NEUROMUSCULAR REEDUCATION: CPT

## 2021-07-29 NOTE — PROGRESS NOTES
Daily Note     Today's date: 2021  Patient name: Elizabet Hernández  : 1950  MRN: [id number]  Referring provider: Felicia Maher MD  Dx:   Encounter Diagnosis     ICD-10-CM    1  Peroneal tendonitis, left  M76 72    2  Lumbar radiculopathy  M54 16    3  Left foot pain  M79 672        Start Time: 08  Stop Time: 915  Total time in clinic (min): 45 minutes    Subjective: Patient reports some pain and burning in her left lower ankle and foot  Objective: See treatment diary below      Assessment: Tolerated treatment well,  Challenged with balance activities  No c/o pain with TE, discomfort with IV on the isolator patient reports and she did less time on the the Nustep today due to she thinks she over did it last time  Patient exhibited good technique with therapeutic exercises and would benefit from continued PT      Plan: Continue per plan of care        Precautions: HNP L 5-S1/pt, cataract sx,      Manuals     LB/ LE's 10 10 10 10 10 10 10 10 10    L ankle 5 5 5 5 5 5 5 5 5    KT lateral L 8 10 nt 10 10 nt  10 nt                 Neuro Re-Ed             SLS    nv 10' 3x 10" 3x 10" 3x      Side/side     1x 2x 2x 2x 2x    Tandem for/back     1x 2x 2x 2x 2x    pods       2' 2x 2x                                           Ther Ex             HEP 8            B heel slides 30x  20x 30x 30x 30x 30x 30x 30x 30x    Hip abd-band  20x 30x 30x 30x 30x 30x 30x 30x    Hip add-ball  20x 30x 30x 30x 30x 30x 30x 30x    Slant board  4x 4x 4x 4x 4x 4x 4x 4x    Nustep  3' 6' 6' 6' 5' 5' 7' 5'    bridge   25 20x 20x deffer nt nt 5x    Isolator x3   1/2# 30x 1/2# 30x 30x ea 30x 30xea 30xea 1/2#/30x ea    Ther Activity             Stand ball press   nv 3" 30x 3" 30x 3" 20x 3"20x 3" 20x 3"x20    rows   nv Blue 30x 30x 30x nt 30x 30x                              Gait Training             Modalities                          Ice after 10' home

## 2021-08-02 ENCOUNTER — OFFICE VISIT (OUTPATIENT)
Dept: FAMILY MEDICINE CLINIC | Facility: CLINIC | Age: 71
End: 2021-08-02
Payer: MEDICARE

## 2021-08-02 VITALS
RESPIRATION RATE: 18 BRPM | SYSTOLIC BLOOD PRESSURE: 140 MMHG | HEIGHT: 61 IN | TEMPERATURE: 98.6 F | DIASTOLIC BLOOD PRESSURE: 78 MMHG | BODY MASS INDEX: 29.94 KG/M2 | OXYGEN SATURATION: 96 % | WEIGHT: 158.6 LBS | HEART RATE: 76 BPM

## 2021-08-02 DIAGNOSIS — R21 RASH OF UNKNOWN ETIOLOGY: Primary | ICD-10-CM

## 2021-08-02 PROCEDURE — 99213 OFFICE O/P EST LOW 20 MIN: CPT | Performed by: NURSE PRACTITIONER

## 2021-08-02 RX ORDER — PREDNISONE 10 MG/1
TABLET ORAL
Qty: 18 TABLET | Refills: 0 | Status: SHIPPED | OUTPATIENT
Start: 2021-08-02 | End: 2021-08-20 | Stop reason: ALTCHOICE

## 2021-08-02 NOTE — PROGRESS NOTES
OFFICE VISIT  Connie Marshall 73 y o  female MRN: [id number]          Assessment / Plan:  Problem List Items Addressed This Visit        Musculoskeletal and Integument    Rash of unknown etiology - Primary     May start if rash persist or continues, continue with benadryl          Relevant Medications    predniSONE 10 mg tablet            Reason For Visit / Chief Complaint  Chief Complaint   Patient presents with    Rash        HPI:  Connie Marshall is a 73 y o  female who presents today for a rash to her chest , rash started four days ago  She reports the rash is itchy and somewhat improving  She has been using benadryl  Rash spreading she believes  She denies no new changes at home      Historical Information   Past Medical History:   Diagnosis Date    GERD (gastroesophageal reflux disease)     Herniated disc, cervical     Hyperlipidemia      Past Surgical History:   Procedure Laterality Date    BLADDER REPAIR      sling    CATARACT EXTRACTION, BILATERAL      REMOVAL OF INTRAUTERINE DEVICE (IUD)      in abd area    TONSILLECTOMY      TUMOR REMOVAL      pinky finger 1977    WISDOM TOOTH EXTRACTION       Social History   Social History     Substance and Sexual Activity   Alcohol Use Never     Social History     Substance and Sexual Activity   Drug Use Never     Social History     Tobacco Use   Smoking Status Never Smoker   Smokeless Tobacco Never Used     Family History   Problem Relation Age of Onset    Hypertension Mother         Pulmonary    Osteoporosis Mother     Heart failure Mother     Heart attack Father     Hypertension Father     Stroke Father        Meds/Allergies   Allergies   Allergen Reactions    Eggs Or Egg-Derived Products - Food Allergy Other (See Comments)    Etodolac      Other reaction(s): chest pain, dizziness, nausea    Nitrofurantoin Other (See Comments)    Sucralfate Dizziness and Headache    Sulfa Antibiotics Hives and Other (See Comments)    Levofloxacin Dizziness    Omeprazole Headache and Fatigue    Pregabalin Chest Pain    Aspirin      Other reaction(s): GI upset    Chocolate - Food Allergy     Ibuprofen      Other reaction(s): GI upset    Naproxen      Other reaction(s): GI upset    Penicillin G      Other reaction(s): swollen lips, tingling around mouth       Meds:    Current Outpatient Medications:     Biotin 5000 MCG CAPS, 5000 mcg 1 qd, Disp: , Rfl:     Black Elderberry,Berry-Flower, 575 MG CAPS, Take by mouth as needed , Disp: , Rfl:     calcium carbonate (TUMS) 500 mg chewable tablet, Chew 1 tablet as needed , Disp: , Rfl:     Capsaicin-Menthol 0 025-1 25 % PTCH, Salonpas (capsaicin-menthol) 0 025 %-1 25 % topical patch, Disp: , Rfl:     cholecalciferol (VITAMIN D3) 1,000 units tablet, Take 1,000 Units by mouth daily, Disp: , Rfl:     cyanocobalamin (VITAMIN B-12) 100 mcg tablet, Take by mouth as needed , Disp: , Rfl:     diclofenac sodium (VOLTAREN) 1 %, Apply 2 g topically 4 (four) times a day, Disp: 1 Tube, Rfl: 0    Diclofenac Sodium (VOLTAREN) 1 %, Apply 2 g topically 4 (four) times a day, Disp: 100 g, Rfl: 0    Ginger, Zingiber officinalis, (GINGER ROOT PO), as needed , Disp: , Rfl:     Liniments (SALONPAS ARTHRITIS PAIN RELIEF EX), as needed , Disp: , Rfl:     loratadine (CLARITIN) 10 mg tablet, Take 10 mg by mouth daily, Disp: , Rfl:     Multiple Vitamins-Minerals (PreserVision/Lutein) CAPS, Take by mouth, Disp: , Rfl:     Omega-3 Fatty Acids (Fish Oil) 1000 MG CPDR, Take by mouth, Disp: , Rfl:     ondansetron (ZOFRAN-ODT) 4 mg disintegrating tablet, Take 1 tablet (4 mg total) by mouth every 8 (eight) hours as needed for nausea or vomiting, Disp: 20 tablet, Rfl: 0    ondansetron (ZOFRAN-ODT) 4 mg disintegrating tablet, Take 1 tablet (4 mg total) by mouth every 6 (six) hours as needed for nausea or vomiting for up to 20 doses, Disp: 20 tablet, Rfl: 0    Probiotic Product (PROBIOTIC ACIDOPHILUS BEADS PO), Take by mouth, Disp: , Rfl:    hydrocortisone 2 5 % cream, Apply at nighttime and in a m  To affected area on the lips (Patient taking differently: as needed Apply at nighttime and in a m  To affected area on the lips), Disp: 30 g, Rfl: 0    predniSONE 10 mg tablet, 30 mg by mouth daily for 3 days, then 20 mg by mouth daily for 3 days, then 10 mg by mouth daily for 3 days, then stop, Disp: 18 tablet, Rfl: 0      REVIEW OF SYSTEMS  Review of Systems   Constitutional: Negative for activity change, chills, fatigue and fever  HENT: Negative for congestion, ear discharge, ear pain, sinus pressure, sinus pain, sore throat, tinnitus and trouble swallowing  Eyes: Negative for photophobia, pain, discharge, itching and visual disturbance  Respiratory: Negative for cough, chest tightness, shortness of breath and wheezing  Cardiovascular: Negative for chest pain and leg swelling  Gastrointestinal: Negative for abdominal distention, abdominal pain, constipation, diarrhea, nausea and vomiting  Endocrine: Negative for polydipsia, polyphagia and polyuria  Genitourinary: Negative for dysuria and frequency  Musculoskeletal: Negative for arthralgias, myalgias, neck pain and neck stiffness  Skin: Positive for rash  Negative for color change  Neurological: Negative for dizziness, syncope, weakness, numbness and headaches  Hematological: Does not bruise/bleed easily  Psychiatric/Behavioral: Negative for behavioral problems, confusion, self-injury, sleep disturbance and suicidal ideas  The patient is not nervous/anxious  Current Vitals:   Blood Pressure: 140/78 (08/02/21 1025)  Pulse: 76 (08/02/21 1025)  Temperature: 98 6 °F (37 °C) (08/02/21 1025)  Respirations: 18 (08/02/21 1025)  Height: 5' 1" (154 9 cm) (08/02/21 1025)  Weight - Scale: 71 9 kg (158 lb 9 6 oz) (08/02/21 1025)  SpO2: 96 % (08/02/21 1025)  [unfilled]    PHYSICAL EXAMS:  Physical Exam  Constitutional:       Appearance: Normal appearance  She is well-developed  HENT:      Head: Normocephalic  Right Ear: Tympanic membrane, ear canal and external ear normal       Left Ear: Tympanic membrane, ear canal and external ear normal       Nose: Nose normal  No congestion or rhinorrhea  Mouth/Throat:      Mouth: Mucous membranes are moist       Pharynx: No oropharyngeal exudate or posterior oropharyngeal erythema  Eyes:      Extraocular Movements: Extraocular movements intact  Conjunctiva/sclera: Conjunctivae normal       Pupils: Pupils are equal, round, and reactive to light  Cardiovascular:      Rate and Rhythm: Normal rate and regular rhythm  Pulses: Normal pulses  Heart sounds: Normal heart sounds  Pulmonary:      Effort: Pulmonary effort is normal       Breath sounds: Normal breath sounds  No wheezing or rhonchi  Abdominal:      General: Bowel sounds are normal  There is no distension  Palpations: Abdomen is soft  Tenderness: There is no abdominal tenderness  Musculoskeletal:         General: No swelling, tenderness, deformity or signs of injury  Normal range of motion  Cervical back: Normal range of motion and neck supple  Right lower leg: No edema  Left lower leg: No edema  Skin:     General: Skin is warm and dry  Findings: Rash present  No bruising, erythema or lesion  Comments: sporadic pinpoint papules to chest, back, and hand, approx 10   Neurological:      General: No focal deficit present  Mental Status: She is alert and oriented to person, place, and time  Psychiatric:         Mood and Affect: Mood normal          Behavior: Behavior normal          Thought Content: Thought content normal          Judgment: Judgment normal              Lab, imaging and other studies: I have personally reviewed pertinent reports  Beau Bennett

## 2021-08-03 ENCOUNTER — OFFICE VISIT (OUTPATIENT)
Dept: PHYSICAL THERAPY | Age: 71
End: 2021-08-03
Payer: MEDICARE

## 2021-08-03 DIAGNOSIS — M76.72 PERONEAL TENDONITIS, LEFT: Primary | ICD-10-CM

## 2021-08-03 DIAGNOSIS — M54.16 LUMBAR RADICULOPATHY: ICD-10-CM

## 2021-08-03 DIAGNOSIS — M79.672 LEFT FOOT PAIN: ICD-10-CM

## 2021-08-03 PROCEDURE — 97110 THERAPEUTIC EXERCISES: CPT | Performed by: PHYSICAL THERAPIST

## 2021-08-03 PROCEDURE — 97140 MANUAL THERAPY 1/> REGIONS: CPT | Performed by: PHYSICAL THERAPIST

## 2021-08-03 NOTE — PROGRESS NOTES
Daily Note/Discharge     Today's date: 8/3/2021  Patient name: Ankush Villafana  : 1950  MRN: [id number]  Referring provider: Ana Cowan MD  Dx:   Encounter Diagnosis     ICD-10-CM    1  Peroneal tendonitis, left  M76 72    2  Lumbar radiculopathy  M54 16    3  Left foot pain  M79 672        Start Time: 0845  Stop Time: 09  Total time in clinic (min): 45 minutes    Subjective: Patient reports she is having pain down into her lateral left foot and 5th digit  Objective: See treatment diary below      Assessment: Tolerated treatment fair  Patient has own equiptment and will do HEP  general deconditioning with overall body  She is still having other medical issues that she needs to work through and will continue to work on left ankle and LB strengthening  Improved core strength  Patient has less tenderness in lateral left ankle with heel toe gait pattern  Max potential reached with PT at this time  Plan: Discharge PT to HEP        Precautions: HNP L 5-S1/pt, cataract sx,      Manuals  8/3   LB/ LE's 10 10 10 10 10 10 10 10 10 10   L ankle 5 5 5 5 5 5 5 5 5 5   KT lateral L 8 10 nt 10 10 nt  10 nt nt                Neuro Re-Ed             SLS    nv 10' 3x 10" 3x 10" 3x      Side/side     1x 2x 2x 2x 2x 2x   Tandem for/back     1x 2x 2x 2x 2x    pods       2' 2x 2x nt                                          Ther Ex             HEP 8            B heel slides 30x  20x 30x 30x 30x 30x 30x 30x 30x 30x   Hip abd-band  20x 30x 30x 30x 30x 30x 30x 30x 30x   Hip add-ball  20x 30x 30x 30x 30x 30x 30x 30x 30x   Slant board  4x 4x 4x 4x 4x 4x 4x 4x 4x   Nustep  3' 6' 6' 6' 5' 5' 7' 5' 5' L2   bridge   25 20x 20x deffer nt nt 5x 5x   Isolator x3   1/2# 30x 1/2# 30x 30x ea 30x 30xea 30xea 1/2#/30x ea nt   Ther Activity             Stand ball press   nv 3" 30x 3" 30x 3" 20x 3"20x 3" 20x 3"x20 5" 20x   rows   nv Blue 30x 30x 30x nt 30x 30x home Gait Training             Modalities                          Ice after 10' home

## 2021-08-03 NOTE — PROGRESS NOTES
PT Re-Evaluation     Today's date: 8/3/2021  Patient name: Christine Velasquez  : 1950  MRN: [id number]  Referring provider: Bryanna Sherwood MD  Dx:   Encounter Diagnosis     ICD-10-CM    1  Peroneal tendonitis, left  M76 72    2  Lumbar radiculopathy  M54 16    3  Left foot pain  M79 672                   Assessment  Assessment details: Christine Velasquez is a 73 y o  female who presents with pain, decreased strength, decreased ROM, ambulatory dysfunction, tenderness and edema  Due to these impairments, Patient has difficulty performing a/iadls and recreational activities  Patient's clinical presentation is consistent with their referring diagnosis of peroneal tendonitis and lumbar radiculapathy  Reviewed HEP with patient  Advised to ice left foot 3xday  Patient would benefit from skilled physical therapy to address their aforementioned impairments, improve their level of function and to improve their overall quality of life  Impairments: abnormal gait, abnormal or restricted ROM, activity intolerance, impaired balance, impaired physical strength, lacks appropriate home exercise program, pain with function, poor posture  and poor body mechanics    Symptom irritability: moderateUnderstanding of Dx/Px/POC: excellent  Goals  ST-3 WEEKS  1  Decrease pain in left foot < 6/10 on VAS at its worst   2   Increase ROM left foot by > 5 deg in all deficients planes  3   Increase strength left foot  by 1/2 MMT grade in all deficient planes  LT-6 WEEKS  1  Patient to be independent with a/iadls  2  Increase functional activities for leisure and home activities to previous LOF    3  Independent with HEP and/or fitness program     Plan  Patient would benefit from: skilled physical therapy  Planned modality interventions: cryotherapy, thermotherapy: hydrocollator packs and unattended electrical stimulation  Planned therapy interventions: activity modification, behavior modification, body mechanics training, aquatic therapy, flexibility, functional ROM exercises, home exercise program, IADL retraining, joint mobilization, manual therapy, neuromuscular re-education, patient education, postural training, strengthening, stretching, therapeutic activities, therapeutic exercise, balance/weight bearing training and gait training  Frequency: 2-3x week  Duration in weeks: 12  Plan of Care beginning date: 2021  Plan of Care expiration date: 2021  Treatment plan discussed with: patient        Subjective Evaluation    History of Present Illness  Mechanism of injury: Patient reports her left ankle was paining  She had cataract surgery and foot was put on hold  Then she went to ER in May due to increased pain  xrays were negative  She went to ortho and She was referred to [facility]  She followed up with PCP who added lumbar radiculopathy  She has had history of back pain and able to manage but since her foot pain, she is unable to resolve her back pain  Pain  Current pain rating: 3  At worst pain ratin  Location: left lateral foot  Quality: radiating and sharp  Relieving factors: rest  Aggravating factors: stair climbing and walking  Progression: no change    Social Support  Steps to enter house: yes  Stairs in house: no   L[facility] in: [facility]  Lives with: spouse    Employment status: not working    Diagnostic Tests  X-ray: normal    [facility]CE comments: Foot xrays were negative  No recent tests on back  Treatments  Current treatment: chiropractic  Patient Goals  Patient goals for therapy: decreased pain, increased motion, improved balance, increased strength and independence with ADLs/IADLs  Patient goal: walk better, garden        Objective     Static Posture   General Observations  Scoliosis  Lumbar Spine   Increased lordosis  Postural Observations  Seated posture: good  Standing posture: good        Observations   Left Ankle/Foot   Positive for edema       Additional Observation Details  5th left metatarsal swelling    Palpation   Left   Tenderness of the peroneus  Tenderness     Left Hip   Tenderness in the PSIS  Right Hip   Tenderness in the PSIS  Left Ankle/Foot   Tenderness in the fifth metatarsal base and peroneal tendon  Additional Tenderness Details  Lateral lower left leg tenderness and pain to palpation along peroneal tendon    Neurological Testing     Sensation     Lumbar   Left   Intact: light touch and hot/cold discrimination    Right   Intact: light touch and hot/cold discrimination    Ankle/Foot   Left Ankle/Foot   Intact: light touch and hot/cold discrimination    Active Range of Motion     Lumbar   Flexion: Active lumbar flexion: tips to distal shins  Extension:  WFL  Left lateral flexion:  Restriction level: minimal  Right lateral flexion:  WFL  Left Ankle/Foot   Dorsiflexion (ke): 5 degrees with pain  Plantar flexion: 45 degrees   Inversion: 20 degrees   Eversion: 20 degrees with pain    Right Ankle/Foot   Dorsiflexion (ke): 10 degrees   Plantar flexion: 52 degrees   Inversion: 20 degrees   Eversion: 12 degrees     Additional Active Range of Motion Details  Hamstring flexibility: 72° bilateral    Strength/Myotome Testing     Left Hip   Planes of Motion   Flexion: 4+  Extension: 4+  Abduction: 4+  Adduction: 5    Right Hip   Planes of Motion   Flexion: 4+  Extension: 4+  Abduction: 4+  Adduction: 5    Left Knee   Flexion: 4+  Extension: 5    Right Knee   Flexion: 4+  Extension: 5    Left Ankle/Foot   Dorsiflexion: 4+  Plantar flexion: 4+  Inversion: 4+  Eversion: 4    Right Ankle/Foot   Normal strength    Tests     Lumbar   Negative SIJ compression  Left   Positive passive SLR  Left Hip   Negative FCO and FADIR  Right Hip   Negative FCO and FADIR       Ambulation   Weight-Bearing Status   Assistive device used: none    Ambulation: Level Surfaces   Ambulation without assistive device: independent    Observational Gait   Gait: antalgic   Decreased walking speed, stride length and left stance time       Functional Assessment        Single Leg Stance   Left: 2 seconds  Right: 6 seconds    General Comments:    Lower quarter screen   Hips: unremarkable  Knees: unremarkable    Ankle/Foot Comments   Pocket of fluid/swelling at 5th metatarsal dorsal aspect             Precautions:HNP L 5-S1/pt, cataract sx,      Manuals 6/21 7/6 7/9 7/12 7/16 7/20 7/23 7/26 7/29    LB/ LE's 10 10 10 10 10 10 10 10 10    L ankle 5 5 5 5 5 5 5 5 5    KT lateral L 8 10 nt 10 10 nt  10 nt                 Neuro Re-Ed             SLS    nv 10' 3x 10" 3x 10" 3x      Side/side     1x 2x 2x 2x 2x    Tandem for/back     1x 2x 2x 2x 2x    pods       2' 2x 2x                                           Ther Ex             HEP 8            B heel slides 30x  20x 30x 30x 30x 30x 30x 30x 30x    Hip abd-band  20x 30x 30x 30x 30x 30x 30x 30x    Hip add-ball  20x 30x 30x 30x 30x 30x 30x 30x    Slant board  4x 4x 4x 4x 4x 4x 4x 4x    Nustep  3' 6' 6' 6' 5' 5' 7' 5'    bridge   25 20x 20x deffer nt nt 5x    Isolator x3   1/2# 30x 1/2# 30x 30x ea 30x 30xea 30xea 1/2#/30x ea    Ther Activity             Stand ball press   nv 3" 30x 3" 30x 3" 20x 3"20x 3" 20x 3"x20    rows   nv Blue 30x 30x 30x nt 30x 30x                              Gait Training             Modalities                          Ice after 10' home

## 2021-08-06 ENCOUNTER — APPOINTMENT (OUTPATIENT)
Dept: PHYSICAL THERAPY | Age: 71
End: 2021-08-06
Payer: MEDICARE

## 2021-08-10 ENCOUNTER — APPOINTMENT (OUTPATIENT)
Dept: PHYSICAL THERAPY | Age: 71
End: 2021-08-10
Payer: MEDICARE

## 2021-08-13 ENCOUNTER — APPOINTMENT (OUTPATIENT)
Dept: PHYSICAL THERAPY | Age: 71
End: 2021-08-13
Payer: MEDICARE

## 2021-08-18 ENCOUNTER — TELEPHONE (OUTPATIENT)
Dept: GASTROENTEROLOGY | Facility: CLINIC | Age: 71
End: 2021-08-18

## 2021-08-19 ENCOUNTER — ANESTHESIA (OUTPATIENT)
Dept: ANESTHESIOLOGY | Facility: HOSPITAL | Age: 71
End: 2021-08-19

## 2021-08-19 ENCOUNTER — ANESTHESIA EVENT (OUTPATIENT)
Dept: ANESTHESIOLOGY | Facility: HOSPITAL | Age: 71
End: 2021-08-19

## 2021-08-19 ENCOUNTER — TELEPHONE (OUTPATIENT)
Dept: GASTROENTEROLOGY | Facility: HOSPITAL | Age: 71
End: 2021-08-19

## 2021-08-19 NOTE — ANESTHESIA PREPROCEDURE EVALUATION
Procedure:  PRE-OP ONLY    Relevant Problems   GI/HEPATIC   (+) Chronic GERD   (+) Gastroesophageal reflux disease without esophagitis      HEMATOLOGY   (+) Platelets decreased (HCC)      MUSCULOSKELETAL   (+) Degeneration of intervertebral disc   (+) Low back pain      Other   (+) Hyponatremia   (+) Lumbar radiculopathy   (+) Neck pain   (+) Osteopenia of multiple sites   (+) Peroneal tendinitis of left lower extremity        Physical Exam    Airway    Mallampati score: II  TM Distance: >3 FB  Neck ROM: full     Dental   No notable dental hx     Cardiovascular  Cardiovascular exam normal    Pulmonary  Pulmonary exam normal Breath sounds clear to auscultation,     Other Findings      Normal sinus rhythm  Normal ECG  When compared with ECG of 23-JUN-2021 19:34, (unconfirmed)  No significant change was found  Confirmed by Frida Espinoza ([id number]) on 6/24/2021 8:48:35 AM  Anesthesia Plan  ASA Score- 2     Anesthesia Type- IV sedation with anesthesia with ASA Monitors  Additional Monitors:   Airway Plan:           Plan Factors-Exercise tolerance (METS): >4 METS  Chart reviewed  EKG reviewed  Imaging results reviewed  Existing labs reviewed  Patient summary reviewed  Patient is not a current smoker  Patient instructed to abstain from smoking on day of procedure  Patient did not smoke on day of surgery  Obstructive sleep apnea risk education given perioperatively  Induction- intravenous  Postoperative Plan-     Informed Consent- Anesthetic plan and risks discussed with patient  I personally reviewed this patient with the CRNA  Discussed and agreed on the Anesthesia Plan with the CRNA             Lab Results   Component Value Date    WBC 3 12 (L) 06/24/2021    HGB 12 5 06/24/2021    HCT 37 6 06/24/2021    MCV 91 06/24/2021     (L) 06/24/2021     Lab Results   Component Value Date    CALCIUM 8 7 06/24/2021    K 3 7 06/24/2021    CO2 27 06/24/2021     06/24/2021    BUN 10 06/24/2021 CREATININE 0 72 06/24/2021     Lab Results   Component Value Date    INR 1 05 06/23/2021    INR 1 09 11/24/2019    PROTIME 13 2 06/23/2021    PROTIME 12 6 11/24/2019     Lab Results   Component Value Date    PTT 38 (H) 11/24/2019         Discussed with pt the benefits/alternatives and risks or General Anesthesia including breathing tube remaining in place if not strong enough, PONV, damage to lips and teeth, sore throat, eye injury or blindness    I, Dr Nikole Perez, the attending physician, have personally seen and evaluated the patient prior to anesthetic care  I have reviewed the pre-anesthetic record, and other medical records if appropriate to the anesthetic care  If a CRNA is involved in the case, I have reviewed the CRNA assessment, if present, and agree  The patient is in a suitable condition to proceed with my formulated anesthetic plan

## 2021-08-20 ENCOUNTER — ANESTHESIA EVENT (OUTPATIENT)
Dept: GASTROENTEROLOGY | Facility: HOSPITAL | Age: 71
End: 2021-08-20

## 2021-08-20 ENCOUNTER — ANESTHESIA (OUTPATIENT)
Dept: GASTROENTEROLOGY | Facility: HOSPITAL | Age: 71
End: 2021-08-20

## 2021-08-20 ENCOUNTER — HOSPITAL ENCOUNTER (OUTPATIENT)
Dept: GASTROENTEROLOGY | Facility: HOSPITAL | Age: 71
Setting detail: OUTPATIENT SURGERY
Discharge: HOME | End: 2021-08-20
Admitting: PHYSICIAN ASSISTANT
Payer: MEDICARE

## 2021-08-20 VITALS
TEMPERATURE: 97.2 F | SYSTOLIC BLOOD PRESSURE: 136 MMHG | OXYGEN SATURATION: 97 % | RESPIRATION RATE: 16 BRPM | WEIGHT: 155.65 LBS | HEART RATE: 57 BPM | BODY MASS INDEX: 29.39 KG/M2 | DIASTOLIC BLOOD PRESSURE: 67 MMHG | HEIGHT: 61 IN

## 2021-08-20 DIAGNOSIS — K62.5 GASTROINTESTINAL HEMORRHAGE ASSOCIATED WITH ANORECTAL SOURCE: ICD-10-CM

## 2021-08-20 DIAGNOSIS — K21.9 CHRONIC GERD: ICD-10-CM

## 2021-08-20 PROCEDURE — 45378 DIAGNOSTIC COLONOSCOPY: CPT | Performed by: INTERNAL MEDICINE

## 2021-08-20 PROCEDURE — 88305 TISSUE EXAM BY PATHOLOGIST: CPT | Performed by: PATHOLOGY

## 2021-08-20 PROCEDURE — 43239 EGD BIOPSY SINGLE/MULTIPLE: CPT | Performed by: INTERNAL MEDICINE

## 2021-08-20 RX ORDER — LIDOCAINE HYDROCHLORIDE 20 MG/ML
INJECTION, SOLUTION EPIDURAL; INFILTRATION; INTRACAUDAL; PERINEURAL AS NEEDED
Status: DISCONTINUED | OUTPATIENT
Start: 2021-08-20 | End: 2021-08-20

## 2021-08-20 RX ORDER — SODIUM CHLORIDE, SODIUM LACTATE, POTASSIUM CHLORIDE, CALCIUM CHLORIDE 600; 310; 30; 20 MG/100ML; MG/100ML; MG/100ML; MG/100ML
INJECTION, SOLUTION INTRAVENOUS CONTINUOUS PRN
Status: DISCONTINUED | OUTPATIENT
Start: 2021-08-20 | End: 2021-08-20

## 2021-08-20 RX ORDER — PROPOFOL 10 MG/ML
INJECTION, EMULSION INTRAVENOUS AS NEEDED
Status: DISCONTINUED | OUTPATIENT
Start: 2021-08-20 | End: 2021-08-20

## 2021-08-20 RX ORDER — ONDANSETRON 2 MG/ML
INJECTION INTRAMUSCULAR; INTRAVENOUS AS NEEDED
Status: DISCONTINUED | OUTPATIENT
Start: 2021-08-20 | End: 2021-08-20

## 2021-08-20 RX ADMIN — PROPOFOL 50 MG: 10 INJECTION, EMULSION INTRAVENOUS at 07:43

## 2021-08-20 RX ADMIN — PROPOFOL 50 MG: 10 INJECTION, EMULSION INTRAVENOUS at 07:57

## 2021-08-20 RX ADMIN — PROPOFOL 50 MG: 10 INJECTION, EMULSION INTRAVENOUS at 07:45

## 2021-08-20 RX ADMIN — ONDANSETRON 4 MG: 2 INJECTION INTRAMUSCULAR; INTRAVENOUS at 07:39

## 2021-08-20 RX ADMIN — PROPOFOL 50 MG: 10 INJECTION, EMULSION INTRAVENOUS at 07:47

## 2021-08-20 RX ADMIN — PROPOFOL 50 MG: 10 INJECTION, EMULSION INTRAVENOUS at 07:53

## 2021-08-20 RX ADMIN — PROPOFOL 50 MG: 10 INJECTION, EMULSION INTRAVENOUS at 07:50

## 2021-08-20 RX ADMIN — LIDOCAINE HYDROCHLORIDE 100 MG: 20 INJECTION, SOLUTION EPIDURAL; INFILTRATION; INTRACAUDAL; PERINEURAL at 07:41

## 2021-08-20 RX ADMIN — SODIUM CHLORIDE, SODIUM LACTATE, POTASSIUM CHLORIDE, AND CALCIUM CHLORIDE: .6; .31; .03; .02 INJECTION, SOLUTION INTRAVENOUS at 07:05

## 2021-08-20 NOTE — DISCHARGE INSTRUCTIONS
Upper Endoscopy   WHAT YOU NEED TO KNOW:   An upper endoscopy is also called an upper gastrointestinal (GI) endoscopy, or an esophagogastroduodenoscopy (EGD)  You may feel bloated, gassy, or have some abdominal discomfort after your procedure  Your throat may be sore for 24 to 36 hours  You may burp or pass gas from air that is still inside your body  DISCHARGE INSTRUCTIONS:   Call 911 for any of the following:   · You have sudden chest pain or trouble breathing  Seek care immediately if:   · You feel dizzy or faint  · You have trouble swallowing  · Your bowel movements are very dark or black  · Your abdomen is hard and firm and you have severe pain  · You vomit blood  Contact your healthcare provider if:   · You feel full or bloated and cannot burp or pass gas  · You have not had a bowel movement for 3 days after your procedure  · You have neck pain  · You have a fever or chills  · You have nausea or are vomiting  · You have a rash or hives  · You have questions or concerns about your endoscopy  Relieve a sore throat:  Suck on throat lozenges or crushed ice  Gargle with a small amount of warm salt water  Mix 1 teaspoon of salt and 1 cup of warm water to make salt water  Relieve gas and discomfort from bloating:  Lie on your right side with a heating pad on your abdomen  Take short walks to help pass gas  Eat small meals until bloating is relieved  Rest after your procedure: You have been given medicine to relax you  Do not  drive or make important decisions until the day after your procedure  Return to your normal activity as directed  You can usually return to work the day after your procedure  Follow up with your healthcare provider as directed:  Write down your questions so you remember to ask them during your visits     © 2017 [address] is for End User's use only and may not be sold, redistributed or otherwise used for commercial purposes  All illustrations and images included in CareNotes® are the copyrighted property of [employer]  or Yusuf Ramirez  The above information is an  only  It is not intended as medical advice for individual conditions or treatments  Talk to your doctor, nurse or pharmacist before following any medical regimen to see if it is safe and effective for you  Colonoscopy   WHAT YOU NEED TO KNOW:   A colonoscopy is a procedure to examine the inside of your colon (intestine) with a scope  Polyps or tissue growths may have been removed during your colonoscopy  It is normal to feel bloated and to have some abdominal discomfort  You should be passing gas  If you have hemorrhoids or you had polyps removed, you may have a small amount of bleeding  DISCHARGE INSTRUCTIONS:   Seek care immediately if:   · You have a large amount of bright red blood in your bowel movements  · Your abdomen is hard and firm and you have severe pain  · You have sudden trouble breathing  Contact your healthcare provider if:   · You develop a rash or hives  · You have a fever within 24 hours of your procedure       · You have not had a bowel movement for 3 days after your procedure  · You have questions or concerns about your condition or care  Activity:   · Do not lift, strain, or run  for 3 days after your procedure  · Rest after your procedure  You have been given medicine to relax you  Do not  drive or make important decisions until the day after your procedure  Return to your normal activity as directed  · Relieve gas and discomfort from bloating  by lying on your right side with a heating pad on your abdomen  You may need to take short walks to help the gas move out  Eat small meals until bloating is relieved  If you had polyps removed: For 7 days after your procedure:  · Do not  take aspirin  · Do not  go on long car rides    Follow up with your healthcare provider as directed: Write down your questions so you remember to ask them during your visits  © 2017 [address] is for End User's use only and may not be sold, redistributed or otherwise used for commercial purposes  All illustrations and images included in CareNotes® are the copyrighted property of [employer]  or Yusuf Ramirez  The above information is an  only  It is not intended as medical advice for individual conditions or treatments  Talk to your doctor, nurse or pharmacist before following any medical regimen to see if it is safe and effective for you

## 2021-08-20 NOTE — ANESTHESIA PREPROCEDURE EVALUATION
Procedure:  COLONOSCOPY  EGD    Relevant Problems   GI/HEPATIC   (+) Chronic GERD   (+) Gastroesophageal reflux disease without esophagitis      HEMATOLOGY   (+) Platelets decreased (HCC)      MUSCULOSKELETAL   (+) Degeneration of intervertebral disc   (+) Low back pain        Physical Exam    Airway    Mallampati score: II  TM Distance: >3 FB  Neck ROM: full     Dental   No notable dental hx     Cardiovascular  Cardiovascular exam normal    Pulmonary  Pulmonary exam normal Breath sounds clear to auscultation,     Other Findings      Normal sinus rhythm  Normal ECG  When compared with ECG of 23-JUN-2021 19:34, (unconfirmed)  No significant change was found  Confirmed by Kin Henderson ([id number]) on 6/24/2021 8:48:35 AM  Anesthesia Plan  ASA Score- 2     Anesthesia Type- IV sedation with anesthesia with ASA Monitors  Additional Monitors:   Airway Plan:           Plan Factors-Exercise tolerance (METS): >4 METS  Chart reviewed  EKG reviewed  Imaging results reviewed  Existing labs reviewed  Patient summary reviewed  Patient is not a current smoker  Patient instructed to abstain from smoking on day of procedure  Patient did not smoke on day of surgery  Obstructive sleep apnea risk education given perioperatively  Induction- intravenous  Postoperative Plan-     Informed Consent- Anesthetic plan and risks discussed with patient  I personally reviewed this patient with the CRNA  Discussed and agreed on the Anesthesia Plan with the CRNA             Lab Results   Component Value Date    WBC 3 12 (L) 06/24/2021    HGB 12 5 06/24/2021    HCT 37 6 06/24/2021    MCV 91 06/24/2021     (L) 06/24/2021     Lab Results   Component Value Date    CALCIUM 8 7 06/24/2021    K 3 7 06/24/2021    CO2 27 06/24/2021     06/24/2021    BUN 10 06/24/2021    CREATININE 0 72 06/24/2021     Lab Results   Component Value Date    INR 1 05 06/23/2021    INR 1 09 11/24/2019    PROTIME 13 2 06/23/2021    PROTIME 12 6 11/24/2019     Lab Results   Component Value Date    PTT 38 (H) 11/24/2019         Discussed with pt the benefits/alternatives and risks or General Anesthesia including breathing tube remaining in place if not strong enough, PONV, damage to lips and teeth, sore throat, eye injury or blindness    I, Dr Nohemy Frost, the attending physician, have personally seen and evaluated the patient prior to anesthetic care  I have reviewed the pre-anesthetic record, and other medical records if appropriate to the anesthetic care  If a CRNA is involved in the case, I have reviewed the CRNA assessment, if present, and agree  The patient is in a suitable condition to proceed with my formulated anesthetic plan

## 2021-08-20 NOTE — ANESTHESIA POSTPROCEDURE EVALUATION
Post-Op Assessment Note    CV Status:  Stable  Pain Score: 0    Pain management: adequate     Mental Status:  Awake   Hydration Status:  Stable   PONV Controlled:  Controlled   Airway Patency:  Patent      Post Op Vitals Reviewed: Yes      Staff: CRNA         No complications documented      BP   1117/73   Temp      Pulse  59   Resp   12   SpO2   100

## 2021-08-20 NOTE — H&P
History and Physical -  [facility]  Patricio Chew 73 y o  female MRN: [id number]      HPI: Patricio Chew is a 73y o  year old female who presents for GERD and rectal bleed      REVIEW OF SYSTEMS: Per the HPI, and otherwise unremarkable      Historical Information   Past Medical History:   Diagnosis Date    GERD (gastroesophageal reflux disease)     Herniated disc, cervical     Hyperlipidemia      Past Surgical History:   Procedure Laterality Date    BLADDER REPAIR      sling    CATARACT EXTRACTION, BILATERAL      COLONOSCOPY      REMOVAL OF INTRAUTERINE DEVICE (IUD)      in abd area    TONSILLECTOMY      TUMOR REMOVAL      pinky finger 1977    WISDOM TOOTH EXTRACTION       Social History   Social History     Substance and Sexual Activity   Alcohol Use Never     Social History     Substance and Sexual Activity   Drug Use Never     Social History     Tobacco Use   Smoking Status Never Smoker   Smokeless Tobacco Never Used     Family History   Problem Relation Age of Onset    Hypertension Mother         Pulmonary    Osteoporosis Mother     Heart failure Mother     Heart attack Father     Hypertension Father     Stroke Father        Meds/Allergies     (Not in a hospital admission)      Allergies   Allergen Reactions    Eggs Or Egg-Derived Products - Food Allergy Other (See Comments)    Etodolac      Other reaction(s): chest pain, dizziness, nausea    Nitrofurantoin Other (See Comments)    Sucralfate Dizziness and Headache    Sulfa Antibiotics Hives and Other (See Comments)    Levofloxacin Dizziness    Omeprazole Headache and Fatigue    Pregabalin Chest Pain    Aspirin      Other reaction(s): GI upset    Chocolate - Food Allergy     Ibuprofen      Other reaction(s): GI upset    Naproxen      Other reaction(s): GI upset    Penicillin G      Other reaction(s): swollen lips, tingling around mouth       Objective     Blood pressure 115/73, pulse 71, temperature 97 7 °F (36 5 °C), temperature source Temporal, resp  rate 18, height 5' 1" (1 549 m), weight 70 6 kg (155 lb 10 3 oz), SpO2 100 %  PHYSICAL EXAM    Gen: NAD  CV: RRR  CHEST: Clear  ABD: soft, NT/ND  EXT: no edema      ASSESSMENT/PLAN:  This is a 73y o  year old female here for EGD, colonoscopy, and she is stable and optimized for her procedure

## 2021-08-25 ENCOUNTER — TELEPHONE (OUTPATIENT)
Dept: GASTROENTEROLOGY | Facility: CLINIC | Age: 71
End: 2021-08-25

## 2021-08-25 NOTE — TELEPHONE ENCOUNTER
----- Message from Ankit Kolb DO sent at 8/25/2021  8:09 AM EDT -----   Please call the patient with the biopsy results  The polyp of the stomach was a benign polyp

## 2021-09-10 ENCOUNTER — TELEPHONE (OUTPATIENT)
Dept: FAMILY MEDICINE CLINIC | Facility: CLINIC | Age: 71
End: 2021-09-10

## 2021-09-10 NOTE — TELEPHONE ENCOUNTER
Pt called stating that she is still having lingering symptoms from her past tick bite  Exposure  She would like to know if she can or should have blood done again

## 2021-09-13 DIAGNOSIS — B34.9 VIRAL SYNDROME: Primary | ICD-10-CM

## 2021-09-13 DIAGNOSIS — R21 RASH OF UNKNOWN ETIOLOGY: ICD-10-CM

## 2021-09-13 NOTE — TELEPHONE ENCOUNTER
I spoke to patient, she stated that she need a more specific test for the lyme check  She stated that the name of the test is Anaplasma  Can you please order this test instead
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
## 2021-09-14 DIAGNOSIS — R21 RASH OF UNKNOWN ETIOLOGY: Primary | ICD-10-CM

## 2021-09-14 DIAGNOSIS — B34.9 VIRAL SYNDROME: ICD-10-CM

## 2021-09-15 ENCOUNTER — APPOINTMENT (OUTPATIENT)
Dept: LAB | Facility: CLINIC | Age: 71
End: 2021-09-15
Payer: MEDICARE

## 2021-09-15 DIAGNOSIS — B34.9 VIRAL SYNDROME: ICD-10-CM

## 2021-09-15 DIAGNOSIS — R21 RASH OF UNKNOWN ETIOLOGY: ICD-10-CM
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
## 2021-09-15 PROCEDURE — 36415 COLL VENOUS BLD VENIPUNCTURE: CPT

## 2021-09-15 PROCEDURE — 86666 EHRLICHIA ANTIBODY: CPT

## 2021-09-20 LAB
A PHAGOCYTOPH IGG TITR SER IF: ABNORMAL {TITER}
A PHAGOCYTOPH IGM TITR SER IF: NEGATIVE {TITER}
E CHAFFEENSIS IGG TITR SER IF: NEGATIVE {TITER}
E CHAFFEENSIS IGM TITR SER IF: NEGATIVE {TITER}

## 2021-09-22 ENCOUNTER — TELEPHONE (OUTPATIENT)
Dept: FAMILY MEDICINE CLINIC | Facility: CLINIC | Age: 71
End: 2021-09-22

## 2021-09-23 NOTE — TELEPHONE ENCOUNTER
This blood test that she requested showed 1 positive antibody  This could be attributed to a previous infection that is often associated with Lyme disease  She requested this blood test so most likely understands what she was testing for  She is not currently sick and was treated in the past most likely or this has since resolved she can go over this with me in the future at her next appointment or if she has specific questions about this then let me know

## 2021-09-24 NOTE — TELEPHONE ENCOUNTER
I spoke to patient, she was made aware of results  She made a sooner apt to discuss in further detail about results

## 2021-09-29 LAB — MISCELLANEOUS LAB TEST RESULT: NORMAL

## 2021-10-01 ENCOUNTER — OFFICE VISIT (OUTPATIENT)
Dept: FAMILY MEDICINE CLINIC | Facility: CLINIC | Age: 71
End: 2021-10-01
Payer: MEDICARE

## 2021-10-01 VITALS
HEART RATE: 61 BPM | WEIGHT: 161 LBS | SYSTOLIC BLOOD PRESSURE: 130 MMHG | OXYGEN SATURATION: 97 % | HEIGHT: 61 IN | BODY MASS INDEX: 30.4 KG/M2 | DIASTOLIC BLOOD PRESSURE: 70 MMHG

## 2021-10-01 DIAGNOSIS — R20.9 COLD HANDS: ICD-10-CM

## 2021-10-01 DIAGNOSIS — R76.8 POSITIVE ANAPLASMOSIS TITER: Primary | ICD-10-CM

## 2021-10-01 PROCEDURE — 99213 OFFICE O/P EST LOW 20 MIN: CPT | Performed by: FAMILY MEDICINE

## 2021-10-01 RX ORDER — DOXYCYCLINE HYCLATE 100 MG
100 TABLET ORAL 2 TIMES DAILY
Qty: 28 TABLET | Refills: 0 | Status: SHIPPED | OUTPATIENT
Start: 2021-10-01 | End: 2021-10-15

## 2021-10-04 ENCOUNTER — TELEPHONE (OUTPATIENT)
Dept: FAMILY MEDICINE CLINIC | Facility: CLINIC | Age: 71
End: 2021-10-04

## 2021-10-12 ENCOUNTER — TELEPHONE (OUTPATIENT)
Dept: FAMILY MEDICINE CLINIC | Facility: CLINIC | Age: 71
End: 2021-10-12

## 2021-10-13 ENCOUNTER — HOSPITAL ENCOUNTER (OUTPATIENT)
Dept: MAMMOGRAPHY | Facility: HOSPITAL | Age: 71
Discharge: HOME | End: 2021-10-13
Attending: FAMILY MEDICINE
Payer: MEDICARE

## 2021-10-13 VITALS — HEIGHT: 61 IN | BODY MASS INDEX: 30.4 KG/M2 | WEIGHT: 161 LBS

## 2021-10-13 DIAGNOSIS — Z12.31 ENCOUNTER FOR SCREENING MAMMOGRAM FOR MALIGNANT NEOPLASM OF BREAST: ICD-10-CM

## 2021-10-13 PROCEDURE — 77063 BREAST TOMOSYNTHESIS BI: CPT

## 2021-10-13 PROCEDURE — 77067 SCR MAMMO BI INCL CAD: CPT

## 2021-12-06 ENCOUNTER — APPOINTMENT (OUTPATIENT)
Dept: LAB | Facility: CLINIC | Age: 71
End: 2021-12-06
Payer: MEDICARE

## 2021-12-06 DIAGNOSIS — M85.89 OSTEOPENIA OF MULTIPLE SITES: ICD-10-CM

## 2021-12-06 DIAGNOSIS — K59.01 SLOW TRANSIT CONSTIPATION: ICD-10-CM

## 2021-12-06 DIAGNOSIS — Z12.31 ENCOUNTER FOR SCREENING MAMMOGRAM FOR MALIGNANT NEOPLASM OF BREAST: ICD-10-CM

## 2021-12-06 DIAGNOSIS — Z00.00 MEDICARE ANNUAL WELLNESS VISIT, SUBSEQUENT: ICD-10-CM

## 2021-12-06 DIAGNOSIS — K21.9 GASTROESOPHAGEAL REFLUX DISEASE WITHOUT ESOPHAGITIS: ICD-10-CM

## 2021-12-06 DIAGNOSIS — B34.9 VIRAL SYNDROME: ICD-10-CM

## 2021-12-06 DIAGNOSIS — E78.2 MIXED HYPERLIPIDEMIA: ICD-10-CM

## 2021-12-06 DIAGNOSIS — M76.72 PERONEAL TENDONITIS, LEFT: ICD-10-CM

## 2021-12-06 DIAGNOSIS — R21 RASH OF UNKNOWN ETIOLOGY: ICD-10-CM

## 2021-12-06 DIAGNOSIS — M54.16 LUMBAR RADICULOPATHY: ICD-10-CM

## 2021-12-06 LAB
ALBUMIN SERPL BCP-MCNC: 3.6 G/DL (ref 3.5–5)
ALP SERPL-CCNC: 112 U/L (ref 46–116)
ALT SERPL W P-5'-P-CCNC: 26 U/L (ref 12–78)
ANION GAP SERPL CALCULATED.3IONS-SCNC: 5 MMOL/L (ref 4–13)
AST SERPL W P-5'-P-CCNC: 16 U/L (ref 5–45)
BASOPHILS # BLD AUTO: 0.04 THOUSANDS/ΜL (ref 0–0.1)
BASOPHILS NFR BLD AUTO: 1 % (ref 0–1)
BILIRUB SERPL-MCNC: 0.42 MG/DL (ref 0.2–1)
BUN SERPL-MCNC: 12 MG/DL (ref 5–25)
CALCIUM SERPL-MCNC: 9.2 MG/DL (ref 8.3–10.1)
CHLORIDE SERPL-SCNC: 106 MMOL/L (ref 100–108)
CHOLEST SERPL-MCNC: 213 MG/DL
CO2 SERPL-SCNC: 27 MMOL/L (ref 21–32)
CREAT SERPL-MCNC: 0.6 MG/DL (ref 0.6–1.3)
EOSINOPHIL # BLD AUTO: 0.12 THOUSAND/ΜL (ref 0–0.61)
EOSINOPHIL NFR BLD AUTO: 2 % (ref 0–6)
ERYTHROCYTE [DISTWIDTH] IN BLOOD BY AUTOMATED COUNT: 12.5 % (ref 11.6–15.1)
GFR SERPL CREATININE-BSD FRML MDRD: 93 ML/MIN/1.73SQ M
GLUCOSE P FAST SERPL-MCNC: 85 MG/DL (ref 65–99)
HCT VFR BLD AUTO: 38.5 % (ref 34.8–46.1)
HDLC SERPL-MCNC: 57 MG/DL
HGB BLD-MCNC: 12.5 G/DL (ref 11.5–15.4)
IMM GRANULOCYTES # BLD AUTO: 0.02 THOUSAND/UL (ref 0–0.2)
IMM GRANULOCYTES NFR BLD AUTO: 0 % (ref 0–2)
LDLC SERPL CALC-MCNC: 141 MG/DL (ref 0–100)
LYMPHOCYTES # BLD AUTO: 1.72 THOUSANDS/ΜL (ref 0.6–4.47)
LYMPHOCYTES NFR BLD AUTO: 28 % (ref 14–44)
MCH RBC QN AUTO: 30.8 PG (ref 26.8–34.3)
MCHC RBC AUTO-ENTMCNC: 32.5 G/DL (ref 31.4–37.4)
MCV RBC AUTO: 95 FL (ref 82–98)
MONOCYTES # BLD AUTO: 0.45 THOUSAND/ΜL (ref 0.17–1.22)
MONOCYTES NFR BLD AUTO: 7 % (ref 4–12)
NEUTROPHILS # BLD AUTO: 3.89 THOUSANDS/ΜL (ref 1.85–7.62)
NEUTS SEG NFR BLD AUTO: 62 % (ref 43–75)
NONHDLC SERPL-MCNC: 156 MG/DL
NRBC BLD AUTO-RTO: 0 /100 WBCS
PLATELET # BLD AUTO: 280 THOUSANDS/UL (ref 149–390)
PMV BLD AUTO: 10.2 FL (ref 8.9–12.7)
POTASSIUM SERPL-SCNC: 4.4 MMOL/L (ref 3.5–5.3)
PROT SERPL-MCNC: 7.5 G/DL (ref 6.4–8.2)
RBC # BLD AUTO: 4.06 MILLION/UL (ref 3.81–5.12)
SODIUM SERPL-SCNC: 138 MMOL/L (ref 136–145)
TRIGL SERPL-MCNC: 74 MG/DL
TSH SERPL DL<=0.05 MIU/L-ACNC: 2.42 UIU/ML (ref 0.36–3.74)
WBC # BLD AUTO: 6.24 THOUSAND/UL (ref 4.31–10.16)

## 2021-12-06 PROCEDURE — 85025 COMPLETE CBC W/AUTO DIFF WBC: CPT

## 2021-12-06 PROCEDURE — 86618 LYME DISEASE ANTIBODY: CPT

## 2021-12-06 PROCEDURE — 84443 ASSAY THYROID STIM HORMONE: CPT

## 2021-12-06 PROCEDURE — 80061 LIPID PANEL: CPT

## 2021-12-06 PROCEDURE — 36415 COLL VENOUS BLD VENIPUNCTURE: CPT

## 2021-12-06 PROCEDURE — 80053 COMPREHEN METABOLIC PANEL: CPT

## 2021-12-07 LAB — B BURGDOR IGG+IGM SER-ACNC: 0

## 2021-12-09 ENCOUNTER — PATIENT OUTREACH (OUTPATIENT)
Dept: OTHER | Facility: OTHER | Age: 71
End: 2021-12-09

## 2021-12-15 ENCOUNTER — OFFICE VISIT (OUTPATIENT)
Dept: FAMILY MEDICINE CLINIC | Facility: CLINIC | Age: 71
End: 2021-12-15
Payer: MEDICARE

## 2021-12-15 VITALS
DIASTOLIC BLOOD PRESSURE: 62 MMHG | BODY MASS INDEX: 30.73 KG/M2 | SYSTOLIC BLOOD PRESSURE: 110 MMHG | HEIGHT: 61 IN | OXYGEN SATURATION: 100 % | RESPIRATION RATE: 18 BRPM | HEART RATE: 79 BPM | TEMPERATURE: 98.7 F | WEIGHT: 162.8 LBS

## 2021-12-15 DIAGNOSIS — K59.01 SLOW TRANSIT CONSTIPATION: ICD-10-CM

## 2021-12-15 DIAGNOSIS — K21.9 GASTROESOPHAGEAL REFLUX DISEASE WITHOUT ESOPHAGITIS: ICD-10-CM

## 2021-12-15 DIAGNOSIS — M54.16 LUMBAR RADICULOPATHY: ICD-10-CM

## 2021-12-15 DIAGNOSIS — R76.8 POSITIVE ANAPLASMOSIS TITER: ICD-10-CM

## 2021-12-15 DIAGNOSIS — M81.0 OSTEOPOROSIS, UNSPECIFIED OSTEOPOROSIS TYPE, UNSPECIFIED PATHOLOGICAL FRACTURE PRESENCE: Primary | ICD-10-CM

## 2021-12-15 PROCEDURE — 99214 OFFICE O/P EST MOD 30 MIN: CPT | Performed by: FAMILY MEDICINE

## 2022-02-17 ENCOUNTER — OFFICE VISIT (OUTPATIENT)
Dept: FAMILY MEDICINE CLINIC | Facility: CLINIC | Age: 72
End: 2022-02-17
Payer: MEDICARE

## 2022-02-17 VITALS
TEMPERATURE: 97 F | SYSTOLIC BLOOD PRESSURE: 120 MMHG | DIASTOLIC BLOOD PRESSURE: 70 MMHG | HEART RATE: 89 BPM | BODY MASS INDEX: 30.4 KG/M2 | WEIGHT: 161 LBS | OXYGEN SATURATION: 97 % | HEIGHT: 61 IN

## 2022-02-17 DIAGNOSIS — N30.00 ACUTE CYSTITIS WITHOUT HEMATURIA: Primary | ICD-10-CM

## 2022-02-17 DIAGNOSIS — D69.6 PLATELETS DECREASED (HCC): ICD-10-CM

## 2022-02-17 LAB
BACTERIA UR QL AUTO: NORMAL /HPF
BILIRUB UR QL STRIP: NEGATIVE
CLARITY UR: CLEAR
COLOR UR: ABNORMAL
GLUCOSE UR STRIP-MCNC: NEGATIVE MG/DL
HGB UR QL STRIP.AUTO: NEGATIVE
HYALINE CASTS #/AREA URNS LPF: NORMAL /LPF
KETONES UR STRIP-MCNC: NEGATIVE MG/DL
LEUKOCYTE ESTERASE UR QL STRIP: ABNORMAL
NITRITE UR QL STRIP: POSITIVE
NON-SQ EPI CELLS URNS QL MICRO: NORMAL /HPF
PH UR STRIP.AUTO: 6.5 [PH]
PROT UR STRIP-MCNC: NEGATIVE MG/DL
RBC #/AREA URNS AUTO: NORMAL /HPF
SL AMB  POCT GLUCOSE, UA: NORMAL
SL AMB LEUKOCYTE ESTERASE,UA: NORMAL
SL AMB POCT BILIRUBIN,UA: NORMAL
SL AMB POCT BLOOD,UA: NORMAL
SL AMB POCT CLARITY,UA: CLEAR
SL AMB POCT COLOR,UA: YELLOW
SL AMB POCT KETONES,UA: NORMAL
SL AMB POCT NITRITE,UA: NORMAL
SL AMB POCT PH,UA: 6
SL AMB POCT SPECIFIC GRAVITY,UA: 1.01
SL AMB POCT URINE PROTEIN: NORMAL
SL AMB POCT UROBILINOGEN: NORMAL
SP GR UR STRIP.AUTO: 1.01 (ref 1–1.03)
UROBILINOGEN UR QL STRIP.AUTO: 0.2 E.U./DL
WBC #/AREA URNS AUTO: NORMAL /HPF

## 2022-02-17 PROCEDURE — 81001 URINALYSIS AUTO W/SCOPE: CPT | Performed by: NURSE PRACTITIONER

## 2022-02-17 PROCEDURE — 81002 URINALYSIS NONAUTO W/O SCOPE: CPT | Performed by: NURSE PRACTITIONER

## 2022-02-17 PROCEDURE — 99214 OFFICE O/P EST MOD 30 MIN: CPT | Performed by: NURSE PRACTITIONER

## 2022-02-17 RX ORDER — CIPROFLOXACIN 500 MG/1
500 TABLET, FILM COATED ORAL EVERY 12 HOURS SCHEDULED
Qty: 10 TABLET | Refills: 0 | Status: SHIPPED | OUTPATIENT
Start: 2022-02-17 | End: 2022-02-22

## 2022-02-17 RX ORDER — POLYETHYLENE GLYCOL 400 AND PROPYLENE GLYCOL 4; 3 MG/ML; MG/ML
SOLUTION/ DROPS OPHTHALMIC
COMMUNITY

## 2022-02-17 NOTE — ASSESSMENT & PLAN NOTE
Pt was notified of results.    Repeat platelet count in December, 280, normal, was as low as 143   Will have routine follow up with pcp at routine appt in June

## 2022-02-17 NOTE — PROGRESS NOTES
OFFICE VISIT  Cecelia Gomez 73 y o  female MRN: [id number]          Assessment / Plan:  Problem List Items Addressed This Visit        Genitourinary    Acute cystitis without hematuria - Primary     positive nitrates on urine dip, will send out for culture, start antbx today, increase fluids  Relevant Medications    ciprofloxacin (CIPRO) 500 mg tablet    Other Relevant Orders    UA w Reflex to Microscopic w Reflex to Culture - Clinic Collect    POCT urine dip (Completed)       Other    Platelets decreased ([address] )     Repeat platelet count in December, 280, normal, was as low as 143  Will have routine follow up with pcp at routine appt in June                  Reason For Visit / Chief Complaint  Chief Complaint   Patient presents with    Urinary Tract Infection     possible bladder/uti infection, started to get worse two days ago        HPI:  Cecelia Gomez is a 73 y o  female who presents today for uti symptoms  She reports burning with urination, started two days ago  She reports frequency, taking cranberry pills and another OTC medication       Historical Information   Past Medical History:   Diagnosis Date    GERD (gastroesophageal reflux disease)     Herniated disc, cervical     Hyperlipidemia      Past Surgical History:   Procedure Laterality Date    BLADDER REPAIR      sling    CATARACT EXTRACTION, BILATERAL      COLONOSCOPY      REMOVAL OF INTRAUTERINE DEVICE (IUD)      in abd area    TONSILLECTOMY      TUMOR REMOVAL      pinky finger 1977    WISDOM TOOTH EXTRACTION       Social History   Social History     Substance and Sexual Activity   Alcohol Use Never     Social History     Substance and Sexual Activity   Drug Use Never     Social History     Tobacco Use   Smoking Status Never Smoker   Smokeless Tobacco Never Used     Family History   Problem Relation Age of Onset    Hypertension Mother         Pulmonary    Osteoporosis Mother     Heart failure Mother     Heart attack Father    Henna Castillo Hypertension Father     Stroke Father     No Known Problems Sister     No Known Problems Daughter     No Known Problems Maternal Grandmother     No Known Problems Paternal Grandmother     No Known Problems Daughter     No Known Problems Maternal Aunt     No Known Problems Maternal Aunt     Breast cancer Maternal Aunt     No Known Problems Maternal Aunt     No Known Problems Paternal Aunt        Meds/Allergies   Allergies   Allergen Reactions    Eggs Or Egg-Derived Products - Food Allergy Other (See Comments)    Etodolac      Other reaction(s): chest pain, dizziness, nausea    Nitrofurantoin Other (See Comments)    Sucralfate Dizziness and Headache    Sulfa Antibiotics Hives and Other (See Comments)    Levofloxacin Dizziness    Omeprazole Headache and Fatigue    Pregabalin Chest Pain    Aspirin      Other reaction(s): GI upset    Chocolate - Food Allergy     Famotidine Other (See Comments)    Fluconazole Other (See Comments)    Ibuprofen      Other reaction(s): GI upset    Naproxen      Other reaction(s): GI upset    Penicillin G      Other reaction(s): swollen lips, tingling around mouth       Meds:    Current Outpatient Medications:     Biotin 5000 MCG CAPS, 5000 mcg 1 qd, Disp: , Rfl:     Black Elderberry,Berry-Flower, 575 MG CAPS, Take by mouth as needed , Disp: , Rfl:     Camphor-Menthol-Methyl Sal (FLEXALL ULTRA PLUS EX), Salonpas, Disp: , Rfl:     cholecalciferol (VITAMIN D3) 1,000 units tablet, Take 1,000 Units by mouth daily, Disp: , Rfl:     cyanocobalamin (VITAMIN B-12) 100 mcg tablet, Take by mouth as needed , Disp: , Rfl:     Liniments (SALONPAS ARTHRITIS PAIN RELIEF EX), as needed , Disp: , Rfl:     loratadine (CLARITIN) 10 mg tablet, Take 10 mg by mouth daily, Disp: , Rfl:     Multiple Vitamins-Minerals (PreserVision/Lutein) CAPS, Take by mouth, Disp: , Rfl:     Omega-3 Fatty Acids (Fish Oil) 1000 MG CPDR, Take by mouth, Disp: , Rfl:     polyethylene glycol-propylene glycol (Systane) 0 4-0 3 %, Systane (PF) 0 4 %-0 3 % eye drops in a dropperette, Disp: , Rfl:     Probiotic Product (PROBIOTIC ACIDOPHILUS BEADS PO), Take by mouth, Disp: , Rfl:     ciprofloxacin (CIPRO) 500 mg tablet, Take 1 tablet (500 mg total) by mouth every 12 (twelve) hours for 5 days, Disp: 10 tablet, Rfl: 0      REVIEW OF SYSTEMS  Review of Systems   Constitutional: Negative for chills, fatigue and fever  HENT: Negative for congestion, ear discharge, ear pain, sore throat, trouble swallowing and voice change  Eyes: Negative for pain and redness  Respiratory: Negative for cough, chest tightness, shortness of breath and wheezing  Gastrointestinal: Positive for nausea  Negative for abdominal pain, blood in stool, constipation, diarrhea and vomiting  Endocrine: Negative for cold intolerance, heat intolerance, polydipsia, polyphagia and polyuria  Genitourinary: Positive for difficulty urinating, frequency and urgency  Negative for decreased urine volume and dysuria  Musculoskeletal: Negative for arthralgias, back pain, myalgias and neck pain  Skin: Negative for color change and rash  Neurological: Negative for dizziness, syncope, weakness, light-headedness, numbness and headaches  Psychiatric/Behavioral: Negative for sleep disturbance and suicidal ideas  The patient is not nervous/anxious  Current Vitals:   Blood Pressure: 120/70 (02/17/22 0925)  Pulse: 89 (02/17/22 0925)  Temperature: (!) 97 °F (36 1 °C) (02/17/22 0925)  Height: 5' 1" (154 9 cm) (02/17/22 0925)  Weight - Scale: 73 kg (161 lb) (02/17/22 0925)  SpO2: 97 % (02/17/22 0925)  [unfilled]    PHYSICAL EXAMS:  Physical Exam  Constitutional:       Appearance: Normal appearance  HENT:      Head: Normocephalic and atraumatic        Right Ear: Tympanic membrane normal       Left Ear: Tympanic membrane normal       Nose: Nose normal       Mouth/Throat:      Mouth: Mucous membranes are moist    Eyes:      Extraocular Movements: Extraocular movements intact  Cardiovascular:      Rate and Rhythm: Normal rate and regular rhythm  Pulses: Normal pulses  Heart sounds: Normal heart sounds  Pulmonary:      Effort: Pulmonary effort is normal       Breath sounds: Normal breath sounds  Abdominal:      Tenderness: There is abdominal tenderness  Comments: Right lower abd   Skin:     General: Skin is warm  Neurological:      General: No focal deficit present  Mental Status: She is alert and oriented to person, place, and time  Psychiatric:         Mood and Affect: Mood normal          Behavior: Behavior normal              Lab, imaging and other studies: I have personally reviewed pertinent reports  Israel Shaver

## 2022-03-04 ENCOUNTER — OFFICE VISIT (OUTPATIENT)
Dept: OBGYN CLINIC | Facility: MEDICAL CENTER | Age: 72
End: 2022-03-04
Payer: MEDICARE

## 2022-03-04 VITALS
SYSTOLIC BLOOD PRESSURE: 124 MMHG | WEIGHT: 158 LBS | HEIGHT: 61 IN | DIASTOLIC BLOOD PRESSURE: 72 MMHG | BODY MASS INDEX: 29.83 KG/M2

## 2022-03-04 DIAGNOSIS — T83.718A EROSION OF BLADDER SUSPENSION MESH, INITIAL ENCOUNTER (HCC): Primary | ICD-10-CM

## 2022-03-04 DIAGNOSIS — N81.10 PELVIC ORGAN PROLAPSE QUANTIFICATION STAGE 2 CYSTOCELE: ICD-10-CM

## 2022-03-04 PROCEDURE — 99204 OFFICE O/P NEW MOD 45 MIN: CPT | Performed by: STUDENT IN AN ORGANIZED HEALTH CARE EDUCATION/TRAINING PROGRAM

## 2022-03-04 RX ORDER — ESTRADIOL 0.1 MG/G
0.5 CREAM VAGINAL DAILY
Qty: 42.5 G | Refills: 1 | OUTPATIENT
Start: 2022-03-04 | End: 2022-05-30

## 2022-03-04 NOTE — PROGRESS NOTES
[facility] [address] [place] [address], [place], [id number]    Assessment/Plan:  Feliz Marie is a 73 y o   who presents as a new patient with erosion of TVT mesh and POP-Q stage 2 cystocele  Erosion of bladder suspension mesh (HCC)  - We reviewed her symptoms of vaginal pain and discomfort with intercoruse, her history of TVT in , and her physical exam findings of small 1 mm area of palpable mesh erosion   - We reviewed management options including conservative with vaginal estrace cream and surgical with referral to urogynecology for Mesh revision  She opts conservative management with estrace cream  She will follow up in 6-8 weeks  Pelvic organ prolapse quantification stage 2 cystocele  - We reviewed her symptomatic cystocele  We reviewed examination revealed POP-Q stage 2 cystocele  - We reviewed management options including pelvic floor PT, pessary, and surgical referral   She declines PT referral and surgical referral today  She opts to return to office for pessary fitting  Diagnoses and all orders for this visit:    Erosion of bladder suspension mesh, initial encounter ([facility])  -     estradiol (ESTRACE VAGINAL) 0 1 mg/g vaginal cream; Insert 0 5 g into the vagina daily Apply pea sized amount vaginally over area of mesh erosion and periurethral area  Pelvic organ prolapse quantification stage 2 cystocele          Subjective:   Feliz Marie is a 73 y o   female  CC: Vaginal discomfort    HPI: [place] presents as a new patient with vaginal discomfort that feels like a "sticker" or something poking in the vagina  Vaginal intercourse has become very uncomfortable  She had TVT in  for stress urinary incontinence  She feels vaginal bulging symptoms  She recently had a UTI       ROS: Review of Systems   Constitutional: Negative for chills and fever  Respiratory: Negative for cough and shortness of breath      Cardiovascular: Negative for chest pain and leg swelling  Gastrointestinal: Negative for abdominal pain, nausea and vomiting  Genitourinary: Negative for dysuria, frequency and urgency  Neurological: Negative for dizziness, light-headedness and headaches  PFSH: The following portions of the patient's history were reviewed and updated as appropriate: allergies, current medications, past family history, past medical history, obstetric history, gynecologic history, past social history, past surgical history and problem list        Objective:  /72   Ht 5' 1" (1 549 m)   Wt 71 7 kg (158 lb)   BMI 29 85 kg/m²    Physical Exam  Constitutional:       Appearance: Normal appearance  Cardiovascular:      Rate and Rhythm: Normal rate  Pulmonary:      Effort: Pulmonary effort is normal    Genitourinary:     Comments: POPQ Stage 2 Anterior prolapse: Aa +1, Ba+1 C-5, TVL 10, Ap -1 Bp -1 D-7  Small 1 mm area of palpable mesh erosion from prior TVT  Neurological:      Mental Status: She is alert             Kana Morrissey MD  [address] [place] [address]  3/4/2022 1:51 PM

## 2022-03-04 NOTE — ASSESSMENT & PLAN NOTE
- We reviewed her symptoms of vaginal pain and discomfort with intercoruse, her history of TVT in 2008, and her physical exam findings of small 1 mm area of palpable mesh erosion   - We reviewed management options including conservative with vaginal estrace cream and surgical with referral to urogynecology for Mesh revision  She opts conservative management with estrace cream  She will follow up in 6-8 weeks

## 2022-03-04 NOTE — ASSESSMENT & PLAN NOTE
- We reviewed her symptomatic cystocele  We reviewed examination revealed POP-Q stage 2 cystocele  - We reviewed management options including pelvic floor PT, pessary, and surgical referral   She declines PT referral and surgical referral today  She opts to return to office for pessary fitting

## 2022-03-11 ENCOUNTER — TELEPHONE (OUTPATIENT)
Dept: OBGYN CLINIC | Facility: MEDICAL CENTER | Age: 72
End: 2022-03-11

## 2022-03-14 ENCOUNTER — OFFICE VISIT (OUTPATIENT)
Dept: FAMILY MEDICINE CLINIC | Facility: CLINIC | Age: 72
End: 2022-03-14
Payer: MEDICARE

## 2022-03-14 VITALS
TEMPERATURE: 98.7 F | BODY MASS INDEX: 29.91 KG/M2 | HEART RATE: 75 BPM | WEIGHT: 158.4 LBS | OXYGEN SATURATION: 98 % | RESPIRATION RATE: 18 BRPM | SYSTOLIC BLOOD PRESSURE: 130 MMHG | HEIGHT: 61 IN | DIASTOLIC BLOOD PRESSURE: 78 MMHG

## 2022-03-14 DIAGNOSIS — R00.0 TACHYCARDIA: ICD-10-CM

## 2022-03-14 DIAGNOSIS — N81.10 PELVIC ORGAN PROLAPSE QUANTIFICATION STAGE 2 CYSTOCELE: Primary | ICD-10-CM

## 2022-03-14 DIAGNOSIS — N30.00 ACUTE CYSTITIS WITHOUT HEMATURIA: ICD-10-CM

## 2022-03-14 PROCEDURE — 99214 OFFICE O/P EST MOD 30 MIN: CPT | Performed by: FAMILY MEDICINE

## 2022-03-14 NOTE — ASSESSMENT & PLAN NOTE
Pt saw Dave Millercaryn and started Estradiol cream last week and developed head aches lightheaded feeling and low dose temperature-Covid test neg  Fatigue and increased heart rate  She is concidering surgery and will follow up Urogynecology

## 2022-03-14 NOTE — ASSESSMENT & PLAN NOTE
No current recent issues or problems patient will be followed up by urogynecology as she had discussed with her gynecologist and would like to move forward with that appointment today

## 2022-03-14 NOTE — PROGRESS NOTES
Assessment/Plan:       Problem List Items Addressed This Visit        Genitourinary    Acute cystitis without hematuria     No current recent issues or problems patient will be followed up by urogynecology as she had discussed with her gynecologist and would like to move forward with that appointment today         Pelvic organ prolapse quantification stage 2 cystocele - Primary     Pt saw Gyn and started Estradiol cream last week and developed head aches lightheaded feeling and low dose temperature-Covid test neg  Fatigue and increased heart rate  She is concidering surgery and will follow up Urogynecology  Other    Tachycardia     This is a side effect likely of her estrogen as a corresponding directly with her estrogen usage and she stop the medication a few days ago at today's visit her heart rate is in a normal rate and rhythm at 76  Subjective:      Patient ID: Tiffany Justin is a 73 y o  female  Patient presents for viral upper respiratory symptoms concerned about overall whether she is worsening or needs treatment does have been present over the last 7-10 days      The following portions of the patient's history were reviewed and updated as appropriate: allergies, current medications, past family history, past medical history, past social history, past surgical history and problem list     Review of Systems   Constitutional: Negative for chills, fatigue and fever  HENT: Positive for congestion  Negative for nosebleeds, rhinorrhea, sinus pressure and sore throat  Eyes: Negative for discharge and redness  Respiratory: Positive for cough  Negative for shortness of breath  Cardiovascular: Negative for chest pain, palpitations and leg swelling  Gastrointestinal: Negative for abdominal pain, blood in stool and nausea  Endocrine: Negative for cold intolerance, heat intolerance and polyuria  Genitourinary: Negative for dysuria and frequency     Musculoskeletal: Negative for arthralgias, back pain and myalgias  Skin: Negative for rash  Neurological: Negative for dizziness, weakness and headaches  Hematological: Negative for adenopathy  Psychiatric/Behavioral: Negative for behavioral problems and sleep disturbance  The patient is not nervous/anxious  Objective:      /78 (BP Location: Left arm, Patient Position: Sitting)   Pulse 75   Temp 98 7 °F (37 1 °C)   Resp 18   Ht 5' 1" (1 549 m)   Wt 71 8 kg (158 lb 6 4 oz)   SpO2 98%   BMI 29 93 kg/m²        Physical Exam  Vitals and nursing note reviewed  Constitutional:       General: She is not in acute distress  Appearance: She is well-developed  HENT:      Head: Normocephalic and atraumatic  Right Ear: External ear normal       Left Ear: External ear normal       Nose: Congestion present  Mouth/Throat:      Pharynx: No oropharyngeal exudate  Eyes:      General: No scleral icterus  Right eye: No discharge  Left eye: No discharge  Conjunctiva/sclera: Conjunctivae normal       Pupils: Pupils are equal, round, and reactive to light  Neck:      Thyroid: No thyromegaly  Vascular: No JVD  Cardiovascular:      Rate and Rhythm: Normal rate and regular rhythm  Heart sounds: Normal heart sounds  No murmur heard  Pulmonary:      Effort: Pulmonary effort is normal       Breath sounds: No wheezing or rales  Chest:      Chest wall: No tenderness  Abdominal:      General: Bowel sounds are normal  There is no distension  Palpations: Abdomen is soft  There is no mass  Tenderness: There is no abdominal tenderness  Musculoskeletal:         General: No tenderness or deformity  Normal range of motion  Cervical back: Normal range of motion  Lymphadenopathy:      Cervical: No cervical adenopathy  Skin:     General: Skin is warm and dry  Findings: No rash  Neurological:      General: No focal deficit present        Mental Status: She is alert and oriented to person, place, and time  Mental status is at baseline  Cranial Nerves: No cranial nerve deficit  Coordination: Coordination normal       Deep Tendon Reflexes: Reflexes are normal and symmetric  Reflexes normal    Psychiatric:         Mood and Affect: Mood normal          Behavior: Behavior normal          Thought Content: Thought content normal          Judgment: Judgment normal           Data:    Laboratory Results: I have personally reviewed the pertinent laboratory results/reports   Radiology/Other Diagnostic Testing Results: I have personally reviewed pertinent reports         Lab Results   Component Value Date    WBC 6 24 12/06/2021    HGB 12 5 12/06/2021    HCT 38 5 12/06/2021    MCV 95 12/06/2021     12/06/2021     Lab Results   Component Value Date    K 4 4 12/06/2021     12/06/2021    CO2 27 12/06/2021    BUN 12 12/06/2021    CREATININE 0 60 12/06/2021    GLUF 85 12/06/2021    CALCIUM 9 2 12/06/2021    AST 16 12/06/2021    ALT 26 12/06/2021    ALKPHOS 112 12/06/2021    EGFR 93 12/06/2021     Lab Results   Component Value Date    CHOLESTEROL 213 (H) 12/06/2021    CHOLESTEROL 220 (H) 01/05/2021    CHOLESTEROL 220 (H) 11/15/2018     Lab Results   Component Value Date    HDL 57 12/06/2021    HDL 58 01/05/2021    HDL 54 11/15/2018     Lab Results   Component Value Date    LDLCALC 141 (H) 12/06/2021    LDLCALC 149 (H) 01/05/2021    LDLCALC 148 (H) 11/15/2018     Lab Results   Component Value Date    TRIG 74 12/06/2021    TRIG 64 01/05/2021    TRIG 90 11/15/2018     No results found for: [place], [place]  Lab Results   Component Value Date    OHV1XILTVGDU 2 420 12/06/2021     No results found for: HGBA1C  No results found for: GABE Gramajo, DO

## 2022-03-14 NOTE — ASSESSMENT & PLAN NOTE
This is a side effect likely of her estrogen as a corresponding directly with her estrogen usage and she stop the medication a few days ago at today's visit her heart rate is in a normal rate and rhythm at 76

## 2022-03-15 ENCOUNTER — TELEPHONE (OUTPATIENT)
Dept: OBGYN CLINIC | Facility: MEDICAL CENTER | Age: 72
End: 2022-03-15

## 2022-03-15 NOTE — TELEPHONE ENCOUNTER
Hello,   The patient called in today regarding a reaction that she had to a medication that was prescribed to her(Estradiol)  She stated that is elevated her heart rate and she just had an overall terrible experience with the medication  She is requesting to speak directly to you about this  Please review when you are able to   Thank you

## 2022-03-15 NOTE — TELEPHONE ENCOUNTER
[address] 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

## 2022-05-10 ENCOUNTER — TELEMEDICINE (OUTPATIENT)
Dept: FAMILY MEDICINE CLINIC | Facility: CLINIC | Age: 72
End: 2022-05-10
Payer: MEDICARE

## 2022-05-10 VITALS — TEMPERATURE: 98.4 F

## 2022-05-10 DIAGNOSIS — U07.1 COVID-19 VIRUS INFECTION: Primary | ICD-10-CM

## 2022-05-10 PROCEDURE — 99214 OFFICE O/P EST MOD 30 MIN: CPT | Performed by: FAMILY MEDICINE

## 2022-05-10 RX ORDER — ONDANSETRON 4 MG/1
4 TABLET, FILM COATED ORAL EVERY 8 HOURS PRN
Qty: 20 TABLET | Refills: 0 | Status: SHIPPED | OUTPATIENT
Start: 2022-05-10

## 2022-05-10 NOTE — ASSESSMENT & PLAN NOTE
Patient presents by virtual visit for COVID-19 infection she was positive in the past for previous COVID infection at this time she visited [employer] to go to her son's graduation but became sick on Saturday with extreme fatigue nausea diarrhea and tested positive on Sunday  She is agreeable to the Paxlovid oral treatment we will try to arrange is at this point  Her  has been in close contact with her unmasked and I recommend that he watch for symptoms and test himself accordingly    In light of the symptomatology and the fact that she is completely unvaccinated mode like to follow-up with her again in 48 hours it is recommended that if she feels worse in any way or has difficulty with breathing she is to contact our office and go to the hospital

## 2022-05-10 NOTE — PROGRESS NOTES
COVID-19 Outpatient Progress Note    Assessment/Plan:    Problem List Items Addressed This Visit        Other    COVID-19 virus infection - Primary     Patient presents by virtual visit for COVID-19 infection she was positive in the past for previous COVID infection at this time she visited [facility] to go to her son's graduation but became sick on Saturday with extreme fatigue nausea diarrhea and tested positive on Sunday  She is agreeable to the Paxlovid oral treatment we will try to arrange is at this point  Her  has been in close contact with her unmasked and I recommend that he watch for symptoms and test himself accordingly  In light of the symptomatology and the fact that she is completely unvaccinated mode like to follow-up with her again in 48 hours it is recommended that if she feels worse in any way or has difficulty with breathing she is to contact our office and go to the hospital          Relevant Medications    nirmatrelvir & ritonavir (Paxlovid) tablet therapy pack         Disposition:     I have spent 28 minutes directly with the patient  Greater than 50% of this time was spent in counseling/coordination of care regarding: diagnostic results, prognosis, risks and benefits of treatment options, instructions for management, patient and family education, importance of treatment compliance, risk factor reductions and impressions  Encounter provider Flores Hand DO    Provider located at [place] [address]  [phone]    Recent Visits  No visits were found meeting these conditions  Showing recent visits within past 7 days and meeting all other requirements  Today's Visits  Date Type Provider Dept   05/10/22 Telemedicine Flores Hand DO [facility] today's visits and meeting all other requirements  Future Appointments  No visits were found meeting these conditions    Showing future appointments within next 150 days and meeting all other requirements     This virtual check-in was done via [employer] and patient was informed that this is a secure, HIPAA-compliant platform  She agrees to proceed  Patient agrees to participate in a virtual check in via telephone or video visit instead of presenting to the office to address urgent/immediate medical needs  Patient is aware this is a billable service  After connecting through [facility], the patient was identified by name and date of birth  Janak Cummings was informed that this was a telemedicine visit and that the exam was being conducted confidentially over secure lines  No one else was in the room  Janak Cummings acknowledged consent and understanding of privacy and security of the telemedicine visit  I informed the patient that I have reviewed her record in Epic and presented the opportunity for her to ask any questions regarding the visit today  The patient agreed to participate  Verification of patient location:  Patient is located in the following state in which I hold an active license: [place]    Subjective:   Janak Cummings is a 81 y o  female who is concerned about COVID-19  Patient's symptoms include fatigue, nausea, diarrhea and headache           COVID-19 vaccination status: Not vaccinated    Exposure:   Contact with a person who is under investigation (PUI) for or who is positive for COVID-19 within the last 14 days?: No    Hospitalized recently for fever and/or lower respiratory symptoms?: No      Currently a healthcare worker that is involved in direct patient care?: No      Works in a special setting where the risk of COVID-19 transmission may be high? (this may include long-term care, correctional and long term facilities; homeless shelters; assisted-living facilities and group homes ): No      Resident in a special setting where the risk of COVID-19 transmission may be high? (this may include long-term care, correctional and nursing home facilities; homeless shelters; assisted-living facilities and group homes ): No      Lab Results   Component Value Date    SARSCOV2 Negative 06/24/2021    Renee Bucy Not Detected 11/18/2020     Past Medical History:   Diagnosis Date    GERD (gastroesophageal reflux disease)     Herniated disc, cervical     Hyperlipidemia      Past Surgical History:   Procedure Laterality Date    BLADDER REPAIR      sling    CATARACT EXTRACTION, BILATERAL      COLONOSCOPY      REMOVAL OF INTRAUTERINE DEVICE (IUD)      in abd area    TONSILLECTOMY      TUMOR REMOVAL      pinky finger 1977    WISDOM TOOTH EXTRACTION       Current Outpatient Medications   Medication Sig Dispense Refill    cholecalciferol (VITAMIN D3) 1,000 units tablet Take 1,000 Units by mouth daily      cyanocobalamin (VITAMIN B-12) 100 mcg tablet Take by mouth as needed       loratadine (CLARITIN) 10 mg tablet Take 10 mg by mouth daily        Multiple Vitamins-Minerals (PreserVision/Lutein) CAPS Take by mouth      Omega-3 Fatty Acids (Fish Oil) 1000 MG CPDR Take by mouth      polyethylene glycol-propylene glycol (Systane) 0 4-0 3 % Systane (PF) 0 4 %-0 3 % eye drops in a dropperette      Probiotic Product (PROBIOTIC ACIDOPHILUS BEADS PO) Take by mouth      Black Elderberry,Berry-Flower, 575 MG CAPS Take by mouth as needed  (Patient not taking: Reported on 3/4/2022 )      Camphor-Menthol-Methyl Sal (FLEXALL ULTRA PLUS EX)   (Patient not taking:  )      estradiol (ESTRACE VAGINAL) 0 1 mg/g vaginal cream Insert 0 5 g into the vagina daily Apply pea sized amount vaginally over area of mesh erosion and periurethral area   (Patient not taking: Reported on 3/14/2022 ) 42 5 g 1    Liniments (SALONPAS ARTHRITIS PAIN RELIEF EX) as needed  (Patient not taking: Reported on 3/14/2022 )      nirmatrelvir & ritonavir (Paxlovid) tablet therapy pack Take 3 tablets by mouth 2 (two) times a day for 5 days Take 2 nirmatrelvir tablets + 1 ritonavir tablet together per dose 30 tablet 0     No current facility-administered medications for this visit  Allergies   Allergen Reactions    Eggs Or Egg-Derived Products - Food Allergy Other (See Comments)    Etodolac      Other reaction(s): chest pain, dizziness, nausea    Nitrofurantoin Other (See Comments)    Sucralfate Dizziness and Headache    Sulfa Antibiotics Hives and Other (See Comments)    Levofloxacin Dizziness    Omeprazole Headache and Fatigue    Pregabalin Chest Pain    Aspirin      Other reaction(s): GI upset    Chocolate - Food Allergy     Famotidine Other (See Comments)    Fluconazole Other (See Comments)    Ibuprofen      Other reaction(s): GI upset    Naproxen      Other reaction(s): GI upset    Penicillin G      Other reaction(s): swollen lips, tingling around mouth       Review of Systems   Constitutional: Positive for fatigue  Gastrointestinal: Positive for diarrhea and nausea  Neurological: Positive for headaches  Objective:    Vitals:    05/10/22 1114   Temp: 98 4 °F (36 9 °C)       Physical Exam  Vitals and nursing note reviewed  Constitutional:       General: She is not in acute distress  Appearance: Normal appearance  She is well-developed  HENT:      Head: Normocephalic and atraumatic  Nose: Nose normal       Mouth/Throat:      Mouth: Mucous membranes are moist    Eyes:      Conjunctiva/sclera: Conjunctivae normal    Cardiovascular:      Rate and Rhythm: Normal rate and regular rhythm  Heart sounds: No murmur heard  Pulmonary:      Effort: Pulmonary effort is normal  No respiratory distress  Breath sounds: Normal breath sounds  Abdominal:      Palpations: Abdomen is soft  Tenderness: There is no abdominal tenderness  Musculoskeletal:      Cervical back: Neck supple  Skin:     General: Skin is warm and dry  Neurological:      General: No focal deficit present  Mental Status: She is alert and oriented to person, place, and time  Mental status is at baseline  Psychiatric:         Mood and Affect: Mood normal          Behavior: Behavior normal          Thought Content: Thought content normal          Judgment: Judgment normal          VIRTUAL VISIT DISCLAIMER    Sae Meyers verbally agrees to participate in [employer]  Pt is aware that [employer] could be limited without vital signs or the ability to perform a full hands-on physical exam  Tarah Meyers understands she or the provider may request at any time to terminate the video visit and request the patient to seek care or treatment in person

## 2022-05-10 NOTE — PROGRESS NOTES
Virtual Regular Visit    Verification of patient location:    Patient is located in the following state in which I hold an active license [place]      Assessment/Plan:    Problem List Items Addressed This Visit     None               Reason for visit is   Chief Complaint   Patient presents with    COVID-19    Diarrhea    Fever    Fatigue        Encounter provider Tennille White DO    [facility]  [address] [place] [address] [place] [address]  [phone]      Recent Visits  No visits were found meeting these conditions  Showing recent visits within past 7 days and meeting all other requirements  Today's Visits  Date Type Provider Dept   05/10/22 [facility]medicine [facility] DO [facility] today's visits and meeting all other requirements  Future Appointments  No visits were found meeting these conditions  Showing future appointments within next 150 days and meeting all other requirements       The patient was identified by name and date of birth  Barak Roy was informed that this is a telemedicine visit and that the visit is being conducted through [address] [place] [address] Now and patient was informed that this is a secure, HIPAA-compliant platform  She agrees to proceed     My office door was closed  No one else was in the room  She acknowledged consent and understanding of privacy and security of the video platform  The patient has agreed to participate and understands they can discontinue the visit at any time  Patient is aware this is a billable service  Subjective  Barak Roy is a 81 y o  female Covid positive   Covid positive tested Sunday with diarrhea abdominal cramping and fatigue  Low grade fever         Past Medical History:   Diagnosis Date    GERD (gastroesophageal reflux disease)     Herniated disc, cervical     Hyperlipidemia        Past Surgical History:   Procedure Laterality Date    BLADDER REPAIR      sling  CATARACT EXTRACTION, BILATERAL      COLONOSCOPY      REMOVAL OF INTRAUTERINE DEVICE (IUD)      in abd area    TONSILLECTOMY      TUMOR REMOVAL      pinky finger 1977    WISDOM TOOTH EXTRACTION         Current Outpatient Medications   Medication Sig Dispense Refill    cholecalciferol (VITAMIN D3) 1,000 units tablet Take 1,000 Units by mouth daily      cyanocobalamin (VITAMIN B-12) 100 mcg tablet Take by mouth as needed       loratadine (CLARITIN) 10 mg tablet Take 10 mg by mouth daily        Multiple Vitamins-Minerals (PreserVision/Lutein) CAPS Take by mouth      Omega-3 Fatty Acids (Fish Oil) 1000 MG CPDR Take by mouth      polyethylene glycol-propylene glycol (Systane) 0 4-0 3 % Systane (PF) 0 4 %-0 3 % eye drops in a dropperette      Probiotic Product (PROBIOTIC ACIDOPHILUS BEADS PO) Take by mouth      Black Elderberry,Berry-Flower, 575 MG CAPS Take by mouth as needed  (Patient not taking: Reported on 3/4/2022 )      Camphor-Menthol-Methyl Sal (FLEXALL ULTRA PLUS EX)   (Patient not taking:  )      estradiol (ESTRACE VAGINAL) 0 1 mg/g vaginal cream Insert 0 5 g into the vagina daily Apply pea sized amount vaginally over area of mesh erosion and periurethral area  (Patient not taking: Reported on 3/14/2022 ) 42 5 g 1    Liniments (SALONPAS ARTHRITIS PAIN RELIEF EX) as needed  (Patient not taking: Reported on 3/14/2022 )       No current facility-administered medications for this visit          Allergies   Allergen Reactions    Eggs Or Egg-Derived Products - Food Allergy Other (See Comments)    Etodolac      Other reaction(s): chest pain, dizziness, nausea    Nitrofurantoin Other (See Comments)    Sucralfate Dizziness and Headache    Sulfa Antibiotics Hives and Other (See Comments)    Levofloxacin Dizziness    Omeprazole Headache and Fatigue    Pregabalin Chest Pain    Aspirin      Other reaction(s): GI upset    Chocolate - Food Allergy     Famotidine Other (See Comments)    Fluconazole Other (See Comments)    Ibuprofen      Other reaction(s): GI upset    Naproxen      Other reaction(s): GI upset    Penicillin G      Other reaction(s): swollen lips, tingling around mouth       Review of Systems    Video Exam    Vitals:    05/10/22 1114   Temp: 98 4 °F (36 9 °C)       Physical Exam     {Time Spent:58583}    VIRTUAL VISIT DISCLAIMER      Luis Miguel Meyers verbally agrees to participate in [employer]  Pt is aware that [employer] could be limited without vital signs or the ability to perform a full hands-on physical exam  Tarah Meyers understands she or the provider may request at any time to terminate the video visit and request the patient to seek care or treatment in person

## 2022-05-12 ENCOUNTER — TELEMEDICINE (OUTPATIENT)
Dept: FAMILY MEDICINE CLINIC | Facility: CLINIC | Age: 72
End: 2022-05-12
Payer: MEDICARE

## 2022-05-12 VITALS — OXYGEN SATURATION: 96 % | HEART RATE: 77 BPM

## 2022-05-12 DIAGNOSIS — R11.0 NAUSEA: ICD-10-CM

## 2022-05-12 DIAGNOSIS — K21.9 CHRONIC GERD: Primary | ICD-10-CM

## 2022-05-12 PROBLEM — U07.1 COVID-19 VIRUS INFECTION: Status: RESOLVED | Noted: 2022-05-10 | Resolved: 2022-05-12

## 2022-05-12 PROCEDURE — 99214 OFFICE O/P EST MOD 30 MIN: CPT | Performed by: FAMILY MEDICINE

## 2022-05-12 NOTE — PROGRESS NOTES
COVID-19 Outpatient Progress Note    Assessment/Plan:    Problem List Items Addressed This Visit        Digestive    Chronic GERD - Primary     Continue medication the same no change  Other    Nausea     Due to Paxlovid-use Zofran prn  Disposition:     Risks and benefits of COVID-19 vaccination was discussed with patient  Patient has COVID-19 infection  Based off CDC guidelines, they were recommended to isolate for 5 days from the date of the positive test  If they remain asymptomatic, isolation may be ended followed by 5 days of wearing a mask when around othes to minimize risk of infecting others  If they have a fever, continue to stay home until fever resolves for at least 24 hours  Discussed symptom directed medication options with patient  Discussed vitamin D, vitamin C, and/or zinc supplementation with patient  Patient meets criteria for PAXLOVID and they have been counseled appropriately according to EUA documentation released by the FDA  After discussion, patient agrees to treatment  Sylvester Moors is an investigational medicine used to treat mild-to-moderate COVID-19 in adults and children (15years of age and older weighing at least 80 pounds (40 kg)) with positive results of direct SARS-CoV-2 viral testing, and who are at high risk for progression to severe COVID-19, including hospitalization or death  PAXLOVID is investigational because it is still being studied  There is limited information about the safety and effectiveness of using PAXLOVID to treat people with mild-to-moderate COVID-19  The FDA has authorized the emergency use of PAXLOVID for the treatment of mild-tomoderate COVID-19 in adults and children (15years of age and older weighing at least 80 pounds (40 kg)) with a positive test for the virus that causes COVID-19, and who are at high risk for progression to severe COVID-19, including hospitalization or death, under an EUA       What should I tell my healthcare provider before I take PAXLOVID? Tell your healthcare provider if you:  - Have any allergies  - Have liver or kidney disease  - Are pregnant or plan to become pregnant  - Are breastfeeding a child  - Have any serious illnesses    Tell your healthcare provider about all the medicines you take, including prescription and over-the-counter medicines, vitamins, and herbal supplements  Some medicines may interact with PAXLOVID and may cause serious side effects  Keep a list of your medicines to show your healthcare provider and pharmacist when you get a new medicine  You can ask your healthcare provider or pharmacist for a list of medicines that interact with PAXLOVID  Do not start taking a new medicine without telling your healthcare provider  Your healthcare provider can tell you if it is safe to take PAXLOVID with other medicines  Tell your healthcare provider if you are taking combined hormonal contraceptive  PAXLOVID may affect how your birth control pills work  Females who are able to become pregnant should use another effective alternative form of contraception or an additional barrier method of contraception  Talk to your healthcare provider if you have any questions about contraceptive methods that might be right for you  How do I take PAXLOVID? PAXLOVID consists of 2 medicines: nirmatrelvir and ritonavir  - Take 2 pink tablets of nirmatrelvir with 1 white tablet of ritonavir by mouth 2 times each day (in the morning and in the evening) for 5 days  For each dose, take all 3 tablets at the same time  - If you have kidney disease, talk to your healthcare provider  You may need a different dose  - Swallow the tablets whole  Do not chew, break, or crush the tablets  - Take PAXLOVID with or without food  - Do not stop taking PAXLOVID without talking to your healthcare provider, even if you feel better    - If you miss a dose of PAXLOVID within 8 hours of the time it is usually taken, take it as soon as you remember  If you miss a dose by more than 8 hours, skip the missed dose and take the next dose at your regular time  Do not take 2 doses of PAXLOVID at the same time  - If you take too much PAXLOVID, call your healthcare provider or go to the nearest hospital emergency room right away  - If you are taking a ritonavir- or cobicistat-containing medicine to treat hepatitis C or Human Immunodeficiency Virus (HIV), you should continue to take your medicine as prescribed by your healthcare provider   - Talk to your healthcare provider if you do not feel better or if you feel worse after 5 days  Who should generally not take PAXLOVID? Do not take PAXLOVID if:  You are allergic to nirmatrelvir, ritonavir, or any of the ingredients in PAXLOVID  You are taking any of the following medicines:  - Alfuzosin  - Pethidine, piroxicam, propoxyphene  - Ranolazine  - Amiodarone, dronedarone, flecainide, propafenone, quinidine  - Colchicine  - Lurasidone, pimozide, clozapine  - Dihydroergotamine, ergotamine, methylergonovine  - Lovastatin, simvastatin  - Sildenafil (Revatio®) for pulmonary arterial hypertension (PAH)  - Triazolam, oral midazolam  - Apalutamide  - Carbamazepine, phenobarbital, phenytoin  - Rifampin  - St  Rocs Wort (hypericum perforatum)    What are the important possible side effects of PAXLOVID? Possible side effects of PAXLOVID are:  - Liver Problems  Tell your healthcare provider right away if you have any of these signs and symptoms of liver problems: loss of appetite, yellowing of your skin and the whites of eyes (jaundice), dark-colored urine, pale colored stools and itchy skin, stomach area (abdominal) pain  - Resistance to HIV Medicines  If you have untreated HIV infection, PAXLOVID may lead to some HIV medicines not working as well in the future    - Other possible side effects include: altered sense of taste, diarrhea, high blood pressure, or muscle aches    These are not all the possible side effects of PAXLOVID  Not many people have taken PAXLOVID  Serious and unexpected side effects may happen  Erick Sprinkle is still being studied, so it is possible that all of the risks are not known at this time  What other treatment choices are there? Like Kelsea Alejandraude may allow for the emergency use of other medicines to treat people with COVID-19  Go to https://[employer]/ for information on the emergency use of other medicines that are authorized by FDA to treat people with COVID-19  Your healthcare provider may talk with you about clinical trials for which you may be eligible  It is your choice to be treated or not to be treated with PAXLOVID  Should you decide not to receive it or for your child not to receive it, it will not change your standard medical care  What if I am pregnant or breastfeeding? There is no experience treating pregnant women or breastfeeding mothers with PAXLOVID  For a mother and unborn baby, the benefit of taking PAXLOVID may be greater than the risk from the treatment  If you are pregnant, discuss your options and specific situation with your healthcare provider  It is recommended that you use effective barrier contraception or do not have sexual activity while taking PAXLOVID  If you are breastfeeding, discuss your options and specific situation with your healthcare provider  How do I report side effects with PAXLOVID? Contact your healthcare provider if you have any side effects that bother you or do not go away  Report side effects to FDA MedWatch at www fda gov/medwatch or call 2-194-NTD9606 or you can report side effects to [facility]  at the contact information provided below  Website Fax number Telephone number   [employer] [phone]     How should I store Erick Sprinkle?     Store PAXLOVID tablets at room temperature between 68°F to 77°F (20°C to 25°C)  Full fact sheet for patients, parents, and caregivers can be found at: [employer] com au    I have spent 31 minutes directly with the patient  Greater than 50% of this time was spent in counseling/coordination of care regarding: diagnostic results, prognosis, risks and benefits of treatment options, instructions for management, patient and family education, importance of treatment compliance, risk factor reductions and impressions  Encounter provider Corina Gamino DO    Provider located at [address] [address]  [phone]    Recent Visits  Date Type Provider Dept   05/10/22 Telemedicine Corina Gamino DO [facility] [address] recent visits within past 7 days and meeting all other requirements  Today's Visits  Date Type Provider Dept   05/12/22 Telemedicine Corina Gamino DO [facility] [address] today's visits and meeting all other requirements  Future Appointments  No visits were found meeting these conditions  Showing future appointments within next 150 days and meeting all other requirements     This virtual check-in was done via [email or web address] and patient was informed that this is a secure, HIPAA-compliant platform  She agrees to proceed  Patient agrees to participate in a virtual check in via telephone or video visit instead of presenting to the office to address urgent/immediate medical needs  Patient is aware this is a billable service  After connecting through [facility], the patient was identified by name and date of birth  Raul Holloway was informed that this was a telemedicine visit and that the exam was being conducted confidentially over secure lines  My office door was closed  No one else was in the room   Raul Holloway acknowledged consent and understanding of privacy and security of the telemedicine visit  I informed the patient that I have reviewed her record in Epic and presented the opportunity for her to ask any questions regarding the visit today  The patient agreed to participate  Verification of patient location:  Patient is located in the following state in which I hold an active license: [place]    Subjective:   Quynh Tariq is a 81 y o  female who has been screened for COVID-19  Symptom change since last report: improving  Patient's symptoms include cough, nausea and headache  Patient denies fever, chills, fatigue, congestion, rhinorrhea, sore throat, shortness of breath, abdominal pain and myalgias  COVID-19 vaccination status: Not vaccinated    Vijaya Chung has been staying home and has isolated themselves in her home  She is taking care to not share personal items and is cleaning all surfaces that are touched often, like counters, tabletops, and doorknobs using household cleaning sprays or wipes  She is wearing a mask when she leaves her room       Lab Results   Component Value Date    SARSCOV2 Negative 06/24/2021    SARSCOV2 Not Detected 11/18/2020     Past Medical History:   Diagnosis Date    GERD (gastroesophageal reflux disease)     Herniated disc, cervical     Hyperlipidemia      Past Surgical History:   Procedure Laterality Date    BLADDER REPAIR      sling    CATARACT EXTRACTION, BILATERAL      COLONOSCOPY      REMOVAL OF INTRAUTERINE DEVICE (IUD)      in abd area    TONSILLECTOMY      TUMOR REMOVAL      pinky finger 1977    WISDOM TOOTH EXTRACTION       Current Outpatient Medications   Medication Sig Dispense Refill    cholecalciferol (VITAMIN D3) 1,000 units tablet Take 1,000 Units by mouth daily      cyanocobalamin (VITAMIN B-12) 100 mcg tablet Take by mouth as needed       loratadine (CLARITIN) 10 mg tablet Take 10 mg by mouth daily        Multiple Vitamins-Minerals (PreserVision/Lutein) CAPS Take by mouth      nirmatrelvir & ritonavir (Paxlovid) tablet therapy pack Take 3 tablets by mouth 2 (two) times a day for 5 days Take 2 nirmatrelvir tablets + 1 ritonavir tablet together per dose 30 tablet 0    Omega-3 Fatty Acids (Fish Oil) 1000 MG CPDR Take by mouth      ondansetron (ZOFRAN) 4 mg tablet Take 1 tablet (4 mg total) by mouth every 8 (eight) hours as needed for nausea or vomiting 20 tablet 0    polyethylene glycol-propylene glycol (Systane) 0 4-0 3 % Systane (PF) 0 4 %-0 3 % eye drops in a dropperette      Probiotic Product (PROBIOTIC ACIDOPHILUS BEADS PO) Take by mouth      Black Elderberry,Berry-Flower, 575 MG CAPS Take by mouth as needed  (Patient not taking: No sig reported)      Camphor-Menthol-Methyl Sal (FLEXALL ULTRA PLUS EX)   (Patient not taking: No sig reported)      estradiol (ESTRACE VAGINAL) 0 1 mg/g vaginal cream Insert 0 5 g into the vagina daily Apply pea sized amount vaginally over area of mesh erosion and periurethral area  (Patient not taking: No sig reported) 42 5 g 1    Liniments (SALONPAS ARTHRITIS PAIN RELIEF EX) as needed  (Patient not taking: No sig reported)       No current facility-administered medications for this visit  Allergies   Allergen Reactions    Eggs Or Egg-Derived Products - Food Allergy Other (See Comments)    Etodolac      Other reaction(s): chest pain, dizziness, nausea    Nitrofurantoin Other (See Comments)    Sucralfate Dizziness and Headache    Sulfa Antibiotics Hives and Other (See Comments)    Levofloxacin Dizziness    Omeprazole Headache and Fatigue    Pregabalin Chest Pain    Aspirin      Other reaction(s): GI upset    Chocolate - Food Allergy     Famotidine Other (See Comments)    Fluconazole Other (See Comments)    Ibuprofen      Other reaction(s): GI upset    Naproxen      Other reaction(s): GI upset    Penicillin G      Other reaction(s): swollen lips, tingling around mouth       Review of Systems   Constitutional: Negative for chills, fatigue and fever     HENT: Negative for congestion, nosebleeds, rhinorrhea, sinus pressure and sore throat  Eyes: Negative for discharge and redness  Respiratory: Positive for cough  Negative for shortness of breath  Cardiovascular: Negative for chest pain, palpitations and leg swelling  Gastrointestinal: Positive for nausea  Negative for abdominal pain and blood in stool  Endocrine: Negative for cold intolerance, heat intolerance and polyuria  Genitourinary: Negative for dysuria and frequency  Musculoskeletal: Negative for arthralgias, back pain and myalgias  Skin: Negative for rash  Neurological: Positive for headaches  Negative for dizziness and weakness  Hematological: Negative for adenopathy  Psychiatric/Behavioral: Negative for behavioral problems and sleep disturbance  The patient is not nervous/anxious  Objective:    Vitals:    05/12/22 0833   Pulse: 77   SpO2: 96%       Physical Exam  Vitals and nursing note reviewed  Constitutional:       General: She is not in acute distress  Appearance: She is well-developed  HENT:      Head: Normocephalic and atraumatic  Nose: Nose normal       Mouth/Throat:      Mouth: Mucous membranes are moist       Pharynx: Oropharynx is clear  Eyes:      Conjunctiva/sclera: Conjunctivae normal    Cardiovascular:      Rate and Rhythm: Normal rate and regular rhythm  Pulses: Normal pulses  Heart sounds: No murmur heard  Pulmonary:      Effort: Pulmonary effort is normal  No respiratory distress  Breath sounds: Normal breath sounds  Abdominal:      Palpations: Abdomen is soft  Tenderness: There is no abdominal tenderness  Musculoskeletal:      Cervical back: Normal range of motion and neck supple  Skin:     General: Skin is warm and dry  Neurological:      General: No focal deficit present  Mental Status: She is alert and oriented to person, place, and time  Mental status is at baseline     Psychiatric:         Mood and Affect: Mood normal  Behavior: Behavior normal          Thought Content: Thought content normal          Judgment: Judgment normal          VIRTUAL VISIT DISCLAIMER    Sae Meyers verbally agrees to participate in [employer]  Pt is aware that [employer] could be limited without vital signs or the ability to perform a full hands-on physical exam  Tarah Meyers understands she or the provider may request at any time to terminate the video visit and request the patient to seek care or treatment in person

## 2022-05-13 ENCOUNTER — TELEMEDICINE (OUTPATIENT)
Dept: FAMILY MEDICINE CLINIC | Facility: CLINIC | Age: 72
End: 2022-05-13
Payer: MEDICARE

## 2022-05-13 VITALS — OXYGEN SATURATION: 99 % | HEART RATE: 72 BPM | TEMPERATURE: 98.1 F

## 2022-05-13 DIAGNOSIS — U07.1 COVID-19 VIRUS INFECTION: Primary | ICD-10-CM

## 2022-05-13 PROCEDURE — 99213 OFFICE O/P EST LOW 20 MIN: CPT | Performed by: FAMILY MEDICINE

## 2022-05-13 NOTE — ASSESSMENT & PLAN NOTE
Patient noted improvement with the oral COVID treatment however she developed severe dry mouth and could not sleep with the medication after she took this for 2 and half days she will discontinue at this time and monitor for change in symptoms over the weekend and follow-up with me in office on Monday or Tuesday

## 2022-05-13 NOTE — PROGRESS NOTES
COVID-19 Outpatient Progress Note    Assessment/Plan:    Problem List Items Addressed This Visit    None        COVID-19 Plan   Encounter provider Macarthur Bosworth, DO    Provider located at [address]  [address] [address] [address] [address] [phone]    Recent Visits  Date Type Provider Dept   05/12/22 [address] [facility], DO [facility] [address]   05/10/22 Telemedicine Macarthur Bosworth, DO [facility]   Showing recent visits within past 7 days and meeting all other requirements  Today's Visits  Date Type Provider Dept   05/13/22 Telemedicine Macarthur Bosworth, DO [address] [address] today's visits and meeting all other requirements  Future Appointments  No visits were found meeting these conditions    Showing future appointments within next 150 days and meeting all other requirements     COVID-19 HPI  Lab Results   Component Value Date    SARSCOV2 Negative 06/24/2021    SARSCOV2 Not Detected 11/18/2020     Past Medical History:   Diagnosis Date    GERD (gastroesophageal reflux disease)     Herniated disc, cervical     Hyperlipidemia      Past Surgical History:   Procedure Laterality Date    BLADDER REPAIR      sling    CATARACT EXTRACTION, BILATERAL      COLONOSCOPY      REMOVAL OF INTRAUTERINE DEVICE (IUD)      in abd area    TONSILLECTOMY      TUMOR REMOVAL      pinky finger 1977    WISDOM TOOTH EXTRACTION       Current Outpatient Medications   Medication Sig Dispense Refill    Black Elderberry,Berry-Flower, 575 MG CAPS Take by mouth as needed  (Patient not taking: No sig reported)      Camphor-Menthol-Methyl Sal (FLEXALL ULTRA PLUS EX)   (Patient not taking: No sig reported)      cholecalciferol (VITAMIN D3) 1,000 units tablet Take 1,000 Units by mouth daily      cyanocobalamin (VITAMIN B-12) 100 mcg tablet Take by mouth as needed       estradiol (ESTRACE VAGINAL) 0 1 mg/g vaginal cream Insert 0 5 g into the vagina daily Apply pea sized amount vaginally over area of mesh erosion and periurethral area  (Patient not taking: No sig reported) 42 5 g 1    Liniments (SALONPAS ARTHRITIS PAIN RELIEF EX) as needed  (Patient not taking: No sig reported)      loratadine (CLARITIN) 10 mg tablet Take 10 mg by mouth daily        Multiple Vitamins-Minerals (PreserVision/Lutein) CAPS Take by mouth      nirmatrelvir & ritonavir (Paxlovid) tablet therapy pack Take 3 tablets by mouth 2 (two) times a day for 5 days Take 2 nirmatrelvir tablets + 1 ritonavir tablet together per dose 30 tablet 0    Omega-3 Fatty Acids (Fish Oil) 1000 MG CPDR Take by mouth      ondansetron (ZOFRAN) 4 mg tablet Take 1 tablet (4 mg total) by mouth every 8 (eight) hours as needed for nausea or vomiting 20 tablet 0    polyethylene glycol-propylene glycol (Systane) 0 4-0 3 % Systane (PF) 0 4 %-0 3 % eye drops in a dropperette      Probiotic Product (PROBIOTIC ACIDOPHILUS BEADS PO) Take by mouth       No current facility-administered medications for this visit       Allergies   Allergen Reactions    Eggs Or Egg-Derived Products - Food Allergy Other (See Comments)    Etodolac      Other reaction(s): chest pain, dizziness, nausea    Nitrofurantoin Other (See Comments)    Sucralfate Dizziness and Headache    Sulfa Antibiotics Hives and Other (See Comments)    Levofloxacin Dizziness    Omeprazole Headache and Fatigue    Pregabalin Chest Pain    Aspirin      Other reaction(s): GI upset    Chocolate - Food Allergy     Famotidine Other (See Comments)    Fluconazole Other (See Comments)    Ibuprofen      Other reaction(s): GI upset    Naproxen      Other reaction(s): GI upset    Penicillin G      Other reaction(s): swollen lips, tingling around mouth       Review of Systems  Objective:    Vitals:    05/13/22 0949   Pulse: 72   Temp: 98 1 °F (36 7 °C)   SpO2: 99%       Physical Exam    VIRTUAL VISIT DISCLAIMER    Charlie Meyers verbally agrees to participate in [facility]  Pt is aware that [employer] could be limited without vital signs or the ability to perform a full hands-on physical exam  Tarah Meyers understands she or the provider may request at any time to terminate the video visit and request the patient to seek care or treatment in person

## 2022-05-17 ENCOUNTER — OFFICE VISIT (OUTPATIENT)
Dept: FAMILY MEDICINE CLINIC | Facility: CLINIC | Age: 72
End: 2022-05-17
Payer: MEDICARE

## 2022-05-17 VITALS
TEMPERATURE: 98.9 F | OXYGEN SATURATION: 97 % | BODY MASS INDEX: 29.19 KG/M2 | WEIGHT: 158.6 LBS | RESPIRATION RATE: 18 BRPM | HEART RATE: 76 BPM | SYSTOLIC BLOOD PRESSURE: 118 MMHG | DIASTOLIC BLOOD PRESSURE: 70 MMHG | HEIGHT: 62 IN

## 2022-05-17 DIAGNOSIS — R21 RASH OF UNKNOWN ETIOLOGY: ICD-10-CM

## 2022-05-17 DIAGNOSIS — R53.82 POST-COVID CHRONIC FATIGUE: Primary | ICD-10-CM

## 2022-05-17 DIAGNOSIS — R11.0 NAUSEA: ICD-10-CM

## 2022-05-17 DIAGNOSIS — U09.9 POST-COVID CHRONIC FATIGUE: Primary | ICD-10-CM

## 2022-05-17 DIAGNOSIS — U07.1 COVID-19 VIRUS INFECTION: ICD-10-CM

## 2022-05-17 PROBLEM — G93.32 POST-COVID CHRONIC FATIGUE: Status: ACTIVE | Noted: 2022-05-17

## 2022-05-17 PROCEDURE — 99214 OFFICE O/P EST MOD 30 MIN: CPT | Performed by: FAMILY MEDICINE

## 2022-05-17 NOTE — PROGRESS NOTES
Assessment/Plan:       Problem List Items Addressed This Visit        Musculoskeletal and Integument    Rash of unknown etiology     Hives from the Paxlovid- stopped after 2 5 days              Other    COVID-19 virus infection     Overall improving but still has generalized fatigue  Follow-up with phone call in 2 weeks  Nausea     Resolved now watching diet  Post-COVID chronic fatigue - Primary     Patient doing relatively well now recovering from [place] without shortness of breath or worsening cough she completed half the dose course the oral medication  Observe for any changes at this time she is going to start walking and doing a home therapy program to increase her lung function and call me if symptoms change or worsen                   Subjective:      Patient ID: Merirll Alves is a 81 y o  female  Recovering from Covid now  The following portions of the patient's history were reviewed and updated as appropriate: allergies, current medications, past family history, past medical history, past social history, past surgical history and problem list     Review of Systems   Constitutional: Positive for fatigue  Negative for chills and fever  HENT: Negative for congestion, nosebleeds, rhinorrhea, sinus pressure and sore throat  Eyes: Negative for discharge and redness  Respiratory: Negative for cough and shortness of breath  Cardiovascular: Negative for chest pain, palpitations and leg swelling  Gastrointestinal: Negative for abdominal pain, blood in stool and nausea  Endocrine: Negative for cold intolerance, heat intolerance and polyuria  Genitourinary: Negative for dysuria and frequency  Musculoskeletal: Negative for arthralgias, back pain and myalgias  Skin: Negative for rash  Neurological: Negative for dizziness, weakness and headaches  Hematological: Negative for adenopathy  Psychiatric/Behavioral: Negative for behavioral problems and sleep disturbance  The patient is not nervous/anxious  Objective:      /70 (BP Location: Left arm, Patient Position: Sitting)   Pulse 76   Temp 98 9 °F (37 2 °C)   Resp 18   Ht 5' 1 5" (1 562 m)   Wt 71 9 kg (158 lb 9 6 oz)   SpO2 97%   BMI 29 48 kg/m²        Physical Exam  Vitals and nursing note reviewed  Constitutional:       General: She is not in acute distress  Appearance: She is well-developed  HENT:      Head: Normocephalic and atraumatic  Right Ear: Tympanic membrane and external ear normal       Left Ear: Tympanic membrane and external ear normal       Nose: Nose normal       Mouth/Throat:      Mouth: Mucous membranes are moist       Pharynx: Oropharynx is clear  No oropharyngeal exudate  Eyes:      General: No scleral icterus  Right eye: No discharge  Left eye: No discharge  Conjunctiva/sclera: Conjunctivae normal       Pupils: Pupils are equal, round, and reactive to light  Neck:      Thyroid: No thyromegaly  Vascular: No JVD  Cardiovascular:      Rate and Rhythm: Normal rate and regular rhythm  Heart sounds: Normal heart sounds  No murmur heard  Pulmonary:      Effort: Pulmonary effort is normal       Breath sounds: No wheezing or rales  Chest:      Chest wall: No tenderness  Abdominal:      General: Bowel sounds are normal  There is no distension  Palpations: Abdomen is soft  There is no mass  Tenderness: There is no abdominal tenderness  Musculoskeletal:         General: No tenderness or deformity  Normal range of motion  Cervical back: Normal range of motion  Lymphadenopathy:      Cervical: No cervical adenopathy  Skin:     General: Skin is warm and dry  Findings: No rash  Neurological:      Mental Status: She is alert and oriented to person, place, and time  Cranial Nerves: No cranial nerve deficit  Coordination: Coordination normal       Deep Tendon Reflexes: Reflexes are normal and symmetric   Reflexes normal    Psychiatric:         Mood and Affect: Mood normal          Behavior: Behavior normal          Thought Content: Thought content normal          Judgment: Judgment normal           Data:    Laboratory Results: I have personally reviewed the pertinent laboratory results/reports   Radiology/Other Diagnostic Testing Results: I have personally reviewed pertinent reports         Lab Results   Component Value Date    WBC 6 24 12/06/2021    HGB 12 5 12/06/2021    HCT 38 5 12/06/2021    MCV 95 12/06/2021     12/06/2021     Lab Results   Component Value Date    K 4 4 12/06/2021     12/06/2021    CO2 27 12/06/2021    BUN 12 12/06/2021    CREATININE 0 60 12/06/2021    GLUF 85 12/06/2021    CALCIUM 9 2 12/06/2021    AST 16 12/06/2021    ALT 26 12/06/2021    ALKPHOS 112 12/06/2021    EGFR 93 12/06/2021     Lab Results   Component Value Date    CHOLESTEROL 213 (H) 12/06/2021    CHOLESTEROL 220 (H) 01/05/2021    CHOLESTEROL 220 (H) 11/15/2018     Lab Results   Component Value Date    HDL 57 12/06/2021    HDL 58 01/05/2021    HDL 54 11/15/2018     Lab Results   Component Value Date    LDLCALC 141 (H) 12/06/2021    LDLCALC 149 (H) 01/05/2021    LDLCALC 148 (H) 11/15/2018     Lab Results   Component Value Date    TRIG 74 12/06/2021    TRIG 64 01/05/2021    TRIG 90 11/15/2018     No results found for: [place], [place]  Lab Results   Component Value Date    [id number] 2 420 12/06/2021     No results found for: HGBA1C  No results found for: PSA    Daniel Gramajo, DO

## 2022-05-17 NOTE — ASSESSMENT & PLAN NOTE
Patient doing relatively well now recovering from [place] without shortness of breath or worsening cough she completed half the dose course the oral medication    Observe for any changes at this time she is going to start walking and doing a home therapy program to increase her lung function and call me if symptoms change or worsen

## 2022-05-29 ENCOUNTER — APPOINTMENT (EMERGENCY)
Dept: CT IMAGING | Facility: HOSPITAL | Age: 72
End: 2022-05-29
Payer: MEDICARE

## 2022-05-29 ENCOUNTER — HOSPITAL ENCOUNTER (OUTPATIENT)
Facility: HOSPITAL | Age: 72
Setting detail: OBSERVATION
Discharge: HOME | End: 2022-05-30
Attending: EMERGENCY MEDICINE | Admitting: INTERNAL MEDICINE
Payer: MEDICARE

## 2022-05-29 DIAGNOSIS — H53.9 VISUAL DISTURBANCE: Primary | ICD-10-CM

## 2022-05-29 DIAGNOSIS — I63.9 CVA (CEREBRAL VASCULAR ACCIDENT) (HCC): ICD-10-CM

## 2022-05-29 LAB
ANION GAP SERPL CALCULATED.3IONS-SCNC: 8 MMOL/L (ref 4–13)
APTT PPP: 30 SECONDS (ref 23–37)
ATRIAL RATE: 76 BPM
ATRIAL RATE: 92 BPM
BUN SERPL-MCNC: 10 MG/DL (ref 5–25)
CALCIUM SERPL-MCNC: 9.7 MG/DL (ref 8.4–10.2)
CARDIAC TROPONIN I PNL SERPL HS: 3 NG/L
CHLORIDE SERPL-SCNC: 102 MMOL/L (ref 96–108)
CO2 SERPL-SCNC: 30 MMOL/L (ref 21–32)
CREAT SERPL-MCNC: 0.67 MG/DL (ref 0.6–1.3)
CRP SERPL QL: 8 MG/L
ERYTHROCYTE [DISTWIDTH] IN BLOOD BY AUTOMATED COUNT: 12.8 % (ref 11.6–15.1)
ERYTHROCYTE [SEDIMENTATION RATE] IN BLOOD: 24 MM/HOUR (ref 0–29)
FLUAV RNA RESP QL NAA+PROBE: NEGATIVE
FLUBV RNA RESP QL NAA+PROBE: NEGATIVE
GFR SERPL CREATININE-BSD FRML MDRD: 89 ML/MIN/1.73SQ M
GLUCOSE SERPL-MCNC: 111 MG/DL (ref 65–140)
GLUCOSE SERPL-MCNC: 98 MG/DL (ref 65–140)
HCT VFR BLD AUTO: 39.4 % (ref 34.8–46.1)
HGB BLD-MCNC: 12.8 G/DL (ref 11.5–15.4)
INR PPP: 0.9 (ref 0.84–1.19)
MCH RBC QN AUTO: 31.4 PG (ref 26.8–34.3)
MCHC RBC AUTO-ENTMCNC: 32.5 G/DL (ref 31.4–37.4)
MCV RBC AUTO: 97 FL (ref 82–98)
P AXIS: 42 DEGREES
P AXIS: 43 DEGREES
PLATELET # BLD AUTO: 252 THOUSANDS/UL (ref 149–390)
PMV BLD AUTO: 9.7 FL (ref 8.9–12.7)
POTASSIUM SERPL-SCNC: 3.9 MMOL/L (ref 3.5–5.3)
PR INTERVAL: 180 MS
PR INTERVAL: 182 MS
PROTHROMBIN TIME: 12 SECONDS (ref 11.6–14.5)
QRS AXIS: 50 DEGREES
QRS AXIS: 51 DEGREES
QRSD INTERVAL: 72 MS
QRSD INTERVAL: 78 MS
QT INTERVAL: 364 MS
QT INTERVAL: 392 MS
QTC INTERVAL: 441 MS
QTC INTERVAL: 450 MS
RBC # BLD AUTO: 4.08 MILLION/UL (ref 3.81–5.12)
RSV RNA RESP QL NAA+PROBE: NEGATIVE
SARS-COV-2 RNA RESP QL NAA+PROBE: POSITIVE
SODIUM SERPL-SCNC: 140 MMOL/L (ref 135–147)
T WAVE AXIS: 49 DEGREES
T WAVE AXIS: 69 DEGREES
VENTRICULAR RATE: 76 BPM
VENTRICULAR RATE: 92 BPM
WBC # BLD AUTO: 5.18 THOUSAND/UL (ref 4.31–10.16)

## 2022-05-29 PROCEDURE — 70498 CT ANGIOGRAPHY NECK: CPT

## 2022-05-29 PROCEDURE — 93005 ELECTROCARDIOGRAM TRACING: CPT

## 2022-05-29 PROCEDURE — 99285 EMERGENCY DEPT VISIT HI MDM: CPT

## 2022-05-29 PROCEDURE — 93010 ELECTROCARDIOGRAM REPORT: CPT | Performed by: INTERNAL MEDICINE

## 2022-05-29 PROCEDURE — 85610 PROTHROMBIN TIME: CPT | Performed by: PHYSICIAN ASSISTANT

## 2022-05-29 PROCEDURE — 99283 EMERGENCY DEPT VISIT LOW MDM: CPT | Performed by: PSYCHIATRY & NEUROLOGY

## 2022-05-29 PROCEDURE — 99285 EMERGENCY DEPT VISIT HI MDM: CPT | Performed by: PHYSICIAN ASSISTANT

## 2022-05-29 PROCEDURE — 36415 COLL VENOUS BLD VENIPUNCTURE: CPT | Performed by: PHYSICIAN ASSISTANT

## 2022-05-29 PROCEDURE — 82948 REAGENT STRIP/BLOOD GLUCOSE: CPT

## 2022-05-29 PROCEDURE — 85730 THROMBOPLASTIN TIME PARTIAL: CPT | Performed by: PHYSICIAN ASSISTANT

## 2022-05-29 PROCEDURE — 99220 PR INITIAL OBSERVATION CARE/DAY 70 MINUTES: CPT | Performed by: PHYSICIAN ASSISTANT

## 2022-05-29 PROCEDURE — NC001 PR NO CHARGE: Performed by: PSYCHIATRY & NEUROLOGY

## 2022-05-29 PROCEDURE — 70496 CT ANGIOGRAPHY HEAD: CPT

## 2022-05-29 PROCEDURE — 80048 BASIC METABOLIC PNL TOTAL CA: CPT | Performed by: PHYSICIAN ASSISTANT

## 2022-05-29 PROCEDURE — 86140 C-REACTIVE PROTEIN: CPT | Performed by: PHYSICIAN ASSISTANT

## 2022-05-29 PROCEDURE — 0241U HB NFCT DS VIR RESP RNA 4 TRGT: CPT | Performed by: PHYSICIAN ASSISTANT

## 2022-05-29 PROCEDURE — 85652 RBC SED RATE AUTOMATED: CPT | Performed by: PHYSICIAN ASSISTANT

## 2022-05-29 PROCEDURE — 85027 COMPLETE CBC AUTOMATED: CPT | Performed by: PHYSICIAN ASSISTANT

## 2022-05-29 PROCEDURE — 84484 ASSAY OF TROPONIN QUANT: CPT | Performed by: PHYSICIAN ASSISTANT

## 2022-05-29 RX ORDER — ONDANSETRON 2 MG/ML
4 INJECTION INTRAMUSCULAR; INTRAVENOUS EVERY 6 HOURS PRN
Status: DISCONTINUED | OUTPATIENT
Start: 2022-05-29 | End: 2022-05-30 | Stop reason: HOSPADM

## 2022-05-29 RX ORDER — ATORVASTATIN CALCIUM 40 MG/1
40 TABLET, FILM COATED ORAL EVERY EVENING
Status: DISCONTINUED | OUTPATIENT
Start: 2022-05-29 | End: 2022-05-30 | Stop reason: HOSPADM

## 2022-05-29 RX ORDER — ENOXAPARIN SODIUM 100 MG/ML
40 INJECTION SUBCUTANEOUS DAILY
Status: DISCONTINUED | OUTPATIENT
Start: 2022-05-29 | End: 2022-05-30 | Stop reason: HOSPADM

## 2022-05-29 RX ORDER — LORATADINE 10 MG/1
10 TABLET ORAL DAILY
Status: DISCONTINUED | OUTPATIENT
Start: 2022-05-29 | End: 2022-05-30 | Stop reason: HOSPADM

## 2022-05-29 RX ORDER — TETRACAINE HYDROCHLORIDE 5 MG/ML
1 SOLUTION OPHTHALMIC ONCE
Status: COMPLETED | OUTPATIENT
Start: 2022-05-29 | End: 2022-05-29

## 2022-05-29 RX ORDER — MELATONIN
1000 DAILY
Status: DISCONTINUED | OUTPATIENT
Start: 2022-05-29 | End: 2022-05-30 | Stop reason: HOSPADM

## 2022-05-29 RX ORDER — ACETAMINOPHEN 325 MG/1
650 TABLET ORAL EVERY 4 HOURS PRN
Status: DISCONTINUED | OUTPATIENT
Start: 2022-05-29 | End: 2022-05-30 | Stop reason: HOSPADM

## 2022-05-29 RX ADMIN — Medication 1000 UNITS: at 13:22

## 2022-05-29 RX ADMIN — ACETAMINOPHEN 650 MG: 325 TABLET ORAL at 17:35

## 2022-05-29 RX ADMIN — TETRACAINE HYDROCHLORIDE 1 DROP: 5 SOLUTION OPHTHALMIC at 10:05

## 2022-05-29 RX ADMIN — ENOXAPARIN SODIUM 40 MG: 100 INJECTION SUBCUTANEOUS at 13:22

## 2022-05-29 RX ADMIN — IOHEXOL 65 ML: 350 INJECTION, SOLUTION INTRAVENOUS at 09:48

## 2022-05-29 NOTE — NURSING NOTE
Assumed care of patient with earlier complaints of Lt visual field disturbance with Lt LE weakness  Vital signs stable; A+Ox4; neuro intact; Medicated for c/o mild headache pain with Tylenol 650 mg oral dose  Spouse at bedside for support  Call bell with in reach

## 2022-05-29 NOTE — PROGRESS NOTES
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  No hemodynamically significant stenosis of cervical carotid and vertebral arteries  No dissection  · MRI brain  · Aspirin not ordered due to allergy  · Lipitor 40 mg po daily  · Check lipid panel, Hgb A1C  · Consult neurology  · Labs in am    COVID-19 virus infection  Assessment & Plan  · Patient states she tested positive for COVID earlier this month  Was seen by PCP on 5/10/22 and prescribed Paxlovid  · Patient is past the 10 day quarantine period no isolation required       VTE Pharmacologic Prophylaxis: VTE Score: 9 High Risk (Score >/= 5) - Pharmacological DVT Prophylaxis Ordered: enoxaparin (Lovenox)  Sequential Compression Devices Ordered  Code Status: Level 1 - Full Code   Discussion with family: Patient declined call to   Anticipated Length of Stay: Patient will be admitted on an observation basis with an anticipated length of stay of less than 2 midnights secondary to stroke workup  Total Time for Visit, including Counseling / Coordination of Care: 60 minutes Greater than 50% of this total time spent on direct patient counseling and coordination of care      Chief Complaint: loss of vision on left side    History of Present Illness:  Quynh Tariq is a 81 y o  female with a PMH of recent COVID infection who presents with a complaint of loss of vision on left side  The patient states she woke up this morning and sat down in front of her computer and noted she was unable to see the left side of the screen  She denies any extremity weakness, slurred speech, facial droop  She states the episode lasted about 2 hours and has currently resolved  She states she's been having a lot of stress lately and was unsure if that caused her symptoms  She was diagnosed and treated for COVID earlier this month  She currently denies any symptoms from [place]  She denies headaches, chest pain, shortness of breath, abdominal pain, nausea/vomiting, or diarrhea  Review of Systems:  Review of Systems   Eyes: Positive for visual disturbance  All other systems reviewed and are negative  Past Medical and Surgical History:   Past Medical History:   Diagnosis Date    GERD (gastroesophageal reflux disease)     Herniated disc, cervical     Hyperlipidemia        Past Surgical History:   Procedure Laterality Date    BLADDER REPAIR      sling    CATARACT EXTRACTION, BILATERAL      COLONOSCOPY      REMOVAL OF INTRAUTERINE DEVICE (IUD)      in abd area    TONSILLECTOMY      TUMOR REMOVAL      pinky finger 1977    WISDOM TOOTH EXTRACTION         Meds/Allergies:  Prior to Admission medications    Medication Sig Start Date End Date Taking?  Authorizing Provider   Black Elderberry,Berry-Flower, 575 MG CAPS Take by mouth as needed    Historical Provider, MD   Camphor-Menthol-Methyl Sal (FLEXALL ULTRA PLUS EX)      Historical Provider, MD   cholecalciferol (VITAMIN D3) 1,000 units tablet Take 1,000 Units by mouth daily    Historical Provider, MD   cyanocobalamin (VITAMIN B-12) 100 mcg tablet Take by mouth as needed   Patient not taking: Reported on 5/17/2022    Historical Provider, MD   estradiol (ESTRACE VAGINAL) 0 1 mg/g vaginal cream Insert 0 5 g into the vagina daily Apply pea sized amount vaginally over area of mesh erosion and periurethral area  Patient not taking: No sig reported 3/4/22   Miguelina Vazquez MD   [facility] ARTHRITIS PAIN RELIEF EX) as needed   Patient not taking: No sig reported    Historical Provider, MD   loratadine (CLARITIN) 10 mg tablet Take 10 mg by mouth daily      Historical Provider, MD   Multiple Vitamins-Minerals (PreserVision/Lutein) CAPS Take by mouth    Historical Provider, MD   Omega-3 Fatty Acids (Fish Oil) 1000 MG CPDR Take by mouth    Historical Provider, MD   ondansetron (ZOFRAN) 4 mg tablet Take 1 tablet (4 mg total) by mouth every 8 (eight) hours as needed for nausea or vomiting 5/10/22   [facility]   polyethylene glycol-propylene glycol (Systane) 0 4-0 3 % Systane (PF) 0 4 %-0 3 % eye drops in a dropperette    Historical Provider, MD   Probiotic Product (PROBIOTIC ACIDOPHILUS BEADS PO) Take by mouth    Historical Provider, MD     I have reviewed home medications with patient personally  Allergies:    Allergies   Allergen Reactions    Eggs Or Egg-Derived Products - Food Allergy Other (See Comments)    Etodolac      Other reaction(s): chest pain, dizziness, nausea    Nitrofurantoin Other (See Comments)    Sucralfate Dizziness and Headache    Sulfa Antibiotics Hives and Other (See Comments)    Levofloxacin Dizziness    Omeprazole Headache and Fatigue    Pregabalin Chest Pain    Aspirin      Other reaction(s): GI upset    Chocolate - Food Allergy     Famotidine Other (See Comments)    Fluconazole Other (See Comments)    Ibuprofen      Other reaction(s): GI upset    Naproxen      Other reaction(s): GI upset    Penicillin G      Other reaction(s): swollen lips, tingling around mouth       Social History:  Marital Status: /Civil Union   Occupation: unknown  Patient Pre-hospital Living Situation: Home  Patient Pre-hospital Level of Mobility: walks  Patient Pre-hospital Diet Restrictions: none  Substance Use History:   Social History     Substance and Sexual Activity   Alcohol Use Never     Social History     Tobacco Use   Smoking Status Never Smoker   Smokeless Tobacco Never Used     Social History     Substance and Sexual Activity   Drug Use Never       Family History:  Family History   Problem Relation Age of Onset    Hypertension Mother         Pulmonary    Osteoporosis Mother     Heart failure Mother     Heart attack Father     Hypertension Father     Stroke Father     No Known Problems Sister     No Known Problems Daughter     No Known Problems Maternal Grandmother     No Known Problems Paternal Grandmother     No Known Problems Daughter     No Known Problems Maternal Aunt     No Known Problems Maternal Aunt     Breast cancer Maternal Aunt     No Known Problems Maternal Aunt     No Known Problems Paternal Aunt        Physical Exam:     Vitals:   Blood Pressure: 131/66 (05/29/22 1016)  Pulse: 74 (05/29/22 0950)  Temperature: 98 °F (36 7 °C) (05/29/22 0928)  Respirations: 18 (05/29/22 0950)  Weight - Scale: 71 7 kg (158 lb) (05/29/22 0923)  SpO2: 100 % (05/29/22 0950)    Physical Exam  Vitals and nursing note reviewed  Constitutional:       General: She is awake  Appearance: Normal appearance  HENT:      Head: Normocephalic and atraumatic  Cardiovascular:      Rate and Rhythm: Normal rate and regular rhythm  Pulmonary:      Effort: Pulmonary effort is normal       Breath sounds: Normal breath sounds  Abdominal:      Palpations: Abdomen is soft  Tenderness: There is no abdominal tenderness  Skin:     General: Skin is warm and dry  Neurological:      General: No focal deficit present  Mental Status: She is alert and oriented to person, place, and time  Psychiatric:         Attention and Perception: Attention normal          Mood and Affect: Mood normal          Speech: Speech normal          Behavior: Behavior is cooperative            Additional Data:     Lab Results:  Results from last 7 days   Lab Units 05/29/22  0951 WBC Thousand/uL 5 18   HEMOGLOBIN g/dL 12 8   HEMATOCRIT % 39 4   PLATELETS Thousands/uL 252     Results from last 7 days   Lab Units 05/29/22  0934   SODIUM mmol/L 140   POTASSIUM mmol/L 3 9   CHLORIDE mmol/L 102   CO2 mmol/L 30   BUN mg/dL 10   CREATININE mg/dL 0 67   ANION GAP mmol/L 8   CALCIUM mg/dL 9 7   GLUCOSE RANDOM mg/dL 98     Results from last 7 days   Lab Units 05/29/22  0934   INR  0 90     Results from last 7 days   Lab Units 05/29/22  0927   POC GLUCOSE mg/dl 111               Imaging: Reviewed radiology reports from this admission including: CT head  CTA stroke alert (head/neck)   Final Result by Matt Martin MD (05/29 1018)      1  No intracranial large vessel occlusion or critical stenosis  No aneurysm  2   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  3   No hemodynamically significant stenosis of cervical carotid and vertebral arteries  No dissection  I personally discussed this study with Dr Magali Whittaker on 5/29/2022 at 9:53 AM                                  Workstation performed: [id number]         CT stroke alert brain   Final Result by Matt Martin MD (05/29 1020)      1  No acute intracranial CT abnormality  Chronic microangiopathy  2   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

## 2022-05-29 NOTE — ED PROVIDER NOTES
History  Chief Complaint   Patient presents with    Visual Field Change     8am left eye difficulty seeing peripherally     Gait Problem     79-year-old female presents to the emergency department with concern for peripheral vision loss of the left eye  Patient states that the vision loss began between 8 and 830 this morning  Patient states that she was experiencing blindness of the lateral visual fields of the left eye  Stroke alert called  Patient does not appear to be acutely distressed  She states that the symptoms are still present during initial evaluation  There is no obvious focal deficit  Patient is moving extremities equally and communicating clearly  Prior to Admission Medications   Prescriptions Last Dose Informant Patient Reported? Taking? Black Elderberry,Berry-Flower, 575 MG CAPS   Yes No   Sig: Take by mouth as needed   Camphor-Menthol-Methyl Sal (FLEXALL ULTRA PLUS EX)   Yes No   Sig:     Liniments (SALONPAS ARTHRITIS PAIN RELIEF EX)   Yes No   Sig: as needed    Patient not taking: No sig reported   Multiple Vitamins-Minerals (PreserVision/Lutein) CAPS   Yes No   Sig: Take by mouth   Omega-3 Fatty Acids (Fish Oil) 1000 MG CPDR   Yes No   Sig: Take by mouth   Probiotic Product (PROBIOTIC ACIDOPHILUS BEADS PO)  Self Yes No   Sig: Take by mouth   cholecalciferol (VITAMIN D3) 1,000 units tablet  Self Yes No   Sig: Take 1,000 Units by mouth daily   cyanocobalamin (VITAMIN B-12) 100 mcg tablet  Self Yes No   Sig: Take by mouth as needed    Patient not taking: Reported on 5/17/2022   estradiol (ESTRACE VAGINAL) 0 1 mg/g vaginal cream   No No   Sig: Insert 0 5 g into the vagina daily Apply pea sized amount vaginally over area of mesh erosion and periurethral area     Patient not taking: No sig reported   loratadine (CLARITIN) 10 mg tablet  Self Yes No   Sig: Take 10 mg by mouth daily     ondansetron (ZOFRAN) 4 mg tablet   No No   Sig: Take 1 tablet (4 mg total) by mouth every 8 (eight) hours as needed for nausea or vomiting   polyethylene glycol-propylene glycol (Systane) 0 4-0 3 %   Yes No   Sig: Systane (PF) 0 4 %-0 3 % eye drops in a dropperette      Facility-Administered Medications: None       Past Medical History:   Diagnosis Date    GERD (gastroesophageal reflux disease)     Herniated disc, cervical     Hyperlipidemia        Past Surgical History:   Procedure Laterality Date    BLADDER REPAIR      sling    CATARACT EXTRACTION, BILATERAL      COLONOSCOPY      REMOVAL OF INTRAUTERINE DEVICE (IUD)      in abd area    TONSILLECTOMY      TUMOR REMOVAL      pinky finger 1977    WISDOM TOOTH EXTRACTION         Family History   Problem Relation Age of Onset    Hypertension Mother         Pulmonary    Osteoporosis Mother     Heart failure Mother     Heart attack Father     Hypertension Father     Stroke Father     No Known Problems Sister     No Known Problems Daughter     No Known Problems Maternal Grandmother     No Known Problems Paternal Grandmother     No Known Problems Daughter     No Known Problems Maternal Aunt     No Known Problems Maternal Aunt     Breast cancer Maternal Aunt     No Known Problems Maternal Aunt     No Known Problems Paternal Aunt      I have reviewed and agree with the history as documented  E-Cigarette/Vaping    E-Cigarette Use Never User      E-Cigarette/Vaping Substances    Nicotine No     THC No     CBD No     Flavoring No     Other No     Unknown No      Social History     Tobacco Use    Smoking status: Never Smoker    Smokeless tobacco: Never Used   Vaping Use    Vaping Use: Never used   Substance Use Topics    Alcohol use: Never    Drug use: Never       Review of Systems   Constitutional: Negative for chills, fatigue and fever  HENT: Negative for congestion, ear pain, rhinorrhea, sinus pressure, sneezing, sore throat and trouble swallowing  Eyes: Positive for visual disturbance  Negative for discharge and itching  Respiratory: Negative for cough, chest tightness, shortness of breath, wheezing and stridor  Cardiovascular: Negative for chest pain and palpitations  Gastrointestinal: Negative for abdominal pain, diarrhea, nausea and vomiting  Neurological: Negative for dizziness, syncope, numbness and headaches  All other systems reviewed and are negative  Physical Exam  Physical Exam  Vitals and nursing note reviewed  Constitutional:       General: She is not in acute distress  Appearance: She is well-developed  She is not diaphoretic  HENT:      Head: Normocephalic and atraumatic  Right Ear: External ear normal       Left Ear: External ear normal       Nose: Nose normal    Eyes:      General: Lids are normal  Lids are everted, no foreign bodies appreciated  Vision grossly intact  Gaze aligned appropriately  Visual field deficit present  Intraocular pressure: Right eye pressure is 13 mmHg  Left eye pressure is 13 mmHg  Measurements were taken using a handheld tonometer  Extraocular Movements: Extraocular movements intact  Conjunctiva/sclera: Conjunctivae normal       Pupils: Pupils are equal, round, and reactive to light  Cardiovascular:      Rate and Rhythm: Normal rate and regular rhythm  Heart sounds: Normal heart sounds  No murmur heard  No friction rub  No gallop  Pulmonary:      Effort: Pulmonary effort is normal  No respiratory distress  Breath sounds: Normal breath sounds  No stridor  No wheezing or rales  Abdominal:      General: Bowel sounds are normal  There is no distension  Palpations: Abdomen is soft  Tenderness: There is no abdominal tenderness  There is no guarding  Musculoskeletal:         General: No tenderness  Normal range of motion  Cervical back: Normal range of motion  Skin:     General: Skin is warm  Capillary Refill: Capillary refill takes less than 2 seconds  Neurological:      General: No focal deficit present  Mental Status: She is alert and oriented to person, place, and time  GCS: GCS eye subscore is 4  GCS verbal subscore is 5  GCS motor subscore is 6  Sensory: Sensation is intact  Motor: Motor function is intact  Coordination: Coordination is intact  Gait: Gait is intact  Vital Signs  ED Triage Vitals   Temperature Pulse Respirations Blood Pressure SpO2   05/29/22 0928 05/29/22 0923 05/29/22 0923 05/29/22 0923 05/29/22 0923   98 °F (36 7 °C) 94 18 160/78 99 %      Temp src Heart Rate Source Patient Position - Orthostatic VS BP Location FiO2 (%)   -- 05/29/22 0923 -- -- --    Monitor         Pain Score       --                  Vitals:    05/29/22 0928 05/29/22 0931 05/29/22 0950 05/29/22 1016   BP: 160/78 158/73 138/67 131/66   Pulse: 90  74          Visual Acuity  Visual Acuity    Flowsheet Row Most Recent Value   L Pupil Size (mm) 4   R Pupil Size (mm) 4          ED Medications  Medications   tetracaine 0 5 % ophthalmic solution 1 drop (has no administration in time range)   iohexol (OMNIPAQUE) 350 MG/ML injection (MULTI-DOSE) 65 mL (65 mL Intravenous Given 5/29/22 0948)       Diagnostic Studies  Results Reviewed     Procedure Component Value Units Date/Time    COVID/FLU/RSV - 2 hour TAT [[id number]]  (Abnormal) Collected: 05/29/22 0934    Lab Status: Final result Specimen: Nares from Nose Updated: 05/29/22 1018     SARS-CoV-2 Positive     INFLUENZA A PCR Negative     INFLUENZA B PCR Negative     RSV PCR Negative    Narrative:      FOR PEDIATRIC PATIENTS - copy/paste COVID Guidelines URL to browser: [email or web address]    SARS-CoV-2 assay is a Nucleic Acid Amplification assay intended for the  qualitative detection of nucleic acid from SARS-CoV-2 in nasopharyngeal  swabs  Results are for the presumptive identification of SARS-CoV-2 RNA      Positive results are indicative of infection with SARS-CoV-2, the virus  causing COVID-19, but do not rule out bacterial infection or co-infection  with other viruses  Laboratories within the [place] and its  territories are required to report all positive results to the appropriate  public health authorities  Negative results do not preclude SARS-CoV-2  infection and should not be used as the sole basis for treatment or other  patient management decisions  Negative results must be combined with  clinical observations, patient history, and epidemiological information  This test has not been FDA cleared or approved  This test has been authorized by FDA under an Emergency Use Authorization  (EUA)  This test is only authorized for the duration of time the  declaration that circumstances exist justifying the authorization of the  emergency use of an in vitro diagnostic tests for detection of SARS-CoV-2  virus and/or diagnosis of COVID-19 infection under section 564(b)(1) of  the Act, 21 U  S C  357GID-4(P)(0), unless the authorization is terminated  or revoked sooner  The test has been validated but independent review by FDA  and CLIA is pending  Test performed using [employer] GeneXpert: This RT-PCR assay targets N2,  a region unique to SARS-CoV-2  A conserved region in the E-gene was chosen  for pan-Sarbecovirus detection which includes SARS-CoV-2      C-reactive protein [[id number]]  (Abnormal) Collected: 05/29/22 0934    Lab Status: Final result Specimen: Blood from Arm, Right Updated: 05/29/22 1012     CRP 8 0 mg/L     HS Troponin 0hr (reflex protocol) [[id number]]  (Normal) Collected: 05/29/22 0934    Lab Status: Final result Specimen: Blood from Arm, Right Updated: 05/29/22 1005     hs TnI 0hr 3 ng/L     HS Troponin I 2hr [[id number]]     Lab Status: No result Specimen: Blood     Protime-INR [[id number]]  (Normal) Collected: 05/29/22 0934    Lab Status: Final result Specimen: Blood from Arm, Right Updated: 05/29/22 1004     Protime 12 0 seconds      INR 0 90    APTT [[id number]]  (Normal) Collected: 05/29/22 0934    Lab Status: Final result Specimen: Blood from Arm, Right Updated: 05/29/22 1004     PTT 30 seconds     Basic metabolic panel [[id number]] Collected: 05/29/22 0934    Lab Status: Final result Specimen: Blood from Arm, Right Updated: 05/29/22 1001     Sodium 140 mmol/L      Potassium 3 9 mmol/L      Chloride 102 mmol/L      CO2 30 mmol/L      ANION GAP 8 mmol/L      BUN 10 mg/dL      Creatinine 0 67 mg/dL      Glucose 98 mg/dL      Calcium 9 7 mg/dL      eGFR 89 ml/min/1 73sq m     Narrative:      [place] guidelines for Chronic Kidney Disease (CKD):     Stage 1 with normal or high GFR (GFR > 90 mL/min/1 73 square meters)    Stage 2 Mild CKD (GFR = 60-89 mL/min/1 73 square meters)    Stage 3A Moderate CKD (GFR = 45-59 mL/min/1 73 square meters)    Stage 3B Moderate CKD (GFR = 30-44 mL/min/1 73 square meters)    Stage 4 Severe CKD (GFR = 15-29 mL/min/1 73 square meters)    Stage 5 End Stage CKD (GFR <15 mL/min/1 73 square meters)  Note: GFR calculation is accurate only with a steady state creatinine    Sedimentation rate, automated [[id number]]  (Normal) Collected: 05/29/22 0934    Lab Status: Final result Specimen: Blood from Arm, Right Updated: 05/29/22 1001     Sed Rate 24 mm/hour     CBC and Platelet [[id number]]  (Normal) Collected: 05/29/22 0934    Lab Status: Final result Specimen: Blood from Arm, Right Updated: 05/29/22 0958     WBC 5 18 Thousand/uL      RBC 4 08 Million/uL      Hemoglobin 12 8 g/dL      Hematocrit 39 4 %      MCV 97 fL      MCH 31 4 pg      MCHC 32 5 g/dL      RDW 12 8 %      Platelets 675 Thousands/uL      MPV 9 7 fL     Fingerstick Glucose (POCT) [[id number]]  (Normal) Collected: 05/29/22 0927    Lab Status: Final result Updated: 05/29/22 0928     POC Glucose 111 mg/dl                  CTA stroke alert (head/neck)   Final Result by Ana Rivera MD (05/29 1018)      1  No intracranial large vessel occlusion or critical stenosis  No aneurysm  2   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  3   No hemodynamically significant stenosis of cervical carotid and vertebral arteries  No dissection  I personally discussed this study with Dr Yovana Tobar on 5/29/2022 at 9:53 AM                                  Workstation performed: [id number]         CT stroke alert brain   Final Result by Milagros Cobos MD (05/29 1020)      1  No acute intracranial CT abnormality  Chronic microangiopathy  2   Stable 5 mm left hippocampal cystic focus (series 2 image 16)  Stability favors benign etiology  It may indicate enlarged perivascular space versus neuroglial cyst                   I personally discussed this study with Dr Yovana Tobar on 5/29/2022 at 9:53 AM       Workstation performed: [id number]                    Procedures  Procedures         ED Course  ED Course as of 05/29/22 1121   Sun May 29, 2022   0942 Vision appears to be improving  Case discussed with neurology concern for possible central retinal artery occlusion   1002 Upon repeat evaluation he visual field deficit appears of resolved the patient now complains of blurred vision in the left eye patient offers that she has a 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   1007 Spoke to Dr Varsha Suarez  No acute interventions required at this time  Follow labs      1021 SARS-COV-2(!): Positive                  Stroke Assessment     Row Name 05/29/22 0930             NIH Stroke Scale    Interval --      Level of Consciousness (1a ) 0      LOC Questions (1b ) 0      LOC Commands (1c ) 0      Best Gaze (2 ) 0      Visual (3 ) 1      Facial Palsy (4 ) 0      Motor Arm, Left (5a ) 0      Motor Arm, Right (5b ) 0      Motor Leg, Left (6a ) 0      Motor Leg, Right (6b ) 0      Limb Ataxia (7 ) 0      Sensory (8 ) 0      Best Language (9 ) 0      Dysarthria (10 ) 0      Extinction and Inattention (11 ) (Formerly Neglect) 0      Total 1 MDM  Number of Diagnoses or Management Options  CVA [facility]cerebral vascular accident) ([facility] )  Visual disturbance  Diagnosis management comments: Concern for possible CVA based on the patient's presenting symptoms with concern for visual field deficit  Upon re-evaluation patient's visual field appears to have improved and patient does complain of some blurred vision  Case discussed with neurology and on-call eye doctor who recommended admission for stroke pathway  Patient a tPA candidate as her symptoms improved nearly resolved while in the ED  Amount and/or Complexity of Data Reviewed  Clinical lab tests: ordered and reviewed  Tests in the radiology section of CPT®: reviewed and ordered    Risk of Complications, Morbidity, and/or Mortality  Presenting problems: moderate  Diagnostic procedures: low  Management options: low    Patient Progress  Patient progress: stable      Disposition  Final diagnoses:   CVA (cerebral vascular accident) ([facility] )   Visual disturbance     Time reflects when diagnosis was documented in both MDM as applicable and the Disposition within this note     Time User Action Codes Description Comment    5/29/2022  9:29 AM Maple Cera Add [I63 9] CVA (cerebral vascular accident) ([facility] )     5/29/2022 10:26 AM Maple Cera Add [H53 9] Visual disturbance     5/29/2022 10:26 AM Maple Cera Add [G45 9] Transient cerebral ischemia     5/29/2022 10:26 AM Maple Cera Remove [G45 9] Transient cerebral ischemia     5/29/2022 10:26 AM Maple Cera Modify [I63 9] CVA (cerebral vascular accident) ([facility] )     5/29/2022 10:26 AM Maple Cera Modify [H53 9] Visual disturbance       ED Disposition     ED Disposition   Admit    Condition   Stable    Date/Time   Sun May 29, 2022 11:17 AM    Comment   Case was discussed with Amber and the patient's admission status was agreed to be Admission Status: observation status to the service of Dr Rachelle Molina   Follow-up Information     Follow up With Specialties Details [address], [facility] [address]  [phone]            Patient's Medications   Discharge Prescriptions    No medications on file       No discharge procedures on file      PDMP Review     None          ED Provider  Electronically Signed by           Twila Rand PA-C  05/29/22 2154

## 2022-05-29 NOTE — ASSESSMENT & PLAN NOTE
· Patient states she tested positive for COVID earlier this month  Was seen by PCP on 5/10/22 and prescribed Paxlovid    · Patient is past the 10 day quarantine period no isolation required   · Patient is asymptomatic, and on room air  · No further inpatient testing, treatment, and or workup is needed

## 2022-05-29 NOTE — TELEMEDICINE
TeleConsultation - Neurology   Pipo Cameron 81 y o  female MRN: [id number]   Encounter: [id number]      REQUIRED DOCUMENTATION:     1  This service was provided via Telemedicine  2  Provider located in [place]   3  TeleMed provider: Stan Pierce MD   4  Identify all parties in room with patient during tele consult:  RN  5  After connecting through [employer], patient was identified by name and date of birth and assistant checked wristband  Patient was then informed that this was a Telemedicine visit and that the exam was being conducted confidentially over secure lines  My office door was closed  No one else was in the room  Patient acknowledged consent and understanding of privacy and security of the Telemedicine visit, and gave us permission to have the assistant stay in the room in order to assist with the history and to conduct the exam   I informed the patient that I have reviewed their record in Epic and presented the opportunity for them to ask any questions regarding the visit today  The patient agreed to participate  Assessment/Plan   Left visual field disturbance:  Description sounds more like aura than occipital stroke/TIA or amaurosis fugax  However, MRI is needed as occipital stroke can appear not typical, rather than having the usual homonymous hemianopia manifestation  If it cannot be done inpatient, then outpatient is acceptable  She was advised to use aspirin daily for prevention of stroke, but she cited some gastric upset as a challenge  Please call us with questions      History of Present Illness     Reason for Consult / Principal Problem: visual disturbance  Hx and PE limited by: none  HPI: Pipo Cameron is a 81 y o   female who presents with disturbance in left visual field  Image was not dark, but rather having "graphic design" appearance  No shimmering or scintillating components  This lasted for 1-2 hours and then fully disappeared   She currently has a frontal headache, which is mild to moderate, with some throbbing component  No light or noise sensitivity, no nausea  She had history of migraines (related to her menses), but no aura  Of note, she was started on Estrogen by her gynecologist, and she tried it once on Friday  Of note, she had Covid earlier this month    Stroke alert called at 9:28 am  Neurology answered immediately  We attempted to give tPA as LKW was 0800 am  However, symptoms improved spontaneously, hence tPA was not given  Inpatient consult to Neurology  Consult performed by: Stan Pierce MD  Consult ordered by: Alex Cast PA-C          Review of Systems  Complete ROS was done and is negative other than what is mentioned in HPI    Historical Information   Past Medical History:   Diagnosis Date    GERD (gastroesophageal reflux disease)     Herniated disc, cervical     Hyperlipidemia      Past Surgical History:   Procedure Laterality Date    BLADDER REPAIR      sling    CATARACT EXTRACTION, BILATERAL      COLONOSCOPY      REMOVAL OF INTRAUTERINE DEVICE (IUD)      in abd area    TONSILLECTOMY      TUMOR REMOVAL      pinky finger 1977    WISDOM TOOTH EXTRACTION       Social History   Social History     Substance and Sexual Activity   Alcohol Use Never     Social History     Substance and Sexual Activity   Drug Use Never     Social History     Tobacco Use   Smoking Status Never Smoker   Smokeless Tobacco Never Used     Family History: non-contributory    Review of previous medical records was completed       Meds/Allergies   all current active meds have been reviewed    Allergies   Allergen Reactions    Eggs Or Egg-Derived Products - Food Allergy Other (See Comments)    Etodolac      Other reaction(s): chest pain, dizziness, nausea    Nitrofurantoin Other (See Comments)    Sucralfate Dizziness and Headache    Sulfa Antibiotics Hives and Other (See Comments)    Levofloxacin Dizziness    Omeprazole Headache and Fatigue    Pregabalin Chest Pain    Aspirin      Other reaction(s): GI upset    Chocolate - Food Allergy     Famotidine Other (See Comments)    Fluconazole Other (See Comments)    Ibuprofen      Other reaction(s): GI upset    Naproxen      Other reaction(s): GI upset    Penicillin G      Other reaction(s): swollen lips, tingling around mouth       Objective   Vitals:Blood pressure 146/69, pulse 69, temperature 98 °F (36 7 °C), resp  rate 18, weight 71 7 kg (158 lb), SpO2 100 %  ,Body mass index is 29 37 kg/m²  No intake or output data in the 24 hours ending 05/29/22 1538    Invasive Devices: Invasive Devices  Report    Peripheral Intravenous Line  Duration           Peripheral IV 05/29/22 Right Antecubital <1 day                Physical Exam NAD  Skin: no seen lesions  HEENT: ATNC  Chest: breathing comfortably on room air  GI: no apparent distension  Neurologic Exam Alert and oriented for self, place and time  Memory is intact to recent and remote events  Language is intact to comprehension and expression  No neglect  Speech is normal  EOMI  Pupils are symmetric  Visual field appears intact  Face is symmetric  Tongue is midline  Able to move all extremities against gravity  No dysmetria noted  Lab Results:   CBC:   Results from last 7 days   Lab Units 05/29/22  0934   WBC Thousand/uL 5 18   RBC Million/uL 4 08   HEMOGLOBIN g/dL 12 8   HEMATOCRIT % 39 4   MCV fL 97   PLATELETS Thousands/uL 252   , BMP/CMP:   Results from last 7 days   Lab Units 05/29/22  0934   SODIUM mmol/L 140   POTASSIUM mmol/L 3 9   CHLORIDE mmol/L 102   CO2 mmol/L 30   BUN mg/dL 10   CREATININE mg/dL 0 67   CALCIUM mg/dL 9 7   EGFR ml/min/1 73sq m 89     Imaging Studies: I have personally reviewed pertinent films in PACS  EKG, Pathology, and Other Studies: I have personally reviewed pertinent reports      VTE Prophylaxis: Sequential compression device Gisesll Alford     Code Status: Level 1 - Full Code  Advance Directive and Living Will:      Power of :    POLST:      Counseling / Coordination of Care  N/A

## 2022-05-29 NOTE — ASSESSMENT & PLAN NOTE
· All symptoms have resolved - in retrospect most likely secondary to an atypical migraine with aura, doubt that this was a TIA  · Current NIH stroke score scale equals 0  · Workup thus far:-  · 1  CT stroke alert brain:-1   No acute intracranial CT abnormality   Chronic microangiopathy  2   Stable 5 mm left hippocampal cystic focus (series 2 image 16)   Stability favors benign etiology   It may indicate enlarged perivascular space versus neuroglial cyst    · 2  CTA stroke alert head and neck:-1   No intracranial large vessel occlusion or critical stenosis  No aneurysm    2   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  3   No hemodynamically significant stenosis of cervical carotid and vertebral arteries  No dissection  · 3  Lipid panel:-reviewed, patient has a mixed dyslipidemia, patient is reluctant and at this time refusing Lipitor initiation, but will follow-up in the near future in the outpatient setting with her PCP   · 4  Echocardiogram:-can be completed in the outpatient setting  · 5  MRI brain:-can be completed in the outpatient setting as per Neurology  · 6   HbA1c:-pending   · Patient is medically stable  · Will discharge home today  · Patient cannot take aspirin citing GI irritability, and is reluctant on taking Lipitor none the less, once again I do not think that this was a stroke and or a TIA, and I am in agreement with the neurologist that this was most likely secondary to an atypical migraine with aura  · Patient to get an MRI and an echocardiogram in the outpatient setting for completeness  · No further inpatient testing, treatment, and or workup is needed  · Outpatient follow-up with PCP and Neurology

## 2022-05-29 NOTE — H&P
Regine 47  H&P- Patricia Pompa 1950, 81 y o  female MRN: [id number]  Unit/Bed#: ED 02 Encounter: [id number]  Primary Care Provider: Daniel Gramajo DO   Date and time admitted to hospital: 5/29/2022  9:16 AM    Date and time admitted to hospital: 5/29/2022  9:16 AM    * Visual changes  Assessment & Plan  · Patient reports loss of vision on left side this morning  · Now resolved  · Patient placed in observation on stroke pathway with neuro checks and telemetry monitoring  · CT head: No acute intracranial CT abnormality  Chronic microangiopathy  · CTA head and neck: No intracranial large vessel occlusion or critical stenosis  No aneurysm  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  No hemodynamically significant stenosis of cervical carotid and vertebral arteries  No dissection  · MRI brain  · Aspirin not ordered due to allergy  · Lipitor 40 mg po daily  · Check lipid panel, Hgb A1C  · Consult neurology  · Labs in am    COVID-19 virus infection  Assessment & Plan  · Patient states she tested positive for COVID earlier this month  Was seen by PCP on 5/10/22 and prescribed Paxlovid  · Patient is past the 10 day quarantine period no isolation required       VTE Pharmacologic Prophylaxis: VTE Score: 9 High Risk (Score >/= 5) - Pharmacological DVT Prophylaxis Ordered: enoxaparin (Lovenox)  Sequential Compression Devices Ordered  Code Status: Level 1 - Full Code   Discussion with family: Patient declined call to   Anticipated Length of Stay: Patient will be admitted on an observation basis with an anticipated length of stay of less than 2 midnights secondary to stroke workup  Total Time for Visit, including Counseling / Coordination of Care: 60 minutes Greater than 50% of this total time spent on direct patient counseling and coordination of care      Chief Complaint: loss of vision on left side    History of Present Illness:  Patricia Pompa is a 81 y o  female with a PMH of recent COVID infection who presents with a complaint of loss of vision on left side  The patient states she woke up this morning and sat down in front of her computer and noted she was unable to see the left side of the screen  She denies any extremity weakness, slurred speech, facial droop  She states the episode lasted about 2 hours and has currently resolved  She states she's been having a lot of stress lately and was unsure if that caused her symptoms  She was diagnosed and treated for COVID earlier this month  She currently denies any symptoms from Jennifer  She denies headaches, chest pain, shortness of breath, abdominal pain, nausea/vomiting, or diarrhea  Review of Systems:  Review of Systems   Eyes: Positive for visual disturbance  All other systems reviewed and are negative  Past Medical and Surgical History:   Past Medical History:   Diagnosis Date    GERD (gastroesophageal reflux disease)     Herniated disc, cervical     Hyperlipidemia        Past Surgical History:   Procedure Laterality Date    BLADDER REPAIR      sling    CATARACT EXTRACTION, BILATERAL      COLONOSCOPY      REMOVAL OF INTRAUTERINE DEVICE (IUD)      in abd area    TONSILLECTOMY      TUMOR REMOVAL      pinky finger 1977    WISDOM TOOTH EXTRACTION         Meds/Allergies:  Prior to Admission medications    Medication Sig Start Date End Date Taking?  Authorizing Provider   Black Elderberry,Berry-Flower, 575 MG CAPS Take by mouth as needed    Historical Provider, MD   Camphor-Menthol-Methyl Sal (FLEXALL ULTRA PLUS EX)      Historical Provider, MD   cholecalciferol (VITAMIN D3) 1,000 units tablet Take 1,000 Units by mouth daily    Historical Provider, MD   cyanocobalamin (VITAMIN B-12) 100 mcg tablet Take by mouth as needed   Patient not taking: Reported on 5/17/2022    Historical Provider, MD   estradiol (ESTRACE VAGINAL) 0 1 mg/g vaginal cream Insert 0 5 g into the vagina daily Apply pea sized amount vaginally over area of mesh erosion and periurethral area  Patient not taking: No sig reported 3/4/22   Buckley Barban Mercy Goldmann, MD   [facility] ARTHR[facility] PAIN RELIEF EX) as needed   Patient not taking: No sig reported    Historical Provider, MD   loratadine (CLARITIN) 10 mg tablet Take 10 mg by mouth daily      Historical Provider, MD   Multiple Vitamins-Minerals (PreserVision/Lutein) CAPS Take by mouth    Historical Provider, MD   Omega-3 Fatty Acids (Fish Oil) 1000 MG CPDR Take by mouth    Historical Provider, MD   ondansetron (ZOFRAN) 4 mg tablet Take 1 tablet (4 mg total) by mouth every 8 (eight) hours as needed for nausea or vomiting 5/10/22   Mariah Party, DO   polyethylene glycol-propylene glycol (Systane) 0 4-0 3 % Systane (PF) 0 4 %-0 3 % eye drops in a dropperette    Historical Provider, MD   Probiotic Product (PROBIOTIC ACIDOPHILUS BEADS PO) Take by mouth    Historical Provider, MD     I have reviewed home medications with patient personally  Allergies:    Allergies   Allergen Reactions    Eggs Or Egg-Derived Products - Food Allergy Other (See Comments)    Etodolac      Other reaction(s): chest pain, dizziness, nausea    Nitrofurantoin Other (See Comments)    Sucralfate Dizziness and Headache    Sulfa Antibiotics Hives and Other (See Comments)    Levofloxacin Dizziness    Omeprazole Headache and Fatigue    Pregabalin Chest Pain    Aspirin      Other reaction(s): GI upset    Chocolate - Food Allergy     Famotidine Other (See Comments)    Fluconazole Other (See Comments)    Ibuprofen      Other reaction(s): GI upset    Naproxen      Other reaction(s): GI upset    Penicillin G      Other reaction(s): swollen lips, tingling around mouth       Social History:  Marital Status: /Civil Union   Occupation: unknown  Patient Pre-hospital Living Situation: Home  Patient Pre-hospital Level of Mobility: walks  Patient Pre-hospital Diet Restrictions: none  Substance Use History: Social History     Substance and Sexual Activity   Alcohol Use Never     Social History     Tobacco Use   Smoking Status Never Smoker   Smokeless Tobacco Never Used     Social History     Substance and Sexual Activity   Drug Use Never       Family History:  Family History   Problem Relation Age of Onset    Hypertension Mother         Pulmonary    Osteoporosis Mother     Heart failure Mother     Heart attack Father     Hypertension Father     Stroke Father     No Known Problems Sister     No Known Problems Daughter     No Known Problems Maternal Grandmother     No Known Problems Paternal Grandmother     No Known Problems Daughter     No Known Problems Maternal Aunt     No Known Problems Maternal Aunt     Breast cancer Maternal Aunt     No Known Problems Maternal Aunt     No Known Problems Paternal Aunt        Physical Exam:     Vitals:   Blood Pressure: 131/66 (05/29/22 1016)  Pulse: 74 (05/29/22 0950)  Temperature: 98 °F (36 7 °C) (05/29/22 0928)  Respirations: 18 (05/29/22 0950)  Weight - Scale: 71 7 kg (158 lb) (05/29/22 0923)  SpO2: 100 % (05/29/22 0950)    Physical Exam  Vitals and nursing note reviewed  Constitutional:       General: She is awake  Appearance: Normal appearance  HENT:      Head: Normocephalic and atraumatic  Cardiovascular:      Rate and Rhythm: Normal rate and regular rhythm  Pulmonary:      Effort: Pulmonary effort is normal       Breath sounds: Normal breath sounds  Abdominal:      Palpations: Abdomen is soft  Tenderness: There is no abdominal tenderness  Skin:     General: Skin is warm and dry  Neurological:      General: No focal deficit present  Mental Status: She is alert and oriented to person, place, and time  Psychiatric:         Attention and Perception: Attention normal          Mood and Affect: Mood normal          Speech: Speech normal          Behavior: Behavior is cooperative            Additional Data:     Lab Results:  Results from last 7 days   Lab Units 05/29/22  0934   WBC Thousand/uL 5 18   HEMOGLOBIN g/dL 12 8   HEMATOCRIT % 39 4   PLATELETS Thousands/uL 252     Results from last 7 days   Lab Units 05/29/22  0934   SODIUM mmol/L 140   POTASSIUM mmol/L 3 9   CHLORIDE mmol/L 102   CO2 mmol/L 30   BUN mg/dL 10   CREATININE mg/dL 0 67   ANION GAP mmol/L 8   CALCIUM mg/dL 9 7   GLUCOSE RANDOM mg/dL 98     Results from last 7 days   Lab Units 05/29/22  0934   INR  0 90     Results from last 7 days   Lab Units 05/29/22  0927   POC GLUCOSE mg/dl 111               Imaging: Reviewed radiology reports from this admission including: CT head  CTA stroke alert (head/neck)   Final Result by Aminta Linn MD (05/29 1018)      1  No intracranial large vessel occlusion or critical stenosis  No aneurysm  2   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  3   No hemodynamically significant stenosis of cervical carotid and vertebral arteries  No dissection  I personally discussed this study with Dr Gladis Latham on 5/29/2022 at 9:53 AM                                  Workstation performed: [id number]         CT stroke alert brain   Final Result by Aminta Linn MD (05/29 1020)      1  No acute intracranial CT abnormality  Chronic microangiopathy  2   Stable 5 mm left hippocampal cystic focus (series 2 image 16)  Stability favors benign etiology  It may indicate enlarged perivascular space versus neuroglial cyst                   I personally discussed this study with Dr Gladis Latham on 5/29/2022 at 9:53 AM       Workstation performed: [id number]         MRI Inpatient Order    (Results Pending)       EKG and Other Studies Reviewed on Admission:   · EKG: NSR  HR 76     ** Please Note: This note has been constructed using a voice recognition system   **

## 2022-05-30 VITALS
DIASTOLIC BLOOD PRESSURE: 67 MMHG | TEMPERATURE: 98 F | OXYGEN SATURATION: 97 % | SYSTOLIC BLOOD PRESSURE: 131 MMHG | WEIGHT: 158 LBS | RESPIRATION RATE: 17 BRPM | HEART RATE: 73 BPM | BODY MASS INDEX: 29.37 KG/M2

## 2022-05-30 LAB
ALBUMIN SERPL BCP-MCNC: 3.7 G/DL (ref 3.5–5)
ALP SERPL-CCNC: 67 U/L (ref 34–104)
ALT SERPL W P-5'-P-CCNC: 15 U/L (ref 7–52)
ANION GAP SERPL CALCULATED.3IONS-SCNC: 8 MMOL/L (ref 4–13)
AST SERPL W P-5'-P-CCNC: 18 U/L (ref 13–39)
BILIRUB SERPL-MCNC: 0.63 MG/DL (ref 0.2–1)
BUN SERPL-MCNC: 12 MG/DL (ref 5–25)
CALCIUM SERPL-MCNC: 9.3 MG/DL (ref 8.4–10.2)
CHLORIDE SERPL-SCNC: 103 MMOL/L (ref 96–108)
CHOLEST SERPL-MCNC: 210 MG/DL
CO2 SERPL-SCNC: 28 MMOL/L (ref 21–32)
CREAT SERPL-MCNC: 0.67 MG/DL (ref 0.6–1.3)
ERYTHROCYTE [DISTWIDTH] IN BLOOD BY AUTOMATED COUNT: 13.1 % (ref 11.6–15.1)
EST. AVERAGE GLUCOSE BLD GHB EST-MCNC: 108 MG/DL
GFR SERPL CREATININE-BSD FRML MDRD: 89 ML/MIN/1.73SQ M
GLUCOSE P FAST SERPL-MCNC: 93 MG/DL (ref 65–99)
GLUCOSE SERPL-MCNC: 93 MG/DL (ref 65–140)
HBA1C MFR BLD: 5.4 %
HCT VFR BLD AUTO: 36.5 % (ref 34.8–46.1)
HDLC SERPL-MCNC: 44 MG/DL
HGB BLD-MCNC: 11.7 G/DL (ref 11.5–15.4)
LDLC SERPL CALC-MCNC: 148 MG/DL (ref 0–100)
MAGNESIUM SERPL-MCNC: 2.3 MG/DL (ref 1.9–2.7)
MCH RBC QN AUTO: 31 PG (ref 26.8–34.3)
MCHC RBC AUTO-ENTMCNC: 32.1 G/DL (ref 31.4–37.4)
MCV RBC AUTO: 97 FL (ref 82–98)
PLATELET # BLD AUTO: 221 THOUSANDS/UL (ref 149–390)
PMV BLD AUTO: 10.2 FL (ref 8.9–12.7)
POTASSIUM SERPL-SCNC: 4.1 MMOL/L (ref 3.5–5.3)
PROT SERPL-MCNC: 6.5 G/DL (ref 6.4–8.4)
RBC # BLD AUTO: 3.77 MILLION/UL (ref 3.81–5.12)
SODIUM SERPL-SCNC: 139 MMOL/L (ref 135–147)
TRIGL SERPL-MCNC: 88 MG/DL
WBC # BLD AUTO: 4.48 THOUSAND/UL (ref 4.31–10.16)

## 2022-05-30 PROCEDURE — 85027 COMPLETE CBC AUTOMATED: CPT | Performed by: PHYSICIAN ASSISTANT

## 2022-05-30 PROCEDURE — 80053 COMPREHEN METABOLIC PANEL: CPT | Performed by: PHYSICIAN ASSISTANT

## 2022-05-30 PROCEDURE — 83735 ASSAY OF MAGNESIUM: CPT | Performed by: PHYSICIAN ASSISTANT

## 2022-05-30 PROCEDURE — 99217 PR OBSERVATION CARE DISCHARGE MANAGEMENT: CPT | Performed by: HOSPITALIST

## 2022-05-30 PROCEDURE — 83036 HEMOGLOBIN GLYCOSYLATED A1C: CPT | Performed by: PHYSICIAN ASSISTANT

## 2022-05-30 PROCEDURE — 97161 PT EVAL LOW COMPLEX 20 MIN: CPT

## 2022-05-30 PROCEDURE — 36415 COLL VENOUS BLD VENIPUNCTURE: CPT | Performed by: PHYSICIAN ASSISTANT

## 2022-05-30 PROCEDURE — 80061 LIPID PANEL: CPT | Performed by: PHYSICIAN ASSISTANT

## 2022-05-30 RX ADMIN — ENOXAPARIN SODIUM 40 MG: 100 INJECTION SUBCUTANEOUS at 08:05

## 2022-05-30 RX ADMIN — Medication 1 TABLET: at 08:05

## 2022-05-30 RX ADMIN — Medication 1000 UNITS: at 08:05

## 2022-05-30 RX ADMIN — LORATADINE 10 MG: 10 TABLET ORAL at 08:05

## 2022-05-30 NOTE — PHYSICAL THERAPY NOTE
PHYSICAL THERAPY EVALUATION  NAME:  Horace Coretz  DATE: 05/30/22    AGE:   81 y o    Mrn:   [id number]  ADMIT DX:  Visual changes [H53 9]  Problem List:   Patient Active Problem List   Diagnosis    Degeneration of intervertebral disc    Hip pain    Low back pain    Lumbar radiculopathy    Gastroesophageal reflux disease without esophagitis    Neck pain    Hyponatremia    Slow transit constipation    Contact stomatitis    Overweight with body mass index (BMI) of 29 to 29 9 in adult    Osteopenia of multiple sites    Family history of thyroid disease in mother    Left ear pain    Combined forms of age-related cataract of both eyes    Exposure to COVID-19 virus    Viral syndrome    Peroneal tendinitis of left lower extremity    Medicare annual wellness visit, subsequent    Platelets decreased (HCC)    Chronic GERD    Rash of unknown etiology    Positive anaplasmosis titer    Cold hands    Acute cystitis without hematuria    Erosion of bladder suspension mesh (HCC)    Pelvic organ prolapse quantification stage 2 cystocele    Tachycardia    COVID-19 virus infection    Nausea    Post-COVID chronic fatigue    Visual disturbance       Past Medical History  Past Medical History:   Diagnosis Date    GERD (gastroesophageal reflux disease)     Herniated disc, cervical     Hyperlipidemia        Past Surgical History  Past Surgical History:   Procedure Laterality Date    BLADDER REPAIR      sling    CATARACT EXTRACTION, BILATERAL      COLONOSCOPY      REMOVAL OF INTRAUTERINE DEVICE (IUD)      in abd area    TONSILLECTOMY      TUMOR REMOVAL      pinky finger 1977    WISDOM TOOTH EXTRACTION         Length Of Stay: 0  Performed at least 2 patient identifiers during session: Name and Birthday       05/30/22 5026   PT Last Visit   PT Visit Date 05/30/22   Note Type   Note type Evaluation   Pain Assessment   Pain Assessment Tool 0-10   Pain Score 3   Pain Location/Orientation Location: Head  (behind eye)   Pain Onset/Description Descriptor: Aching   Restrictions/Precautions   Weight Bearing Precautions Per Order No   Braces or Orthoses   (orthopedic  shoes)   Other Precautions Pain   Home Living   Type of [address] Two level;Performs ADLs on one level; Able to live on main level with bedroom/bathroom  (enters home from basement; chair lift to 1st FL)   Bathroom Shower/Tub Tub/shower unit   Bathroom Toilet Standard   Bathroom Equipment Grab bars in shower; Shower chair;Grab bars around toilet   [address] Walker;Cane;Wheelchair-manual;Stair glide  (no AD  used at baseline)   Additional Comments +  (short distance)   Prior Function   Level of [place] Independent with ADLs and functional mobility   Lives With Spouse   ADL Assistance Independent   IADLs Independent   Falls in the last 6 months 0   Vocational Retired   Comments enjoys gardening   General   Additional Pertinent History vision has returned to normal   Family/Caregiver Present No   Cognition   Overall Cognitive Status WFL   Arousal/Participation Alert   Orientation Level Oriented X4   Memory Within functional limits   Following Commands Follows all commands and directions without difficulty   Subjective   Subjective pt reports that she is feeling better but still has a slight HA   RLE Assessment   RLE Assessment WFL   LLE Assessment   LLE Assessment WFL   Vision-Basic Assessment   Current Vision Wears glasses all the time   Coordination   Sensation WFL   Bed Mobility   Supine to Sit 7  Independent   Sit to Supine 7  Independent   Additional Comments pt able to sit EOB Indep   Transfers   Sit to Stand 6  Modified independent   Additional items Increased time required   Stand to Sit 7  Independent   Additional Comments pt reported sciatica discomfort which is her norm for the am with standing   Ambulation/Elevation   Gait pattern Improper Weight shift   Gait Assistance 6  Modified independent   Assistive Device None Distance 30 ft   Ambulation/Elevation Additional Comments pt reported slight lightheadedness with ambulation but reports that this is normal post migraines   Balance   Static Sitting Normal   Dynamic Sitting Normal   Static Standing Good   Dynamic Standing Good   Ambulatory Good   Endurance Deficit   Endurance Deficit Yes   Endurance Deficit Description limited by lightheadeness   Activity Tolerance   Activity Tolerance Patient tolerated treatment well   Assessment   Prognosis Excellent   Problem List Pain   Assessment Pt is 81 y o  female seen for PT evaluation s/p admit to [facility] on 5/29/2022 w/ Visual disturbance  PT consulted to assess pt's functional mobility and d/c needs  Order placed for PT eval and tx, w/ up and OOB as tolerated order  Pt agreeable to PT  session upon arrival, pt found supine on stretcher  The following objective measures performed on IE also reveal limitations: AM-PAC 6 Clicks 71/51  Patient was independent w/ all functional mobility w/ no AD  Pt presents at [facility] with transfers, ambulation, and bed mobility  From PT/mobility standpoint, recommendation at time of d/c would be anticipate no needs  Upon conclusion pt supine on stretcher  D/c PT services at this time  Complexity: Comorbidities affecting pt's physical performance at time of assessment include: recent COVID infection  Personal factors affecting pt at time of IE include: lives in 2 story house  Please find objective findings from PT assessment regarding body systems outlined above with impairments and limitations including pain  Pt's clinical presentation is currently evolving seen in pt's presentation of pain  The patient's AM-PAC Basic Mobility Inpatient Short Form Raw Score is 24  A Raw score of  greater than 16 suggests the patient may benefit from discharge to home  Please also refer to the recommendation of the Physical Therapist for safe discharge planning     Barriers to Discharge None   Goals   Patient Goals to find out the reason for the visual disturbance   Recommendation   PT Discharge Recommendation No rehabilitation needs   Equipment Recommended   (none)   AM-PAC Basic Mobility Inpatient   Turning in Bed Without Bedrails 4   Lying on Back to Sitting on Edge of Flat Bed 4   Moving Bed to Chair 4   Standing Up From Chair 4   Walk in Room 4   Climb 3-5 Stairs 4   Basic Mobility Inpatient Raw Score 24   Basic Mobility Standardized Score 57 68   Highest Level Of Mobility   JH-HLM Goal 8: Walk 250 feet or more   JH-HLM Achieved 7: Walk 25 feet or more       Time In: 1050  Time Out: 6529  Total Evaluation Minutes: 22    Rima Thakkar, PT

## 2022-05-30 NOTE — DISCHARGE INSTR - AVS FIRST PAGE
Dear Raul Holloway,     It was our pleasure to care for you here at [facility], [address] [facility]  It is our hope that we were always able to exceed the expected standards for your care during your stay  You were hospitalized due to a transient loss of vision  You were cared for on the medical/surgical floor by Tamy Pineda MD with the [facility] who covers for your primary care physician (PCP), Corina Gamino DO, while you were hospitalized  You were additionally seen by Dr Gina Rubalcava of the [address] [place] [address] Neurology group  If you have any questions or concerns related to this hospitalization, you may contact us at [phone]  For follow up as well as any medication refills, we recommend that you follow up with your primary care physician  A registered nurse will reach out to you by phone within a few days after your discharge to answer any additional questions that you may have after going home    However, at this time we provide for you here, the most important instructions / recommendations at discharge:     Notable Medication Adjustments -   No new medications at time of discharge  Okay to resume all pre-admission medications, at the preadmission doses  Testing Required after Discharge -   MRI brain needs to be completed, please ensure that your primary care physician orders this test as soon as possible  Important follow up information -   Please follow-up with the providers as outlined in this discharge package  Other Instructions -   Please maintain a healthy diet  Please review the need for further cholesterol medication with your primary care physician upon completion of the MRI of the brain  Please review this entire after visit summary as additional general instructions including medication list, appointments, activity, diet, any pertinent wound care, and other additional recommendations from your care team that may be provided for you       Sincerely,     Juan Little MD

## 2022-05-30 NOTE — DISCHARGE SUMMARY
[address]  Discharge- Lani Yuen 1950, 81 y o  female MRN: [id number]  Unit/Bed#: ED 02 Encounter: [id number]  Primary Care Provider: Macarthur Bosworth, DO   Date and time admitted to hospital: 5/29/2022  9:16 AM    * Visual disturbance  Assessment & Plan  · All symptoms have resolved - in retrospect most likely secondary to an atypical migraine with aura, doubt that this was a TIA  · Current NIH stroke score scale equals 0  · Workup thus far:-  · 1  CT stroke alert brain:-1   No acute intracranial CT abnormality   Chronic microangiopathy  2   Stable 5 mm left hippocampal cystic focus (series 2 image 16)   Stability favors benign etiology   It may indicate enlarged perivascular space versus neuroglial cyst    · 2  CTA stroke alert head and neck:-1   No intracranial large vessel occlusion or critical stenosis  No aneurysm    2   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  3   No hemodynamically significant stenosis of cervical carotid and vertebral arteries  No dissection  · 3  Lipid panel:-reviewed, patient has a mixed dyslipidemia, patient is reluctant and at this time refusing Lipitor initiation, but will follow-up in the near future in the outpatient setting with her PCP   · 4  Echocardiogram:-can be completed in the outpatient setting  · 5  MRI brain:-can be completed in the outpatient setting as per Neurology  · 6   HbA1c:-pending   · Patient is medically stable  · Will discharge home today  · Patient cannot take aspirin citing GI irritability, and is reluctant on taking Lipitor none the less, once again I do not think that this was a stroke and or a TIA, and I am in agreement with the neurologist that this was most likely secondary to an atypical migraine with aura  · Patient to get an MRI and an echocardiogram in the outpatient setting for completeness  · No further inpatient testing, treatment, and or workup is needed  · Outpatient follow-up with PCP and Neurology    COVID-19 virus infection  Assessment & Plan  · Patient states she tested positive for COVID earlier this month  Was seen by PCP on 5/10/22 and prescribed Paxlovid  · Patient is past the 10 day quarantine period no isolation required   · Patient is asymptomatic, and on room air  · No further inpatient testing, treatment, and or workup is needed        Discharging Physician / Practitioner: Mandie Rivas MD  PCP: Regine Napoles DO  Admission Date:   Admission Orders (From admission, onward)     Ordered        05/29/22 1118  Place in Observation  Once                      Discharge Date: 05/30/22    Medical Problems             Resolved Problems  Date Reviewed: 5/29/2022   None                 Consultations During Hospital Stay:  · Neurology    Procedures Performed:   · None    Significant Findings / Test Results:   · CT brain-see above  · CTA head and neck-see above    Incidental Findings:   · None     Test Results Pending at Discharge (will require follow up): · None     Outpatient Tests Requested:  · None    Complications:     None    Reason for Admission:  Visual disturbance rule out TIA versus CVA    Hospital Course:     Radha Valentine is a 81 y o  female patient who originally presented to the hospital on 5/29/2022 due to a self-limited brief loss of vision in her left eye  Please refer to the initial history and physical examination completed by Marion FRANCE for the initial presenting features and complaints  In brief, the patient is a pleasant 68-year-old female, who was admitted to the observation unit here in the hospital, after she came into the emergency room yesterday with the aforementioned chief complaint  In the emergency room, a stat CT brain, and a CTA of the head and neck were performed with the results as outlined above  Of note by time of arrival, the patient was completely asymptomatic  She remained asymptomatic during her stay    Additionally, at time of discharge her NIH stroke score scale was 0  Patient was deemed medically stable for discharge on 05/30/2022 since she was completely asymptomatic  She will require an MRI of the brain, and a 2D echocardiogram in the outpatient setting for completeness  The patient could not take aspirin secondary to an allergy, and was very reluctant on Lipitor initiation  She agreed to be re-evaluated by her PCP after MRI is completed in the outpatient setting to see if Lipitor is truly needed, she will take it if she indeed had a stroke  No other changes were made to her pre-admission medications, and or to the preadmission doses  Please refer to the assessment/plan portion of this discharge summary as outlined above for the remainder of the details in regard to her stay  Please see above list of diagnoses and related plan for additional information  Condition at Discharge: good     Discharge Day Visit / Exam:     Subjective:  Patient seen and examined, resting in bed, no complaints no distress  Vitals: Blood Pressure: 131/67 (05/30/22 0700)  Pulse: 73 (05/30/22 0700)  Temperature: 98 °F (36 7 °C) (05/29/22 0928)  Respirations: 17 (05/30/22 0700)  Weight - Scale: 71 7 kg (158 lb) (05/29/22 0923)  SpO2: 97 % (05/30/22 0700)  Exam:   Physical Exam  Constitutional:       General: She is not in acute distress  Appearance: Normal appearance  She is normal weight  She is not ill-appearing  HENT:      Head: Normocephalic and atraumatic  Nose: Nose normal       Mouth/Throat:      Mouth: Mucous membranes are moist    Eyes:      Extraocular Movements: Extraocular movements intact  Pupils: Pupils are equal, round, and reactive to light  Cardiovascular:      Rate and Rhythm: Normal rate and regular rhythm  Pulses: Normal pulses  Heart sounds: Normal heart sounds  No murmur heard  No friction rub  No gallop  Pulmonary:      Effort: Pulmonary effort is normal  No respiratory distress        Breath sounds: Normal breath sounds  No wheezing, rhonchi or rales  Abdominal:      General: There is no distension  Palpations: Abdomen is soft  There is no mass  Tenderness: There is no abdominal tenderness  Hernia: No hernia is present  Musculoskeletal:         General: No swelling or tenderness  Normal range of motion  Cervical back: Normal range of motion and neck supple  No rigidity  Right lower leg: No edema  Left lower leg: No edema  Skin:     General: Skin is warm  Capillary Refill: Capillary refill takes less than 2 seconds  Findings: No erythema or rash  Neurological:      General: No focal deficit present  Mental Status: She is alert and oriented to person, place, and time  Mental status is at baseline  Cranial Nerves: No cranial nerve deficit  Motor: No weakness  Psychiatric:         Mood and Affect: Mood normal          Behavior: Behavior normal          Discussion with Family:  No family members were present at the time of my discharge evaluation, nor did the patient ask and or want me to call anyone    Discharge instructions/Information to patient and family:   See after visit summary for information provided to patient and family  Provisions for Follow-Up Care:  See after visit summary for information related to follow-up care and any pertinent home health orders  Disposition:     Home      Planned Readmission:    None     Discharge Statement:  I spent 40 minutes discharging the patient  This time was spent on the day of discharge  I had direct contact with the patient on the day of discharge  Greater than 50% of the total time was spent examining patient, answering all patient questions, arranging and discussing plan of care with patient as well as directly providing post-discharge instructions  Additional time then spent on discharge activities      Discharge Medications:  See after visit summary for reconciled discharge medications provided to patient and family        ** Please Note: This note has been constructed using a voice recognition system **

## 2022-05-31 ENCOUNTER — TRANSITIONAL CARE MANAGEMENT (OUTPATIENT)
Dept: FAMILY MEDICINE CLINIC | Facility: CLINIC | Age: 72
End: 2022-05-31

## 2022-05-31 ENCOUNTER — TELEPHONE (OUTPATIENT)
Dept: NEUROLOGY | Facility: CLINIC | Age: 72
End: 2022-05-31

## 2022-05-31 NOTE — TELEPHONE ENCOUNTER
Patient called to schedule an appointment to follow up after her hospital visit  I sent a message to Preet Mobley to confirm on the discharge instructions for this patient and advised the patient I will call her back as soon as I hear back from the hospital rep  Per Stephanie Mendiola:Stroke HFU Ap or attending 6 weeks       HFU/SLCED 5-29-22 to 5-30-22/Visual disturbance,CVA      Per Candy I can schedule with either 4yr resident in [place]  Called patient back and offered her 7-14-22 at 1 pm with Dr Terry Doss and she accepted

## 2022-06-02 ENCOUNTER — OFFICE VISIT (OUTPATIENT)
Dept: FAMILY MEDICINE CLINIC | Facility: CLINIC | Age: 72
End: 2022-06-02
Payer: MEDICARE

## 2022-06-02 VITALS
WEIGHT: 160 LBS | DIASTOLIC BLOOD PRESSURE: 70 MMHG | HEART RATE: 71 BPM | BODY MASS INDEX: 29.44 KG/M2 | TEMPERATURE: 97.6 F | HEIGHT: 62 IN | OXYGEN SATURATION: 99 % | RESPIRATION RATE: 18 BRPM | SYSTOLIC BLOOD PRESSURE: 128 MMHG

## 2022-06-02 DIAGNOSIS — G45.9 TIA (TRANSIENT ISCHEMIC ATTACK): ICD-10-CM

## 2022-06-02 DIAGNOSIS — G43.009 ATYPICAL MIGRAINE: Primary | ICD-10-CM

## 2022-06-02 DIAGNOSIS — H53.482 VISUAL FIELD CONSTRICTION OF LEFT EYE: ICD-10-CM

## 2022-06-02 DIAGNOSIS — R53.82 POST-COVID CHRONIC FATIGUE: ICD-10-CM

## 2022-06-02 DIAGNOSIS — U09.9 POST-COVID CHRONIC FATIGUE: ICD-10-CM

## 2022-06-02 PROCEDURE — 99496 TRANSJ CARE MGMT HIGH F2F 7D: CPT | Performed by: FAMILY MEDICINE

## 2022-06-02 NOTE — ASSESSMENT & PLAN NOTE
Atypical migraine headache with visual disturbance occurred on Sunday patient went to the emergency department had neurology workup and CT of the head    MRI can be done in the future now to better delineate etiology and rule out other issues

## 2022-06-02 NOTE — ASSESSMENT & PLAN NOTE
Improved energy post COVID she tested positive at the emergency department as well after her last episode from May 10th

## 2022-06-02 NOTE — ASSESSMENT & PLAN NOTE
Visual field disturbance resolved the afternoon at the emergency room felt to be from migraine after evaluated by telemedicine with Neurology she will have an MRI to confirm

## 2022-06-02 NOTE — PROGRESS NOTES
Assessment/Plan:       Problem List Items Addressed This Visit        Cardiovascular and Mediastinum    Atypical migraine - Primary     Atypical migraine headache with visual disturbance occurred on Sunday patient went to the emergency department had neurology workup and CT of the head  MRI can be done in the future now to better delineate etiology and rule out other issues           Relevant Orders    MRI brain wo contrast       Other    Post-COVID chronic fatigue     Improved energy post COVID she tested positive at the emergency department as well after her last episode from May 10th           Visual field constriction of left eye     Visual field disturbance resolved the afternoon at the emergency room felt to be from migraine after evaluated by telemedicine with Neurology she will have an MRI to confirm           Relevant Orders    MRI brain wo contrast      Other Visit Diagnoses     TIA (transient ischemic attack)        Relevant Orders    MRI brain wo contrast            Subjective:      Patient ID: Lain Yuen is a 81 y o  female  Stress on Saturday over grandsons 3rd birthday-unable to attend in person due to her daughter's rules -unvaccinated  Sunday developed visual field defecit and ED diagnosed her with Migraine sx -after Neurology consult  No sx now  The following portions of the patient's history were reviewed and updated as appropriate: allergies, current medications, past family history, past medical history, past social history, past surgical history and problem list     Review of Systems   Constitutional: Negative for chills, fatigue and fever  HENT: Negative for congestion, nosebleeds, rhinorrhea, sinus pressure and sore throat  Eyes: Positive for visual disturbance  Negative for discharge and redness  Respiratory: Negative for cough and shortness of breath  Cardiovascular: Negative for chest pain, palpitations and leg swelling     Gastrointestinal: Negative for abdominal pain, blood in stool and nausea  Endocrine: Negative for cold intolerance, heat intolerance and polyuria  Genitourinary: Negative for dysuria and frequency  Musculoskeletal: Negative for arthralgias, back pain and myalgias  Skin: Negative for rash  Neurological: Positive for headaches  Negative for dizziness and weakness  Hematological: Negative for adenopathy  Psychiatric/Behavioral: Negative for behavioral problems and sleep disturbance  The patient is not nervous/anxious  Objective:      /70 (BP Location: Left arm, Patient Position: Sitting)   Pulse 71   Temp 97 6 °F (36 4 °C)   Resp 18   Ht 5' 1 5" (1 562 m)   Wt 72 6 kg (160 lb)   SpO2 99%   BMI 29 74 kg/m²        Physical Exam  Vitals and nursing note reviewed  Constitutional:       General: She is not in acute distress  Appearance: Normal appearance  She is well-developed  HENT:      Head: Normocephalic and atraumatic  Right Ear: Tympanic membrane and external ear normal       Left Ear: Tympanic membrane and external ear normal       Nose: Nose normal       Mouth/Throat:      Mouth: Mucous membranes are moist       Pharynx: Oropharynx is clear  No oropharyngeal exudate  Eyes:      General: No scleral icterus  Right eye: No discharge  Left eye: No discharge  Extraocular Movements: Extraocular movements intact  Conjunctiva/sclera: Conjunctivae normal       Pupils: Pupils are equal, round, and reactive to light  Neck:      Thyroid: No thyromegaly  Vascular: No JVD  Cardiovascular:      Rate and Rhythm: Normal rate and regular rhythm  Heart sounds: Normal heart sounds  No murmur heard  Pulmonary:      Effort: Pulmonary effort is normal       Breath sounds: No wheezing or rales  Chest:      Chest wall: No tenderness  Abdominal:      General: Bowel sounds are normal  There is no distension  Palpations: Abdomen is soft  There is no mass  Tenderness:  There is no abdominal tenderness  Musculoskeletal:         General: No tenderness or deformity  Normal range of motion  Cervical back: Normal range of motion  Lymphadenopathy:      Cervical: No cervical adenopathy  Skin:     General: Skin is warm and dry  Capillary Refill: Capillary refill takes less than 2 seconds  Findings: No rash  Neurological:      General: No focal deficit present  Mental Status: She is alert and oriented to person, place, and time  Mental status is at baseline  Cranial Nerves: No cranial nerve deficit  Coordination: Coordination normal       Deep Tendon Reflexes: Reflexes are normal and symmetric  Reflexes normal    Psychiatric:         Mood and Affect: Mood normal          Behavior: Behavior normal          Thought Content: Thought content normal          Judgment: Judgment normal           Data:    Laboratory Results: I have personally reviewed the pertinent laboratory results/reports   Radiology/Other Diagnostic Testing Results: I have personally reviewed pertinent reports         Lab Results   Component Value Date    WBC 4 48 05/30/2022    HGB 11 7 05/30/2022    HCT 36 5 05/30/2022    MCV 97 05/30/2022     05/30/2022     Lab Results   Component Value Date    K 4 1 05/30/2022     05/30/2022    CO2 28 05/30/2022    BUN 12 05/30/2022    CREATININE 0 67 05/30/2022    GLUF 93 05/30/2022    CALCIUM 9 3 05/30/2022    AST 18 05/30/2022    ALT 15 05/30/2022    ALKPHOS 67 05/30/2022    EGFR 89 05/30/2022     Lab Results   Component Value Date    CHOLESTEROL 210 (H) 05/30/2022    CHOLESTEROL 213 (H) 12/06/2021    CHOLESTEROL 220 (H) 01/05/2021     Lab Results   Component Value Date    HDL 44 (L) 05/30/2022    HDL 57 12/06/2021    HDL 58 01/05/2021     Lab Results   Component Value Date    LDLCALC 148 (H) 05/30/2022    LDLCALC 141 (H) 12/06/2021    LDLCALC 149 (H) 01/05/2021     Lab Results   Component Value Date    TRIG 88 05/30/2022    TRIG 74 12/06/2021 TRIG 64 01/05/2021     No results found for: [place], [place]  Lab Results   Component Value Date    CSS1HOUIDJHB 2 420 12/06/2021     Lab Results   Component Value Date    HGBA1C 5 4 05/30/2022     No results found for: GABE Gramajo DO

## 2022-06-08 ENCOUNTER — TELEPHONE (OUTPATIENT)
Dept: FAMILY MEDICINE CLINIC | Facility: CLINIC | Age: 72
End: 2022-06-08

## 2022-06-08 DIAGNOSIS — H53.122 TRANSIENT VISUAL LOSS, LEFT EYE: ICD-10-CM

## 2022-06-08 DIAGNOSIS — H53.482 VISUAL FIELD CONSTRICTION OF LEFT EYE: Primary | ICD-10-CM

## 2022-06-08 NOTE — TELEPHONE ENCOUNTER
Pt went to the retina specialist and they recommend that she have a US of her Caroid artery and an Echocardiogram

## 2022-06-14 ENCOUNTER — HOSPITAL ENCOUNTER (OUTPATIENT)
Dept: MRI IMAGING | Facility: HOSPITAL | Age: 72
Discharge: HOME | End: 2022-06-14
Attending: FAMILY MEDICINE
Payer: MEDICARE

## 2022-06-14 DIAGNOSIS — H53.482 VISUAL FIELD CONSTRICTION OF LEFT EYE: ICD-10-CM

## 2022-06-14 DIAGNOSIS — G45.9 TIA (TRANSIENT ISCHEMIC ATTACK): ICD-10-CM

## 2022-06-14 DIAGNOSIS — G43.009 ATYPICAL MIGRAINE: ICD-10-CM

## 2022-06-14 PROCEDURE — G1004 CDSM NDSC: HCPCS

## 2022-06-14 PROCEDURE — 70551 MRI BRAIN STEM W/O DYE: CPT

## 2022-06-20 ENCOUNTER — OFFICE VISIT (OUTPATIENT)
Dept: FAMILY MEDICINE CLINIC | Facility: CLINIC | Age: 72
End: 2022-06-20
Payer: MEDICARE

## 2022-06-20 ENCOUNTER — HOSPITAL ENCOUNTER (OUTPATIENT)
Dept: NON INVASIVE DIAGNOSTICS | Facility: CLINIC | Age: 72
Discharge: HOME | End: 2022-06-20
Payer: MEDICARE

## 2022-06-20 VITALS
WEIGHT: 160 LBS | HEIGHT: 62 IN | HEART RATE: 93 BPM | SYSTOLIC BLOOD PRESSURE: 128 MMHG | BODY MASS INDEX: 29.44 KG/M2 | DIASTOLIC BLOOD PRESSURE: 70 MMHG

## 2022-06-20 VITALS
HEIGHT: 62 IN | HEART RATE: 82 BPM | RESPIRATION RATE: 18 BRPM | OXYGEN SATURATION: 96 % | TEMPERATURE: 98.2 F | WEIGHT: 161.2 LBS | DIASTOLIC BLOOD PRESSURE: 62 MMHG | BODY MASS INDEX: 29.66 KG/M2 | SYSTOLIC BLOOD PRESSURE: 120 MMHG

## 2022-06-20 DIAGNOSIS — K59.01 SLOW TRANSIT CONSTIPATION: Primary | ICD-10-CM

## 2022-06-20 DIAGNOSIS — E87.1 HYPONATREMIA: ICD-10-CM

## 2022-06-20 DIAGNOSIS — M54.16 LUMBAR RADICULOPATHY: ICD-10-CM

## 2022-06-20 DIAGNOSIS — Z00.00 MEDICARE ANNUAL WELLNESS VISIT, SUBSEQUENT: ICD-10-CM

## 2022-06-20 DIAGNOSIS — K21.9 GASTROESOPHAGEAL REFLUX DISEASE WITHOUT ESOPHAGITIS: ICD-10-CM

## 2022-06-20 DIAGNOSIS — R79.9 ABNORMAL FINDING OF BLOOD CHEMISTRY, UNSPECIFIED: ICD-10-CM

## 2022-06-20 DIAGNOSIS — D69.6 PLATELETS DECREASED (HCC): ICD-10-CM

## 2022-06-20 DIAGNOSIS — H53.482 VISUAL FIELD CONSTRICTION OF LEFT EYE: ICD-10-CM

## 2022-06-20 DIAGNOSIS — T83.718A EROSION OF BLADDER SUSPENSION MESH, INITIAL ENCOUNTER (HCC): ICD-10-CM

## 2022-06-20 LAB
AORTIC ROOT: 3.2 CM
AORTIC VALVE MEAN VELOCITY: 8.5 M/S
APICAL FOUR CHAMBER EJECTION FRACTION: 62 %
ASCENDING AORTA: 3 CM
AV AREA BY CONTINUOUS VTI: 2.4 CM2
AV AREA PEAK VELOCITY: 2.3 CM2
AV LVOT MEAN GRADIENT: 3 MMHG
AV LVOT PEAK GRADIENT: 4 MMHG
AV MEAN GRADIENT: 4 MMHG
AV PEAK GRADIENT: 8 MMHG
AV VALVE AREA: 2.41 CM2
AV VELOCITY RATIO: 0.73
DOP CALC AO PEAK VEL: 1.4 M/S
DOP CALC AO VTI: 27.11 CM
DOP CALC LVOT AREA: 3.14 CM2
DOP CALC LVOT DIAMETER: 2 CM
DOP CALC LVOT PEAK VEL VTI: 20.81 CM
DOP CALC LVOT PEAK VEL: 1.02 M/S
DOP CALC LVOT STROKE INDEX: 37 ML/M2
DOP CALC LVOT STROKE VOLUME: 64
E WAVE DECELERATION TIME: 122 MS
E/A RATIO: 0.67
FRACTIONAL SHORTENING: 29 (ref 28–44)
INTERVENTRICULAR SEPTUM IN DIASTOLE (PARASTERNAL SHORT AXIS VIEW): 1.1 CM
INTERVENTRICULAR SEPTUM: 1.1 CM (ref 0.6–1.1)
LAAS-AP2: 14.5 CM2
LAAS-AP4: 11.1 CM2
LEFT ATRIUM AREA SYSTOLE SINGLE PLANE A4C: 10.8 CM2
LEFT ATRIUM SIZE: 3 CM
LEFT ATRIUM VOLUME INDEX (MOD BIPLANE): 17.3
LEFT INTERNAL DIMENSION IN SYSTOLE: 2.7 CM (ref 2.1–4)
LEFT VENTRICULAR INTERNAL DIMENSION IN DIASTOLE: 3.8 CM (ref 3.5–6)
LEFT VENTRICULAR POSTERIOR WALL IN END DIASTOLE: 1 CM
LEFT VENTRICULAR STROKE VOLUME: 36 ML
LVSV (TEICH): 36 ML
MV E'TISSUE VEL-SEP: 9 CM/S
MV PEAK A VEL: 1.07 M/S
MV PEAK E VEL: 72 CM/S
MV STENOSIS PRESSURE HALF TIME: 35 MS
MV VALVE AREA P 1/2 METHOD: 6.3
RIGHT ATRIUM AREA SYSTOLE A4C: 10.7 CM2
RIGHT VENTRICLE ID DIMENSION: 2.9 CM
SL CV LEFT ATRIUM LENGTH A2C: 4.7 CM
SL CV LV EF: 60
SL CV PED ECHO LEFT VENTRICLE DIASTOLIC VOLUME (MOD BIPLANE) 2D: 63 ML
SL CV PED ECHO LEFT VENTRICLE SYSTOLIC VOLUME (MOD BIPLANE) 2D: 27 ML
TR MAX PG: 23 MMHG
TR PEAK VELOCITY: 2.4 M/S
TRICUSPID VALVE PEAK REGURGITATION VELOCITY: 2.41 M/S

## 2022-06-20 PROCEDURE — 99214 OFFICE O/P EST MOD 30 MIN: CPT | Performed by: FAMILY MEDICINE

## 2022-06-20 PROCEDURE — 93306 TTE W/DOPPLER COMPLETE: CPT

## 2022-06-20 PROCEDURE — G0439 PPPS, SUBSEQ VISIT: HCPCS | Performed by: FAMILY MEDICINE

## 2022-06-20 PROCEDURE — 93306 TTE W/DOPPLER COMPLETE: CPT | Performed by: INTERNAL MEDICINE

## 2022-06-20 NOTE — ASSESSMENT & PLAN NOTE
Patient is followed by Dr Rich Woods at [facility] for pain management and this spinal injections have been helpful    She will need to do core strengthening exercises pelvic tilt exercises and follow-up with both her pain management and the chiropractor

## 2022-06-20 NOTE — PROGRESS NOTES
Assessment and Plan:     Problem List Items Addressed This Visit    None          Preventive health issues were discussed with patient, and age appropriate screening tests were ordered as noted in patient's After Visit Summary  Personalized health advice and appropriate referrals for health education or preventive services given if needed, as noted in patient's After Visit Summary  History of Present Illness:     Patient presents for a Medicare Wellness Visit    [facility] wellness     Patient Care Team:  Daniel Gramajo DO as PCP - General (Family Medicine)  Heidi Rose PA-C as Physician Assistant (Physician Assistant)     Review of Systems:     Review of Systems   Constitutional: Negative for chills, fatigue and fever  HENT: Negative for congestion, nosebleeds, rhinorrhea, sinus pressure and sore throat  Eyes: Negative for discharge and redness  Respiratory: Negative for cough and shortness of breath  Cardiovascular: Negative for chest pain, palpitations and leg swelling  Gastrointestinal: Negative for abdominal pain, blood in stool and nausea  Endocrine: Negative for cold intolerance, heat intolerance and polyuria  Genitourinary: Negative for dysuria and frequency  Musculoskeletal: Positive for back pain  Negative for arthralgias and myalgias  Skin: Negative for rash  Neurological: Negative for dizziness, weakness and headaches  Hematological: Negative for adenopathy  Psychiatric/Behavioral: Negative for behavioral problems and sleep disturbance  The patient is not nervous/anxious           Problem List:     Patient Active Problem List   Diagnosis    Degeneration of intervertebral disc    Hip pain    Low back pain    Lumbar radiculopathy    Gastroesophageal reflux disease without esophagitis    Neck pain    Hyponatremia    Slow transit constipation    Contact stomatitis    Overweight with body mass index (BMI) of 29 to 29 9 in adult    Osteopenia of multiple sites    Family history of thyroid disease in mother    Left ear pain    Combined forms of age-related cataract of both eyes    Exposure to COVID-19 virus    Viral syndrome    Peroneal tendinitis of left lower extremity    Medicare annual wellness visit, subsequent    Platelets decreased ([address] )    Chronic GERD    Rash of unknown etiology    Positive anaplasmosis titer    Cold hands    Acute cystitis without hematuria    Erosion of bladder suspension mesh (HCC)    Pelvic organ prolapse quantification stage 2 cystocele    Tachycardia    COVID-19 virus infection    Nausea    Post-COVID chronic fatigue    Visual disturbance    Atypical migraine    Visual field constriction of left eye      Past Medical and Surgical History:     Past Medical History:   Diagnosis Date    GERD (gastroesophageal reflux disease)     Herniated disc, cervical     Hyperlipidemia      Past Surgical History:   Procedure Laterality Date    BLADDER REPAIR      sling    CATARACT EXTRACTION, BILATERAL      COLONOSCOPY      REMOVAL OF INTRAUTERINE DEVICE (IUD)      in abd area    TONSILLECTOMY      TUMOR REMOVAL      pinky finger 1977    WISDOM TOOTH EXTRACTION        Family History:     Family History   Problem Relation Age of Onset    Hypertension Mother         Pulmonary    Osteoporosis Mother     Heart failure Mother     Heart attack Father     Hypertension Father     Stroke Father     No Known Problems Sister     No Known Problems Daughter     No Known Problems Maternal Grandmother     No Known Problems Paternal Grandmother     No Known Problems Daughter     No Known Problems Maternal Aunt     No Known Problems Maternal Aunt     Breast cancer Maternal Aunt     No Known Problems Maternal Aunt     No Known Problems Paternal Aunt       Social History:     Social History     Socioeconomic History    Marital status: /Civil Union     Spouse name: None    Number of children: None    Years of education: None  Highest education level: None   Occupational History    None   Tobacco Use    Smoking status: Never Smoker    Smokeless tobacco: Never Used   Vaping Use    Vaping Use: Never used   Substance and Sexual Activity    Alcohol use: Never    Drug use: Never    Sexual activity: Not Currently     Partners: Male   Other Topics Concern    None   Social History Narrative    Caffeine Consumption-1 cup of hot tea daily     Social Determinants of Health     Financial Resource Strain: Not on file   Food Insecurity: Not on file   Transportation Needs: Not on file   Physical Activity: Not on file   Stress: Not on file   Social Connections: Not on file   Intimate Partner Violence: Not on file   Housing Stability: Not on file      Medications and Allergies:     Current Outpatient Medications   Medication Sig Dispense Refill    cholecalciferol (VITAMIN D3) 1,000 units tablet Take 1,000 Units by mouth daily      Garlic 360 MG TABS Take 100 mg by mouth      loratadine (CLARITIN) 10 mg tablet Take 10 mg by mouth daily        Multiple Vitamins-Minerals (PreserVision/Lutein) CAPS Take by mouth      Omega-3 Fatty Acids (Fish Oil) 1000 MG CPDR Take by mouth      polyethylene glycol-propylene glycol (Systane) 0 4-0 3 % Systane (PF) 0 4 %-0 3 % eye drops in a dropperette      Probiotic Product (PROBIOTIC ACIDOPHILUS BEADS PO) Take by mouth      ondansetron (ZOFRAN) 4 mg tablet Take 1 tablet (4 mg total) by mouth every 8 (eight) hours as needed for nausea or vomiting (Patient not taking: Reported on 6/20/2022) 20 tablet 0     No current facility-administered medications for this visit       Allergies   Allergen Reactions    Eggs Or Egg-Derived Products - Food Allergy Other (See Comments)    Etodolac      Other reaction(s): chest pain, dizziness, nausea    Nitrofurantoin Other (See Comments)    Sucralfate Dizziness and Headache    Sulfa Antibiotics Hives and Other (See Comments)    Levofloxacin Dizziness    Omeprazole Headache and Fatigue    Pregabalin Chest Pain    Aspirin      Other reaction(s): GI upset    Chocolate - Food Allergy     Famotidine Other (See Comments)    Fluconazole Other (See Comments)    Ibuprofen      Other reaction(s): GI upset    Naproxen      Other reaction(s): GI upset    Penicillin G      Other reaction(s): swollen lips, tingling around mouth      Immunizations:     Immunization History   Administered Date(s) Administered    Tdap 05/29/2019      Health Maintenance:         Topic Date Due    Breast Cancer Screening: Mammogram  10/13/2022    Colorectal Cancer Screening  08/18/2031    Hepatitis C Screening  Completed         Topic Date Due    COVID-19 Vaccine (1) Never done    Pneumococcal Vaccine: 65+ Years (1 - PCV) Never done    Influenza Vaccine (Season Ended) 09/01/2022      Medicare Screening Tests and Risk Assessments:     Esa Garcia is here for her Initial Wellness visit  Health Risk Assessment:   Patient rates overall health as good  Patient feels that their physical health rating is slightly worse  Patient is satisfied with their life  Eyesight was rated as same  Hearing was rated as same  Patient feels that their emotional and mental health rating is same  Patients states they are never, rarely angry  Patient states they are sometimes unusually tired/fatigued  Pain experienced in the last 7 days has been a lot  Patient's pain rating has been 8/10  Patient states that she has experienced no weight loss or gain in last 6 months  Fall Risk Screening: In the past year, patient has experienced: no history of falling in past year      Urinary Incontinence Screening:   Patient has not leaked urine accidently in the last six months  Home Safety:  Patient does not have trouble with stairs inside or outside of their home  Patient has working smoke alarms and has working carbon monoxide detector  Home safety hazards include: none  Nutrition:   Current diet is Regular  Medications:   Patient is currently taking over-the-counter supplements  OTC medications include: see medication list  Patient is able to manage medications  Activities of Daily Living (ADLs)/Instrumental Activities of Daily Living (IADLs):   Walk and transfer into and out of bed and chair?: Yes  Dress and groom yourself?: Yes    Bathe or shower yourself?: Yes    Feed yourself? Yes  Do your laundry/housekeeping?: Yes  Manage your money, pay your bills and track your expenses?: Yes  Make your own meals?: Yes    Do your own shopping?: Yes    Previous Hospitalizations:   Any hospitalizations or ED visits within the last 12 months?: No      Advance Care Planning:   Living will: Yes    Durable POA for healthcare: Yes    Advanced directive: Yes      PREVENTIVE SCREENINGS      Cardiovascular Screening:    General: Screening Not Indicated and History Lipid Disorder      Diabetes Screening:     General: Screening Current      Colorectal Cancer Screening:     General: Screening Current      Breast Cancer Screening:     General: Screening Current      Cervical Cancer Screening:    General: Screening Not Indicated      Osteoporosis Screening:    General: Screening Not Indicated and History Osteoporosis      Lung Cancer Screening:     General: Screening Not Indicated      Hepatitis C Screening:    General: Screening Current    Screening, Brief Intervention, and Referral to Treatment (SBIRT)    Screening  Typical number of drinks in a day: 0  Typical number of drinks in a week: 0  Interpretation: Low risk drinking behavior  AUDIT-C Screenin) How often did you have a drink containing alcohol in the past year? never  2) How many drinks did you have on a typical day when you were drinking in the past year? 0  3) How often did you have 6 or more drinks on one occasion in the past year? never    AUDIT-C Score: 0  Interpretation: Score 0-2 (female): Negative screen for alcohol misuse    Single Item Drug Screening:   How often have you used an illegal drug (including marijuana) or a prescription medication for non-medical reasons in the past year? never    Single Item Drug Screen Score: 0  Interpretation: Negative screen for possible drug use disorder    No exam data present     Physical Exam:     /62 (BP Location: Left arm, Patient Position: Sitting)   Pulse 82   Temp 98 2 °F (36 8 °C)   Resp 18   Ht 5' 1 5" (1 562 m)   Wt 73 1 kg (161 lb 3 2 oz)   SpO2 96%   BMI 29 97 kg/m²     Physical Exam  Vitals and nursing note reviewed  Constitutional:       General: She is not in acute distress  Appearance: She is well-developed  HENT:      Head: Normocephalic and atraumatic  Mouth/Throat:      Mouth: Mucous membranes are moist    Eyes:      Conjunctiva/sclera: Conjunctivae normal    Cardiovascular:      Rate and Rhythm: Normal rate and regular rhythm  Heart sounds: No murmur heard  Pulmonary:      Effort: Pulmonary effort is normal  No respiratory distress  Breath sounds: Normal breath sounds  Abdominal:      Palpations: Abdomen is soft  Tenderness: There is no abdominal tenderness  Musculoskeletal:         General: Normal range of motion  Cervical back: Neck supple  Skin:     General: Skin is warm and dry  Neurological:      Mental Status: She is alert  Psychiatric:         Mood and Affect: Mood normal       BMI Counseling: Body mass index is 29 97 kg/m²  The BMI is above normal  Nutrition recommendations include reducing portion sizes, decreasing overall calorie intake, consuming healthier snacks, decreasing soda and/or juice intake, moderation in carbohydrate intake, increasing intake of lean protein and reducing intake of saturated fat and trans fat  Exercise recommendations include exercising 3-5 times per week    Juan Gallegos DO

## 2022-06-20 NOTE — PROGRESS NOTES
Assessment/Plan:       Problem List Items Addressed This Visit        Digestive    Gastroesophageal reflux disease without esophagitis     Stable at this time no change in diet regimen           Slow transit constipation - Primary     Stable no changes              Nervous and Auditory    Lumbar radiculopathy     Patient is followed by Dr Ashley Lopes at [facility] for pain management and this spinal injections have been helpful  She will need to do core strengthening exercises pelvic tilt exercises and follow-up with both her pain management and the chiropractor              Other    Hyponatremia    Medicare annual wellness visit, subsequent    Plate[address] [address] )    Erosion of bladder suspension mesh ([facility] [address] )     Sees Uro gyn for treatment- postponing currently  Subjective:      Patient ID: Raul Holloway is a 81 y o  female  Check up fatigue and back pain      The following portions of the patient's history were reviewed and updated as appropriate: allergies, current medications, past family history, past medical history, past social history, past surgical history and problem list     Review of Systems   Constitutional: Negative for chills, fatigue and fever  HENT: Negative for congestion, nosebleeds, rhinorrhea, sinus pressure and sore throat  Eyes: Negative for discharge and redness  Respiratory: Negative for cough and shortness of breath  Cardiovascular: Negative for chest pain, palpitations and leg swelling  Gastrointestinal: Negative for abdominal pain, blood in stool and nausea  Endocrine: Negative for cold intolerance, heat intolerance and polyuria  Genitourinary: Negative for dysuria and frequency  Musculoskeletal: Positive for back pain  Negative for arthralgias and myalgias  Skin: Negative for rash  Neurological: Negative for dizziness, weakness and headaches  Hematological: Negative for adenopathy     Psychiatric/Behavioral: Negative for behavioral problems and sleep disturbance  The patient is not nervous/anxious  Objective:      /62 (BP Location: Left arm, Patient Position: Sitting)   Pulse 82   Temp 98 2 °F (36 8 °C)   Resp 18   Ht 5' 1 5" (1 562 m)   Wt 73 1 kg (161 lb 3 2 oz)   SpO2 96%   BMI 29 97 kg/m²        Physical Exam  Vitals and nursing note reviewed  Constitutional:       General: She is not in acute distress  Appearance: Normal appearance  She is well-developed  HENT:      Head: Normocephalic and atraumatic  Right Ear: Tympanic membrane and external ear normal       Left Ear: Tympanic membrane and external ear normal       Nose: Nose normal       Mouth/Throat:      Mouth: Mucous membranes are moist       Pharynx: No oropharyngeal exudate  Eyes:      General: No scleral icterus  Right eye: No discharge  Left eye: No discharge  Conjunctiva/sclera: Conjunctivae normal       Pupils: Pupils are equal, round, and reactive to light  Neck:      Thyroid: No thyromegaly  Vascular: No JVD  Cardiovascular:      Rate and Rhythm: Normal rate and regular rhythm  Heart sounds: Normal heart sounds  No murmur heard  Pulmonary:      Effort: Pulmonary effort is normal       Breath sounds: No wheezing or rales  Chest:      Chest wall: No tenderness  Abdominal:      General: Bowel sounds are normal  There is no distension  Palpations: Abdomen is soft  There is no mass  Tenderness: There is no abdominal tenderness  Musculoskeletal:         General: No tenderness or deformity  Normal range of motion  Cervical back: Normal range of motion  Lymphadenopathy:      Cervical: No cervical adenopathy  Skin:     General: Skin is warm and dry  Findings: No rash  Neurological:      Mental Status: She is alert and oriented to person, place, and time  Cranial Nerves: No cranial nerve deficit  Coordination: Coordination normal       Deep Tendon Reflexes: Reflexes are normal and symmetric  Reflexes normal    Psychiatric:         Behavior: Behavior normal          Thought Content: Thought content normal          Judgment: Judgment normal           Data:    Laboratory Results: I have personally reviewed the pertinent laboratory results/reports   Radiology/Other Diagnostic Testing Results: I have personally reviewed pertinent reports         Lab Results   Component Value Date    WBC 4 48 05/30/2022    HGB 11 7 05/30/2022    HCT 36 5 05/30/2022    MCV 97 05/30/2022     05/30/2022     Lab Results   Component Value Date    K 4 1 05/30/2022     05/30/2022    CO2 28 05/30/2022    BUN 12 05/30/2022    CREATININE 0 67 05/30/2022    GLUF 93 05/30/2022    CALCIUM 9 3 05/30/2022    AST 18 05/30/2022    ALT 15 05/30/2022    ALKPHOS 67 05/30/2022    EGFR 89 05/30/2022     Lab Results   Component Value Date    CHOLESTEROL 210 (H) 05/30/2022    CHOLESTEROL 213 (H) 12/06/2021    CHOLESTEROL 220 (H) 01/05/2021     Lab Results   Component Value Date    HDL 44 (L) 05/30/2022    HDL 57 12/06/2021    HDL 58 01/05/2021     Lab Results   Component Value Date    LDLCALC 148 (H) 05/30/2022    LDLCALC 141 (H) 12/06/2021    LDLCALC 149 (H) 01/05/2021     Lab Results   Component Value Date    TRIG 88 05/30/2022    TRIG 74 12/06/2021    TRIG 64 01/05/2021     No results found for: [place], [place]  Lab Results   Component Value Date    BTE3WCBGHTWA 2 420 12/06/2021     Lab Results   Component Value Date    HGBA1C 5 4 05/30/2022     No results found for: GABE Garcia DO

## 2022-06-28 NOTE — TELEPHONE ENCOUNTER
PT called in today 6/28/22 and canceled appt  She said her family dr told her the appt was not needed   No r/s

## 2022-06-29 ENCOUNTER — HOSPITAL ENCOUNTER (OUTPATIENT)
Dept: BONE DENSITY | Facility: HOSPITAL | Age: 72
Discharge: HOME | End: 2022-06-29
Attending: FAMILY MEDICINE
Payer: MEDICARE

## 2022-06-29 ENCOUNTER — HOSPITAL ENCOUNTER (OUTPATIENT)
Dept: NON INVASIVE DIAGNOSTICS | Facility: HOSPITAL | Age: 72
Discharge: HOME | End: 2022-06-29
Attending: FAMILY MEDICINE
Payer: MEDICARE

## 2022-06-29 DIAGNOSIS — M81.0 OSTEOPOROSIS, UNSPECIFIED OSTEOPOROSIS TYPE, UNSPECIFIED PATHOLOGICAL FRACTURE PRESENCE: ICD-10-CM

## 2022-06-29 DIAGNOSIS — H53.122 TRANSIENT VISUAL LOSS, LEFT EYE: ICD-10-CM

## 2022-06-29 DIAGNOSIS — H53.482 VISUAL FIELD CONSTRICTION OF LEFT EYE: ICD-10-CM

## 2022-06-29 PROCEDURE — 93880 EXTRACRANIAL BILAT STUDY: CPT

## 2022-06-29 PROCEDURE — 77080 DXA BONE DENSITY AXIAL: CPT

## 2022-06-30 PROCEDURE — 93880 EXTRACRANIAL BILAT STUDY: CPT | Performed by: SURGERY

## 2022-07-05 ENCOUNTER — OFFICE VISIT (OUTPATIENT)
Dept: PODIATRY | Facility: CLINIC | Age: 72
End: 2022-07-05
Payer: MEDICARE

## 2022-07-05 VITALS
BODY MASS INDEX: 29.44 KG/M2 | HEART RATE: 87 BPM | HEIGHT: 62 IN | OXYGEN SATURATION: 98 % | SYSTOLIC BLOOD PRESSURE: 120 MMHG | WEIGHT: 160 LBS | DIASTOLIC BLOOD PRESSURE: 66 MMHG

## 2022-07-05 DIAGNOSIS — M79.671 RIGHT FOOT PAIN: Primary | ICD-10-CM

## 2022-07-05 DIAGNOSIS — M76.821 POSTERIOR TIBIAL TENDINITIS OF RIGHT LOWER EXTREMITY: ICD-10-CM

## 2022-07-05 PROCEDURE — 99203 OFFICE O/P NEW LOW 30 MIN: CPT | Performed by: STUDENT IN AN ORGANIZED HEALTH CARE EDUCATION/TRAINING PROGRAM

## 2022-07-05 RX ORDER — METHYLPREDNISOLONE 4 MG/1
TABLET ORAL
Qty: 21 TABLET | Refills: 0 | Status: SHIPPED | OUTPATIENT
Start: 2022-07-05

## 2022-07-05 NOTE — PATIENT INSTRUCTIONS
Tendinitis   AMBULATORY CARE:   Achilles tendinitis  is swelling of the tendon that connects your calf muscle to your heel bone  It may happen suddenly or become a chronic condition  Your risk for Achilles tendinitis increases as you age  Contact your healthcare provider if:   You have a fever  Your swelling or pain gets worse  You feel or hear a sudden pop near your ankle  You cannot bend your ankle or put pressure on your leg  You have questions about your condition or care  Common signs and symptoms include the following:   Pain in your heel that gets worse with activity    Swelling in your heel or calf    Stiffness in your heel or calf    Treatment of Achilles tendinitis  may include medicine, physical therapy, or support devices  You may need surgery or other procedures if your Achilles tendinitis does not get better with other treatments  NSAIDs , such as ibuprofen, help decrease swelling, pain, and fever  This medicine is available with or without a doctor's order  NSAIDs can cause stomach bleeding or kidney problems in certain people  If you take blood thinner medicine, always ask your healthcare provider if NSAIDs are safe for you  Always read the medicine label and follow directions  Take your medicine as directed  Contact your healthcare provider if you think your medicine is not helping or if you have side effects  Tell him or her if you are allergic to any medicine  Keep a list of the medicines, vitamins, and herbs you take  Include the amounts, and when and why you take them  Bring the list or the pill bottles to follow-up visits  Carry your medicine list with you in case of an emergency  Manage your Achilles tendinitis:   Rest  as directed  Rest decreases swelling and prevents your tendinitis from getting worse  Your healthcare provider may tell you to stop your usual training or exercise activities  Ask him when you can return to your normal activities or exercise plan  Apply ice  on your Achilles tendon for 15 to 20 minutes every hour or as directed  Use an ice pack, or put crushed ice in a plastic bag  Cover it with a towel  Ice helps prevent tissue damage and decreases swelling and pain  Wear a compression bandage or use tape  as directed  This will decrease swelling and pain  Ask your healthcare provider how to wrap a compression bandage or apply tape  If you use a support device ask if you should wear a compression bandage or use tape  Elevate  your heel above the level of your heart as often as you can  This will help decrease swelling and pain  Prop your heel on pillows or blankets to keep it elevated comfortably  Stretch  as directed when you return to your exercise program  Always warm up your muscles and stretch before you exercise  Do cool down exercises and stretches when you are finished  This will keep your muscles loose and decrease stress on your Achilles tendon  Do bilateral heel drop exercises as directed  Bilateral heel drops strengthen your Achilles tendon  Do not do the following exercise unless your healthcare provider says it is safe:     Stand at the edge of a stair or raised step  Hold onto the railing for balance  Place the front part of your foot on the stair or step  Let the back of your foot hang off of the stair or step  Slowly lift your heels off the ground and then slowly lower your heels past the stair  Do not move your heels quickly  This could make your injury worse  Repeat this exercise 20 times or as directed  Slowly increase the time and intensity when you return to your exercise program   Start with short and low intensity exercises  Ask your healthcare provider how and when to increase the time and intensity of your exercise  Wear support devices or supportive shoes as directed  Support devices may include a splint, orthotic, or brace   These devices will decrease pressure on your Achilles tendon and help relieve pain  Supportive shoes will cushion your heel and protect your Achilles tendon  Replace shoes or sneakers that are worn out  Go to physical therapy and practice exercises as directed:  A physical therapist teaches you exercises to help improve movement and strength, and decrease pain  Practice these exercises at home as directed  Follow up with your doctor as directed:  Write down your questions so you remember to ask them during your visits  © Copyright [address] 2022 Information is for End User's use only and may not be sold, redistributed or otherwise used for commercial purposes  All illustrations and images included in CareNotes® are the copyrighted property of A D A M [employer]  or [address] [place] [address]  The above information is an  only  It is not intended as medical advice for individual conditions or treatments  Talk to your doctor, nurse or pharmacist before following any medical regimen to see if it is safe and effective for you

## 2022-07-05 NOTE — PROGRESS NOTES
Assessment/Plan:    No problem-specific Assessment & Plan notes found for this encounter  Diagnoses and all orders for this visit:    Right foot pain  -     Cam Boot  -     methylPREDNISolone 4 MG tablet therapy pack; Use as directed on package    Posterior tibial tendinitis of right lower extremity  -     Cam Boot  -     methylPREDNISolone 4 MG tablet therapy pack; Use as directed on package      Plan:   -  Patient was counseled and educated on the condition and the diagnosis  The diagnosis, treatment options and prognosis were discussed in detail    - I recommended resting inflamed tendon via immobilization with CAM walker  I Rx Medrol Dose pack to calm down the inflammation  I advised patient to hold of on gym and long walks for few weeks  - Provided home therapy for stretching exercises  - RICE protocol   - RTC 4 weeks    Subjective:      Patient ID: Anitra Carlin is a 81 y o  female  61-year-old female with no significant past medical history presents with complaint of right medial foot pain  Patient reports since March she started noticing pain in the medial side of her heel and medial malleolus  He reports it is sharp shooting pain at times and sometimes it feels like needles in her foot  The pain is usually with activity  She has been wearing new balance shoes without much relief  She denies any other complaints at this time  Denies nausea vomiting fever chills shortness of breath  The following portions of the patient's history were reviewed and updated as appropriate:   She  has a past medical history of GERD (gastroesophageal reflux disease), Herniated disc, cervical, and Hyperlipidemia    She   Patient Active Problem List    Diagnosis Date Noted    Atypical migraine 06/02/2022    Visual field constriction of left eye 06/02/2022    Visual disturbance 05/29/2022    Post-COVID chronic fatigue 05/17/2022    Nausea 05/12/2022    COVID-19 virus infection 05/10/2022    Tachycardia 03/14/2022    Erosion of bladder suspension mesh (HCC) 03/04/2022    Pelvic organ prolapse quantification stage 2 cystocele 03/04/2022    Acute cystitis without hematuria 02/17/2022    Positive anaplasmosis titer 10/01/2021    Cold hands 10/01/2021    Rash of unknown etiology 08/02/2021    Platelets decreased ([address] ) 06/28/2021    Chronic GERD 06/28/2021    Medicare annual wellness visit, subsequent 06/18/2021    Peroneal tendinitis of left lower extremity 06/08/2021    Viral syndrome 11/20/2020    Exposure to COVID-19 virus 11/18/2020    Left ear pain 06/22/2020    Combined forms of age-related cataract of both eyes 06/22/2020    Overweight with body mass index (BMI) of 29 to 29 9 in adult 06/17/2020    Osteopenia of multiple sites 06/17/2020    Family history of thyroid disease in mother 06/17/2020    Contact stomatitis 01/20/2020    Slow transit constipation 12/12/2019    Hyponatremia 11/26/2019    Neck pain 02/28/2019    Degeneration of intervertebral disc 12/06/2018    Hip pain 12/06/2018    Low back pain 12/06/2018    Gastroesophageal reflux disease without esophagitis 12/06/2018    Lumbar radiculopathy 08/13/2018     She  has a past surgical history that includes Tonsillectomy; Bladder repair; REMOVAL OF INTRAUTERINE DEVICE (IUD); [place] tooth extraction; Tumor removal; Cataract extraction, bilateral; and Colonoscopy       Review of Systems   Constitutional: Negative for chills and fever  Respiratory: Negative for shortness of breath  Gastrointestinal: Negative for diarrhea and vomiting  Musculoskeletal: Positive for arthralgias  Skin: Negative for color change  Neurological: Negative for dizziness  Psychiatric/Behavioral: Negative for agitation           Objective:      /66 (BP Location: Right arm, Patient Position: Sitting, Cuff Size: Standard)   Pulse 87   Ht 5' 2" (1 575 m)   Wt 72 6 kg (160 lb)   SpO2 98%   BMI 29 26 kg/m²          Physical Exam  Vitals reviewed  Cardiovascular:      Rate and Rhythm: Normal rate  Pulses: Normal pulses  Pulmonary:      Effort: Pulmonary effort is normal    Musculoskeletal:         General: Swelling and tenderness present  Right foot: Tenderness present  Comments: Pain elicited with palpation to the posterior tibial tendon complex from distal 1/3 leg all the way to its insertion site on the navicular  Pain with inversion and PF of the foot  Generalized edema noted medial rearfoot/ankle area along the tendon course  Stance exam:  Right pes planus deformity, medial longitudinal arch collapsed, Upon hallux extension medial arch was recreated  Heel in mild valgus  Patient unable to perform single heel raise  Too many toe sign present  Decreased ankle DF with knee extended and flexed- positive for equinus  Gait exam: Pronated gait     Skin:     General: Skin is warm and dry  Capillary Refill: Capillary refill takes less than 2 seconds  Neurological:      General: No focal deficit present  Mental Status: She is alert     Psychiatric:         Mood and Affect: Mood normal

## 2022-07-06 ENCOUNTER — OFFICE VISIT (OUTPATIENT)
Dept: FAMILY MEDICINE CLINIC | Facility: CLINIC | Age: 72
End: 2022-07-06
Payer: MEDICARE

## 2022-07-06 VITALS
DIASTOLIC BLOOD PRESSURE: 82 MMHG | RESPIRATION RATE: 18 BRPM | OXYGEN SATURATION: 97 % | HEART RATE: 91 BPM | TEMPERATURE: 97.9 F | HEIGHT: 62 IN | WEIGHT: 161.2 LBS | BODY MASS INDEX: 29.66 KG/M2 | SYSTOLIC BLOOD PRESSURE: 124 MMHG

## 2022-07-06 DIAGNOSIS — E78.2 MIXED HYPERLIPIDEMIA: ICD-10-CM

## 2022-07-06 DIAGNOSIS — M76.821 POSTERIOR TIBIAL TENDINITIS OF RIGHT LOWER EXTREMITY: Primary | ICD-10-CM

## 2022-07-06 DIAGNOSIS — R23.3 PETECHIAE: ICD-10-CM

## 2022-07-06 DIAGNOSIS — M81.6 LOCALIZED OSTEOPOROSIS WITHOUT CURRENT PATHOLOGICAL FRACTURE: ICD-10-CM

## 2022-07-06 DIAGNOSIS — H53.482 VISUAL FIELD CONSTRICTION OF LEFT EYE: ICD-10-CM

## 2022-07-06 PROCEDURE — 99214 OFFICE O/P EST MOD 30 MIN: CPT | Performed by: NURSE PRACTITIONER

## 2022-07-06 RX ORDER — ECHINACEA 400 MG
1150 CAPSULE ORAL DAILY
COMMUNITY

## 2022-07-06 NOTE — PROGRESS NOTES
OFFICE VISIT  Mally Wolff 81 y o  female MRN: [id number]          Assessment / Plan:  Problem List Items Addressed This Visit        Musculoskeletal and Integument    Posterior tibial tendinitis of right lower extremity - Primary     To be wearing cam walker boot, on prednisone taper at this time  Localized osteoporosis without current pathological fracture     Recent dexa scan completed  Reviewed options, including prolia  To discuss with Dr at next visit               Other    Visual field constriction of left eye     Seen in ED, stroke work up negative, no further occurrences  Mixed hyperlipidemia     Had a brief discussion on statin therapy due to prev occurrence of loss of visual field  She will continue with her omega 3 and garlic, will have repeat level and talk with Dr Citlalli Alarcon at next appt, statin therapy advised            Petechiae     bilateral to both legs, last CBC normal limits  Reason For Visit / Chief Complaint  Chief Complaint   Patient presents with    Rash     On both legs          HPI:  Mally Wolff is a 81 y o  female who presents today for rash to both legs  She was told she was told she had capillaries to her legs  She reports this has been present for one month, does not bother her  She was recent seen by podiatry, dx with tibial tendinitis, on prednisone  She reports this starting approx 2 months ago          Historical Information   Past Medical History:   Diagnosis Date    GERD (gastroesophageal reflux disease)     Herniated disc, cervical     Hyperlipidemia      Past Surgical History:   Procedure Laterality Date    BLADDER REPAIR      sling    CATARACT EXTRACTION, BILATERAL      COLONOSCOPY      REMOVAL OF INTRAUTERINE DEVICE (IUD)      in abd area    TONSILLECTOMY      TUMOR REMOVAL      pinky finger 1977    WISDOM TOOTH EXTRACTION       Social History   Social History     Substance and Sexual Activity   Alcohol Use Never     Social History     Substance and Sexual Activity   Drug Use Never     Social History     Tobacco Use   Smoking Status Never Smoker   Smokeless Tobacco Never Used     Family History   Problem Relation Age of Onset    Hypertension Mother         Pulmonary    Osteoporosis Mother     Heart failure Mother     Heart attack Father     Hypertension Father     Stroke Father     No Known Problems Sister     No Known Problems Daughter     No Known Problems Maternal Grandmother     No Known Problems Paternal Grandmother     No Known Problems Daughter     No Known Problems Maternal Aunt     No Known Problems Maternal Aunt     Breast cancer Maternal Aunt     No Known Problems Maternal Aunt     No Known Problems Paternal Aunt        Meds/Allergies   Allergies   Allergen Reactions    Eggs Or Egg-Derived Products - Food Allergy Other (See Comments)    Etodolac      Other reaction(s): chest pain, dizziness, nausea    Nitrofurantoin Other (See Comments)    Sucralfate Dizziness and Headache    Sulfa Antibiotics Hives and Other (See Comments)    Levofloxacin Dizziness    Omeprazole Headache and Fatigue    Pregabalin Chest Pain    Aspirin      Other reaction(s): GI upset    Chocolate - Food Allergy     Famotidine Other (See Comments)    Fluconazole Other (See Comments)    Ibuprofen      Other reaction(s): GI upset    Naproxen      Other reaction(s): GI upset    Penicillin G      Other reaction(s): swollen lips, tingling around mouth       Meds:    Current Outpatient Medications:     Black Elderberry,Berry-Flower, 575 MG CAPS, Take 1,150 mg by mouth daily, Disp: , Rfl:     cholecalciferol (VITAMIN D3) 1,000 units tablet, Take 1,000 Units by mouth daily, Disp: , Rfl:     co-enzyme Q-10 50 MG capsule, Take 100 mg by mouth daily, Disp: , Rfl:     Garlic 034 MG TABS, Take 100 mg by mouth, Disp: , Rfl:     loratadine (CLARITIN) 10 mg tablet, Take 10 mg by mouth daily  , Disp: , Rfl:     methylPREDNISolone 4 MG tablet therapy pack, Use as directed on package, Disp: 21 tablet, Rfl: 0    Multiple Vitamins-Minerals (PreserVision/Lutein) CAPS, Take by mouth, Disp: , Rfl:     Omega-3 Fatty Acids (Fish Oil) 1000 MG CPDR, Take by mouth, Disp: , Rfl:     polyethylene glycol-propylene glycol (Systane) 0 4-0 3 %, Systane (PF) 0 4 %-0 3 % eye drops in a dropperette, Disp: , Rfl:     Probiotic Product (PROBIOTIC ACIDOPHILUS BEADS PO), Take by mouth, Disp: , Rfl:     ondansetron (ZOFRAN) 4 mg tablet, Take 1 tablet (4 mg total) by mouth every 8 (eight) hours as needed for nausea or vomiting (Patient not taking: No sig reported), Disp: 20 tablet, Rfl: 0      REVIEW OF SYSTEMS  Review of Systems   Constitutional: Negative for activity change, chills, fatigue and fever  HENT: Negative for congestion, ear discharge, ear pain, sinus pressure, sinus pain, sore throat, tinnitus and trouble swallowing  Eyes: Negative for photophobia, pain, discharge, itching and visual disturbance  Respiratory: Negative for cough, chest tightness, shortness of breath and wheezing  Cardiovascular: Negative for chest pain and leg swelling  Gastrointestinal: Negative for abdominal distention, abdominal pain, constipation, diarrhea, nausea and vomiting  Endocrine: Negative for polydipsia, polyphagia and polyuria  Genitourinary: Negative for dysuria and frequency  Musculoskeletal: Negative for arthralgias, myalgias, neck pain and neck stiffness  Skin: Positive for rash  Negative for color change  Neurological: Negative for dizziness, syncope, weakness, numbness and headaches  Hematological: Does not bruise/bleed easily  Psychiatric/Behavioral: Negative for behavioral problems, confusion, self-injury, sleep disturbance and suicidal ideas  The patient is not nervous/anxious              Current Vitals:   Blood Pressure: 124/82 (07/06/22 0857)  Pulse: 91 (07/06/22 0857)  Temperature: 97 9 °F (36 6 °C) (07/06/22 0857)  Respirations: 18 (07/06/22 0857)  Height: 5' 2" (157 5 cm) (07/06/22 0857)  Weight - Scale: 73 1 kg (161 lb 3 2 oz) (07/06/22 0857)  SpO2: 97 % (07/06/22 0857)  [unfilled]    PHYSICAL EXAMS:  Physical Exam  Vitals and nursing note reviewed  Constitutional:       General: She is not in acute distress  Appearance: Normal appearance  She is well-developed  HENT:      Head: Normocephalic and atraumatic  Eyes:      Conjunctiva/sclera: Conjunctivae normal    Cardiovascular:      Rate and Rhythm: Normal rate and regular rhythm  Pulses: Normal pulses  Heart sounds: Normal heart sounds  No murmur heard  Pulmonary:      Effort: Pulmonary effort is normal  No respiratory distress  Breath sounds: Normal breath sounds  Abdominal:      Palpations: Abdomen is soft  Tenderness: There is no abdominal tenderness  Musculoskeletal:      Cervical back: Neck supple  Skin:     General: Skin is warm and dry  Comments: petechiae present to lower legs    Neurological:      General: No focal deficit present  Mental Status: She is alert and oriented to person, place, and time  Psychiatric:         Mood and Affect: Mood normal          Behavior: Behavior normal              Lab, imaging and other studies: I have personally reviewed pertinent reports  Giselle Browning

## 2022-07-06 NOTE — ASSESSMENT & PLAN NOTE
Had a brief discussion on statin therapy due to prev occurrence of loss of visual field   She will continue with her omega 3 and garlic, will have repeat level and talk with Dr Trini Enrique at next appt, statin therapy advised

## 2022-07-26 ENCOUNTER — OFFICE VISIT (OUTPATIENT)
Dept: PODIATRY | Facility: CLINIC | Age: 72
End: 2022-07-26
Payer: MEDICARE

## 2022-07-26 VITALS
HEART RATE: 80 BPM | SYSTOLIC BLOOD PRESSURE: 118 MMHG | BODY MASS INDEX: 30 KG/M2 | WEIGHT: 163 LBS | DIASTOLIC BLOOD PRESSURE: 62 MMHG | HEIGHT: 62 IN | OXYGEN SATURATION: 99 %

## 2022-07-26 DIAGNOSIS — M21.6X2 EQUINUS DEFORMITY OF BOTH FEET: ICD-10-CM

## 2022-07-26 DIAGNOSIS — I87.2 VENOUS INSUFFICIENCY OF BOTH LOWER EXTREMITIES: ICD-10-CM

## 2022-07-26 DIAGNOSIS — M76.821 POSTERIOR TIBIAL TENDINITIS OF RIGHT LOWER EXTREMITY: Primary | ICD-10-CM

## 2022-07-26 DIAGNOSIS — B35.1 ONYCHOMYCOSIS: ICD-10-CM

## 2022-07-26 DIAGNOSIS — M21.6X1 EQUINUS DEFORMITY OF BOTH FEET: ICD-10-CM

## 2022-07-26 PROCEDURE — 99213 OFFICE O/P EST LOW 20 MIN: CPT | Performed by: STUDENT IN AN ORGANIZED HEALTH CARE EDUCATION/TRAINING PROGRAM

## 2022-07-26 PROCEDURE — 11721 DEBRIDE NAIL 6 OR MORE: CPT | Performed by: STUDENT IN AN ORGANIZED HEALTH CARE EDUCATION/TRAINING PROGRAM

## 2022-07-26 NOTE — PATIENT INSTRUCTIONS
Powerstep inserts neutral buy from [facility]  Achilles Tendinitis   AMBULATORY CARE:   Achilles tendinitis  is swelling of the tendon that connects your calf muscle to your heel bone  It may happen suddenly or become a chronic condition  Your risk for Achilles tendinitis increases as you age  Contact your healthcare provider if:   You have a fever  Your swelling or pain gets worse  You feel or hear a sudden pop near your ankle  You cannot bend your ankle or put pressure on your leg  You have questions about your condition or care  Common signs and symptoms include the following:   Pain in your heel that gets worse with activity    Swelling in your heel or calf    Stiffness in your heel or calf    Treatment of Achilles tendinitis  may include medicine, physical therapy, or support devices  You may need surgery or other procedures if your Achilles tendinitis does not get better with other treatments  NSAIDs , such as ibuprofen, help decrease swelling, pain, and fever  This medicine is available with or without a doctor's order  NSAIDs can cause stomach bleeding or kidney problems in certain people  If you take blood thinner medicine, always ask your healthcare provider if NSAIDs are safe for you  Always read the medicine label and follow directions  Take your medicine as directed  Contact your healthcare provider if you think your medicine is not helping or if you have side effects  Tell him or her if you are allergic to any medicine  Keep a list of the medicines, vitamins, and herbs you take  Include the amounts, and when and why you take them  Bring the list or the pill bottles to follow-up visits  Carry your medicine list with you in case of an emergency  Manage your Achilles tendinitis:   Rest  as directed  Rest decreases swelling and prevents your tendinitis from getting worse  Your healthcare provider may tell you to stop your usual training or exercise activities   Ask him when you can return to your normal activities or exercise plan  Apply ice  on your Achilles tendon for 15 to 20 minutes every hour or as directed  Use an ice pack, or put crushed ice in a plastic bag  Cover it with a towel  Ice helps prevent tissue damage and decreases swelling and pain  Wear a compression bandage or use tape  as directed  This will decrease swelling and pain  Ask your healthcare provider how to wrap a compression bandage or apply tape  If you use a support device ask if you should wear a compression bandage or use tape  Elevate  your heel above the level of your heart as often as you can  This will help decrease swelling and pain  Prop your heel on pillows or blankets to keep it elevated comfortably  Stretch  as directed when you return to your exercise program  Always warm up your muscles and stretch before you exercise  Do cool down exercises and stretches when you are finished  This will keep your muscles loose and decrease stress on your Achilles tendon  Do bilateral heel drop exercises as directed  Bilateral heel drops strengthen your Achilles tendon  Do not do the following exercise unless your healthcare provider says it is safe:     Stand at the edge of a stair or raised step  Hold onto the railing for balance  Place the front part of your foot on the stair or step  Let the back of your foot hang off of the stair or step  Slowly lift your heels off the ground and then slowly lower your heels past the stair  Do not move your heels quickly  This could make your injury worse  Repeat this exercise 20 times or as directed  Slowly increase the time and intensity when you return to your exercise program   Start with short and low intensity exercises  Ask your healthcare provider how and when to increase the time and intensity of your exercise  Wear support devices or supportive shoes as directed  Support devices may include a splint, orthotic, or brace   These devices will decrease pressure on your Achilles tendon and help relieve pain  Supportive shoes will cushion your heel and protect your Achilles tendon  Replace shoes or sneakers that are worn out  Go to physical therapy and practice exercises as directed:  A physical therapist teaches you exercises to help improve movement and strength, and decrease pain  Practice these exercises at home as directed  Follow up with your doctor as directed:  Write down your questions so you remember to ask them during your visits  © Copyright [employer] 2022 Information is for End User's use only and may not be sold, redistributed or otherwise used for commercial purposes  All illustrations and images included in CareNotes® are the copyrighted property of A D A M [employer] Inc  or [facility] Rocio Lackey   The above information is an  only  It is not intended as medical advice for individual conditions or treatments  Talk to your doctor, nurse or pharmacist before following any medical regimen to see if it is safe and effective for you

## 2022-07-26 NOTE — PROGRESS NOTES
Assessment/Plan:    No problem-specific Assessment & Plan notes found for this encounter  Diagnoses and all orders for this visit:    Posterior tibial tendinitis of right lower extremity    Equinus deformity of both feet    Venous insufficiency of both lower extremities    Onychomycosis    Other orders  -     vitamin E 100 UNIT capsule; Take 100 Units by mouth daily      Plan:     -  Patient was counseled and educated on the condition and the diagnosis  The diagnosis, treatment options and prognosis were discussed in detail    - I recommended formal physical therapy for equinus foot deformity and posterior tibial tendinitis on the right however patient refused at this time stating she will to exercises and stretching at home  Recommended continue supportive shoe gear with over-the-counter orthotics power steps  -  All 10 toenails were debridement as follow: using nail nipper, jakob, and curette, nails were sharply debrided, reduced in thickness and length  Devitalized nail tissue and fungal debris excised and removed  Patient tolerated well  - Patient was educated on importance of  daily foot assessment and proper shoe gear  Educational materials were provided  Patient will follow up annually for diabetic foot risk assessment  - return in 6 weeks for posterior tibial tendinitis follow-up in 3 months for routine foot care  Subjective:      Patient ID: Alejandra Jones is a 81 y o  female  81-year-old female presents for continued treatment of right posterior tibial tendonitis  Patient reports she has not worn Cam boot as recommended he felt unstable wearing it  She does report significant improvement in pain since taking Medrol Dosepak  She presents with a new onset of equinus foot deformity bilateral foot causing stiffness to the calf  Reports due to her back pain she is unable to trim her toenails and would like to establish care here for routine foot care as well    No other complaints at this time       The following portions of the patient's history were reviewed and updated as appropriate:   She  has a past medical history of GERD (gastroesophageal reflux disease), Herniated disc, cervical, and Hyperlipidemia    She   Patient Active Problem List    Diagnosis Date Noted    Mixed hyperlipidemia 07/06/2022    Posterior tibial tendinitis of right lower extremity 07/06/2022    Petechiae 07/06/2022    Localized osteoporosis without current pathological fracture 07/06/2022    Atypical migraine 06/02/2022    Visual field constriction of left eye 06/02/2022    Visual disturbance 05/29/2022    Post-COVID chronic fatigue 05/17/2022    Nausea 05/12/2022    COVID-19 virus infection 05/10/2022    Tachycardia 03/14/2022    Erosion of bladder suspension mesh ([facility] [address] ) 03/04/2022    Pelvic organ prolapse quantification stage 2 cystocele 03/04/2022    Acute cystitis without hematuria 02/17/2022    Positive anaplasmosis titer 10/01/2021    Cold hands 10/01/2021    Rash of unknown etiology 08/02/2021    Platelets decreased ([facility] [address] ) 06/28/2021    Chronic GERD 06/28/2021    Medicare annual wellness visit, subsequent 06/18/2021    Peroneal tendinitis of left lower extremity 06/08/2021    Viral syndrome 11/20/2020    Exposure to COVID-19 virus 11/18/2020    Left ear pain 06/22/2020    Combined forms of age-related cataract of both eyes 06/22/2020    Overweight with body mass index (BMI) of 29 to 29 9 in adult 06/17/2020    Osteopenia of multiple sites 06/17/2020    Family history of thyroid disease in mother 06/17/2020    Contact stomatitis 01/20/2020    Slow transit constipation 12/12/2019    Hyponatremia 11/26/2019    Neck pain 02/28/2019    Degeneration of intervertebral disc 12/06/2018    Hip pain 12/06/2018    Low back pain 12/06/2018    Gastroesophageal reflux disease without esophagitis 12/06/2018    Lumbar radiculopathy 08/13/2018     She  has a past surgical history that includes Tonsillectomy; Bladder repair; REMOVAL OF INTRAUTERINE DEVICE (IUD); [place] tooth extraction; Tumor removal; Cataract extraction, bilateral; and Colonoscopy       Review of Systems   Constitutional: Negative for chills  Respiratory: Negative for shortness of breath  Cardiovascular: Negative for leg swelling  Gastrointestinal: Negative for diarrhea  Musculoskeletal: Positive for arthralgias  Skin: Positive for color change  Neurological: Negative for dizziness  Psychiatric/Behavioral: Negative for agitation  Objective:      /62 (BP Location: Right arm, Patient Position: Sitting, Cuff Size: Standard)   Pulse 80   Ht 5' 2" (1 575 m)   Wt 73 9 kg (163 lb)   SpO2 99%   BMI 29 81 kg/m²          Physical Exam  Vitals reviewed  Cardiovascular:      Rate and Rhythm: Normal rate  Pulses:           Dorsalis pedis pulses are 1+ on the right side and 1+ on the left side  Posterior tibial pulses are 1+ on the right side and 1+ on the left side  Pulmonary:      Effort: Pulmonary effort is normal    Musculoskeletal:         General: No swelling or tenderness  Comments: Mild discomfort along the right posterior tibial tendon palpation  No pain with range of motion  Muscle manual test 5/5  Patient exhibits equinus foot deformity with decreased ankle dorsiflexion with knee extended compared to knee flexed on the right  Feet:      Right foot:      Protective Sensation: 10 sites tested  8 sites sensed  Skin integrity: Dry skin present  Toenail Condition: Right toenails are abnormally thick and long  Fungal disease present  Left foot:      Protective Sensation: 10 sites tested  8 sites sensed  Skin integrity: Dry skin present  Toenail Condition: Left toenails are abnormally thick and long  Fungal disease present  Comments: On exam patient has thickened, hypertrophic, discolored, brittle toenails with subungual debris and tenderness x10     Patient has lower extremity edema  PAtients skin is atrophic, thickened nails, and decreased pedal hair  Patient has decreased pinprick and vibratory sensation to his feet and parasthesia  Skin:     General: Skin is warm and dry  Neurological:      General: No focal deficit present  Mental Status: She is alert     Psychiatric:         Mood and Affect: Mood normal

## 2022-08-30 ENCOUNTER — APPOINTMENT (OUTPATIENT)
Dept: LAB | Facility: CLINIC | Age: 72
End: 2022-08-30
Payer: MEDICARE

## 2022-08-30 DIAGNOSIS — R79.9 ABNORMAL FINDING OF BLOOD CHEMISTRY, UNSPECIFIED: ICD-10-CM

## 2022-08-30 DIAGNOSIS — K59.01 SLOW TRANSIT CONSTIPATION: ICD-10-CM

## 2022-08-30 DIAGNOSIS — M54.16 LUMBAR RADICULOPATHY: ICD-10-CM

## 2022-08-30 DIAGNOSIS — Z00.00 MEDICARE ANNUAL WELLNESS VISIT, SUBSEQUENT: ICD-10-CM

## 2022-08-30 DIAGNOSIS — K21.9 GASTROESOPHAGEAL REFLUX DISEASE WITHOUT ESOPHAGITIS: ICD-10-CM

## 2022-08-30 LAB
ALBUMIN SERPL BCP-MCNC: 3.8 G/DL (ref 3.5–5)
ALP SERPL-CCNC: 90 U/L (ref 46–116)
ALT SERPL W P-5'-P-CCNC: 28 U/L (ref 12–78)
ANION GAP SERPL CALCULATED.3IONS-SCNC: 5 MMOL/L (ref 4–13)
AST SERPL W P-5'-P-CCNC: 11 U/L (ref 5–45)
BASOPHILS # BLD AUTO: 0.02 THOUSANDS/ΜL (ref 0–0.1)
BASOPHILS NFR BLD AUTO: 0 % (ref 0–1)
BILIRUB SERPL-MCNC: 0.45 MG/DL (ref 0.2–1)
BUN SERPL-MCNC: 12 MG/DL (ref 5–25)
CALCIUM SERPL-MCNC: 9.2 MG/DL (ref 8.3–10.1)
CHLORIDE SERPL-SCNC: 103 MMOL/L (ref 96–108)
CHOLEST SERPL-MCNC: 222 MG/DL
CO2 SERPL-SCNC: 29 MMOL/L (ref 21–32)
CREAT SERPL-MCNC: 0.72 MG/DL (ref 0.6–1.3)
EOSINOPHIL # BLD AUTO: 0.01 THOUSAND/ΜL (ref 0–0.61)
EOSINOPHIL NFR BLD AUTO: 0 % (ref 0–6)
ERYTHROCYTE [DISTWIDTH] IN BLOOD BY AUTOMATED COUNT: 13 % (ref 11.6–15.1)
GFR SERPL CREATININE-BSD FRML MDRD: 85 ML/MIN/1.73SQ M
GLUCOSE P FAST SERPL-MCNC: 92 MG/DL (ref 65–99)
HCT VFR BLD AUTO: 39.1 % (ref 34.8–46.1)
HDLC SERPL-MCNC: 54 MG/DL
HGB BLD-MCNC: 12.5 G/DL (ref 11.5–15.4)
IMM GRANULOCYTES # BLD AUTO: 0.02 THOUSAND/UL (ref 0–0.2)
IMM GRANULOCYTES NFR BLD AUTO: 0 % (ref 0–2)
LDLC SERPL CALC-MCNC: 149 MG/DL (ref 0–100)
LYMPHOCYTES # BLD AUTO: 1.67 THOUSANDS/ΜL (ref 0.6–4.47)
LYMPHOCYTES NFR BLD AUTO: 23 % (ref 14–44)
MCH RBC QN AUTO: 30.2 PG (ref 26.8–34.3)
MCHC RBC AUTO-ENTMCNC: 32 G/DL (ref 31.4–37.4)
MCV RBC AUTO: 94 FL (ref 82–98)
MONOCYTES # BLD AUTO: 0.57 THOUSAND/ΜL (ref 0.17–1.22)
MONOCYTES NFR BLD AUTO: 8 % (ref 4–12)
NEUTROPHILS # BLD AUTO: 4.95 THOUSANDS/ΜL (ref 1.85–7.62)
NEUTS SEG NFR BLD AUTO: 69 % (ref 43–75)
NONHDLC SERPL-MCNC: 168 MG/DL
NRBC BLD AUTO-RTO: 0 /100 WBCS
PLATELET # BLD AUTO: 314 THOUSANDS/UL (ref 149–390)
PMV BLD AUTO: 10.3 FL (ref 8.9–12.7)
POTASSIUM SERPL-SCNC: 3.8 MMOL/L (ref 3.5–5.3)
PROT SERPL-MCNC: 7.7 G/DL (ref 6.4–8.4)
RBC # BLD AUTO: 4.14 MILLION/UL (ref 3.81–5.12)
SODIUM SERPL-SCNC: 137 MMOL/L (ref 135–147)
TRIGL SERPL-MCNC: 94 MG/DL
TSH SERPL DL<=0.05 MIU/L-ACNC: 2.72 UIU/ML (ref 0.45–4.5)
WBC # BLD AUTO: 7.24 THOUSAND/UL (ref 4.31–10.16)

## 2022-08-30 PROCEDURE — 36415 COLL VENOUS BLD VENIPUNCTURE: CPT

## 2022-08-30 PROCEDURE — 84443 ASSAY THYROID STIM HORMONE: CPT

## 2022-08-30 PROCEDURE — 85025 COMPLETE CBC W/AUTO DIFF WBC: CPT

## 2022-08-30 PROCEDURE — 80053 COMPREHEN METABOLIC PANEL: CPT

## 2022-08-30 PROCEDURE — 80061 LIPID PANEL: CPT

## 2022-09-22 ENCOUNTER — OFFICE VISIT (OUTPATIENT)
Dept: FAMILY MEDICINE CLINIC | Facility: CLINIC | Age: 72
End: 2022-09-22
Payer: MEDICARE

## 2022-09-22 VITALS
OXYGEN SATURATION: 98 % | HEART RATE: 74 BPM | TEMPERATURE: 96.8 F | DIASTOLIC BLOOD PRESSURE: 76 MMHG | RESPIRATION RATE: 18 BRPM | BODY MASS INDEX: 28.97 KG/M2 | WEIGHT: 157.4 LBS | SYSTOLIC BLOOD PRESSURE: 128 MMHG | HEIGHT: 62 IN

## 2022-09-22 DIAGNOSIS — M54.16 LUMBAR RADICULOPATHY: Primary | ICD-10-CM

## 2022-09-22 DIAGNOSIS — E78.2 MIXED HYPERLIPIDEMIA: ICD-10-CM

## 2022-09-22 DIAGNOSIS — Z12.31 VISIT FOR SCREENING MAMMOGRAM: ICD-10-CM

## 2022-09-22 DIAGNOSIS — H53.482 VISUAL FIELD CONSTRICTION OF LEFT EYE: ICD-10-CM

## 2022-09-22 DIAGNOSIS — M81.6 LOCALIZED OSTEOPOROSIS WITHOUT CURRENT PATHOLOGICAL FRACTURE: ICD-10-CM

## 2022-09-22 DIAGNOSIS — K21.9 CHRONIC GERD: ICD-10-CM

## 2022-09-22 PROCEDURE — 99214 OFFICE O/P EST MOD 30 MIN: CPT | Performed by: FAMILY MEDICINE

## 2022-09-22 RX ORDER — VITAMIN B COMPLEX
TABLET ORAL
COMMUNITY

## 2022-09-22 NOTE — PROGRESS NOTES
Assessment/Plan:       Problem List Items Addressed This Visit        Digestive    Chronic GERD     No change in medications continue same dosing            Nervous and Auditory    Lumbar radiculopathy - Primary     Recommend continuation of exercise program core strengthening and low back stretching            Musculoskeletal and Integument    Localized osteoporosis without current pathological fracture     Continue calcium vitamin-D and check DEXA scan every 2 years            Other    Visual field constriction of left eye     History of brief transient visual field deficit of the left eye with no residual affects and complete resolution  She will continue her same diet regimen follow-up with Ophthalmology every 6 months for the next year then yearly  Feels that this was a result of the estrogen cream which she discontinued  Her workup with MRI carotids and lab work has been stable  Mixed hyperlipidemia    Relevant Orders    Comprehensive metabolic panel    Lipid panel    TSH, 3rd generation with Free T4 reflex      Other Visit Diagnoses     Visit for screening mammogram        Relevant Orders    Mammo screening bilateral w 3d & cad            Subjective:      Patient ID: Abhijit Flores is a 81 y o  female  Patient is here for her follow-up evaluation and discussion regarding her ongoing health concerns with back pain leg pain constipation and brief visual loss transient ischemic attack      The following portions of the patient's history were reviewed and updated as appropriate: allergies, current medications, past family history, past medical history, past social history, past surgical history and problem list     Review of Systems   Constitutional: Negative for chills, fatigue and fever  HENT: Negative for congestion, nosebleeds, rhinorrhea, sinus pressure and sore throat  Eyes: Negative for discharge and redness  Respiratory: Negative for cough and shortness of breath      Cardiovascular: Negative for chest pain, palpitations and leg swelling  Gastrointestinal: Positive for constipation  Negative for abdominal pain, blood in stool and nausea  Endocrine: Negative for cold intolerance, heat intolerance and polyuria  Genitourinary: Negative for dysuria and frequency  Musculoskeletal: Positive for back pain  Negative for arthralgias and myalgias  Skin: Negative for rash  Neurological: Negative for dizziness, weakness and headaches  Hematological: Negative for adenopathy  Psychiatric/Behavioral: Negative for behavioral problems and sleep disturbance  The patient is not nervous/anxious  Objective:      /76 (BP Location: Left arm, Patient Position: Sitting)   Pulse 74   Temp (!) 96 8 °F (36 °C)   Resp 18   Ht 5' 2" (1 575 m)   Wt 71 4 kg (157 lb 6 4 oz)   SpO2 98%   BMI 28 79 kg/m²        Physical Exam  Vitals and nursing note reviewed  Constitutional:       General: She is not in acute distress  Appearance: Normal appearance  She is well-developed and normal weight  HENT:      Head: Normocephalic and atraumatic  Right Ear: Tympanic membrane and external ear normal       Left Ear: Tympanic membrane and external ear normal       Nose: Nose normal       Mouth/Throat:      Mouth: Mucous membranes are moist       Pharynx: Oropharynx is clear  No oropharyngeal exudate  Eyes:      General: No scleral icterus  Right eye: No discharge  Left eye: No discharge  Extraocular Movements: Extraocular movements intact  Conjunctiva/sclera: Conjunctivae normal       Pupils: Pupils are equal, round, and reactive to light  Neck:      Thyroid: No thyromegaly  Vascular: No JVD  Cardiovascular:      Rate and Rhythm: Normal rate and regular rhythm  Pulses: Normal pulses  Heart sounds: Normal heart sounds  No murmur heard  Pulmonary:      Effort: Pulmonary effort is normal       Breath sounds: No wheezing or rales     Chest:      Chest wall: No tenderness  Abdominal:      General: Bowel sounds are normal  There is no distension  Palpations: Abdomen is soft  There is no mass  Tenderness: There is no abdominal tenderness  Musculoskeletal:         General: No tenderness or deformity  Normal range of motion  Cervical back: Normal range of motion  Lymphadenopathy:      Cervical: No cervical adenopathy  Skin:     General: Skin is warm and dry  Capillary Refill: Capillary refill takes less than 2 seconds  Findings: No rash  Neurological:      General: No focal deficit present  Mental Status: She is alert and oriented to person, place, and time  Mental status is at baseline  Cranial Nerves: No cranial nerve deficit  Coordination: Coordination normal       Deep Tendon Reflexes: Reflexes are normal and symmetric  Reflexes normal    Psychiatric:         Mood and Affect: Mood normal          Behavior: Behavior normal          Thought Content: Thought content normal          Judgment: Judgment normal           Data:    Laboratory Results: I have personally reviewed the pertinent laboratory results/reports   Radiology/Other Diagnostic Testing Results: I have personally reviewed pertinent reports         Lab Results   Component Value Date    WBC 7 24 08/30/2022    HGB 12 5 08/30/2022    HCT 39 1 08/30/2022    MCV 94 08/30/2022     08/30/2022     Lab Results   Component Value Date    K 3 8 08/30/2022     08/30/2022    CO2 29 08/30/2022    BUN 12 08/30/2022    CREATININE 0 72 08/30/2022    GLUF 92 08/30/2022    CALCIUM 9 2 08/30/2022    AST 11 08/30/2022    ALT 28 08/30/2022    ALKPHOS 90 08/30/2022    EGFR 85 08/30/2022     Lab Results   Component Value Date    CHOLESTEROL 222 (H) 08/30/2022    CHOLESTEROL 210 (H) 05/30/2022    CHOLESTEROL 213 (H) 12/06/2021     Lab Results   Component Value Date    HDL 54 08/30/2022    HDL 44 (L) 05/30/2022    HDL 57 12/06/2021     Lab Results   Component Value Date LDLCALC 149 (H) 08/30/2022    LDLCALC 148 (H) 05/30/2022    LDLCALC 141 (H) 12/06/2021     Lab Results   Component Value Date    TRIG 94 08/30/2022    TRIG 88 05/30/2022    TRIG 74 12/06/2021     No results found for: [place], [place]  Lab Results   Component Value Date    [id number] 2 720 08/30/2022     Lab Results   Component Value Date    HGBA1C 5 4 05/30/2022     No results found for: GABE Gramajo, DO

## 2022-09-22 NOTE — ASSESSMENT & PLAN NOTE
History of brief transient visual field deficit of the left eye with no residual affects and complete resolution  She will continue her same diet regimen follow-up with Ophthalmology every 6 months for the next year then yearly  Feels that this was a result of the estrogen cream which she discontinued  Her workup with MRI carotids and lab work has been stable

## 2022-09-23 ENCOUNTER — OFFICE VISIT (OUTPATIENT)
Dept: PODIATRY | Facility: CLINIC | Age: 72
End: 2022-09-23
Payer: MEDICARE

## 2022-09-23 VITALS
WEIGHT: 157 LBS | HEART RATE: 71 BPM | SYSTOLIC BLOOD PRESSURE: 116 MMHG | HEIGHT: 62 IN | BODY MASS INDEX: 28.89 KG/M2 | DIASTOLIC BLOOD PRESSURE: 68 MMHG | RESPIRATION RATE: 99 BRPM

## 2022-09-23 DIAGNOSIS — M76.821 POSTERIOR TIBIAL TENDINITIS OF RIGHT LOWER EXTREMITY: Primary | ICD-10-CM

## 2022-09-23 PROCEDURE — 99212 OFFICE O/P EST SF 10 MIN: CPT | Performed by: STUDENT IN AN ORGANIZED HEALTH CARE EDUCATION/TRAINING PROGRAM

## 2022-09-23 NOTE — PROGRESS NOTES
Assessment/Plan:    No problem-specific Assessment & Plan notes found for this encounter  Diagnoses and all orders for this visit:    Posterior tibial tendinitis of right lower extremity        Plan:     -  Patient was counseled and educated on the condition and the diagnosis  The diagnosis, treatment options and prognosis were discussed in detail  - patient reports significant pain relief since using power steps and supportive shoe gear  I have recommended continue with this along with stretching and strengthening exercises at home   -return as needed    Subjective:      Patient ID: Stormy Cornell is a 81 y o  female  81-year-old female presents for follow-up right lower extremity posterior tibial tendinitis  Patient reports she has been wearing recommended over-the-counter orthotics power steps in her supportive new balance shoe  She has been very pleased with these and reports no pain to the area  Denies any other complaints  The following portions of the patient's history were reviewed and updated as appropriate:   She  has a past medical history of GERD (gastroesophageal reflux disease), Herniated disc, cervical, and Hyperlipidemia    She   Patient Active Problem List    Diagnosis Date Noted    Mixed hyperlipidemia 07/06/2022    Posterior tibial tendinitis of right lower extremity 07/06/2022    Petechiae 07/06/2022    Localized osteoporosis without current pathological fracture 07/06/2022    Atypical migraine 06/02/2022    Visual field constriction of left eye 06/02/2022    Visual disturbance 05/29/2022    Post-COVID chronic fatigue 05/17/2022    Nausea 05/12/2022    COVID-19 virus infection 05/10/2022    Tachycardia 03/14/2022    Erosion of bladder suspension mesh ([facility] [address] ) 03/04/2022    Pelvic organ prolapse quantification stage 2 cystocele 03/04/2022    Acute cystitis without hematuria 02/17/2022    Positive anaplasmosis titer 10/01/2021    Cold hands 10/01/2021    Rash of unknown etiology 08/02/2021    Platelets decreased ([facility] [address] ) 06/28/2021    Chronic GERD 06/28/2021    Medicare annual wellness visit, subsequent 06/18/2021    Peroneal tendinitis of left lower extremity 06/08/2021    Viral syndrome 11/20/2020    Exposure to COVID-19 virus 11/18/2020    Left ear pain 06/22/2020    Combined forms of age-related cataract of both eyes 06/22/2020    Overweight with body mass index (BMI) of 29 to 29 9 in adult 06/17/2020    Osteopenia of multiple sites 06/17/2020    Family history of thyroid disease in mother 06/17/2020    Contact stomatitis 01/20/2020    Slow transit constipation 12/12/2019    Hyponatremia 11/26/2019    Neck pain 02/28/2019    Degeneration of intervertebral disc 12/06/2018    Hip pain 12/06/2018    Low back pain 12/06/2018    Gastroesophageal reflux disease without esophagitis 12/06/2018    Lumbar radiculopathy 08/13/2018     She  has a past surgical history that includes Tonsillectomy; Bladder repair; REMOVAL OF INTRAUTERINE DEVICE (IUD); [place] tooth extraction; Tumor removal; Cataract extraction, bilateral; and Colonoscopy       Review of Systems   Constitutional: Negative for chills  Respiratory: Negative for shortness of breath  Gastrointestinal: Negative for vomiting  Musculoskeletal: Negative for arthralgias  Skin: Negative for color change  Neurological: Negative for dizziness  Psychiatric/Behavioral: Negative for agitation  Objective:      /68 (BP Location: Left arm, Patient Position: Sitting, Cuff Size: Standard)   Pulse 71   Resp (!) 99   Ht 5' 2" (1 575 m)   Wt 71 2 kg (157 lb)   BMI 28 72 kg/m²          Physical Exam  Vitals reviewed  Cardiovascular:      Rate and Rhythm: Normal rate  Pulses: Normal pulses  Musculoskeletal:         General: No tenderness  Comments: No pain with palpation noted to the right posterior tibial tendon from ankle extending distally to the navicular tuberosity    Muscle manual test 5/5  Skin:     General: Skin is warm and dry  Neurological:      General: No focal deficit present  Mental Status: She is alert     Psychiatric:         Mood and Affect: Mood normal

## 2022-10-11 PROBLEM — N30.00 ACUTE CYSTITIS WITHOUT HEMATURIA: Status: RESOLVED | Noted: 2022-02-17 | Resolved: 2022-10-11

## 2022-10-12 PROBLEM — Z00.00 MEDICARE ANNUAL WELLNESS VISIT, SUBSEQUENT: Status: RESOLVED | Noted: 2021-06-18 | Resolved: 2022-10-12

## 2022-11-09 ENCOUNTER — OFFICE VISIT (OUTPATIENT)
Dept: PODIATRY | Facility: CLINIC | Age: 72
End: 2022-11-09

## 2022-11-09 VITALS — HEIGHT: 62 IN | WEIGHT: 157 LBS | BODY MASS INDEX: 28.89 KG/M2

## 2022-11-09 DIAGNOSIS — I87.2 VENOUS INSUFFICIENCY OF BOTH LOWER EXTREMITIES: Primary | ICD-10-CM

## 2022-11-09 DIAGNOSIS — B35.1 ONYCHOMYCOSIS: ICD-10-CM

## 2022-11-09 NOTE — PROGRESS NOTES
Assessment/Plan:    No problem-specific Assessment & Plan notes found for this encounter  Diagnoses and all orders for this visit:    Venous insufficiency of both lower extremities    Onychomycosis      Plan:     1  A thorough neurovascular exam was performed  Patient presents for at-risk foot care  Patient has no acute concerns today  Patient has significant lower extremity risk due to neuropathy, parasthesia, edema, and trophic skin changes to the lower extremity  Patient has Q9  findings and is recommended for at risk foot care every 3 months  2  All 10 toenails were debridement as follow: using nail nipper, jakob, and curette, nails were sharply debrided, reduced in thickness and length  Devitalized nail tissue and fungal debris excised and removed  Patient tolerated well  3  Patient was educated on importance of  daily foot assessment and proper shoe gear  Educational materials were provided  4  Call if any questions or occurrence of any foot and ankle related complications     5  Return in 10-12 weeks  Subjective:      Patient ID: Erickson Horton is a 81 y o  female  HPI:  Erickson Horton is a 81 y o  female who presents with painful, elongated toenails  They have difficulty applying their socks and shoes due to the elongation of the nails  The pressure within their shoe gear is painful and they have been unable to cut their nails adequately  Patient states pain is 1/10 in shoe gear  Pain with pressure  Requires at risk foot care  The following portions of the patient's history were reviewed and updated as appropriate:   She  has a past medical history of GERD (gastroesophageal reflux disease), Herniated disc, cervical, and Hyperlipidemia    She   Patient Active Problem List    Diagnosis Date Noted   • Mixed hyperlipidemia 07/06/2022   • Posterior tibial tendinitis of right lower extremity 07/06/2022   • Petechiae 07/06/2022   • Localized osteoporosis without current pathological fracture 07/06/2022   • Atypical migraine 06/02/2022   • Visual field constriction of left eye 06/02/2022   • Visual disturbance 05/29/2022   • Post-COVID chronic fatigue 05/17/2022   • Nausea 05/12/2022   • COVID-19 virus infection 05/10/2022   • Tachycardia 03/14/2022   • Erosion of bladder suspension mesh ([facility] [address] ) 03/04/2022   • Pelvic organ prolapse quantification stage 2 cystocele 03/04/2022   • Positive anaplasmosis titer 10/01/2021   • Cold hands 10/01/2021   • Rash of unknown etiology 08/02/2021   • Platelets decreased ([facility] [address] ) 06/28/2021   • Chronic GERD 06/28/2021   • Peroneal tendinitis of left lower extremity 06/08/2021   • Viral syndrome 11/20/2020   • Exposure to COVID-19 virus 11/18/2020   • Left ear pain 06/22/2020   • Combined forms of age-related cataract of both eyes 06/22/2020   • Overweight with body mass index (BMI) of 29 to 29 9 in adult 06/17/2020   • Osteopenia of multiple sites 06/17/2020   • Family history of thyroid disease in mother 06/17/2020   • Contact stomatitis 01/20/2020   • Slow transit constipation 12/12/2019   • Hyponatremia 11/26/2019   • Neck pain 02/28/2019   • Degeneration of intervertebral disc 12/06/2018   • Hip pain 12/06/2018   • Low back pain 12/06/2018   • Gastroesophageal reflux disease without esophagitis 12/06/2018   • Lumbar radiculopathy 08/13/2018     She  has a past surgical history that includes Tonsillectomy; Bladder repair; REMOVAL OF INTRAUTERINE DEVICE (IUD); [place] tooth extraction; Tumor removal; Cataract extraction, bilateral; and Colonoscopy       Review of Systems   All other systems reviewed and are negative  Objective:      Ht 5' 2" (1 575 m)   Wt 71 2 kg (157 lb)   BMI 28 72 kg/m²          Physical Exam  Vitals reviewed  Feet:      Comments: On exam patient has thickened, hypertrophic, discolored, brittle toenails with subungual debris and tenderness x10    Patient has lower extremity edema  PAtients skin is atrophic, thickened nails, and decreased pedal hair  Patient has decreased pinprick and vibratory sensation to his feet and parasthesia

## 2022-12-05 ENCOUNTER — OFFICE VISIT (OUTPATIENT)
Dept: FAMILY MEDICINE CLINIC | Facility: CLINIC | Age: 72
End: 2022-12-05

## 2022-12-05 VITALS — OXYGEN SATURATION: 96 % | HEART RATE: 56 BPM | TEMPERATURE: 97.9 F

## 2022-12-05 DIAGNOSIS — R05.9 COUGH IN ADULT: Primary | ICD-10-CM

## 2022-12-05 DIAGNOSIS — J98.8 RESPIRATORY INFECTION: ICD-10-CM

## 2022-12-05 LAB
SARS-COV-2 AG UPPER RESP QL IA: NEGATIVE
VALID CONTROL: NORMAL

## 2022-12-05 RX ORDER — AZITHROMYCIN 250 MG/1
TABLET, FILM COATED ORAL
Qty: 6 TABLET | Refills: 0 | Status: SHIPPED | OUTPATIENT
Start: 2022-12-05 | End: 2022-12-10

## 2022-12-05 NOTE — ASSESSMENT & PLAN NOTE
In sinuses and chest with cough dry nonproductive without fever if symptoms change or worsen over the next 24-48 hours she will start antibiotics prescription given at this time

## 2022-12-05 NOTE — PROGRESS NOTES
Assessment/Plan:       Problem List Items Addressed This Visit        Respiratory    Respiratory infection     In sinuses and chest with cough dry nonproductive without fever if symptoms change or worsen over the next 24-48 hours she will start antibiotics prescription given at this time         Relevant Medications    azithromycin (ZITHROMAX) 250 mg tablet   Other Visit Diagnoses     Cough in adult    -  Primary    Relevant Orders    POCT Rapid Covid Ag (Completed)            Subjective:      Patient ID: Stormy Cornell is a 81 y o  female  Cough and congestion for 4 days  The following portions of the patient's history were reviewed and updated as appropriate: allergies, current medications, past family history, past medical history, past social history, past surgical history and problem list     Review of Systems   Constitutional: Positive for chills and fatigue  Negative for fever  HENT: Positive for congestion  Negative for nosebleeds, rhinorrhea, sinus pressure and sore throat  Eyes: Negative for discharge and redness  Respiratory: Negative for cough and shortness of breath  Cardiovascular: Negative for chest pain, palpitations and leg swelling  Gastrointestinal: Negative for abdominal pain, blood in stool and nausea  Endocrine: Negative for cold intolerance, heat intolerance and polyuria  Genitourinary: Negative for dysuria and frequency  Musculoskeletal: Negative for arthralgias, back pain and myalgias  Skin: Negative for rash  Neurological: Negative for dizziness, weakness and headaches  Hematological: Negative for adenopathy  Psychiatric/Behavioral: Negative for behavioral problems and sleep disturbance  The patient is not nervous/anxious  Objective:      Pulse 56   Temp 97 9 °F (36 6 °C)   SpO2 96%        Physical Exam  Vitals and nursing note reviewed  Constitutional:       General: She is not in acute distress       Appearance: She is well-developed and well-nourished  HENT:      Head: Normocephalic and atraumatic  Right Ear: External ear normal       Left Ear: External ear normal       Nose: Congestion and rhinorrhea present  Right Sinus: Maxillary sinus tenderness present  Mouth/Throat:      Mouth: Oropharynx is clear and moist       Pharynx: No oropharyngeal exudate  Eyes:      General: No scleral icterus  Right eye: No discharge  Left eye: No discharge  Extraocular Movements: EOM normal       Conjunctiva/sclera: Conjunctivae normal       Pupils: Pupils are equal, round, and reactive to light  Neck:      Thyroid: No thyromegaly  Vascular: No JVD  Cardiovascular:      Rate and Rhythm: Normal rate and regular rhythm  Heart sounds: Normal heart sounds  No murmur heard  Pulmonary:      Effort: Pulmonary effort is normal       Breath sounds: Normal breath sounds  No wheezing or rales  Chest:      Chest wall: No tenderness  Abdominal:      General: Bowel sounds are normal  There is no distension  Palpations: Abdomen is soft  There is no mass  Tenderness: There is no abdominal tenderness  Musculoskeletal:         General: No tenderness, deformity or edema  Normal range of motion  Cervical back: Normal range of motion  Lymphadenopathy:      Cervical: No cervical adenopathy  Skin:     General: Skin is warm and dry  Findings: No rash  Neurological:      Mental Status: She is alert and oriented to person, place, and time  Cranial Nerves: No cranial nerve deficit  Coordination: Coordination normal       Deep Tendon Reflexes: Reflexes are normal and symmetric  Reflexes normal    Psychiatric:         Mood and Affect: Mood and affect normal          Behavior: Behavior normal          Thought Content:  Thought content normal          Judgment: Judgment normal           Data:    Laboratory Results: I have personally reviewed the pertinent laboratory results/reports   Radiology/Other Diagnostic Testing Results: I have personally reviewed pertinent reports         Lab Results   Component Value Date    WBC 7 24 08/30/2022    HGB 12 5 08/30/2022    HCT 39 1 08/30/2022    MCV 94 08/30/2022     08/30/2022     Lab Results   Component Value Date    K 3 8 08/30/2022     08/30/2022    CO2 29 08/30/2022    BUN 12 08/30/2022    CREATININE 0 72 08/30/2022    GLUF 92 08/30/2022    CALCIUM 9 2 08/30/2022    AST 11 08/30/2022    ALT 28 08/30/2022    ALKPHOS 90 08/30/2022    EGFR 85 08/30/2022     Lab Results   Component Value Date    CHOLESTEROL 222 (H) 08/30/2022    CHOLESTEROL 210 (H) 05/30/2022    CHOLESTEROL 213 (H) 12/06/2021     Lab Results   Component Value Date    HDL 54 08/30/2022    HDL 44 (L) 05/30/2022    HDL 57 12/06/2021     Lab Results   Component Value Date    LDLCALC 149 (H) 08/30/2022    LDLCALC 148 (H) 05/30/2022    LDLCALC 141 (H) 12/06/2021     Lab Results   Component Value Date    TRIG 94 08/30/2022    TRIG 88 05/30/2022    TRIG 74 12/06/2021     No results found for: [place], [place]  Lab Results   Component Value Date    [id number] 2 720 08/30/2022     Lab Results   Component Value Date    HGBA1C 5 4 05/30/2022     No results found for: GABE Gramajo, DO

## 2022-12-14 ENCOUNTER — APPOINTMENT (OUTPATIENT)
Dept: LAB | Facility: CLINIC | Age: 72
End: 2022-12-14

## 2022-12-14 DIAGNOSIS — E78.2 MIXED HYPERLIPIDEMIA: ICD-10-CM

## 2022-12-14 LAB
ALBUMIN SERPL BCP-MCNC: 3.6 G/DL (ref 3.5–5)
ALP SERPL-CCNC: 88 U/L (ref 46–116)
ALT SERPL W P-5'-P-CCNC: 23 U/L (ref 12–78)
ANION GAP SERPL CALCULATED.3IONS-SCNC: 3 MMOL/L (ref 4–13)
AST SERPL W P-5'-P-CCNC: 17 U/L (ref 5–45)
BILIRUB SERPL-MCNC: 0.55 MG/DL (ref 0.2–1)
BUN SERPL-MCNC: 11 MG/DL (ref 5–25)
CALCIUM SERPL-MCNC: 9.3 MG/DL (ref 8.3–10.1)
CHLORIDE SERPL-SCNC: 106 MMOL/L (ref 96–108)
CHOLEST SERPL-MCNC: 218 MG/DL
CO2 SERPL-SCNC: 30 MMOL/L (ref 21–32)
CREAT SERPL-MCNC: 0.61 MG/DL (ref 0.6–1.3)
GFR SERPL CREATININE-BSD FRML MDRD: 92 ML/MIN/1.73SQ M
GLUCOSE P FAST SERPL-MCNC: 85 MG/DL (ref 65–99)
HDLC SERPL-MCNC: 52 MG/DL
LDLC SERPL CALC-MCNC: 144 MG/DL (ref 0–100)
NONHDLC SERPL-MCNC: 166 MG/DL
POTASSIUM SERPL-SCNC: 4 MMOL/L (ref 3.5–5.3)
PROT SERPL-MCNC: 7.2 G/DL (ref 6.4–8.4)
SODIUM SERPL-SCNC: 139 MMOL/L (ref 135–147)
TRIGL SERPL-MCNC: 110 MG/DL
TSH SERPL DL<=0.05 MIU/L-ACNC: 3.97 UIU/ML (ref 0.45–4.5)

## 2022-12-20 ENCOUNTER — OFFICE VISIT (OUTPATIENT)
Dept: FAMILY MEDICINE CLINIC | Facility: CLINIC | Age: 72
End: 2022-12-20

## 2022-12-20 VITALS
WEIGHT: 158 LBS | OXYGEN SATURATION: 98 % | BODY MASS INDEX: 29.08 KG/M2 | TEMPERATURE: 97.6 F | DIASTOLIC BLOOD PRESSURE: 60 MMHG | HEART RATE: 75 BPM | SYSTOLIC BLOOD PRESSURE: 102 MMHG | RESPIRATION RATE: 18 BRPM | HEIGHT: 62 IN

## 2022-12-20 DIAGNOSIS — M85.89 OSTEOPENIA OF MULTIPLE SITES: ICD-10-CM

## 2022-12-20 DIAGNOSIS — E78.2 MIXED HYPERLIPIDEMIA: ICD-10-CM

## 2022-12-20 DIAGNOSIS — M54.16 LUMBAR RADICULOPATHY: ICD-10-CM

## 2022-12-20 DIAGNOSIS — K21.9 GASTROESOPHAGEAL REFLUX DISEASE WITHOUT ESOPHAGITIS: Primary | ICD-10-CM

## 2022-12-20 DIAGNOSIS — K59.01 SLOW TRANSIT CONSTIPATION: ICD-10-CM

## 2022-12-20 NOTE — PROGRESS NOTES
BMI Counseling: Body mass index is 28 9 kg/m²  The BMI is above normal  Nutrition recommendations include reducing portion sizes, decreasing overall calorie intake, 3-5 servings of fruits/vegetables daily, consuming healthier snacks, moderation in carbohydrate intake, increasing intake of lean protein and reducing intake of saturated fat and trans fat  Exercise recommendations include exercising 3-5 times per week  Assessment/Plan:       Problem List Items Addressed This Visit        Digestive    Gastroesophageal reflux disease without esophagitis - Primary     GERD symptoms are stable no worsening changes off proton pump inhibitors will use as needed antacids         Relevant Orders    CBC and differential    Comprehensive metabolic panel    Lipid panel    TSH, 3rd generation with Free T4 reflex    Slow transit constipation     Recommend high-fiber diet fruits and vegetables         Relevant Orders    CBC and differential    Comprehensive metabolic panel    Lipid panel    TSH, 3rd generation with Free T4 reflex       Nervous and Auditory    Lumbar radiculopathy     Maintain good diet plan stretching and exercise and avoidance of gaining weight            Musculoskeletal and Integument    Osteopenia of multiple sites     Continue calcium vitamin D check osteoporosis screening every 2 years with DEXA scan         Relevant Orders    CBC and differential    Comprehensive metabolic panel    Lipid panel    TSH, 3rd generation with Free T4 reflex       Other    Mixed hyperlipidemia     Hyperlipidemia is stable continuing with over-the-counter products garlic omega-3 fish oils and supplements and watching fat-free diet follow-up with lab work in 6 months         Relevant Orders    CBC and differential    Comprehensive metabolic panel    Lipid panel    TSH, 3rd generation with Free T4 reflex         Subjective:      Patient ID: Austin Donovan is a 81 y o  female      Follow-up 6-month evaluation      The following portions of the patient's history were reviewed and updated as appropriate: allergies, current medications, past family history, past medical history, past social history, past surgical history and problem list     Review of Systems   Constitutional: Negative for chills, fatigue and fever  HENT: Negative for congestion, nosebleeds, rhinorrhea, sinus pressure and sore throat  Eyes: Negative for discharge and redness  Respiratory: Negative for cough and shortness of breath  Cardiovascular: Negative for chest pain, palpitations and leg swelling  Gastrointestinal: Negative for abdominal pain, blood in stool and nausea  Endocrine: Negative for cold intolerance, heat intolerance and polyuria  Genitourinary: Negative for dysuria and frequency  Musculoskeletal: Positive for arthralgias  Negative for back pain and myalgias  Skin: Negative for rash  Neurological: Negative for dizziness, weakness and headaches  Hematological: Negative for adenopathy  Psychiatric/Behavioral: Negative for behavioral problems and sleep disturbance  The patient is not nervous/anxious  Objective:      /60 (BP Location: Left arm, Patient Position: Sitting, Cuff Size: Standard)   Pulse 75   Temp 97 6 °F (36 4 °C) (Tympanic)   Resp 18   Ht 5' 2" (1 575 m)   Wt 71 7 kg (158 lb)   SpO2 98%   BMI 28 90 kg/m²        Physical Exam  Vitals and nursing note reviewed  Constitutional:       General: She is not in acute distress  Appearance: Normal appearance  She is well-developed and normal weight  HENT:      Head: Normocephalic and atraumatic  Right Ear: Tympanic membrane, ear canal and external ear normal       Left Ear: Tympanic membrane, ear canal and external ear normal       Nose: Nose normal       Mouth/Throat:      Mouth: Mucous membranes are moist       Pharynx: Oropharynx is clear  No oropharyngeal exudate  Eyes:      General: No scleral icterus  Right eye: No discharge  Left eye: No discharge  Extraocular Movements: Extraocular movements intact  Conjunctiva/sclera: Conjunctivae normal       Pupils: Pupils are equal, round, and reactive to light  Neck:      Thyroid: No thyromegaly  Vascular: No JVD  Cardiovascular:      Rate and Rhythm: Normal rate and regular rhythm  Pulses: Normal pulses  Heart sounds: Normal heart sounds  No murmur heard  Pulmonary:      Effort: Pulmonary effort is normal       Breath sounds: No wheezing or rales  Chest:      Chest wall: No tenderness  Abdominal:      General: Bowel sounds are normal  There is no distension  Palpations: Abdomen is soft  There is no mass  Tenderness: There is no abdominal tenderness  Musculoskeletal:         General: No tenderness or deformity  Normal range of motion  Cervical back: Normal range of motion  Lymphadenopathy:      Cervical: No cervical adenopathy  Skin:     General: Skin is warm and dry  Capillary Refill: Capillary refill takes less than 2 seconds  Findings: No rash  Neurological:      General: No focal deficit present  Mental Status: She is alert and oriented to person, place, and time  Mental status is at baseline  Cranial Nerves: No cranial nerve deficit  Coordination: Coordination normal       Deep Tendon Reflexes: Reflexes are normal and symmetric  Reflexes normal    Psychiatric:         Mood and Affect: Mood normal          Behavior: Behavior normal          Thought Content: Thought content normal          Judgment: Judgment normal           Data:    Laboratory Results: I have personally reviewed the pertinent laboratory results/reports   Radiology/Other Diagnostic Testing Results: I have personally reviewed pertinent reports         Lab Results   Component Value Date    WBC 7 24 08/30/2022    HGB 12 5 08/30/2022    HCT 39 1 08/30/2022    MCV 94 08/30/2022     08/30/2022     Lab Results   Component Value Date    K 4 0 12/14/2022     12/14/2022    CO2 30 12/14/2022    BUN 11 12/14/2022    CREATININE 0 61 12/14/2022    GLUF 85 12/14/2022    CALCIUM 9 3 12/14/2022    AST 17 12/14/2022    ALT 23 12/14/2022    ALKPHOS 88 12/14/2022    EGFR 92 12/14/2022     Lab Results   Component Value Date    CHOLESTEROL 218 (H) 12/14/2022    CHOLESTEROL 222 (H) 08/30/2022    CHOLESTEROL 210 (H) 05/30/2022     Lab Results   Component Value Date    HDL 52 12/14/2022    HDL 54 08/30/2022    HDL 44 (L) 05/30/2022     Lab Results   Component Value Date    LDLCALC 144 (H) 12/14/2022    LDLCALC 149 (H) 08/30/2022    LDLCALC 148 (H) 05/30/2022     Lab Results   Component Value Date    TRIG 110 12/14/2022    TRIG 94 08/30/2022    TRIG 88 05/30/2022     No results found for: [place], [place]  Lab Results   Component Value Date    OHS8ACPTEAYF 3 970 12/14/2022     Lab Results   Component Value Date    HGBA1C 5 4 05/30/2022     No results found for: GABE Gramajo DO

## 2022-12-20 NOTE — ASSESSMENT & PLAN NOTE
GERD symptoms are stable no worsening changes off proton pump inhibitors will use as needed antacids

## 2022-12-20 NOTE — ASSESSMENT & PLAN NOTE
Hyperlipidemia is stable continuing with over-the-counter products garlic omega-3 fish oils and supplements and watching fat-free diet follow-up with lab work in 6 months

## 2023-01-04 ENCOUNTER — HOSPITAL ENCOUNTER (EMERGENCY)
Facility: HOSPITAL | Age: 73
Discharge: HOME | End: 2023-01-04
Attending: EMERGENCY MEDICINE

## 2023-01-04 ENCOUNTER — APPOINTMENT (EMERGENCY)
Dept: RADIOLOGY | Facility: HOSPITAL | Age: 73
End: 2023-01-04

## 2023-01-04 ENCOUNTER — APPOINTMENT (EMERGENCY)
Dept: CT IMAGING | Facility: HOSPITAL | Age: 73
End: 2023-01-04

## 2023-01-04 VITALS
TEMPERATURE: 96.8 F | HEIGHT: 62 IN | BODY MASS INDEX: 30.51 KG/M2 | DIASTOLIC BLOOD PRESSURE: 62 MMHG | SYSTOLIC BLOOD PRESSURE: 132 MMHG | HEART RATE: 80 BPM | OXYGEN SATURATION: 98 % | RESPIRATION RATE: 16 BRPM | WEIGHT: 165.79 LBS

## 2023-01-04 DIAGNOSIS — R55 SYNCOPE: Primary | ICD-10-CM

## 2023-01-04 LAB
ALBUMIN SERPL BCP-MCNC: 3.6 G/DL (ref 3.5–5)
ALP SERPL-CCNC: 83 U/L (ref 34–104)
ALT SERPL W P-5'-P-CCNC: 13 U/L (ref 7–52)
ANION GAP SERPL CALCULATED.3IONS-SCNC: 6 MMOL/L (ref 4–13)
AST SERPL W P-5'-P-CCNC: 14 U/L (ref 13–39)
BASOPHILS # BLD AUTO: 0.03 THOUSANDS/ÂΜL (ref 0–0.1)
BASOPHILS NFR BLD AUTO: 0 % (ref 0–1)
BILIRUB SERPL-MCNC: 0.19 MG/DL (ref 0.2–1)
BUN SERPL-MCNC: 15 MG/DL (ref 5–25)
CALCIUM SERPL-MCNC: 8.6 MG/DL (ref 8.4–10.2)
CHLORIDE SERPL-SCNC: 103 MMOL/L (ref 96–108)
CO2 SERPL-SCNC: 27 MMOL/L (ref 21–32)
CREAT SERPL-MCNC: 0.67 MG/DL (ref 0.6–1.3)
EOSINOPHIL # BLD AUTO: 0.11 THOUSAND/ÂΜL (ref 0–0.61)
EOSINOPHIL NFR BLD AUTO: 2 % (ref 0–6)
ERYTHROCYTE [DISTWIDTH] IN BLOOD BY AUTOMATED COUNT: 12.3 % (ref 11.6–15.1)
GFR SERPL CREATININE-BSD FRML MDRD: 89 ML/MIN/1.73SQ M
GLUCOSE SERPL-MCNC: 141 MG/DL (ref 65–140)
HCT VFR BLD AUTO: 31.8 % (ref 34.8–46.1)
HGB BLD-MCNC: 10.6 G/DL (ref 11.5–15.4)
IMM GRANULOCYTES # BLD AUTO: 0.01 THOUSAND/UL (ref 0–0.2)
IMM GRANULOCYTES NFR BLD AUTO: 0 % (ref 0–2)
LYMPHOCYTES # BLD AUTO: 1.64 THOUSANDS/ÂΜL (ref 0.6–4.47)
LYMPHOCYTES NFR BLD AUTO: 24 % (ref 14–44)
MCH RBC QN AUTO: 31.6 PG (ref 26.8–34.3)
MCHC RBC AUTO-ENTMCNC: 33.3 G/DL (ref 31.4–37.4)
MCV RBC AUTO: 95 FL (ref 82–98)
MONOCYTES # BLD AUTO: 0.53 THOUSAND/ÂΜL (ref 0.17–1.22)
MONOCYTES NFR BLD AUTO: 8 % (ref 4–12)
NEUTROPHILS # BLD AUTO: 4.5 THOUSANDS/ÂΜL (ref 1.85–7.62)
NEUTS SEG NFR BLD AUTO: 66 % (ref 43–75)
NRBC BLD AUTO-RTO: 0 /100 WBCS
PLATELET # BLD AUTO: 236 THOUSANDS/UL (ref 149–390)
PMV BLD AUTO: 9.7 FL (ref 8.9–12.7)
POTASSIUM SERPL-SCNC: 3.8 MMOL/L (ref 3.5–5.3)
PROT SERPL-MCNC: 6 G/DL (ref 6.4–8.4)
RBC # BLD AUTO: 3.35 MILLION/UL (ref 3.81–5.12)
SODIUM SERPL-SCNC: 136 MMOL/L (ref 135–147)
WBC # BLD AUTO: 6.82 THOUSAND/UL (ref 4.31–10.16)

## 2023-01-04 RX ORDER — ONDANSETRON 2 MG/ML
4 INJECTION INTRAMUSCULAR; INTRAVENOUS ONCE
Status: COMPLETED | OUTPATIENT
Start: 2023-01-04 | End: 2023-01-04

## 2023-01-04 RX ADMIN — ONDANSETRON 4 MG: 2 INJECTION INTRAMUSCULAR; INTRAVENOUS at 14:03

## 2023-01-04 RX ADMIN — SODIUM CHLORIDE 1000 ML: 0.9 INJECTION, SOLUTION INTRAVENOUS at 15:55

## 2023-01-04 RX ADMIN — SODIUM CHLORIDE 1000 ML: 0.9 INJECTION, SOLUTION INTRAVENOUS at 13:58

## 2023-01-04 NOTE — ED PROVIDER NOTES
History  Chief Complaint   Patient presents with   • Syncope     Pt donated blood around 1100 and then went to a restaurant where she passed out  Bg 80     82 yo female presenting to the ed for episode of syncope at a restaurant  Was reportedly sitting in a concepcion after donating blood and lost consciousness  Per the  who was there she did not fall or strike her head as she was lowered to the ground by him and a bystander  No shaking movements  No reported history of this  Patient states she feels nauseous and wiped out currently  Denies fevers, sore throat, cough, chest pain, shortness of breath, Enio pain, vomiting, diarrhea constipation, dysuria, hematuria  Patient is alert and oriented at this time  Prior to Admission Medications   Prescriptions Last Dose Informant Patient Reported? Taking?    Black Elderberry,Berry-Flower, 575 MG CAPS   Yes No   Sig: Take 1,150 mg by mouth daily   Coenzyme Q10 (CoQ10) 100 MG CAPS   Yes No   Sig: CoQ10    daily   Garlic 091 MG TABS   Yes No   Sig: Take 100 mg by mouth   Patient not taking: Reported on 52/11/8340   Garlic 0117 MG CAPS   Yes No   Sig: Daily   Misc Natural Products (ELDERBERRY ZINC/VIT C/IMMUNE MT)   Yes No   Sig: Elderberry Zinc Vit C   PRN   Multiple Vitamins-Minerals (PreserVision/Lutein) CAPS   Yes No   Sig: Take by mouth   Omega-3 Fatty Acids (Fish Oil) 1000 MG CPDR   Yes No   Sig: Take by mouth   Probiotic Product (PROBIOTIC ACIDOPHILUS BEADS PO)   Yes No   Sig: Take by mouth   Turmeric (QC TUMERIC COMPLEX PO)   Yes No   cholecalciferol (VITAMIN D3) 1,000 units tablet   Yes No   Sig: Take 1,000 Units by mouth daily   co-enzyme Q-10 50 MG capsule   Yes No   Sig: Take 100 mg by mouth daily   Patient not taking: Reported on 12/20/2022   loratadine (CLARITIN) 10 mg tablet   Yes No   Sig: Take 10 mg by mouth daily     Patient not taking: Reported on 12/20/2022   methylPREDNISolone 4 MG tablet therapy pack   No No   Sig: Use as directed on package   ondansetron (ZOFRAN) 4 mg tablet   No No   Sig: Take 1 tablet (4 mg total) by mouth every 8 (eight) hours as needed for nausea or vomiting   polyethylene glycol-propylene glycol (Systane) 0 4-0 3 %   Yes No   Sig: Systane (PF) 0 4 %-0 3 % eye drops in a dropperette   vitamin E 100 UNIT capsule   Yes No   Sig: Take 100 Units by mouth daily      Facility-Administered Medications: None       Past Medical History:   Diagnosis Date   • GERD (gastroesophageal reflux disease)    • Herniated disc, cervical    • Hyperlipidemia        Past Surgical History:   Procedure Laterality Date   • BLADDER REPAIR      sling   • CATARACT EXTRACTION, BILATERAL     • COLONOSCOPY     • REMOVAL OF INTRAUTERINE DEVICE (IUD)      in abd area   • TONSILLECTOMY     • TUMOR REMOVAL      pinky finger 1977   • WISDOM TOOTH EXTRACTION         Family History   Problem Relation Age of Onset   • Hypertension Mother         Pulmonary   • Osteoporosis Mother    • Heart failure Mother    • Heart attack Father    • Hypertension Father    • Stroke Father    • No Known Problems Sister    • No Known Problems Daughter    • No Known Problems Maternal Grandmother    • No Known Problems Paternal Grandmother    • No Known Problems Daughter    • No Known Problems Maternal Aunt    • No Known Problems Maternal Aunt    • Breast cancer Maternal Aunt    • No Known Problems Maternal Aunt    • No Known Problems Paternal Aunt      I have reviewed and agree with the history as documented  E-Cigarette/Vaping   • E-Cigarette Use Never User      E-Cigarette/Vaping Substances   • Nicotine No    • THC No    • CBD No    • Flavoring No    • Other No    • Unknown No      Social History     Tobacco Use   • Smoking status: Never   • Smokeless tobacco: Never   Vaping Use   • Vaping Use: Never used   Substance Use Topics   • Alcohol use: Never   • Drug use: Never       Review of Systems   Neurological: Positive for syncope     All other systems reviewed and are negative  Physical Exam  Physical Exam  Vitals and nursing note reviewed  Constitutional:       General: She is not in acute distress  Appearance: She is well-developed  She is not diaphoretic  HENT:      Head: Normocephalic and atraumatic  Right Ear: External ear normal       Left Ear: External ear normal       Nose: Nose normal    Eyes:      General: No scleral icterus  Right eye: No discharge  Left eye: No discharge  Conjunctiva/sclera: Conjunctivae normal       Pupils: Pupils are equal, round, and reactive to light  Neck:      Vascular: No JVD  Trachea: No tracheal deviation  Cardiovascular:      Rate and Rhythm: Normal rate and regular rhythm  Heart sounds: Normal heart sounds  No murmur heard  No friction rub  No gallop  Pulmonary:      Effort: Pulmonary effort is normal  No respiratory distress  Breath sounds: Normal breath sounds  No stridor  No wheezing or rales  Abdominal:      General: Bowel sounds are normal  There is no distension  Palpations: Abdomen is soft  There is no mass  Tenderness: There is no abdominal tenderness  There is no guarding  Musculoskeletal:         General: No tenderness or deformity  Normal range of motion  Cervical back: Normal range of motion and neck supple  Skin:     General: Skin is warm and dry  Coloration: Skin is not pale  Findings: No erythema or rash  Neurological:      General: No focal deficit present  Mental Status: She is alert and oriented to person, place, and time  Mental status is at baseline  Cranial Nerves: No cranial nerve deficit  Sensory: No sensory deficit  Motor: No weakness or abnormal muscle tone  Gait: Gait normal       Comments: 5/5 strength x 4 extremities  Gross sensation intact  CN intact  GCS 15     Psychiatric:         Behavior: Behavior normal          Thought Content:  Thought content normal          Judgment: Judgment normal  Vital Signs  ED Triage Vitals   Temperature Pulse Respirations Blood Pressure SpO2   01/04/23 1327 01/04/23 1327 01/04/23 1327 01/04/23 1330 01/04/23 1327   (!) 96 8 °F (36 °C) 62 18 112/63 98 %      Temp Source Heart Rate Source Patient Position - Orthostatic VS BP Location FiO2 (%)   01/04/23 1327 01/04/23 1327 01/04/23 1327 01/04/23 1327 --   Temporal Monitor Lying Right arm       Pain Score       01/04/23 1327       No Pain           Vitals:    01/04/23 1430 01/04/23 1700 01/04/23 1730 01/04/23 1800   BP: 118/56 117/56 133/61 132/62   Pulse: 63 74 75 80   Patient Position - Orthostatic VS:             Visual Acuity  Visual Acuity    Flowsheet Row Most Recent Value   L Pupil Size (mm) 3   R Pupil Size (mm) 3          ED Medications  Medications   sodium chloride 0 9 % bolus 1,000 mL (0 mL Intravenous Stopped 1/4/23 1558)   ondansetron (ZOFRAN) injection 4 mg (4 mg Intravenous Given 1/4/23 1403)   sodium chloride 0 9 % bolus 1,000 mL (0 mL Intravenous Stopped 1/4/23 1744)       Diagnostic Studies  Results Reviewed     Procedure Component Value Units Date/Time    Comprehensive metabolic panel [[id number]]  (Abnormal) Collected: 01/04/23 1357    Lab Status: Final result Specimen: Blood from Line, Venous Updated: 01/04/23 1420     Sodium 136 mmol/L      Potassium 3 8 mmol/L      Chloride 103 mmol/L      CO2 27 mmol/L      ANION GAP 6 mmol/L      BUN 15 mg/dL      Creatinine 0 67 mg/dL      Glucose 141 mg/dL      Calcium 8 6 mg/dL      AST 14 U/L      ALT 13 U/L      Alkaline Phosphatase 83 U/L      Total Protein 6 0 g/dL      Albumin 3 6 g/dL      Total Bilirubin 0 19 mg/dL      eGFR 89 ml/min/1 73sq m     Narrative:      [place] guidelines for Chronic Kidney Disease (CKD):   •  Stage 1 with normal or high GFR (GFR > 90 mL/min/1 73 square meters)  •  Stage 2 Mild CKD (GFR = 60-89 mL/min/1 73 square meters)  •  Stage 3A Moderate CKD (GFR = 45-59 mL/min/1 73 square meters)  •  Stage 3B Moderate CKD (GFR = 30-44 mL/min/1 73 square meters)  •  Stage 4 Severe CKD (GFR = 15-29 mL/min/1 73 square meters)  •  Stage 5 End Stage CKD (GFR <15 mL/min/1 73 square meters)  Note: GFR calculation is accurate only with a steady state creatinine    CBC and differential [[id number]]  (Abnormal) Collected: 01/04/23 1357    Lab Status: Final result Specimen: Blood from Line, Venous Updated: 01/04/23 1403     WBC 6 82 Thousand/uL      RBC 3 35 Million/uL      Hemoglobin 10 6 g/dL      Hematocrit 31 8 %      MCV 95 fL      MCH 31 6 pg      MCHC 33 3 g/dL      RDW 12 3 %      MPV 9 7 fL      Platelets 293 Thousands/uL      nRBC 0 /100 WBCs      Neutrophils Relative 66 %      Immat GRANS % 0 %      Lymphocytes Relative 24 %      Monocytes Relative 8 %      Eosinophils Relative 2 %      Basophils Relative 0 %      Neutrophils Absolute 4 50 Thousands/µL      Immature Grans Absolute 0 01 Thousand/uL      Lymphocytes Absolute 1 64 Thousands/µL      Monocytes Absolute 0 53 Thousand/µL      Eosinophils Absolute 0 11 Thousand/µL      Basophils Absolute 0 03 Thousands/µL                  CT head without contrast   Final Result by Na Tang MD (01/04 1523)      No acute intracranial hemorrhage seen   No extra-axial collection                  Workstation performed: [id number]         XR chest 1 view portable    (Results Pending)              Procedures  ECG 12 Lead Documentation Only    Date/Time: 1/4/2023 4:22 PM  Performed by: Olga Fowler DO  Authorized by: Olga Fowler DO     Interpretation:     Interpretation: normal    Rate:     ECG rate:  58    ECG rate assessment: normal    Rhythm:     Rhythm: sinus bradycardia    Ectopy:     Ectopy: none    QRS:     QRS axis:  Normal    QRS intervals:  Normal  Conduction:     Conduction: normal    ST segments:     ST segments:  Normal  T waves:     T waves: normal               ED Course  ED Course as of 01/05/23 0645   Wed Jan 04, 2023   1526 Hemoglobin(!): 10 6  Patient donated blood earlier today   1625 Patient stating she is feeling better but not 100% yet  Has been drinking water and eaten saltines without issue  Will trial jello at this time  Medical Decision Making  Patient with history as above presented with syncope  History obtained from patient  Patient was nontoxic, stable  Ambulatory  Exam as above  EKG reviewed  Labs reviewed  Independently reviewed imaging  Reviewed external records  Differential diagnosis considered  Overall presentation is consistent with syncope  Low suspicion for cardiac dysrhythmia, stroke, intracranial bleed, pulmonary embolus, or other serious cardiac or neurogenic cause of syncope  Patient was treated with IV fluids with improvement in symptoms  Consideration was given for admission, but the patient was stable for outpatient management  Disposition: Discussed need to follow up diagnostics, including incidental findings  Counseled patient to avoid driving, heights, or unsupervised swimming until symptoms are evaluated by a specialist   Discharged with instructions to follow with Primary Care for follow up of patient’s symptoms and findings, with strict return precautions if patient develops new or worsening symptoms  This medical documentation was created using an electronic medical record system with [facility] Modal voice recognition  Although this document has been carefully reviewed, there may still be some phonetic and typographical errors  These errors are purely typographical and due to imperfections of the software program, do not reflect any compromise in the patient's medical care  Amount and/or Complexity of Data Reviewed  Labs: ordered  Radiology: ordered  Risk  Prescription drug management            Disposition  Final diagnoses:   Syncope     Time reflects when diagnosis was documented in both MDM as applicable and the Disposition within this note     Time User Action Codes Description Comment    1/4/2023  4:27 PM Jose Angel Rivas Add [R55] Syncope       ED Disposition     ED Disposition   Discharge    Condition   Stable    Date/Time   Wed Jan 4, 2023  5:28 PM    Comment   Rayna Marrufo discharge to home/self care  Follow-up Information     Follow up With Specialties Details Why Contact Info Additional [address], DO Family Medicine   [address] [id number] [phone]       [facility] Emergency Medicine Go to  As needed, If symptoms worsen [address] [place] [id number] [phone] [facility], [address], [place]aela fontenot, [address]          Discharge Medication List as of 1/4/2023  5:28 PM      CONTINUE these medications which have NOT CHANGED    Details   Black Elderberry,Berry-Flower, 575 MG CAPS Take 1,150 mg by mouth daily, Historical Med      cholecalciferol (VITAMIN D3) 1,000 units tablet Take 1,000 Units by mouth daily, Historical Med      !! co-enzyme Q-10 50 MG capsule Take 100 mg by mouth daily, Historical Med      !!  Coenzyme Q10 (CoQ10) 100 MG CAPS CoQ10    daily, Historical Med      Garlic 012 MG TABS Take 100 mg by mouth, Historical Med      Garlic 2534 MG CAPS Daily, Historical Med      loratadine (CLARITIN) 10 mg tablet Take 10 mg by mouth daily  , Historical Med      methylPREDNISolone 4 MG tablet therapy pack Use as directed on package, Normal      Misc Natural Products (ELDERBERRY ZINC/VIT C/IMMUNE MT) Elderberry Zinc Vit C   PRN, Historical Med      Multiple Vitamins-Minerals (PreserVision/Lutein) CAPS Take by mouth, Historical Med      Omega-3 Fatty Acids (Fish Oil) 1000 MG CPDR Take by mouth, Historical Med      ondansetron (ZOFRAN) 4 mg tablet Take 1 tablet (4 mg total) by mouth every 8 (eight) hours as needed for nausea or vomiting, Starting Tue 5/10/2022, Normal      polyethylene glycol-propylene glycol (Systane) 0 4-0 3 % Systane (PF) 0 4 %-0 3 % eye drops in a dropperette, Historical Med      Probiotic Product (PROBIOTIC ACIDOPHILUS BEADS PO) Take by mouth, Historical Med      Turmeric (QC TUMERIC COMPLEX PO) Starting Mon 9/5/2022, Historical Med      vitamin E 100 UNIT capsule Take 100 Units by mouth daily, Historical Med       !! - Potential duplicate medications found  Please discuss with provider  No discharge procedures on file      PDMP Review     None          ED Provider  Electronically Signed by           Tad Lyles DO  01/05/23 6705

## 2023-01-04 NOTE — ED NOTES
Patient transported to [address] [facility], RN  01/04/23 1145 1.5cm endometrial polyp

## 2023-01-05 LAB
ATRIAL RATE: 58 BPM
P AXIS: 38 DEGREES
PR INTERVAL: 162 MS
QRS AXIS: 57 DEGREES
QRSD INTERVAL: 74 MS
QT INTERVAL: 462 MS
QTC INTERVAL: 453 MS
T WAVE AXIS: 67 DEGREES
VENTRICULAR RATE: 58 BPM

## 2023-01-20 ENCOUNTER — OFFICE VISIT (OUTPATIENT)
Dept: FAMILY MEDICINE CLINIC | Facility: CLINIC | Age: 73
End: 2023-01-20

## 2023-01-20 VITALS
HEART RATE: 72 BPM | BODY MASS INDEX: 29.77 KG/M2 | WEIGHT: 161.8 LBS | DIASTOLIC BLOOD PRESSURE: 76 MMHG | HEIGHT: 62 IN | SYSTOLIC BLOOD PRESSURE: 102 MMHG | TEMPERATURE: 98.1 F | OXYGEN SATURATION: 97 %

## 2023-01-20 DIAGNOSIS — D50.9 IRON DEFICIENCY ANEMIA, UNSPECIFIED IRON DEFICIENCY ANEMIA TYPE: ICD-10-CM

## 2023-01-20 DIAGNOSIS — K21.9 GASTROESOPHAGEAL REFLUX DISEASE WITHOUT ESOPHAGITIS: ICD-10-CM

## 2023-01-20 DIAGNOSIS — R55 VASOVAGAL SYNCOPE: Primary | ICD-10-CM

## 2023-01-20 DIAGNOSIS — D69.6 PLATELETS DECREASED (HCC): ICD-10-CM

## 2023-01-20 DIAGNOSIS — R71.8 OTHER ABNORMALITY OF RED BLOOD CELLS: ICD-10-CM

## 2023-01-20 RX ORDER — FERROUS SULFATE 325(65) MG
325 TABLET ORAL
COMMUNITY

## 2023-01-20 NOTE — PROGRESS NOTES
Assessment/Plan:       Problem List Items Addressed This Visit        Digestive    Gastroesophageal reflux disease without esophagitis     Stable no blood in stool continue with same medication regimen            Other    Platelets decreased (HCC)     Follow-up CBC         Vasovagal syncope - Primary     This occurred after donating blood patient was evaluated at the emergency department last hemoglobin at 10 she is taking iron tablets now and will repeat a CBC in about 1 month         Relevant Orders    Iron Panel (Includes Ferritin, Iron Sat%, Iron, and TIBC)    CBC and differential    Iron deficiency anemia     Will have patient follow-up with CBC in 1 month start iron tablets daily         Relevant Medications    ferrous sulfate 325 (65 Fe) mg tablet   Other Visit Diagnoses     Other abnormality of red blood cells        Relevant Medications    ferrous sulfate 325 (65 Fe) mg tablet    Other Relevant Orders    Iron Panel (Includes Ferritin, Iron Sat%, Iron, and TIBC)            Subjective:      Patient ID: Wallace Renae is a 81 y o  female  Patient presents for follow-up from syncopal event and emergency room visit for anemia after blood donation      The following portions of the patient's history were reviewed and updated as appropriate: allergies, current medications, past family history, past medical history, past social history, past surgical history and problem list     Review of Systems   Constitutional: Negative for chills, fatigue and fever  HENT: Negative for congestion, nosebleeds, rhinorrhea, sinus pressure and sore throat  Eyes: Negative for discharge and redness  Respiratory: Negative for cough and shortness of breath  Cardiovascular: Negative for chest pain, palpitations and leg swelling  Gastrointestinal: Negative for abdominal pain, blood in stool and nausea  Endocrine: Negative for cold intolerance, heat intolerance and polyuria     Genitourinary: Negative for dysuria and frequency  Musculoskeletal: Negative for arthralgias, back pain and myalgias  Skin: Negative for rash  Neurological: Negative for dizziness, weakness and headaches  Hematological: Negative for adenopathy  Psychiatric/Behavioral: Negative for behavioral problems and sleep disturbance  The patient is not nervous/anxious  Objective:      /76 (BP Location: Left arm, Patient Position: Sitting, Cuff Size: Standard)   Pulse 72   Temp 98 1 °F (36 7 °C)   Ht 5' 2" (1 575 m)   Wt 73 4 kg (161 lb 12 8 oz)   SpO2 97%   BMI 29 59 kg/m²        Physical Exam  Vitals and nursing note reviewed  Constitutional:       General: She is not in acute distress  Appearance: Normal appearance  She is well-developed  HENT:      Head: Normocephalic and atraumatic  Right Ear: Tympanic membrane and external ear normal       Left Ear: Tympanic membrane and external ear normal       Nose: Nose normal       Mouth/Throat:      Mouth: Mucous membranes are moist       Pharynx: No oropharyngeal exudate  Eyes:      General: No scleral icterus  Right eye: No discharge  Left eye: No discharge  Conjunctiva/sclera: Conjunctivae normal       Pupils: Pupils are equal, round, and reactive to light  Neck:      Thyroid: No thyromegaly  Vascular: No JVD  Cardiovascular:      Rate and Rhythm: Normal rate and regular rhythm  Heart sounds: Normal heart sounds  No murmur heard  Pulmonary:      Effort: Pulmonary effort is normal       Breath sounds: No wheezing or rales  Chest:      Chest wall: No tenderness  Abdominal:      General: Bowel sounds are normal  There is no distension  Palpations: Abdomen is soft  There is no mass  Tenderness: There is no abdominal tenderness  Musculoskeletal:         General: No tenderness or deformity  Normal range of motion  Cervical back: Normal range of motion  Lymphadenopathy:      Cervical: No cervical adenopathy     Skin: General: Skin is warm and dry  Findings: No rash  Neurological:      General: No focal deficit present  Mental Status: She is alert and oriented to person, place, and time  Cranial Nerves: No cranial nerve deficit  Coordination: Coordination normal       Deep Tendon Reflexes: Reflexes are normal and symmetric  Reflexes normal    Psychiatric:         Mood and Affect: Mood normal          Behavior: Behavior normal          Thought Content: Thought content normal          Judgment: Judgment normal           Data:    Laboratory Results: I have personally reviewed the pertinent laboratory results/reports   Radiology/Other Diagnostic Testing Results: I have personally reviewed pertinent reports         Lab Results   Component Value Date    WBC 6 82 01/04/2023    HGB 10 6 (L) 01/04/2023    HCT 31 8 (L) 01/04/2023    MCV 95 01/04/2023     01/04/2023     Lab Results   Component Value Date    K 3 8 01/04/2023     01/04/2023    CO2 27 01/04/2023    BUN 15 01/04/2023    CREATININE 0 67 01/04/2023    GLUF 85 12/14/2022    CALCIUM 8 6 01/04/2023    AST 14 01/04/2023    ALT 13 01/04/2023    ALKPHOS 83 01/04/2023    EGFR 89 01/04/2023     Lab Results   Component Value Date    CHOLESTEROL 218 (H) 12/14/2022    CHOLESTEROL 222 (H) 08/30/2022    CHOLESTEROL 210 (H) 05/30/2022     Lab Results   Component Value Date    HDL 52 12/14/2022    HDL 54 08/30/2022    HDL 44 (L) 05/30/2022     Lab Results   Component Value Date    LDLCALC 144 (H) 12/14/2022    LDLCALC 149 (H) 08/30/2022    LDLCALC 148 (H) 05/30/2022     Lab Results   Component Value Date    TRIG 110 12/14/2022    TRIG 94 08/30/2022    TRIG 88 05/30/2022     No results found for: [place], [place]  Lab Results   Component Value Date    GDR8IOLLKMJP 3 970 12/14/2022     Lab Results   Component Value Date    HGBA1C 5 4 05/30/2022     No results found for: GABE    [place], DO

## 2023-01-20 NOTE — ASSESSMENT & PLAN NOTE
This occurred after donating blood patient was evaluated at the emergency department last hemoglobin at 10 she is taking iron tablets now and will repeat a CBC in about 1 month

## 2023-01-27 ENCOUNTER — HOSPITAL ENCOUNTER (OUTPATIENT)
Dept: MRI IMAGING | Facility: HOSPITAL | Age: 73
End: 2023-01-27

## 2023-01-27 DIAGNOSIS — M54.16 LUMBAR RADICULOPATHY: ICD-10-CM

## 2023-02-10 ENCOUNTER — OFFICE VISIT (OUTPATIENT)
Dept: PODIATRY | Facility: CLINIC | Age: 73
End: 2023-02-10

## 2023-02-10 VITALS
HEIGHT: 62 IN | BODY MASS INDEX: 29.44 KG/M2 | HEART RATE: 72 BPM | WEIGHT: 160 LBS | DIASTOLIC BLOOD PRESSURE: 68 MMHG | SYSTOLIC BLOOD PRESSURE: 143 MMHG

## 2023-02-10 DIAGNOSIS — B35.1 ONYCHOMYCOSIS: ICD-10-CM

## 2023-02-10 DIAGNOSIS — I87.2 VENOUS INSUFFICIENCY OF BOTH LOWER EXTREMITIES: Primary | ICD-10-CM

## 2023-02-10 NOTE — PROGRESS NOTES
Assessment/Plan:    No problem-specific Assessment & Plan notes found for this encounter  Diagnoses and all orders for this visit:    Venous insufficiency of both lower extremities    Onychomycosis      Plan:     1  A thorough neurovascular exam was performed  Patient presents for at-risk foot care  Patient has no acute concerns today  Patient has significant lower extremity risk due to neuropathy, parasthesia, edema, and trophic skin changes to the lower extremity  Patient has Q9  findings and is recommended for at risk foot care every 3 months      2  All 10 toenails were debridement as follow: using nail nipper, jakob, and curette, nails were sharply debrided, reduced in thickness and length  Devitalized nail tissue and fungal debris excised and removed  Patient tolerated well        3  Patient was educated on importance of  daily foot assessment and proper shoe gear  Educational materials were provided       4  Call if any questions or occurrence of any foot and ankle related complications      5  Return in 10-12 weeks  Subjective:      Patient ID: Sepideh Montoya is a 81 y o  female  HPI:  Sepideh Montoya is a 81 y o  female who presents with painful, elongated toenails  They have difficulty applying their socks and shoes due to the elongation of the nails  The pressure within their shoe gear is painful and they have been unable to cut their nails adequately  Patient states pain is 1/10 in shoe gear  Pain with pressure  Requires at risk foot care  No other complaints  The following portions of the patient's history were reviewed and updated as appropriate:   She  has a past medical history of GERD (gastroesophageal reflux disease), Herniated disc, cervical, Hyperlipidemia, and Syncope (2023)    She   Patient Active Problem List    Diagnosis Date Noted   • Vasovagal syncope 01/20/2023   • Iron deficiency anemia 01/20/2023   • Respiratory infection 12/05/2022   • Mixed hyperlipidemia 07/06/2022   • Posterior tibial tendinitis of right lower extremity 07/06/2022   • Petechiae 07/06/2022   • Localized osteoporosis without current pathological fracture 07/06/2022   • Atypical migraine 06/02/2022   • Visual field constriction of left eye 06/02/2022   • Visual disturbance 05/29/2022   • Post-COVID chronic fatigue 05/17/2022   • Nausea 05/12/2022   • COVID-19 virus infection 05/10/2022   • Tachycardia 03/14/2022   • Erosion of bladder suspension mesh ([facility] [address] ) 03/04/2022   • Pelvic organ prolapse quantification stage 2 cystocele 03/04/2022   • Positive anaplasmosis titer 10/01/2021   • Cold hands 10/01/2021   • Rash of unknown etiology 08/02/2021   • Platelets decreased ([facility] [address] ) 06/28/2021   • Chronic GERD 06/28/2021   • Peroneal tendinitis of left lower extremity 06/08/2021   • Viral syndrome 11/20/2020   • Exposure to COVID-19 virus 11/18/2020   • Left ear pain 06/22/2020   • Combined forms of age-related cataract of both eyes 06/22/2020   • Overweight with body mass index (BMI) of 29 to 29 9 in adult 06/17/2020   • Osteopenia of multiple sites 06/17/2020   • Family history of thyroid disease in mother 06/17/2020   • Contact stomatitis 01/20/2020   • Slow transit constipation 12/12/2019   • Hyponatremia 11/26/2019   • Neck pain 02/28/2019   • Degeneration of intervertebral disc 12/06/2018   • Hip pain 12/06/2018   • Low back pain 12/06/2018   • Gastroesophageal reflux disease without esophagitis 12/06/2018   • Lumbar radiculopathy 08/13/2018     She  has a past surgical history that includes Tonsillectomy; Bladder repair; REMOVAL OF INTRAUTERINE DEVICE (IUD); [place] tooth extraction; Tumor removal; Cataract extraction, bilateral; and Colonoscopy       Review of Systems   All other systems reviewed and are negative          Objective:      /68 (BP Location: Left arm, Patient Position: Sitting, Cuff Size: Large)   Pulse 72   Ht 5' 2" (1 575 m)   Wt 72 6 kg (160 lb)   BMI 29 26 kg/m²          Physical Exam  Vitals reviewed  Feet:      Comments: On exam patient has thickened, hypertrophic, discolored, brittle toenails with subungual debris and tenderness x10    Patient has lower extremity edema  PAtients skin is atrophic, thickened nails, and decreased pedal hair  Patient has decreased pinprick and vibratory sensation to his feet and parasthesia

## 2023-03-09 ENCOUNTER — APPOINTMENT (OUTPATIENT)
Dept: LAB | Facility: CLINIC | Age: 73
End: 2023-03-09

## 2023-03-09 DIAGNOSIS — E78.2 MIXED HYPERLIPIDEMIA: ICD-10-CM

## 2023-03-09 DIAGNOSIS — M85.89 OSTEOPENIA OF MULTIPLE SITES: ICD-10-CM

## 2023-03-09 DIAGNOSIS — K21.9 GASTROESOPHAGEAL REFLUX DISEASE WITHOUT ESOPHAGITIS: ICD-10-CM

## 2023-03-09 DIAGNOSIS — R55 VASOVAGAL SYNCOPE: ICD-10-CM

## 2023-03-09 DIAGNOSIS — R71.8 OTHER ABNORMALITY OF RED BLOOD CELLS: ICD-10-CM

## 2023-03-09 DIAGNOSIS — K59.01 SLOW TRANSIT CONSTIPATION: ICD-10-CM

## 2023-03-09 LAB
BASOPHILS # BLD AUTO: 0.05 THOUSANDS/ÂΜL (ref 0–0.1)
BASOPHILS NFR BLD AUTO: 1 % (ref 0–1)
EOSINOPHIL # BLD AUTO: 0.04 THOUSAND/ÂΜL (ref 0–0.61)
EOSINOPHIL NFR BLD AUTO: 1 % (ref 0–6)
ERYTHROCYTE [DISTWIDTH] IN BLOOD BY AUTOMATED COUNT: 13.3 % (ref 11.6–15.1)
FERRITIN SERPL-MCNC: 54 NG/ML (ref 8–388)
HCT VFR BLD AUTO: 39 % (ref 34.8–46.1)
HGB BLD-MCNC: 12.5 G/DL (ref 11.5–15.4)
IMM GRANULOCYTES # BLD AUTO: 0.02 THOUSAND/UL (ref 0–0.2)
IMM GRANULOCYTES NFR BLD AUTO: 0 % (ref 0–2)
IRON SATN MFR SERPL: 25 % (ref 15–50)
IRON SERPL-MCNC: 89 UG/DL (ref 50–170)
LYMPHOCYTES # BLD AUTO: 1.8 THOUSANDS/ÂΜL (ref 0.6–4.47)
LYMPHOCYTES NFR BLD AUTO: 31 % (ref 14–44)
MCH RBC QN AUTO: 30.2 PG (ref 26.8–34.3)
MCHC RBC AUTO-ENTMCNC: 32.1 G/DL (ref 31.4–37.4)
MCV RBC AUTO: 94 FL (ref 82–98)
MONOCYTES # BLD AUTO: 0.53 THOUSAND/ÂΜL (ref 0.17–1.22)
MONOCYTES NFR BLD AUTO: 9 % (ref 4–12)
NEUTROPHILS # BLD AUTO: 3.32 THOUSANDS/ÂΜL (ref 1.85–7.62)
NEUTS SEG NFR BLD AUTO: 58 % (ref 43–75)
NRBC BLD AUTO-RTO: 0 /100 WBCS
PLATELET # BLD AUTO: 333 THOUSANDS/UL (ref 149–390)
PMV BLD AUTO: 9.7 FL (ref 8.9–12.7)
RBC # BLD AUTO: 4.14 MILLION/UL (ref 3.81–5.12)
TIBC SERPL-MCNC: 359 UG/DL (ref 250–450)
WBC # BLD AUTO: 5.76 THOUSAND/UL (ref 4.31–10.16)

## 2023-05-10 ENCOUNTER — OFFICE VISIT (OUTPATIENT)
Dept: URGENT CARE | Facility: CLINIC | Age: 73
End: 2023-05-10

## 2023-05-10 ENCOUNTER — APPOINTMENT (OUTPATIENT)
Dept: RADIOLOGY | Facility: CLINIC | Age: 73
End: 2023-05-10

## 2023-05-10 VITALS
RESPIRATION RATE: 18 BRPM | SYSTOLIC BLOOD PRESSURE: 143 MMHG | OXYGEN SATURATION: 98 % | WEIGHT: 160 LBS | HEART RATE: 78 BPM | HEIGHT: 62 IN | TEMPERATURE: 98.2 F | DIASTOLIC BLOOD PRESSURE: 69 MMHG | BODY MASS INDEX: 29.44 KG/M2

## 2023-05-10 DIAGNOSIS — M25.511 ACUTE PAIN OF RIGHT SHOULDER: ICD-10-CM

## 2023-05-10 DIAGNOSIS — R07.81 RIB PAIN ON RIGHT SIDE: Primary | ICD-10-CM

## 2023-05-10 DIAGNOSIS — M25.551 RIGHT HIP PAIN: ICD-10-CM

## 2023-05-10 DIAGNOSIS — R07.81 RIB PAIN ON RIGHT SIDE: ICD-10-CM

## 2023-05-10 NOTE — PROGRESS NOTES
[email or web address]      NAME: Erik Amin is a 72 y o  female  : 1950    MRN: [id number]  DATE: May 10, 2023  TIME: 4:15 PM    Assessment and Plan   Rib pain on right side [R07 81]  1  Rib pain on right side  XR ribs right w pa chest min 3 views      2  Acute pain of right shoulder  XR shoulder 2+ vw right      3  Right hip pain  XR hip/pelv 2-3 vws right if performed          Patient Instructions   Xray appears negative for any fracture  Will follow up with radiologist report when available  Recommend elevating body part, icing the area every 2 hours for 20-30 minutes, take Ibuprofen every 6-8 hours to reduce inflammation  Continue to take deep breaths often to prevent any future lung issues  Did offer to call in an albuterol inhaler but patient feels her wheezing is improving with otc meds at present and would like to defer it currently  If not improving over the next week, follow up with PCP or orthopedics  To present to the ER if symptoms worsen  Chief Complaint     Chief Complaint   Patient presents with   • Fall     Patient reports falling yesterday down embankment while gardening  Patient denies hitting head or LOC  Patient c/o right side pain  History of Present Illness   Erik Amin presents to the clinic with  c/o    Denies any hitting of head or LOC  Fall  The accident occurred 12 to 24 hours ago  Fall occurred: down an embankment while gardening  There was no blood loss  The point of impact was the right shoulder  The pain is present in the right shoulder and right hip (right rib region)  The pain is moderate  Pertinent negatives include no abdominal pain, fever, headaches, loss of consciousness, numbness, tingling, visual change or vomiting  She has tried ice for the symptoms  The treatment provided mild relief  Review of Systems   Review of Systems   Constitutional: Negative for chills, diaphoresis, fatigue and fever     HENT: Negative for congestion, ear discharge, ear pain and facial swelling  Eyes: Negative for photophobia, pain, discharge, redness, itching and visual disturbance  Respiratory: Negative for apnea, cough, chest tightness, shortness of breath and wheezing  Cardiovascular: Negative for chest pain and palpitations  Gastrointestinal: Negative for abdominal pain and vomiting  Musculoskeletal: Positive for arthralgias  Negative for joint swelling  Skin: Negative for color change, rash and wound  Neurological: Negative for dizziness, tingling, loss of consciousness, numbness and headaches  Hematological: Negative for adenopathy  Current Medications     Long-Term Medications   Medication Sig Dispense Refill   • cholecalciferol (VITAMIN D3) 1,000 units tablet Take 1,000 Units by mouth daily 4,000     • ferrous sulfate 325 (65 Fe) mg tablet Take 325 mg by mouth daily with breakfast Unknown dose every other day     • loratadine (CLARITIN) 10 mg tablet Take 10 mg by mouth daily PRN     • Multiple Vitamins-Minerals (PreserVision/Lutein) CAPS Take by mouth     • polyethylene glycol-propylene glycol (Systane) 0 4-0 3 % Systane (PF) 0 4 %-0 3 % eye drops in a dropperette     • ondansetron (ZOFRAN) 4 mg tablet Take 1 tablet (4 mg total) by mouth every 8 (eight) hours as needed for nausea or vomiting (Patient not taking: Reported on 4/14/2023) 20 tablet 0   • [DISCONTINUED] cyanocobalamin (VITAMIN B-12) 100 mcg tablet Take by mouth as needed  (Patient not taking: Reported on 5/17/2022)     • [DISCONTINUED] estradiol (ESTRACE VAGINAL) 0 1 mg/g vaginal cream Insert 0 5 g into the vagina daily Apply pea sized amount vaginally over area of mesh erosion and periurethral area   (Patient not taking: No sig reported) 42 5 g 1       Current Allergies     Allergies as of 05/10/2023 - Reviewed 05/10/2023   Allergen Reaction Noted   • Eggs or egg-derived products - food allergy Other (See Comments) 11/24/2015   • Etodolac  02/14/2016   • Nitrofurantoin Other (See Comments)    • Sucralfate Dizziness and Headache 06/22/2020   • Sulfa antibiotics Hives and Other (See Comments)    • Levofloxacin Dizziness    • Omeprazole Headache and Fatigue 06/22/2020   • Pregabalin Chest Pain    • Aspirin     • Chocolate - food allergy  12/12/2019   • Famotidine Other (See Comments) 02/17/2022   • Fluconazole Other (See Comments) 02/17/2022   • Ibuprofen     • Naproxen     • Penicillin g  02/14/2016            The following portions of the patient's history were reviewed and updated as appropriate: allergies, current medications, past family history, past medical history, past social history, past surgical history and problem list   Past Medical History:   Diagnosis Date   • GERD (gastroesophageal reflux disease)    • Herniated disc, cervical    • Hyperlipidemia    • Syncope 2023     Past Surgical History:   Procedure Laterality Date   • BLADDER REPAIR      sling   • CATARACT EXTRACTION, BILATERAL     • COLONOSCOPY     • REMOVAL OF INTRAUTERINE DEVICE (IUD)      in abd area   • TONSILLECTOMY     • TUMOR REMOVAL      pinky finger 1977   • WISDOM TOOTH EXTRACTION       Social History     Socioeconomic History   • Marital status: /Civil Union     Spouse name: Not on file   • Number of children: Not on file   • Years of education: Not on file   • Highest education level: Not on file   Occupational History   • Not on file   Tobacco Use   • Smoking status: Never   • Smokeless tobacco: Never   Vaping Use   • Vaping Use: Never used   Substance and Sexual Activity   • Alcohol use: Never   • Drug use: Never   • Sexual activity: Not Currently     Partners: Male   Other Topics Concern   • Not on file   Social History Narrative    Caffeine Consumption-1 cup of hot tea daily     Social Determinants of Health     Financial Resource Strain: Not on file   Food Insecurity: Not on file   Transportation Needs: Not on file   Physical Activity: Not on file   Stress: Not on file "  Social Connections: Not on file   Intimate Partner Violence: Not on file   Housing Stability: Not on file       Objective   /69   Pulse 78   Temp 98 2 °F (36 8 °C) (Temporal)   Resp 18   Ht 5' 1 5\" (1 562 m)   Wt 72 6 kg (160 lb)   SpO2 98%   BMI 29 74 kg/m²      Physical Exam     Physical Exam  Vitals and nursing note reviewed  Constitutional:       General: She is not in acute distress  Appearance: She is well-developed  She is not diaphoretic  HENT:      Head: Normocephalic and atraumatic  Right Ear: External ear normal       Left Ear: External ear normal       Nose: Nose normal       Mouth/Throat:      Mouth: Mucous membranes are moist       Pharynx: No oropharyngeal exudate or posterior oropharyngeal erythema  Eyes:      General: No scleral icterus  Right eye: No discharge  Left eye: No discharge  Conjunctiva/sclera: Conjunctivae normal    Cardiovascular:      Rate and Rhythm: Normal rate and regular rhythm  Heart sounds: Normal heart sounds  No murmur heard  No friction rub  No gallop  Pulmonary:      Effort: Pulmonary effort is normal  No respiratory distress  Breath sounds: Examination of the right-upper field reveals wheezing  Examination of the left-upper field reveals wheezing  Wheezing present  No decreased breath sounds, rhonchi or rales  Chest:      Chest wall: Tenderness present  Musculoskeletal:      Right shoulder: Bony tenderness present  No swelling or deformity  Normal range of motion  Normal strength  Normal pulse  Right hip: Tenderness present  No deformity or crepitus  Normal range of motion  Normal strength  Skin:     General: Skin is warm and dry  Coloration: Skin is not pale  Findings: No erythema or rash  Neurological:      Mental Status: She is alert and oriented to person, place, and time  Psychiatric:         Behavior: Behavior normal          Thought Content:  Thought content normal          " Judgment: Judgment normal          Ariel Barger PA-C

## 2023-05-11 ENCOUNTER — OFFICE VISIT (OUTPATIENT)
Dept: FAMILY MEDICINE CLINIC | Facility: CLINIC | Age: 73
End: 2023-05-11

## 2023-05-11 VITALS
DIASTOLIC BLOOD PRESSURE: 78 MMHG | HEIGHT: 62 IN | HEART RATE: 85 BPM | TEMPERATURE: 98.9 F | OXYGEN SATURATION: 97 % | SYSTOLIC BLOOD PRESSURE: 130 MMHG | WEIGHT: 160 LBS | BODY MASS INDEX: 29.44 KG/M2

## 2023-05-11 DIAGNOSIS — M81.6 LOCALIZED OSTEOPOROSIS WITHOUT CURRENT PATHOLOGICAL FRACTURE: ICD-10-CM

## 2023-05-11 DIAGNOSIS — R07.81 RIB PAIN ON RIGHT SIDE: Primary | ICD-10-CM

## 2023-05-11 DIAGNOSIS — J45.21 MILD INTERMITTENT REACTIVE AIRWAY DISEASE WITH ACUTE EXACERBATION: ICD-10-CM

## 2023-05-11 DIAGNOSIS — J98.8 RESPIRATORY INFECTION: ICD-10-CM

## 2023-05-11 PROBLEM — J45.901 REACTIVE AIRWAY DISEASE WITH ACUTE EXACERBATION: Status: ACTIVE | Noted: 2023-05-11

## 2023-05-11 RX ORDER — CEPHALEXIN 500 MG/1
500 CAPSULE ORAL EVERY 8 HOURS SCHEDULED
Qty: 21 CAPSULE | Refills: 0 | Status: SHIPPED | OUTPATIENT
Start: 2023-05-11 | End: 2023-05-18

## 2023-05-11 RX ORDER — BUDESONIDE 90 UG/1
1 AEROSOL, POWDER RESPIRATORY (INHALATION) 2 TIMES DAILY
Qty: 1 EACH | Refills: 1 | Status: SHIPPED | OUTPATIENT
Start: 2023-05-11

## 2023-05-11 NOTE — ASSESSMENT & PLAN NOTE
Patient has right rib pain right-sided after a fall injury and I reassured her that this will take time to heal it causes more pain with breathing but overall she is going to require 1 or 2 months    Over-the-counter pain patches such as IcyHot or Aspercreme with lidocaine can be applied

## 2023-05-11 NOTE — ASSESSMENT & PLAN NOTE
Patient is at more risk for fracture due to this condition she needs to continue with calcium and vitamin D and x-rays all reviewed at this time negative

## 2023-05-11 NOTE — ASSESSMENT & PLAN NOTE
Add antibiotic in light of current condition to prevent worsening symptoms or development of pneumonia patient will start an antibiotic now and contact me if not improved after 1 week

## 2023-05-11 NOTE — PROGRESS NOTES
Assessment/Plan:       Problem List Items Addressed This Visit        Respiratory    Respiratory infection     Add antibiotic in light of current condition to prevent worsening symptoms or development of pneumonia patient will start an antibiotic now and contact me if not improved after 1 week         Reactive airway disease with acute exacerbation     On examination patient has diffuse wheezing we will add back her Symbicort inhaler which she has used in the past             Musculoskeletal and Integument    Localized osteoporosis without current pathological fracture     Patient is at more risk for fracture due to this condition she needs to continue with calcium and vitamin D and x-rays all reviewed at this time negative            Other    Rib pain on right side - Primary     Patient has right rib pain right-sided after a fall injury and I reassured her that this will take time to heal it causes more pain with breathing but overall she is going to require 1 or 2 months  Over-the-counter pain patches such as IcyHot or Aspercreme with lidocaine can be applied              Subjective:      Patient ID: Evelina Shook is a 72 y o  female  Chest pain chest wall pain right ribs after a fall injury seen at urgent care came here for today for follow-up evaluation as she has continuous chest pain      The following portions of the patient's history were reviewed and updated as appropriate: allergies, current medications, past family history, past medical history, past social history, past surgical history and problem list     Review of Systems   Constitutional: Negative for chills, fatigue and fever  HENT: Negative for congestion, nosebleeds, rhinorrhea, sinus pressure and sore throat  Eyes: Negative for discharge and redness  Respiratory: Negative for cough and shortness of breath  Cardiovascular: Positive for chest pain  Negative for palpitations and leg swelling     Gastrointestinal: Negative for "abdominal pain, blood in stool and nausea  Endocrine: Negative for cold intolerance, heat intolerance and polyuria  Genitourinary: Negative for dysuria and frequency  Musculoskeletal: Negative for arthralgias, back pain and myalgias  Skin: Negative for rash  Neurological: Negative for dizziness, weakness and headaches  Hematological: Negative for adenopathy  Psychiatric/Behavioral: Negative for behavioral problems and sleep disturbance  The patient is not nervous/anxious  Objective:      /78   Pulse 85   Temp 98 9 °F (37 2 °C)   Ht 5' 1 5\" (1 562 m)   Wt 72 6 kg (160 lb)   SpO2 97%   BMI 29 74 kg/m²        Physical Exam  Vitals and nursing note reviewed  Constitutional:       General: She is not in acute distress  Appearance: Normal appearance  She is well-developed and normal weight  HENT:      Head: Normocephalic and atraumatic  Right Ear: Tympanic membrane, ear canal and external ear normal       Left Ear: Tympanic membrane, ear canal and external ear normal       Nose: Nose normal       Mouth/Throat:      Mouth: Mucous membranes are moist       Pharynx: Oropharynx is clear  No oropharyngeal exudate  Eyes:      General: No scleral icterus  Right eye: No discharge  Left eye: No discharge  Conjunctiva/sclera: Conjunctivae normal       Pupils: Pupils are equal, round, and reactive to light  Neck:      Thyroid: No thyromegaly  Vascular: No JVD  Cardiovascular:      Rate and Rhythm: Normal rate and regular rhythm  Heart sounds: Normal heart sounds  No murmur heard  Pulmonary:      Effort: Pulmonary effort is normal       Breath sounds: No wheezing or rales  Chest:      Chest wall: No tenderness  Abdominal:      General: Bowel sounds are normal  There is no distension  Palpations: Abdomen is soft  There is no mass  Tenderness: There is no abdominal tenderness     Musculoskeletal:         General: No tenderness or " deformity  Normal range of motion  Cervical back: Normal range of motion  Lymphadenopathy:      Cervical: No cervical adenopathy  Skin:     General: Skin is warm and dry  Findings: No rash  Neurological:      General: No focal deficit present  Mental Status: She is alert and oriented to person, place, and time  Mental status is at baseline  Cranial Nerves: No cranial nerve deficit  Coordination: Coordination normal       Deep Tendon Reflexes: Reflexes are normal and symmetric  Reflexes normal    Psychiatric:         Mood and Affect: Mood normal          Behavior: Behavior normal          Thought Content: Thought content normal          Judgment: Judgment normal           Data:    Laboratory Results: I have personally reviewed the pertinent laboratory results/reports   Radiology/Other Diagnostic Testing Results: I have personally reviewed pertinent reports         Lab Results   Component Value Date    WBC 5 76 03/09/2023    HGB 12 5 03/09/2023    HCT 39 0 03/09/2023    MCV 94 03/09/2023     03/09/2023     Lab Results   Component Value Date    K 3 8 01/04/2023     01/04/2023    CO2 27 01/04/2023    BUN 15 01/04/2023    CREATININE 0 67 01/04/2023    GLUF 85 12/14/2022    CALCIUM 8 6 01/04/2023    AST 14 01/04/2023    ALT 13 01/04/2023    ALKPHOS 83 01/04/2023    EGFR 89 01/04/2023     Lab Results   Component Value Date    CHOLESTEROL 218 (H) 12/14/2022    CHOLESTEROL 222 (H) 08/30/2022    CHOLESTEROL 210 (H) 05/30/2022     Lab Results   Component Value Date    HDL 52 12/14/2022    HDL 54 08/30/2022    HDL 44 (L) 05/30/2022     Lab Results   Component Value Date    LDLCALC 144 (H) 12/14/2022    LDLCALC 149 (H) 08/30/2022    LDLCALC 148 (H) 05/30/2022     Lab Results   Component Value Date    TRIG 110 12/14/2022    TRIG 94 08/30/2022    TRIG 88 05/30/2022     No results found for: [place], [place]  Lab Results   Component Value Date    YHV7VVOBLMYO 3 970 12/14/2022     Lab Results Component Value Date    [facility]1C 5 4 05/30/2022     No results found for: GABE Gramajo, DO

## 2023-05-11 NOTE — ASSESSMENT & PLAN NOTE
On examination patient has diffuse wheezing we will add back her Symbicort inhaler which she has used in the past

## 2023-05-12 ENCOUNTER — OFFICE VISIT (OUTPATIENT)
Dept: PODIATRY | Facility: CLINIC | Age: 73
End: 2023-05-12

## 2023-05-12 VITALS
WEIGHT: 163 LBS | HEART RATE: 86 BPM | HEIGHT: 61 IN | BODY MASS INDEX: 30.78 KG/M2 | SYSTOLIC BLOOD PRESSURE: 118 MMHG | DIASTOLIC BLOOD PRESSURE: 64 MMHG

## 2023-05-12 DIAGNOSIS — B35.1 ONYCHOMYCOSIS: ICD-10-CM

## 2023-05-12 DIAGNOSIS — I87.2 VENOUS INSUFFICIENCY OF BOTH LOWER EXTREMITIES: Primary | ICD-10-CM

## 2023-05-12 NOTE — PROGRESS NOTES
Assessment/Plan:    No problem-specific Assessment & Plan notes found for this encounter  Diagnoses and all orders for this visit:    Venous insufficiency of both lower extremities    Onychomycosis        Plan:     1  A thorough neurovascular exam was performed  Patient presents for at-risk foot care  Nicole Collins has no acute concerns today  Nicole Collins has significant lower extremity risk due to neuropathy, parasthesia, edema, and trophic skin changes to the lower extremity  Patient has Q9  findings and is recommended for at risk foot care every 3 months      2  All 10 toenails were debridement as follow: using nail nipper, jakob, and curette, nails were sharply debrided, reduced in thickness and length  Devitalized nail tissue and fungal debris excised and removed  Patient tolerated well        3  Patient was educated on importance of  daily foot assessment and proper shoe gear  Educational materials were provided       4  Call if any questions or occurrence of any foot and ankle related complications      5  Return in 10-12 weeks      Subjective:      Patient ID: Ruthie Cotton is a 72 y o  female  HPI:  Ruthie Cotton is a 72 y o  female who presents with painful, elongated toenails  They have difficulty applying their socks and shoes due to the elongation of the nails  The pressure within their shoe gear is painful and they have been unable to cut their nails adequately  Patient states pain is 1/10 in shoe gear  Pain with pressure  Requires at risk foot care  The following portions of the patient's history were reviewed and updated as appropriate:   She  has a past medical history of Allergic, Anemia, Arthritis, GERD (gastroesophageal reflux disease), Herniated disc, cervical, Hyperlipidemia, Stroke ([facility] [address] ), and Syncope (2023)    She   Patient Active Problem List    Diagnosis Date Noted   • Rib pain on right side 05/11/2023   • Reactive airway disease with acute exacerbation 05/11/2023   • Vasovagal syncope 01/20/2023   • Iron deficiency anemia 01/20/2023   • Respiratory infection 12/05/2022   • Mixed hyperlipidemia 07/06/2022   • Posterior tibial tendinitis of right lower extremity 07/06/2022   • Petechiae 07/06/2022   • Localized osteoporosis without current pathological fracture 07/06/2022   • Atypical migraine 06/02/2022   • Visual field constriction of left eye 06/02/2022   • Visual disturbance 05/29/2022   • Post-COVID chronic fatigue 05/17/2022   • Nausea 05/12/2022   • COVID-19 virus infection 05/10/2022   • Tachycardia 03/14/2022   • Erosion of bladder suspension mesh ([facility] [address] ) 03/04/2022   • Pelvic organ prolapse quantification stage 2 cystocele 03/04/2022   • Positive anaplasmosis titer 10/01/2021   • Cold hands 10/01/2021   • Rash of unknown etiology 08/02/2021   • Platelets decreased ([facility] [address] ) 06/28/2021   • Chronic GERD 06/28/2021   • Peroneal tendinitis of left lower extremity 06/08/2021   • Viral syndrome 11/20/2020   • Exposure to COVID-19 virus 11/18/2020   • Left ear pain 06/22/2020   • Combined forms of age-related cataract of both eyes 06/22/2020   • Overweight with body mass index (BMI) of 29 to 29 9 in adult 06/17/2020   • Osteopenia of multiple sites 06/17/2020   • Family history of thyroid disease in mother 06/17/2020   • Contact stomatitis 01/20/2020   • Slow transit constipation 12/12/2019   • Hyponatremia 11/26/2019   • Neck pain 02/28/2019   • Degeneration of intervertebral disc 12/06/2018   • Hip pain 12/06/2018   • Low back pain 12/06/2018   • Gastroesophageal reflux disease without esophagitis 12/06/2018   • Lumbar radiculopathy 08/13/2018     She  has a past surgical history that includes Tonsillectomy; Bladder repair; REMOVAL OF INTRAUTERINE DEVICE (IUD); [place] tooth extraction; Tumor removal; Cataract extraction, bilateral; Colonoscopy; Eye surgery; and Tubal ligation       Review of Systems   All other systems reviewed and are negative          Objective:      /64 (BP Location: "Left arm, Patient Position: Sitting, Cuff Size: Extra-Large)   Pulse 86   Ht 5' 1\" (1 549 m)   Wt 73 9 kg (163 lb)   BMI 30 80 kg/m²          Physical Exam  Vitals reviewed  Feet:      Comments: On exam patient has thickened, hypertrophic, discolored, brittle toenails with subungual debris and tenderness x10    Patient has lower extremity edema  PAtients skin is atrophic, thickened nails, and decreased pedal hair  Patient has decreased pinprick and vibratory sensation to his feet and parasthesia        "

## 2023-06-15 ENCOUNTER — APPOINTMENT (OUTPATIENT)
Dept: LAB | Facility: CLINIC | Age: 73
End: 2023-06-15
Payer: MEDICARE

## 2023-06-15 LAB
ALBUMIN SERPL BCP-MCNC: 3.5 G/DL (ref 3.5–5)
ALP SERPL-CCNC: 105 U/L (ref 46–116)
ALT SERPL W P-5'-P-CCNC: 27 U/L (ref 12–78)
ANION GAP SERPL CALCULATED.3IONS-SCNC: 1 MMOL/L (ref 4–13)
AST SERPL W P-5'-P-CCNC: 16 U/L (ref 5–45)
BASOPHILS # BLD AUTO: 0.03 THOUSANDS/ÂΜL (ref 0–0.1)
BASOPHILS NFR BLD AUTO: 1 % (ref 0–1)
BILIRUB SERPL-MCNC: 0.44 MG/DL (ref 0.2–1)
BUN SERPL-MCNC: 14 MG/DL (ref 5–25)
CALCIUM SERPL-MCNC: 9.1 MG/DL (ref 8.3–10.1)
CHLORIDE SERPL-SCNC: 107 MMOL/L (ref 96–108)
CHOLEST SERPL-MCNC: 237 MG/DL
CO2 SERPL-SCNC: 29 MMOL/L (ref 21–32)
CREAT SERPL-MCNC: 0.69 MG/DL (ref 0.6–1.3)
EOSINOPHIL # BLD AUTO: 0.18 THOUSAND/ÂΜL (ref 0–0.61)
EOSINOPHIL NFR BLD AUTO: 3 % (ref 0–6)
ERYTHROCYTE [DISTWIDTH] IN BLOOD BY AUTOMATED COUNT: 13 % (ref 11.6–15.1)
GFR SERPL CREATININE-BSD FRML MDRD: 87 ML/MIN/1.73SQ M
GLUCOSE P FAST SERPL-MCNC: 94 MG/DL (ref 65–99)
HCT VFR BLD AUTO: 38.8 % (ref 34.8–46.1)
HDLC SERPL-MCNC: 51 MG/DL
HGB BLD-MCNC: 12.3 G/DL (ref 11.5–15.4)
IMM GRANULOCYTES # BLD AUTO: 0.01 THOUSAND/UL (ref 0–0.2)
IMM GRANULOCYTES NFR BLD AUTO: 0 % (ref 0–2)
LDLC SERPL CALC-MCNC: 161 MG/DL (ref 0–100)
LYMPHOCYTES # BLD AUTO: 1.53 THOUSANDS/ÂΜL (ref 0.6–4.47)
LYMPHOCYTES NFR BLD AUTO: 29 % (ref 14–44)
MCH RBC QN AUTO: 29.7 PG (ref 26.8–34.3)
MCHC RBC AUTO-ENTMCNC: 31.7 G/DL (ref 31.4–37.4)
MCV RBC AUTO: 94 FL (ref 82–98)
MONOCYTES # BLD AUTO: 0.45 THOUSAND/ÂΜL (ref 0.17–1.22)
MONOCYTES NFR BLD AUTO: 9 % (ref 4–12)
NEUTROPHILS # BLD AUTO: 3.09 THOUSANDS/ÂΜL (ref 1.85–7.62)
NEUTS SEG NFR BLD AUTO: 58 % (ref 43–75)
NONHDLC SERPL-MCNC: 186 MG/DL
NRBC BLD AUTO-RTO: 0 /100 WBCS
PLATELET # BLD AUTO: 307 THOUSANDS/UL (ref 149–390)
PMV BLD AUTO: 9.9 FL (ref 8.9–12.7)
POTASSIUM SERPL-SCNC: 3.7 MMOL/L (ref 3.5–5.3)
PROT SERPL-MCNC: 7.5 G/DL (ref 6.4–8.4)
RBC # BLD AUTO: 4.14 MILLION/UL (ref 3.81–5.12)
SODIUM SERPL-SCNC: 137 MMOL/L (ref 135–147)
TRIGL SERPL-MCNC: 123 MG/DL
TSH SERPL DL<=0.05 MIU/L-ACNC: 3.15 UIU/ML (ref 0.45–4.5)
WBC # BLD AUTO: 5.29 THOUSAND/UL (ref 4.31–10.16)

## 2023-06-22 ENCOUNTER — OFFICE VISIT (OUTPATIENT)
Dept: FAMILY MEDICINE CLINIC | Facility: CLINIC | Age: 73
End: 2023-06-22
Payer: MEDICARE

## 2023-06-22 VITALS
OXYGEN SATURATION: 97 % | SYSTOLIC BLOOD PRESSURE: 122 MMHG | DIASTOLIC BLOOD PRESSURE: 80 MMHG | RESPIRATION RATE: 18 BRPM | WEIGHT: 163.2 LBS | HEART RATE: 82 BPM | TEMPERATURE: 97.1 F | HEIGHT: 61 IN | BODY MASS INDEX: 30.81 KG/M2

## 2023-06-22 DIAGNOSIS — K21.9 CHRONIC GERD: Primary | ICD-10-CM

## 2023-06-22 DIAGNOSIS — Z00.00 MEDICARE ANNUAL WELLNESS VISIT, SUBSEQUENT: ICD-10-CM

## 2023-06-22 DIAGNOSIS — M54.50 LOW BACK PAIN, UNSPECIFIED BACK PAIN LATERALITY, UNSPECIFIED CHRONICITY, UNSPECIFIED WHETHER SCIATICA PRESENT: ICD-10-CM

## 2023-06-22 DIAGNOSIS — M54.16 LUMBAR RADICULOPATHY: ICD-10-CM

## 2023-06-22 DIAGNOSIS — M81.6 LOCALIZED OSTEOPOROSIS WITHOUT CURRENT PATHOLOGICAL FRACTURE: ICD-10-CM

## 2023-06-22 DIAGNOSIS — E78.2 MIXED HYPERLIPIDEMIA: ICD-10-CM

## 2023-06-22 DIAGNOSIS — S46.812S TRAPEZIUS MUSCLE STRAIN, LEFT, SEQUELA: ICD-10-CM

## 2023-06-22 DIAGNOSIS — Z12.31 VISIT FOR SCREENING MAMMOGRAM: ICD-10-CM

## 2023-06-22 PROCEDURE — G0439 PPPS, SUBSEQ VISIT: HCPCS | Performed by: FAMILY MEDICINE

## 2023-06-22 PROCEDURE — 99214 OFFICE O/P EST MOD 30 MIN: CPT | Performed by: FAMILY MEDICINE

## 2023-06-22 RX ORDER — CHOLECALCIFEROL (VITAMIN D3) 125 MCG
CAPSULE ORAL
COMMUNITY
Start: 2022-12-20

## 2023-06-22 RX ORDER — VITAMIN E 268 MG
CAPSULE ORAL
COMMUNITY
Start: 2023-02-01

## 2023-06-22 RX ORDER — CEPHALEXIN 500 MG/1
CAPSULE ORAL
COMMUNITY
Start: 2023-05-31

## 2023-06-22 RX ORDER — NEOMYCIN SULFATE, POLYMYXIN B SULFATE, AND DEXAMETHASONE 3.5; 10000; 1 MG/G; [USP'U]/G; MG/G
OINTMENT OPHTHALMIC
COMMUNITY
Start: 2023-05-31

## 2023-06-22 NOTE — PROGRESS NOTES
Assessment and Plan:     Problem List Items Addressed This Visit        Digestive    Chronic GERD - Primary     Stable overall off proton pump inhibitors currently            Nervous and Auditory    Lumbar radiculopathy     Follow-up by [facility] found to have S1 radicular compromise on EMG testing with lumbar intact  Physical therapy could help the situation            Musculoskeletal and Integument    Localized osteoporosis without current pathological fracture     Stable currently continuing with calcium and vitamin D monitor DEXA scan every 2 years         Trapezius muscle strain, left, sequela     Recommend PT            Other    Low back pain     Recommend Physical therapy for bilateral leg pain with negative lumbar EMG but pos S1 emg         Medicare annual wellness visit, subsequent        Preventive health issues were discussed with patient, and age appropriate screening tests were ordered as noted in patient's After Visit Summary  Personalized health advice and appropriate referrals for health education or preventive services given if needed, as noted in patient's After Visit Summary  History of Present Illness:     Patient presents for a Medicare Wellness Visit    Patient presents for follow-up evaluation review of lab work and Medicare wellness visit along with discussion regarding her findings from the orthopedic doctors     Patient Care Team:  John Sierra DO as PCP - General (Family Medicine)  Zara Muir PA-C as Physician Assistant (Physician Assistant)     Review of Systems:     Review of Systems   Constitutional: Negative for chills, fatigue and fever  HENT: Negative for congestion, nosebleeds, rhinorrhea, sinus pressure and sore throat  Eyes: Negative for discharge and redness  Respiratory: Negative for cough and shortness of breath  Cardiovascular: Negative for chest pain, palpitations and leg swelling     Gastrointestinal: Negative for abdominal pain, blood in stool and nausea  Endocrine: Negative for cold intolerance, heat intolerance and polyuria  Genitourinary: Negative for dysuria and frequency  Musculoskeletal: Negative for arthralgias, back pain and myalgias  Skin: Negative for rash  Neurological: Negative for dizziness, weakness and headaches  Hematological: Negative for adenopathy  Psychiatric/Behavioral: Negative for behavioral problems and sleep disturbance  The patient is not nervous/anxious           Problem List:     Patient Active Problem List   Diagnosis   • Degeneration of intervertebral disc   • Hip pain   • Low back pain   • Lumbar radiculopathy   • Gastroesophageal reflux disease without esophagitis   • Neck pain   • Hyponatremia   • Slow transit constipation   • Contact stomatitis   • Overweight with body mass index (BMI) of 29 to 29 9 in adult   • Osteopenia of multiple sites   • Family history of thyroid disease in mother   • Left ear pain   • Combined forms of age-related cataract of both eyes   • Exposure to COVID-19 virus   • Viral syndrome   • Peroneal tendinitis of left lower extremity   • Medicare annual wellness visit, subsequent   • Platelets decreased ([facility] [address] )   • Chronic GERD   • Rash of unknown etiology   • Positive anaplasmosis titer   • Cold hands   • Erosion of bladder suspension mesh (HCC)   • Pelvic organ prolapse quantification stage 2 cystocele   • Tachycardia   • COVID-19 virus infection   • Nausea   • Post-COVID chronic fatigue   • Visual disturbance   • Atypical migraine   • Visual field constriction of left eye   • Mixed hyperlipidemia   • Posterior tibial tendinitis of right lower extremity   • Petechiae   • Localized osteoporosis without current pathological fracture   • Respiratory infection   • Vasovagal syncope   • Iron deficiency anemia   • Rib pain on right side   • Reactive airway disease with acute exacerbation   • Trapezius muscle strain, left, sequela      Past Medical and Surgical History:     Past Medical History:   Diagnosis Date   • Allergic    • Anemia    • Arthritis    • GERD (gastroesophageal reflux disease)    • Herniated disc, cervical    • Hyperlipidemia    • Stroke [facility])    • Syncope 2023     Past Surgical History:   Procedure Laterality Date   • BLADDER REPAIR      sling   • CATARACT EXTRACTION, BILATERAL     • COLONOSCOPY     • EYE SURGERY     • REMOVAL OF INTRAUTERINE DEVICE (IUD)      in abd area   • TONSILLECTOMY     • TUBAL LIGATION     • TUMOR REMOVAL      pinky finger 1977   • WISDOM TOOTH EXTRACTION        Family History:     Family History   Problem Relation Age of Onset   • Hypertension Mother         Pulmonary   • Osteoporosis Mother    • Heart failure Mother    • Thyroid disease Mother    • Arthritis Mother    • Anxiety disorder Mother    • Heart attack Father    • Hypertension Father    • Stroke Father    • Dementia Father    • Heart disease Father    • Thyroid disease Sister    • No Known Problems Daughter    • No Known Problems Maternal Grandmother    • No Known Problems Paternal Grandmother    • No Known Problems Daughter    • Mental illness Maternal Aunt    • Depression Maternal Aunt    • Schizoaffective Disorder  Maternal Aunt    • Schizophrenia Maternal Aunt    • No Known Problems Maternal Aunt    • Breast cancer Maternal Aunt    • No Known Problems Maternal Aunt    • No Known Problems Paternal Aunt    • Suicide Attempts Maternal Grandfather    • Colon cancer Maternal Uncle       Social History:     Social History     Socioeconomic History   • Marital status: /Civil Union     Spouse name: None   • Number of children: None   • Years of education: None   • Highest education level: None   Occupational History   • None   Tobacco Use   • Smoking status: Never   • Smokeless tobacco: Never   Vaping Use   • Vaping Use: Never used   Substance and Sexual Activity   • Alcohol use: Never   • Drug use: Never   • Sexual activity: Not Currently     Partners: Male   Other Topics Concern   • None Social History Narrative    Caffeine Consumption-1 cup of hot tea daily     Social Determinants of Health     Financial Resource Strain: Medium Risk (6/21/2023)    Overall Financial Resource Strain (CARDIA)    • Difficulty of Paying Living Expenses: Somewhat hard   Food Insecurity: Not on file   Transportation Needs: No Transportation Needs (6/21/2023)    PRAPARE - Transportation    • Lack of Transportation (Medical): No    • Lack of Transportation (Non-Medical): No   Physical Activity: Not on file   Stress: Not on file   Social Connections: Not on file   Intimate Partner Violence: Not on file   Housing Stability: Not on file      Medications and Allergies:     Current Outpatient Medications   Medication Sig Dispense Refill   • Black Elderberry,Berry-Flower, 575 MG CAPS Take 1,150 mg by mouth daily     • cephalexin (KEFLEX) 500 mg capsule      • Cholecalciferol (Vitamin D) 125 MCG (5000 UT) CAPS      • cholecalciferol (VITAMIN D3) 1,000 units tablet Take 1,000 Units by mouth daily 4,000     • Coenzyme Q10 (CoQ10) 100 MG CAPS CoQ10    daily     • Garlic 0355 MG CAPS Daily     • loratadine (CLARITIN) 10 mg tablet Take 10 mg by mouth daily PRN     • M2 ZINC-50 PO      • Multiple Vitamins-Minerals (PreserVision/Lutein) CAPS Take by mouth     • Multiple Vitamins-Minerals (WOMENS 50+ MULTI VITAMIN/MIN PO)      • neomycin-polymyxin-dexamethasone (MAXITROL) 0 35%-10,000 units/g-0 1%      • Omega-3 Fatty Acids (Fish Oil) 1000 MG CPDR Take by mouth     • OREGANO PO Take 140 mg by mouth     • polyethylene glycol-propylene glycol (Systane) 0 4-0 3 % Systane (PF) 0 4 %-0 3 % eye drops in a dropperette     • Probiotic Product (PROBIOTIC ACIDOPHILUS BEADS PO) Take by mouth     • vitamin E 100 UNIT capsule Take 400 Units by mouth daily     • Vitamin E 180 MG (400 UNIT) capsule      • budesonide (Pulmicort Flexhaler) 90 MCG/ACT inhaler Inhale 1 puff 2 (two) times a day Rinse mouth after use   (Patient not taking: Reported on 5/12/2023) 1 each 1   • ferrous sulfate 325 (65 Fe) mg tablet Take 325 mg by mouth daily with breakfast Unknown dose every other day (Patient not taking: Reported on 5/12/2023)     • Turmeric (QC TUMERIC COMPLEX PO)  (Patient not taking: Reported on 5/12/2023)       No current facility-administered medications for this visit  Allergies   Allergen Reactions   • Eggs Or Egg-Derived Products - Food Allergy Other (See Comments)   • Etodolac      Other reaction(s): chest pain, dizziness, nausea   • Nitrofurantoin Other (See Comments)   • Paxlovid [Nirmatrelvir-Ritonavir] Anaphylaxis   • Sucralfate Dizziness and Headache   • Sulfa Antibiotics Hives and Other (See Comments)   • Levofloxacin Dizziness   • Omeprazole Headache and Fatigue   • Pregabalin Chest Pain   • Aspirin      Other reaction(s): GI upset   • Chocolate - Food Allergy    • Famotidine Other (See Comments)   • Fluconazole Other (See Comments)   • Ibuprofen      Other reaction(s): GI upset   • Naproxen      Other reaction(s): GI upset   • Penicillin G      Other reaction(s): swollen lips, tingling around mouth      Immunizations:     Immunization History   Administered Date(s) Administered   • Tdap 05/29/2019      Health Maintenance:         Topic Date Due   • Breast Cancer Screening: Mammogram  10/13/2022   • Colorectal Cancer Screening  08/18/2031   • Hepatitis C Screening  Completed         Topic Date Due   • COVID-19 Vaccine (1) Never done   • Pneumococcal Vaccine: 65+ Years (1 - PCV) Never done   • Influenza Vaccine (Season Ended) 09/01/2023      Medicare Screening Tests and Risk Assessments:     Yosi Solares is here for her Subsequent Wellness visit  Health Risk Assessment:   Patient rates overall health as fair  Patient feels that their physical health rating is slightly worse  Patient is satisfied with their life  Eyesight was rated as same  Hearing was rated as same  Patient feels that their emotional and mental health rating is slightly better  Patients states they are never, rarely angry  Patient states they are often unusually tired/fatigued  Pain experienced in the last 7 days has been some  Patient's pain rating has been 6/10  Patient states that she has experienced no weight loss or gain in last 6 months  Fall Risk Screening: In the past year, patient has experienced: history of falling in past year      Urinary Incontinence Screening:   Patient has leaked urine accidently in the last six months  Home Safety:  Patient does not have trouble with stairs inside or outside of their home  Patient has working smoke alarms and has working carbon monoxide detector  Home safety hazards include: none  Nutrition:   Current diet is Low Saturated Fat and Limited junk food  Medications:   Patient is currently taking over-the-counter supplements  OTC medications include: See list  Patient is able to manage medications  Activities of Daily Living (ADLs)/Instrumental Activities of Daily Living (IADLs):   Walk and transfer into and out of bed and chair?: Yes  Dress and groom yourself?: Yes    Bathe or shower yourself?: Yes    Feed yourself? Yes  Do your laundry/housekeeping?: Yes  Manage your money, pay your bills and track your expenses?: Yes  Make your own meals?: Yes    Do your own shopping?: Yes    Durable Medical Equipment Suppliers  none    Previous Hospitalizations:   Any hospitalizations or ED visits within the last 12 months?: Yes    How many hospitalizations have you had in the last year?: 1-2    Advance Care Planning:   Living will: Yes    Durable POA for healthcare:  Yes    Advanced directive: Yes      PREVENTIVE SCREENINGS      Cardiovascular Screening:    General: Screening Not Indicated and History Lipid Disorder      Diabetes Screening:     General: Screening Current      Colorectal Cancer Screening:     General: Screening Current      Breast Cancer Screening:     General: Screening Current      Cervical Cancer Screening: "General: Screening Not Indicated      Osteoporosis Screening:    General: Screening Not Indicated and History Osteoporosis      Lung Cancer Screening:     General: Screening Not Indicated      Hepatitis C Screening:    General: Screening Current    Screening, Brief Intervention, and Referral to Treatment (SBIRT)    Screening  Typical number of drinks in a day: 0  Typical number of drinks in a week: 0  Interpretation: Low risk drinking behavior  AUDIT-C Screenin) How often did you have a drink containing alcohol in the past year? never  2) How many drinks did you have on a typical day when you were drinking in the past year? 0  3) How often did you have 6 or more drinks on one occasion in the past year? never    AUDIT-C Score: 0  Interpretation: Score 0-2 (female): Negative screen for alcohol misuse    Single Item Drug Screening:  How often have you used an illegal drug (including marijuana) or a prescription medication for non-medical reasons in the past year? never    Single Item Drug Screen Score: 0  Interpretation: Negative screen for possible drug use disorder    No results found  Physical Exam:     /80 (BP Location: Right arm, Patient Position: Sitting, Cuff Size: Standard)   Pulse 82   Temp (!) 97 1 °F (36 2 °C) (Tympanic)   Resp 18   Ht 5' 1\" (1 549 m)   Wt 74 kg (163 lb 3 2 oz)   SpO2 97%   BMI 30 84 kg/m²     Physical Exam  Vitals and nursing note reviewed  Constitutional:       General: She is not in acute distress  Appearance: Normal appearance  She is well-developed  HENT:      Head: Normocephalic and atraumatic  Right Ear: Tympanic membrane, ear canal and external ear normal       Left Ear: Tympanic membrane, ear canal and external ear normal       Nose: Nose normal       Mouth/Throat:      Mouth: Mucous membranes are moist       Pharynx: Oropharynx is clear  Eyes:      Extraocular Movements: Extraocular movements intact        Conjunctiva/sclera: Conjunctivae " normal       Pupils: Pupils are equal, round, and reactive to light  Cardiovascular:      Rate and Rhythm: Normal rate and regular rhythm  Pulses: Normal pulses  Heart sounds: Normal heart sounds  No murmur heard  Pulmonary:      Effort: Pulmonary effort is normal  No respiratory distress  Breath sounds: Normal breath sounds  Abdominal:      General: Bowel sounds are normal       Palpations: Abdomen is soft  Tenderness: There is no abdominal tenderness  Musculoskeletal:         General: No swelling  Normal range of motion  Cervical back: Normal range of motion and neck supple  Skin:     General: Skin is warm and dry  Capillary Refill: Capillary refill takes less than 2 seconds  Neurological:      General: No focal deficit present  Mental Status: She is alert and oriented to person, place, and time  Mental status is at baseline  Psychiatric:         Mood and Affect: Mood normal          Behavior: Behavior normal          Thought Content: Thought content normal          Judgment: Judgment normal           Adithya Hand DO BMI Counseling: Body mass index is 30 84 kg/m²  The BMI is above normal  Nutrition recommendations include reducing portion sizes, decreasing overall calorie intake, 3-5 servings of fruits/vegetables daily, reducing fast food intake, consuming healthier snacks, decreasing soda and/or juice intake, increasing intake of lean protein, reducing intake of saturated fat and trans fat and reducing intake of cholesterol  Exercise recommendations include exercising 3-5 times per week

## 2023-06-22 NOTE — ASSESSMENT & PLAN NOTE
Subjective    Jerrica Marquez is a 28 y.o. female. she is here today for follow-up.    History of Present Illness           27 year old comes for follow up      reason - hypothyroidism,due to Hashimoto's     timing - constant     quality - not controlled     severity - moderate     symptoms - 60 + lb weight gain, fatigue, hair loss, constipation, arthralgias     alleviating - levothyroxine 100 mcgs daily and on this dose TSH is 5     Currently pregnant 20 weeks      --     obesity   despite exercise  following 1800 calorie diet     --     h o vit D Deficiency, not on supplements       Evaluation history:  TSH   Date Value Ref Range Status   07/21/2017 3.370 0.460 - 4.680 mIU/mL Final     Free T4   Date Value Ref Range Status   07/21/2017 1.01 0.78 - 2.19 ng/dL Final       Current medications:  Current Outpatient Prescriptions   Medication Sig Dispense Refill   • levothyroxine (SYNTHROID) 125 MCG tablet Take one tablet daily with one extra tablet on Monday and Thursday. 40 tablet 11   • Prenatal Vit-Fe Fumarate-FA (PRENATAL VITAMIN PO) Take  by mouth Daily.     • promethazine (PHENERGAN) 12.5 MG tablet Take 1 tablet by mouth Every 6 (Six) Hours As Needed for Nausea or Vomiting. 30 tablet 2     No current facility-administered medications for this visit.        The following portions of the patient's history were reviewed and updated as appropriate:   Past Medical History:   Diagnosis Date   • Abdominal pain     generalized cramping after eating wheat type products   • Acquired hypothyroidism     Secondary to Hashimoto's thyroiditis   • Dysmenorrhea    • Endogenous obesity    • Excessive or frequent menstruation    • Hashimoto's thyroiditis    • Headache    • Myopia    • Vitamin D deficiency      Past Surgical History:   Procedure Laterality Date   • INJECTION OF MEDICATION  08/14/2013    Depo Provera (medroxyprogesterone acetate)   • INJECTION OF MEDICATION  03/13/2012    Kenalog x2   • INJECTION OF MEDICATION   Stable currently continuing with calcium and vitamin D monitor DEXA scan every 2 years 2015    Toradol   • INJECTION OF MEDICATION  2015    Zofran     Family History   Problem Relation Age of Onset   • Asthma Other    • Hypertension Other    • Other Other      Thyroid Disorder, Depressive Disorder, Smoking Tobacco, Anxiety   • Rheum arthritis Mother    • Hypertension Father    • Heart disease Maternal Grandfather    • Hypertension Maternal Grandfather      OB History      Para Term  AB TAB SAB Ectopic Multiple Living    1                 Allergies   Allergen Reactions   • Cefzil [Cefprozil] Hives   • Celexa [Citalopram]      Patient states that she is not allergic to this med. It causes fatigue   • Influenza Vaccines      Migraines/ vomiting has reaction 2015- not in past      Social History     Social History   • Marital status: Single     Spouse name: N/A   • Number of children: N/A   • Years of education: N/A     Social History Main Topics   • Smoking status: Never Smoker   • Smokeless tobacco: Never Used   • Alcohol use No   • Drug use: No   • Sexual activity: Yes     Partners: Male     Other Topics Concern   • None     Social History Narrative       Review of Systems  Review of Systems   Constitutional: Negative for activity change, appetite change, diaphoresis and fatigue.   HENT: Negative for congestion, dental problem, drooling, facial swelling, sneezing, sore throat, tinnitus, trouble swallowing and voice change.    Eyes: Negative for photophobia, pain, discharge, redness, itching and visual disturbance.   Respiratory: Negative for apnea, cough, choking, chest tightness and shortness of breath.    Cardiovascular: Negative for chest pain, palpitations and leg swelling.   Gastrointestinal: Negative for abdominal distention, abdominal pain, constipation, diarrhea, nausea and vomiting.   Endocrine: Negative for cold intolerance, heat intolerance, polydipsia, polyphagia and polyuria.   Genitourinary: Negative for difficulty urinating, dysuria, frequency, hematuria and  "urgency.   Musculoskeletal: Negative for arthralgias, back pain, gait problem, joint swelling, myalgias, neck pain and neck stiffness.   Skin: Negative for color change, pallor, rash and wound.   Allergic/Immunologic: Negative for environmental allergies and food allergies.   Neurological: Negative for dizziness, tremors, facial asymmetry, weakness, light-headedness, numbness and headaches.   Hematological: Negative for adenopathy. Does not bruise/bleed easily.   Psychiatric/Behavioral: Negative for agitation, behavioral problems, confusion, decreased concentration and sleep disturbance.        Objective    /70 (BP Location: Left arm, Patient Position: Sitting, Cuff Size: Adult)  Pulse 93  Ht 65\" (165.1 cm)  Wt 249 lb (113 kg)  LMP 03/25/2017  BMI 41.44 kg/m2  Physical Exam   Constitutional: She is oriented to person, place, and time. She appears well-developed and well-nourished. No distress.   HENT:   Head: Normocephalic and atraumatic.   Right Ear: External ear normal.   Left Ear: External ear normal.   Nose: Nose normal.   Eyes: Conjunctivae and EOM are normal. Pupils are equal, round, and reactive to light.   Neck: Normal range of motion. Neck supple. No tracheal deviation present. No thyromegaly present.   Cardiovascular: Normal rate, regular rhythm and normal heart sounds.    No murmur heard.  Pulmonary/Chest: Effort normal and breath sounds normal. No respiratory distress. She has no wheezes.   Abdominal: Soft. Bowel sounds are normal. There is no tenderness. There is no rebound and no guarding.   Musculoskeletal: Normal range of motion. She exhibits no edema, tenderness or deformity.   Neurological: She is alert and oriented to person, place, and time. No cranial nerve deficit.   Skin: Skin is warm and dry. No rash noted.   Psychiatric: She has a normal mood and affect. Her behavior is normal. Judgment and thought content normal.       Lab Review  Lab Results   Component Value Date    TSH 3.370 " 07/21/2017     Lab Results   Component Value Date    FREET4 1.01 07/21/2017        Assessment/Plan      1. Hashimoto's thyroiditis    2. Hypothyroidism affecting pregnancy in second trimester    . This diagnosis was discussed and reviewed with the patient including the advantages of drug therapy.     1. Orders placed during this encounter include:  Orders Placed This Encounter   Procedures   • TSH   • TSH     Standing Status:   Standing     Number of Occurrences:   12     Standing Expiration Date:   8/22/2018       Medications prescribed:  Outpatient Encounter Prescriptions as of 8/22/2017   Medication Sig Dispense Refill   • levothyroxine (SYNTHROID) 125 MCG tablet Take one tablet daily with one extra tablet on Monday and Thursday. 40 tablet 11   • Prenatal Vit-Fe Fumarate-FA (PRENATAL VITAMIN PO) Take  by mouth Daily.     • promethazine (PHENERGAN) 12.5 MG tablet Take 1 tablet by mouth Every 6 (Six) Hours As Needed for Nausea or Vomiting. 30 tablet 2     No facility-administered encounter medications on file as of 8/22/2017.          Plan Details  Hypothyroidism        on Levothyroxine 125 mcgs one daily     June 2015     TSH - 2.07  no change in dosage     ( previouly on levothyroxine and cytomel did not like the cytomel)     repeat labs today     Will call with results         Variability TSH -- submit for brand name synthroid      When she becomes pregnant she is to call the office immediately      Levothyroxine 125 mcg one daily except Monday and Thursday take 2 tablet     Repeat today         ===     belviq 10 mg may be a possibility but too expensive - not taking --cannot take due to pregnancy      ===                 ====     Vit D Def , start 50 th q 2 w     Was taking     She ran out about one month ago      ====     B12 nl      --------------     stomach pain after eating breads and other foods     will check for celiac --negative            4. Return in about 4 months (around 12/22/2017).

## 2023-06-22 NOTE — ASSESSMENT & PLAN NOTE
Follow-up by [facility] found to have S1 radicular compromise on EMG testing with lumbar intact    Physical therapy could help the situation

## 2023-07-06 ENCOUNTER — EVALUATION (OUTPATIENT)
Dept: PHYSICAL THERAPY | Age: 73
End: 2023-07-06
Payer: MEDICARE

## 2023-07-06 DIAGNOSIS — M54.16 LUMBAR RADICULOPATHY: Primary | ICD-10-CM

## 2023-07-06 DIAGNOSIS — S46.812S STRAIN OF LEFT TRAPEZIUS MUSCLE, SEQUELA: ICD-10-CM

## 2023-07-06 PROCEDURE — 97140 MANUAL THERAPY 1/> REGIONS: CPT | Performed by: PHYSICAL THERAPIST

## 2023-07-06 PROCEDURE — 97162 PT EVAL MOD COMPLEX 30 MIN: CPT | Performed by: PHYSICAL THERAPIST

## 2023-07-06 PROCEDURE — 97110 THERAPEUTIC EXERCISES: CPT | Performed by: PHYSICAL THERAPIST

## 2023-07-06 NOTE — PROGRESS NOTES
PT Evaluation     Today's date: 2023  Patient name: Mick Lyman  : 1950  MRN: [id number]  Referring provider: Akosua Corral DO  Dx:   Encounter Diagnosis     ICD-10-CM    1. Lumbar radiculopathy  M54.16       2. Strain of left trapezius muscle, sequela  S46.812S           Start Time: 1348  Stop Time: 1445  Total time in clinic (min): 57 minutes    Assessment  Assessment details: Mick Lyman is a 72 y.o. female who presents with pain, decreased strength and decreased ROM. Due to these impairments, Patient has difficulty performing a/iadls and recreational activities. Patient's clinical presentation is consistent with their referring diagnosis of low back pain, left trap strain. Patient had chiropractor appt today and had massage for first time since injury, and states she is much better today than normally would be. She is agreeable to pool and land PT to address both body parts. Patient would benefit from skilled physical therapy to address their aforementioned impairments, improve their level of function and to improve their overall quality of life. Impairments: abnormal or restricted ROM, activity intolerance, impaired physical strength, lacks appropriate home exercise program, pain with function, poor posture  and poor body mechanics    Symptom irritability: moderateUnderstanding of Dx/Px/POC: good   Prognosis: good    Goals  ST-4 WEEKS  1. Decrease LB and left trap pain < 7/10 on VAS at its worst.  2.  Increase ROM by > 5 deg in all deficients planes. 3.  Increase core and LUE strength by 1/2 MMT grade. 4. Increase tolerance to activity and pool therapy > 45 min. 5. Able to sleep on right side uninterrupted > 4 hours. LT-6 WEEKS  1. Patient to be independent with a/iadls. 2. Increase functional activities for leisure and home activities to previous LOF.   3. Independent with HEP and/or fitness program.    Plan  Patient would benefit from: skilled physical therapy  Planned modality interventions: cryotherapy, thermotherapy: hydrocollator packs and unattended electrical stimulation  Planned therapy interventions: activity modification, behavior modification, body mechanics training, aquatic therapy, flexibility, functional ROM exercises, home exercise program, IADL retraining, joint mobilization, manual therapy, neuromuscular re-education, patient education, postural training, strengthening, stretching, therapeutic activities, therapeutic exercise and abdominal trunk stabilization  Frequency: 2-3x week. Duration in weeks: 12  Plan of Care beginning date: 2023  Plan of Care expiration date: 10/6/2023  Treatment plan discussed with: patient        Subjective Evaluation    History of Present Illness  Mechanism of injury: Patient reports she fell down a hill 2x in one day hurting ribs and shoulders. She went to ED next day and xrays of hips, shoulders and ribs were negative. She saw ortho and referred for aquatic therapy for LB but her PCP advised she do land PT for her left trap area also. Patient has long history of back pain with radiculopathy but fall made her worse. She had MRI in 2023, in chart. She had EMG on LE's recently but unable to see results in chart. She will bring next visit. She is agreeable to doing land and pool PT although she is concerned about prior chlorine rash and afraid of getting bladder infection. She saw her chiropractor today for first time since injury and feels much better since treatment.    Pain  Current pain ratin (after massage today)  At worst pain rating: 10  Location: left trap and Low back  Quality: radiating and sharp  Relieving factors: rest  Aggravating factors: stair climbing and walking  Progression: improved    Social Support  Steps to enter house: yes  Stairs in house: no   Lives in: [facility] house  Lives with: spouse    Employment status: not working    Diagnostic Tests  X-ray: normal    FCE comments: xrays of ribs, shoulders and hips were all negative  MRI of LB: 1/27/23 in chartTreatments  Current treatment: chiropractic  Patient Goals  Patient goals for therapy: decreased pain, increased motion, increased strength and independence with ADLs/IADLs  Patient goal: walk better, garden        Objective     Static Posture   General Observations  Scoliosis. Lumbar Spine   Increased lordosis. Postural Observations  Seated posture: fair  Standing posture: fair        Observations   Left Ankle/Foot   Positive for edema. Additional Observation Details  5th left metatarsal swelling    Palpation   Left   Tenderness of the erector spinae, lumbar paraspinals, peroneus and upper trapezius. Right   Tenderness of the erector spinae and lumbar paraspinals. Tenderness     Left Hip   Tenderness in the PSIS. Right Hip   Tenderness in the PSIS. Left Ankle/Foot   Tenderness in the fifth metatarsal base and peroneal tendon. Additional Tenderness Details  Lateral lower left leg tenderness and pain to palpation along peroneal tendon    Neurological Testing     Sensation   Cervical/Thoracic   Left   Intact: light touch    Right   Intact: light touch    Lumbar   Left   Intact: light touch and hot/cold discrimination    Right   Intact: light touch and hot/cold discrimination    Ankle/Foot   Left Ankle/Foot   Intact: light touch and hot/cold discrimination    Reflexes   Left   Patellar (L4): normal (2+)    Right   Patellar (L4): normal (2+)    Active Range of Motion   Cervical/Thoracic Spine       Cervical    Flexion: 30 degrees   Extension: 40 degrees      Left lateral flexion: 30 degrees      Right lateral flexion: 35 degrees      Left rotation: 45 degrees  Right rotation: 45 degrees           Lumbar   Flexion: Active lumbar flexion: tips to distal shins.    Extension:  [facility]  Left lateral flexion:  WFL  Right lateral flexion:  WFL  Left Ankle/Foot   Dorsiflexion (ke): 5 degrees with pain  Plantar flexion: 45 degrees   Inversion: 20 degrees   Eversion: 20 degrees with pain    Right Ankle/Foot   Dorsiflexion (ke): 10 degrees   Plantar flexion: 52 degrees   Inversion: 20 degrees   Eversion: 12 degrees     Additional Active Range of Motion Details  Hamstring flexibility: 72° bilateral    Strength/Myotome Testing     Left Shoulder     Planes of Motion   Flexion: 4-   Abduction: 4-     Right Shoulder     Planes of Motion   Flexion: 4   Abduction: 4     Left Hip   Planes of Motion   Flexion: 4  Extension: 4  Abduction: 4  Adduction: 4+    Right Hip   Planes of Motion   Flexion: 4  Extension: 4  Abduction: 4  Adduction: 4+    Left Knee   Flexion: 4+  Extension: 5    Right Knee   Flexion: 4+  Extension: 5    Left Ankle/Foot   Dorsiflexion: 4+  Plantar flexion: 4+    Right Ankle/Foot   Normal strength  Dorsiflexion: 4+  Plantar flexion: 4+    Tests     Lumbar     Left   Negative slump test.     Right   Negative slump test.     Left Hip   Negative FCO and FADIR. Right Hip   Negative FCO and FADIR. Ambulation   Weight-Bearing Status   Assistive device used: none    Ambulation: Level Surfaces   Ambulation without assistive device: independent    Observational Gait   Decreased walking speed.      Functional Assessment        Single Leg Stance   Left: 3 seconds  Right: 4 seconds    General Comments:    Upper quarter screen   Elbow: unremarkable  Hand/wrist: unremarkable  Lower quarter screen   Hips: unremarkable  Knees: unremarkable  Foot/ankle: unremarkable    Shoulder Comments   ROM  WFL for ADL's but limited end range elevation L>R      Flowsheet Rows    Flowsheet Row Most Recent Value   PT/OT G-Codes    Current Score 42   Projected Score 54             Precautions: HNP L 5-S1/pt, cataract sx  Daily Treatment Diary     Manuals 7/6            B leg pulls 5            LB/LE's 10                                      Neuro Re-Ed                                       Ther Ex             Nustep  UE/LE for strength 5' L4 Slant board             UBE for endurance/ROM             TB rows, shld extension             pulleys             B heel slides 30x            Hip abduction 20x blue TB            Hip adduction 20x ball                                                                             Ther Activity                                       Gait Training                                       Modalities

## 2023-07-10 ENCOUNTER — OFFICE VISIT (OUTPATIENT)
Dept: PHYSICAL THERAPY | Age: 73
End: 2023-07-10
Payer: MEDICARE

## 2023-07-10 DIAGNOSIS — M54.16 LUMBAR RADICULOPATHY: Primary | ICD-10-CM

## 2023-07-10 DIAGNOSIS — S46.812S STRAIN OF LEFT TRAPEZIUS MUSCLE, SEQUELA: ICD-10-CM

## 2023-07-10 PROCEDURE — 97113 AQUATIC THERAPY/EXERCISES: CPT

## 2023-07-10 PROCEDURE — 97150 GROUP THERAPEUTIC PROCEDURES: CPT

## 2023-07-10 NOTE — PROGRESS NOTES
Daily Note     Today's date: 7/10/2023  Patient name: Leopoldo Snider  : 1950  MRN: [id number]  Referring provider: Ita Bentley DO  Dx:   Encounter Diagnosis     ICD-10-CM    1. Lumbar radiculopathy  M54.16       2. Strain of left trapezius muscle, sequela  S46.812S           Start Time: 0900  Stop Time: 1000  Total time in clinic (min): 60 minutes    Subjective: Patient reports pain in her LB today. Objective: See treatment diary below      Assessment: Tolerated treatment fairly well with light ex program today, Patient required verbal cueing throughout prescribed exercises for proper execution and dosage. Patient demonstrated fatigue post treatment and would benefit from continued PT    Patient seen 1:1 for 30 minutes and rest of time group setting. Plan: Continue per plan of care.       Precautions: HNP L 5-S1/pt, cataract sx  Daily Treatment Diary     Precautions:  Daily Treatment Diary     Date 7/10          Patient education           Water Walk (FW, BW, side) x10’  10'          LE work at wall               Hip FF/ext swing  2            Toe/heel  1            Hip ABD/ADD  2                        Knee flexion & extension 1            Squats 1          UE noodle x3             Push/pull  1            Rotate  1            Row forward & back  2              OH side bend            UE/Core (AROM, paddles, MB)              ABD/ADD              Horizontal Pec Fly              Alt. arm swing (shld flex/ext)              Push pull              Single paddle rotation           SLS (EO, EC, ball toss)            Aqua Step (FW, lat, eccentric)            Core work:              MF press (red, blue, blk)             Kickboard press (blue, red)              Row/ext w/ tubing (red, blue, blk)           Seated on bench             ankle DF/PF              ABD/ADD  1            Knee flexion/extension  1          DW TX - hang in the deep water  10            DW ABD/ADD DW Bicycle  5            DW Scissor hip flexion/extension              DW DKTC            Stretches              HS at step  2'            Wall stretches              Calf stretch at wall              Other: piriformis 10"x3 ea          Hot Tub - 10 minutes only  10'

## 2023-07-13 ENCOUNTER — OFFICE VISIT (OUTPATIENT)
Dept: PHYSICAL THERAPY | Age: 73
End: 2023-07-13
Payer: MEDICARE

## 2023-07-13 DIAGNOSIS — M54.16 LUMBAR RADICULOPATHY: Primary | ICD-10-CM

## 2023-07-13 DIAGNOSIS — S46.812S STRAIN OF LEFT TRAPEZIUS MUSCLE, SEQUELA: ICD-10-CM

## 2023-07-13 PROCEDURE — 97110 THERAPEUTIC EXERCISES: CPT

## 2023-07-13 NOTE — PROGRESS NOTES
Daily Note     Today's date: 2023  Patient name: Surendra Russell  : 1950  MRN: [id number]  Referring provider: Patricio Garcia MD  Dx:   Encounter Diagnosis     ICD-10-CM    1. Lumbar radiculopathy  M54.16       2. Strain of left trapezius muscle, sequela  S46.812S           Start Time: 0910  Stop Time: 0950  Total time in clinic (min): 40 minutes    Subjective: Patient reports increased pain since last session. Patient believes hanging on pool noodle increased symptoms in her shoulders and UTs last treatment. Objective: See treatment diary below      Assessment: Patient tolerated treatment well. All exercises and manual therapy performed within patient's tolerance. Cueing needed to decrease compensatory techniques with theraband rows. Pt will benefit from continued skilled PT intervention in order to address remaining limitations and to restore maximal function. No increased pain reported post treatment. Plan: Continue per plan of care.       Precautions: HNP L 5-S1/pt, cataract sx  Daily Treatment Diary       Precautions: HNP L 5-S1/pt, cataract sx  Daily Treatment Diary     Manuals            B leg pulls 5 5           LB/LE's 10 10                                     Neuro Re-Ed                                       Ther Ex             Nustep  UE/LE for strength 5' L4 5' L1           Slant board  4x 20"           UBE for endurance/ROM  nv           TB rows, shld extension  RTB x10 ea           pulleys             B heel slides 30x 30x           Hip abduction 20x blue TB 20x Red TB           Hip adduction 20x ball 5" x10 ball                                                                            Ther Activity                                       Gait Training                                       Modalities

## 2023-07-18 ENCOUNTER — OFFICE VISIT (OUTPATIENT)
Dept: PHYSICAL THERAPY | Age: 73
End: 2023-07-18
Payer: MEDICARE

## 2023-07-18 DIAGNOSIS — S46.812S STRAIN OF LEFT TRAPEZIUS MUSCLE, SEQUELA: ICD-10-CM

## 2023-07-18 DIAGNOSIS — M54.16 LUMBAR RADICULOPATHY: Primary | ICD-10-CM

## 2023-07-18 PROCEDURE — 97113 AQUATIC THERAPY/EXERCISES: CPT

## 2023-07-18 NOTE — PROGRESS NOTES
Daily Note     Today's date: 2023  Patient name: Elan Heller  : 1950  MRN: [id number]  Referring provider: Bhavya Wilhelm DO  Dx:   Encounter Diagnosis     ICD-10-CM    1. Lumbar radiculopathy  M54.16       2. Strain of left trapezius muscle, sequela  S46.812S           Start Time: 0900  Stop Time: 1000  Total time in clinic (min): 60 minutes    Subjective: pt notes her L shoulder bothered her from hanging in deep end using a noodle under her arms. She continued w/ L shoulder/trap pain today w/ DW ex's. Objective: See treatment diary below      Assessment: Tolerated treatment fair. Tried different methods for DW ex's and TX but L shoulder/trap pain continued. She did the best w/ buoyancy belt but was unable to keep her legs down due to buoyancy issues. Difficulty w/ DW ex's or TX at this time. Patient would benefit from continued PT      Plan: Continue per plan of care.       Precautions: HNP L 5-S1/pt, cataract sx  Daily Treatment Diary       Precautions: HNP L 5-S1/pt, cataract sx  Daily Treatment Diary     Date 7/10 7/18         Patient education           Water Walk (FW, BW, side) x10’  10' 10         LE work at wall               Hip FF/ext swing  2 2           Toe/heel  1 1           Hip ABD/ADD  2 2            1           Knee flexion & extension 1 1           Squats 1 1         UE noodle x3             Push/pull  1 1 white noodle           Rotate  1 1           Row forward & back  2   2           OH side bend            UE/Core (AROM, paddles, MB)              ABD/ADD              Horizontal Pec Fly              Alt. arm swing (shld flex/ext)              Push pull              Single paddle rotation           Post sh rolls  x10         Aqua Step (FW, lat, eccentric)            Core work:              MF press (red, blue, blk)             Kickboard press (blue, red)              Row/ext w/ tubing (red, blue, blk)           Seated on bench             ankle DF/PF  1 1 March 1 1           ABD/ADD  1 1           Knee flexion/extension  1 1         DW TX - hang in the deep water  10 5           DW ABD/ADD              DW Bicycle  5 5           DW Scissor hip flexion/extension              DW DKTC            Stretches              HS at step  2' 2         Upper trap at bar  x3 10''         Scalenes at bar  x3 10''           Other: piriformis 10"x3 ea x3         Hot Tub - 10 minutes only  10' 10

## 2023-07-20 ENCOUNTER — OFFICE VISIT (OUTPATIENT)
Dept: PHYSICAL THERAPY | Age: 73
End: 2023-07-20
Payer: MEDICARE

## 2023-07-20 DIAGNOSIS — M54.16 LUMBAR RADICULOPATHY: Primary | ICD-10-CM

## 2023-07-20 DIAGNOSIS — S46.812S STRAIN OF LEFT TRAPEZIUS MUSCLE, SEQUELA: ICD-10-CM

## 2023-07-20 PROCEDURE — 97110 THERAPEUTIC EXERCISES: CPT | Performed by: PHYSICAL THERAPIST

## 2023-07-20 PROCEDURE — 97140 MANUAL THERAPY 1/> REGIONS: CPT | Performed by: PHYSICAL THERAPIST

## 2023-07-20 NOTE — PROGRESS NOTES
Daily Note     Today's date: 2023  Patient name: Jimy Rogers  : 1950  MRN: [id number]  Referring provider: Nahomi Swann MD  Dx:   Encounter Diagnosis     ICD-10-CM    1. Lumbar radiculopathy  M54.16       2. Strain of left trapezius muscle, sequela  S46.812S           Start Time: 09  Stop Time: 1002  Total time in clinic (min): 61 minutes    Subjective: Patient reports she "got a workout" in the water, took it easy rest of day and felt good the next day. She c/o left trap pain, cracking in left shoulder. Objective: See treatment diary below      Assessment: Tolerated treatment well. Patient would benefit from continued PT   Tender and tight with spasm distal upper trap on left. Advised to stretch trap at home with instructions. Plan: Continue per plan of care.       Precautions: HNP L 5-S1/pt, cataract sx  Daily Treatment Diary       Precautions: HNP L 5-S1/pt, cataract sx  Daily Treatment Diary     Manuals           B leg pulls 5 5 5          LB/LE's 10 10 5          c/s   5                       Neuro Re-Ed                                       Ther Ex             Nustep  UE/LE for strength 5' L4 5' L1 6' L4          Slant board  4x 20" L3 30" 4x          UBE for endurance/ROM  nv 2/2          TB rows, shld extension  RTB x10 ea 2x10 ea          pulleys             B heel slides 30x 30x 30x          Hip abduction 20x blue TB 20x Red TB 30x          Hip adduction 20x ball 5" x10 ball 30x          Should shrugs, posterior rolls   20x ea          scap retraction   20x          UT stretch   5" 5x                                    Ther Activity                                       Gait Training                                       Modalities             Ice left trap after   10'

## 2023-07-25 ENCOUNTER — OFFICE VISIT (OUTPATIENT)
Dept: PHYSICAL THERAPY | Age: 73
End: 2023-07-25
Payer: MEDICARE

## 2023-07-25 DIAGNOSIS — M54.16 LUMBAR RADICULOPATHY: Primary | ICD-10-CM

## 2023-07-25 DIAGNOSIS — S46.812S STRAIN OF LEFT TRAPEZIUS MUSCLE, SEQUELA: ICD-10-CM

## 2023-07-25 PROCEDURE — 97113 AQUATIC THERAPY/EXERCISES: CPT

## 2023-07-25 NOTE — PROGRESS NOTES
Daily Note     Today's date: 2023  Patient name: Flavio Carrillo  : 1950  MRN: [id number]  Referring provider: Silvia Tucker DO  Dx:   Encounter Diagnosis     ICD-10-CM    1. Lumbar radiculopathy  M54.16       2. Strain of left trapezius muscle, sequela  S46.812S           Start Time: 0900  Stop Time: 1000  Total time in clinic (min): 60 minutes    Subjective: pt notes she felt good after last pool session but her last land PT was a little tough, she did have pain after that in her neck. " I am feeling stronger already in my legs."      Objective: See treatment diary below    Pt seen 1:1 for 23 minutes and group therapy for remainder    Assessment: Tolerated treatment fairly. Improved ability in DW today using BB. Decreased neck pain throughout session, sig tightness w/ stretches. Patient would benefit from continued PT      Plan: Continue per plan of care.       Precautions: HNP L 5-S1/pt, cataract sx  Daily Treatment Diary       Precautions: HNP L 5-S1/pt, cataract sx  Daily Treatment Diary     Date 7/10 7/18 7/25        Patient education           Water Walk (FW, BW, side) x10’  10' 10 10        LE work at wall               Hip FF/ext swing  2 2 2          Toe/heel  1 1 1          Hip ABD/ADD  2 2 2          March 1 1 1          Knee flexion & extension 1 1 1          Squats 1 1 1        UE noodle x3             Push/pull  1 1 white noodle 1white          Rotate  1 1 1white          Row forward & back  2   2 2 White          OH side bend            UE/Core (AROM, paddles, MB)              ABD/ADD              Horizontal Pec Fly              Alt. arm swing (shld flex/ext)              Push pull              Single paddle rotation           Post sh rolls  x10 1'        Aqua Step (FW, lat, eccentric)            Core work:              MF press (red, blue, blk)             Kickboard press (blue, red)              Row/ext w/ tubing (red, blue, blk)           Seated on bench             ankle DF/PF 1 1 1          March  1 1 1          ABD/ADD  1 1 1          Knee flexion/extension  1 1 1        DW TX - hang in the deep water  10 5 5 w/BB          DW ABD/ADD              DW Bicycle  5 5 3 w/ BB          DW Scissor hip flexion/extension              DW DKTC            Stretches              HS at step  2' 2         Upper trap at bar  x3 10'' x3        Scalenes at bar  x3 10''           Other: piriformis 10"x3 ea x3         Hot Tub - 10 minutes only  10' 10 10

## 2023-07-27 ENCOUNTER — OFFICE VISIT (OUTPATIENT)
Dept: PHYSICAL THERAPY | Age: 73
End: 2023-07-27
Payer: MEDICARE

## 2023-07-27 DIAGNOSIS — M54.16 LUMBAR RADICULOPATHY: Primary | ICD-10-CM

## 2023-07-27 DIAGNOSIS — S46.812S STRAIN OF LEFT TRAPEZIUS MUSCLE, SEQUELA: ICD-10-CM

## 2023-07-27 PROCEDURE — 97110 THERAPEUTIC EXERCISES: CPT

## 2023-07-27 PROCEDURE — 97140 MANUAL THERAPY 1/> REGIONS: CPT

## 2023-07-27 NOTE — PROGRESS NOTES
Daily Note     Today's date: 2023  Patient name: Ana Maria Devi  : 1950  MRN: [id number]  Referring provider: Giselle Real MD  Dx:   Encounter Diagnosis     ICD-10-CM    1. Lumbar radiculopathy  M54.16       2. Strain of left trapezius muscle, sequela  S46.812S           Start Time: 1515  Stop Time: 1600  Total time in clinic (min): 45 minutes    Subjective: Reports having more pain at her neck today. States she has pain patches on and took a Tylenol before coming. Objective: See treatment diary below      Assessment: Tolerated treatment fairly well. Relief noted post manual stretching. Cues for carryover of exercises and demonstrations to ensure proper execution of exercises. Patient was able to complete her program as documented below despite elevated pain levels upon arrival. Patient would benefit from continued PT. Plan: Continue per plan of care.       Precautions: HNP L 5-S1/pt, cataract sx  Daily Treatment Diary       Precautions: HNP L 5-S1/pt, cataract sx  Daily Treatment Diary   Manuals          B leg pulls 5 5 5 5'         LB/LE's 10 10 5 10'         c/s   5 5'                      Neuro Re-Ed                                       Ther Ex             Nustep  UE/LE for strength 5' L4 5' L1 6' L4 L4 6'          Slant board  4x 20" L3 30" 4x L3 30"x4         UBE for endurance/ROM  nv 2/2 2'/2'          TB rows, shld extension  RTB x10 ea 2x10 ea RTB 2x10 ea         pulleys             B heel slides 30x 30x 30x 30x          Hip abduction 20x blue TB 20x Red TB 30x Red 30x          Hip adduction 20x ball 5" x10 ball 30x 30x         Should shrugs, posterior rolls   20x ea 20x ea         scap retraction   20x 20x         UT stretch   5" 5x 10"x3                                   Ther Activity                                       Gait Training                                       Modalities             Ice left trap after   10'

## 2023-08-10 ENCOUNTER — APPOINTMENT (OUTPATIENT)
Dept: PHYSICAL THERAPY | Age: 73
End: 2023-08-10
Payer: MEDICARE

## 2023-08-14 ENCOUNTER — OFFICE VISIT (OUTPATIENT)
Dept: PHYSICAL THERAPY | Age: 73
End: 2023-08-14
Payer: MEDICARE

## 2023-08-14 DIAGNOSIS — S46.812S STRAIN OF LEFT TRAPEZIUS MUSCLE, SEQUELA: ICD-10-CM

## 2023-08-14 DIAGNOSIS — M54.16 LUMBAR RADICULOPATHY: Primary | ICD-10-CM

## 2023-08-14 PROCEDURE — 97113 AQUATIC THERAPY/EXERCISES: CPT | Performed by: PHYSICAL THERAPIST

## 2023-08-14 NOTE — PROGRESS NOTES
Daily Note     Today's date: 2023  Patient name: Kofi De La Cruz  : 1950  MRN: [id number]  Referring provider: Christie Garcia MD  Dx:   Encounter Diagnosis     ICD-10-CM    1. Lumbar radiculopathy  M54.16       2. Strain of left trapezius muscle, sequela  S46.812S                      Subjective: Patient was on vacation for 2 weeks. Returns to PT today. Ones 7/10 left trap and cervical pain. Pain with reaching with left arm. Notes received injection left knee on Friday and describes pain and soreness in knee today at 5/10. LBP with walking. Objective: See treatment diary below      Assessment: Tolerated treatment fairly well. Verbal cues for exercise technique. Modified exercise in response to left knee and left trap pain. Patient demonstrated fatigue post treatment LOB at times in water with recovery. Plan: Continue per plan of care.       Precautions: HNP L 5-S1/pt, cataract sx  Daily Treatment Diary         Date 7/10 7/18 7/25 8/14       Patient education    5       Water Walk (FW, BW, side) x10’  10' 10 10 10       LE work at wall               Hip FF/ext swing  2 2 2 2         Toe/heel  1 1 1 1         Hip ABD/ADD  2 2 2 2          1 1 1         Knee flexion & extension 1 1 1 1         Squats 1 1 1        UE noodle x3             Push/pull  1 1 white noodle 1white 1         Rotate  1 1 1white 1         Row forward & back  2   2 2 White 1 no FWD today - pain         OH side bend            UE/Core (AROM, paddles, MB)              ABD/ADD              Horizontal Pec Fly              Alt. arm swing (shld flex/ext)              Push pull              Single paddle rotation           Post sh rolls  x10 1' 1'       Aqua Step (FW, lat, eccentric)            Core work:              MF press (red, blue, blk)             Kickboard press (blue, red)              Row/ext w/ tubing (red, blue, blk)           Seated on bench             ankle DF/PF  1 1 1 1         March  1 1 1 1 ABD/ADD  1 1 1 1         Knee flexion/extension  1 1 1 1       DW TX - hang in the deep water  10 5 5 w/BB 5         DW ABD/ADD              DW Bicycle  5 5 3 w/ BB 3         DW Scissor hip flexion/extension              DW DKTC            Stretches              HS at step  2' 2  2       Upper trap at bar  x3 10'' x3 3x       Scalenes at bar  x3 10''           Other: piriformis 10"x3 ea x3         Hot Tub - 10 minutes only  10' 10 10 5

## 2023-08-17 ENCOUNTER — EVALUATION (OUTPATIENT)
Dept: PHYSICAL THERAPY | Age: 73
End: 2023-08-17
Payer: MEDICARE

## 2023-08-17 DIAGNOSIS — M54.16 LUMBAR RADICULOPATHY: Primary | ICD-10-CM

## 2023-08-17 DIAGNOSIS — S46.812S STRAIN OF LEFT TRAPEZIUS MUSCLE, SEQUELA: ICD-10-CM

## 2023-08-17 PROCEDURE — 97110 THERAPEUTIC EXERCISES: CPT | Performed by: PHYSICAL THERAPIST

## 2023-08-17 PROCEDURE — 97140 MANUAL THERAPY 1/> REGIONS: CPT | Performed by: PHYSICAL THERAPIST

## 2023-08-17 NOTE — PROGRESS NOTES
PT Re-Evaluation     Today's date: 2023  Patient name: Clement Marcus  : 1950  MRN: [id number]  Referring provider: Nemesio Weber MD  Dx:   Encounter Diagnosis     ICD-10-CM    1. Lumbar radiculopathy  M54.16       2. Strain of left trapezius muscle, sequela  S46.812S           Start Time: 844  Stop Time: 932  Total time in clinic (min): 48 minutes    Assessment  Assessment details: Clement Marcus is a 72 y.o. female who presents with pain, decreased strength and decreased ROM. Due to these impairments, Patient has difficulty performing a/iadls and recreational activities. Patient's clinical presentation is consistent with their referring diagnosis of low back pain, left trap strain. Patient has been having land and pool therapy but wants to just do land PT now. She had a slip yesterday with some increased pain. Tender left trap, scalenes and SCM. She is still seeing chiropractor also. Patient would benefit from skilled physical therapy to address their aforementioned impairments, improve their level of function and to improve their overall quality of life. Impairments: abnormal or restricted ROM, activity intolerance, impaired physical strength, lacks appropriate home exercise program, pain with function, poor posture  and poor body mechanics    Symptom irritability: moderateUnderstanding of Dx/Px/POC: good   Prognosis: good    Goals  ST-4 WEEKS  1. Decrease LB and left trap pain < 7/10 on VAS at its worst. Patient fell yesterday so goal not met. Ongoing goal.   2.  Increase ROM by > 5 deg in all deficients planes. Progressing towards. Ongoing goal.   3.  Increase core and LUE strength by 1/2 MMT grade. Goal not met. Ongoing goal.   4. Increase tolerance to activity and pool therapy > 45 min. Goal met. D/c goal.   5. Able to sleep on right side uninterrupted > 4 hours. Progressing towards. Ongoing goal.     LT-6 WEEKS  1. Patient to be independent with a/iadls.  Ongoing goal.   2. Increase functional activities for leisure and home activities to previous LOF. Ongoing goal.   3. Independent with HEP and/or fitness program. Ongoing goal.     Plan  Patient would benefit from: skilled physical therapy  Planned modality interventions: cryotherapy, thermotherapy: hydrocollator packs and unattended electrical stimulation  Planned therapy interventions: activity modification, behavior modification, body mechanics training, aquatic therapy, flexibility, functional ROM exercises, home exercise program, IADL retraining, joint mobilization, manual therapy, neuromuscular re-education, patient education, postural training, strengthening, stretching, therapeutic activities, therapeutic exercise and abdominal trunk stabilization  Frequency: 2-3x week. Duration in weeks: 8  Plan of Care beginning date: 7/6/2023  Plan of Care expiration date: 10/6/2023  Treatment plan discussed with: patient        Subjective Evaluation    History of Present Illness  Mechanism of injury: Patient reports she fell down a hill 2x in one day hurting ribs and shoulders. She went to ED next day and xrays of hips, shoulders and ribs were negative. She saw ortho and referred for aquatic therapy for LB but her PCP advised she do land PT for her left trap area also. Patient has long history of back pain with radiculopathy but fall made her worse. She had MRI in January of 2023, in chart. She had EMG on LE's recently but unable to see results in chart. She will bring next visit. She is agreeable to doing land and pool PT although she is concerned about prior chlorine rash and afraid of getting bladder infection. She saw her chiropractor today for first time since injury and feels much better since treatment. 8/17/23: Patient reports she slipped and slid down an embankment aggrivating her left shoulder and left LB yesterday. She c/o pain in left trap and LB. She did go on vacation for 2 weeks and did some hiking.    Patient Goals  Patient goals for therapy: decreased pain, increased motion, increased strength and independence with ADLs/IADLs  Patient goal: walk better, garden  Pain  Current pain ratin (after massage today)  At worst pain rating: 10  Location: left trap and Low back  Quality: radiating and sharp  Relieving factors: rest  Aggravating factors: stair climbing and walking  Progression: improved    Social Support  Steps to enter house: yes  Stairs in house: no   Lives in: [facility]  Lives with: spouse    Employment status: not working    Diagnostic Tests  X-ray: normal    FCE comments: xrays of ribs, shoulders and hips were all negative  MRI of LB: 23 in chartTreatments  Current treatment: chiropractic        Objective     Static Posture   General Observations  Scoliosis. Lumbar Spine   Increased lordosis. Postural Observations  Seated posture: fair  Standing posture: fair        Palpation   Left   Tenderness of the erector spinae, lumbar paraspinals, peroneus, rhomboids, scalenes, sternocleidomastoid and upper trapezius. Right   Tenderness of the erector spinae and lumbar paraspinals. Tenderness     Left Hip   Tenderness in the PSIS. Right Hip   Tenderness in the PSIS. Left Ankle/Foot   Tenderness in the fifth metatarsal base and peroneal tendon.      Additional Tenderness Details  Lateral lower left leg tenderness and pain to palpation along peroneal tendon    Neurological Testing     Sensation   Cervical/Thoracic   Left   Intact: light touch    Right   Intact: light touch    Lumbar   Left   Intact: light touch and hot/cold discrimination    Right   Intact: light touch and hot/cold discrimination    Ankle/Foot   Left Ankle/Foot   Intact: light touch and hot/cold discrimination    Reflexes   Left   Patellar (L4): normal (2+)    Right   Patellar (L4): normal (2+)    Active Range of Motion   Cervical/Thoracic Spine       Cervical    Flexion: 30 degrees   Extension: 50 degrees      Left lateral flexion: 25 degrees      Right lateral flexion: 35 degrees      Left rotation: 55 degrees  Right rotation: 54 degrees           Lumbar   Flexion: Active lumbar flexion: tips to distal shins. Extension:  WFL  Left lateral flexion:  WFL  Right lateral flexion:  WFL  Left Ankle/Foot   Dorsiflexion (ke): 5 degrees with pain  Plantar flexion: 45 degrees   Inversion: 20 degrees   Eversion: 20 degrees with pain    Right Ankle/Foot   Dorsiflexion (ke): 10 degrees   Plantar flexion: 52 degrees   Inversion: 20 degrees   Eversion: 12 degrees     Additional Active Range of Motion Details  Hamstring flexibility: 72° bilateral    Strength/Myotome Testing     Left Shoulder     Planes of Motion   Flexion: 4-   Abduction: 4-     Right Shoulder     Planes of Motion   Flexion: 4   Abduction: 4     Left Hip   Planes of Motion   Flexion: 4  Extension: 4  Abduction: 4  Adduction: 4+    Right Hip   Planes of Motion   Flexion: 4  Extension: 4  Abduction: 4  Adduction: 4+    Left Knee   Flexion: 4+  Extension: 5    Right Knee   Flexion: 4+  Extension: 5    Left Ankle/Foot   Dorsiflexion: 4+  Plantar flexion: 4+    Right Ankle/Foot   Normal strength  Dorsiflexion: 4+  Plantar flexion: 4+    Tests     Lumbar     Left   Negative slump test.     Right   Negative slump test.     Left Hip   Negative FCO and FADIR. Right Hip   Negative FCO and FADIR. Ambulation   Weight-Bearing Status   Assistive device used: none    Ambulation: Level Surfaces   Ambulation without assistive device: independent    Observational Gait   Decreased walking speed.      Functional Assessment        Single Leg Stance   Left: 3 seconds  Right: 4 seconds    General Comments:    Upper quarter screen   Elbow: unremarkable  Hand/wrist: unremarkable  Lower quarter screen   Hips: unremarkable  Knees: unremarkable  Foot/ankle: unremarkable    Shoulder Comments   ROM  WFL for ADL's but limited end range elevation L>R      Flowsheet Rows    Flowsheet Row Most Recent Value   PT/OT G-Codes    Current Score 55   Projected Score 54             Precautions: HNP L 5-S1/pt, cataract sx  Daily Treatment Diary   Manuals 7/6 7/13 7/20 7/27 8/17        B leg pulls 5 5 5 5' 5        LB/LE's 10 10 5 10' 10        c/s   5 5' 5                     Neuro Re-Ed                                       Ther Ex             Nustep  UE/LE for strength 5' L4 5' L1 6' L4 L4 6'  6'' L4        Slant board  4x 20" L3 30" 4x L3 30"x4 4x        UBE for endurance/ROM  nv 2/2 2'/2'  2/2        TB rows, shld extension  RTB x10 ea 2x10 ea RTB 2x10 ea 30x rows red        pulleys             B heel slides 30x 30x 30x 30x  30x        Hip abduction 20x blue TB 20x Red TB 30x Red 30x  30x        Hip adduction 20x ball 5" x10 ball 30x 30x 30x        Should shrugs, posterior rolls   20x ea 20x ea         scap retraction   20x 20x         UT stretch   5" 5x 10"x3         Pulley w/ head rotations     20x                     Ther Activity                                       Gait Training                                       Modalities             Ice left trap after   10'

## 2023-08-17 NOTE — LETTER
2023    Mallory Cruz MD  [address]    Patient: Susannah Benavidez   YOB: 1950   Date of Visit: 2023     Encounter Diagnosis     ICD-10-CM    1. Lumbar radiculopathy  M54.16       2. Strain of left trapezius muscle, sequela  S46.812S           Dear Dr. Anaya Simmons: Thank you for your recent referral of Susannah Benavidez. Please review the attached evaluation summary from Tarah's recent visit. Please verify that you agree with the plan of care by signing the attached order. If you have any questions or concerns, please do not hesitate to call. I sincerely appreciate the opportunity to share in the care of one of your patients and hope to have another opportunity to work with you in the near future. Sincerely,    Azar Brady, PT      Referring Provider:      I certify that I have read the below Plan of Care and certify the need for these services furnished under this plan of treatment while under my care. Mallory Cruz MD  [address]  Via Fax: [phone]          PT Re-Evaluation     Today's date: 2023  Patient name: Susannah Benavidez  : 1950  MRN: [id number]  Referring provider: Mallory Cruz MD  Dx:   Encounter Diagnosis     ICD-10-CM    1. Lumbar radiculopathy  M54.16       2. Strain of left trapezius muscle, sequela  S46.812S           Start Time: 0844  Stop Time: 09  Total time in clinic (min): 48 minutes    Assessment  Assessment details: Susannah Benavidez is a 72 y.o. female who presents with pain, decreased strength and decreased ROM. Due to these impairments, Patient has difficulty performing a/iadls and recreational activities. Patient's clinical presentation is consistent with their referring diagnosis of low back pain, left trap strain. Patient has been having land and pool therapy but wants to just do land PT now.  She had a slip yesterday with some increased pain. Tender left trap, scalenes and SCM. She is still seeing chiropractor also. Patient would benefit from skilled physical therapy to address their aforementioned impairments, improve their level of function and to improve their overall quality of life. Impairments: abnormal or restricted ROM, activity intolerance, impaired physical strength, lacks appropriate home exercise program, pain with function, poor posture  and poor body mechanics    Symptom irritability: moderateUnderstanding of Dx/Px/POC: good   Prognosis: good    Goals  ST-4 WEEKS  1. Decrease LB and left trap pain < 7/10 on VAS at its worst. Patient fell yesterday so goal not met. Ongoing goal.   2.  Increase ROM by > 5 deg in all deficients planes. Progressing towards. Ongoing goal.   3.  Increase core and LUE strength by 1/2 MMT grade. Goal not met. Ongoing goal.   4. Increase tolerance to activity and pool therapy > 45 min. Goal met. D/c goal.   5. Able to sleep on right side uninterrupted > 4 hours. Progressing towards. Ongoing goal.     LT-6 WEEKS  1. Patient to be independent with a/iadls. Ongoing goal.   2. Increase functional activities for leisure and home activities to previous LOF. Ongoing goal.   3. Independent with HEP and/or fitness program. Ongoing goal.     Plan  Patient would benefit from: skilled physical therapy  Planned modality interventions: cryotherapy, thermotherapy: hydrocollator packs and unattended electrical stimulation  Planned therapy interventions: activity modification, behavior modification, body mechanics training, aquatic therapy, flexibility, functional ROM exercises, home exercise program, IADL retraining, joint mobilization, manual therapy, neuromuscular re-education, patient education, postural training, strengthening, stretching, therapeutic activities, therapeutic exercise and abdominal trunk stabilization  Frequency: 2-3x week.   Duration in weeks: 8  Plan of Care beginning date: 2023  Plan of Care expiration date: 10/6/2023  Treatment plan discussed with: patient        Subjective Evaluation    History of Present Illness  Mechanism of injury: Patient reports she fell down a hill 2x in one day hurting ribs and shoulders. She went to ED next day and xrays of hips, shoulders and ribs were negative. She saw ortho and referred for aquatic therapy for LB but her PCP advised she do land PT for her left trap area also. Patient has long history of back pain with radiculopathy but fall made her worse. She had MRI in 2023, in chart. She had EMG on LE's recently but unable to see results in chart. She will bring next visit. She is agreeable to doing land and pool PT although she is concerned about prior chlorine rash and afraid of getting bladder infection. She saw her chiropractor today for first time since injury and feels much better since treatment. 23: Patient reports she slipped and slid down an embankment aggrivating her left shoulder and left LB yesterday. She c/o pain in left trap and LB. She did go on vacation for 2 weeks and did some hiking. Patient Goals  Patient goals for therapy: decreased pain, increased motion, increased strength and independence with ADLs/IADLs  Patient goal: walk better, garden  Pain  Current pain ratin (after massage today)  At worst pain rating: 10  Location: left trap and Low back  Quality: radiating and sharp  Relieving factors: rest  Aggravating factors: stair climbing and walking  Progression: improved    Social Support  Steps to enter house: yes  Stairs in house: no   [facility]  Lives with: spouse    Employment status: not working    Diagnostic Tests  X-ray: normal    FCE comments: xrays of ribs, shoulders and hips were all negative  MRI of LB: 23 in chartTreatments  Current treatment: chiropractic        Objective     Static Posture   General Observations  Scoliosis. Lumbar Spine   Increased lordosis. Postural Observations  Seated posture: fair  Standing posture: fair        Palpation   Left   Tenderness of the erector spinae, lumbar paraspinals, peroneus, rhomboids, scalenes, sternocleidomastoid and upper trapezius. Right   Tenderness of the erector spinae and lumbar paraspinals. Tenderness     Left Hip   Tenderness in the PSIS. Right Hip   Tenderness in the PSIS. Left Ankle/Foot   Tenderness in the fifth metatarsal base and peroneal tendon. Additional Tenderness Details  Lateral lower left leg tenderness and pain to palpation along peroneal tendon    Neurological Testing     Sensation   Cervical/Thoracic   Left   Intact: light touch    Right   Intact: light touch    Lumbar   Left   Intact: light touch and hot/cold discrimination    Right   Intact: light touch and hot/cold discrimination    Ankle/Foot   Left Ankle/Foot   Intact: light touch and hot/cold discrimination    Reflexes   Left   Patellar (L4): normal (2+)    Right   Patellar (L4): normal (2+)    Active Range of Motion   Cervical/Thoracic Spine       Cervical    Flexion: 30 degrees   Extension: 50 degrees      Left lateral flexion: 25 degrees      Right lateral flexion: 35 degrees      Left rotation: 55 degrees  Right rotation: 54 degrees           Lumbar   Flexion: Active lumbar flexion: tips to distal shins.    Extension:  WFL  Left lateral flexion:  WFL  Right lateral flexion:  WFL  Left Ankle/Foot   Dorsiflexion (ke): 5 degrees with pain  Plantar flexion: 45 degrees   Inversion: 20 degrees   Eversion: 20 degrees with pain    Right Ankle/Foot   Dorsiflexion (ke): 10 degrees   Plantar flexion: 52 degrees   Inversion: 20 degrees   Eversion: 12 degrees     Additional Active Range of Motion Details  Hamstring flexibility: 72° bilateral    Strength/Myotome Testing     Left Shoulder     Planes of Motion   Flexion: 4-   Abduction: 4-     Right Shoulder     Planes of Motion   Flexion: 4   Abduction: 4     Left Hip   Planes of Motion Flexion: 4  Extension: 4  Abduction: 4  Adduction: 4+    Right Hip   Planes of Motion   Flexion: 4  Extension: 4  Abduction: 4  Adduction: 4+    Left Knee   Flexion: 4+  Extension: 5    Right Knee   Flexion: 4+  Extension: 5    Left Ankle/Foot   Dorsiflexion: 4+  Plantar flexion: 4+    Right Ankle/Foot   Normal strength  Dorsiflexion: 4+  Plantar flexion: 4+    Tests     Lumbar     Left   Negative slump test.     Right   Negative slump test.     Left Hip   Negative FCO and FADIR. Right Hip   Negative FCO and FADIR. Ambulation   Weight-Bearing Status   Assistive device used: none    Ambulation: Level Surfaces   Ambulation without assistive device: independent    Observational Gait   Decreased walking speed.      Functional Assessment        Single Leg Stance   Left: 3 seconds  Right: 4 seconds    General Comments:    Upper quarter screen   Elbow: unremarkable  Hand/wrist: unremarkable  Lower quarter screen   Hips: unremarkable  Knees: unremarkable  Foot/ankle: unremarkable    Shoulder Comments   ROM  WFL for ADL's but limited end range elevation L>R      Flowsheet Rows    Flowsheet Row Most Recent Value   PT/OT G-Codes    Current Score 55   Projected Score 54            Precautions: HNP L 5-S1/pt, cataract sx  Daily Treatment Diary   Manuals 7/6 7/13 7/20 7/27 8/17        B leg pulls 5 5 5 5' 5        LB/LE's 10 10 5 10' 10        c/s   5 5' 5                     Neuro Re-Ed                                       Ther Ex             Nustep  UE/LE for strength 5' L4 5' L1 6' L4 L4 6'  6'' L4        Slant board  4x 20" L3 30" 4x L3 30"x4 4x        UBE for endurance/ROM  nv 2/2 2'/2'  2/2        TB rows, shld extension  RTB x10 ea 2x10 ea RTB 2x10 ea 30x rows red        pulleys             B heel slides 30x 30x 30x 30x  30x        Hip abduction 20x blue TB 20x Red TB 30x Red 30x  30x        Hip adduction 20x ball 5" x10 ball 30x 30x 30x        Should shrugs, posterior rolls   20x ea 20x ea         scap retraction   20x 20x         UT stretch   5" 5x 10"x3         Pulley w/ head rotations     20x                     Ther Activity                                       Gait Training                                       Modalities             Ice left trap after   10'

## 2023-08-21 ENCOUNTER — OFFICE VISIT (OUTPATIENT)
Dept: PHYSICAL THERAPY | Age: 73
End: 2023-08-21
Payer: MEDICARE

## 2023-08-21 DIAGNOSIS — S46.812S STRAIN OF LEFT TRAPEZIUS MUSCLE, SEQUELA: ICD-10-CM

## 2023-08-21 DIAGNOSIS — M54.16 LUMBAR RADICULOPATHY: Primary | ICD-10-CM

## 2023-08-21 PROBLEM — Z00.00 MEDICARE ANNUAL WELLNESS VISIT, SUBSEQUENT: Status: RESOLVED | Noted: 2021-06-18 | Resolved: 2023-08-21

## 2023-08-21 PROCEDURE — 97110 THERAPEUTIC EXERCISES: CPT | Performed by: PHYSICAL MEDICINE & REHABILITATION

## 2023-08-21 PROCEDURE — 97140 MANUAL THERAPY 1/> REGIONS: CPT | Performed by: PHYSICAL MEDICINE & REHABILITATION

## 2023-08-21 NOTE — PROGRESS NOTES
Daily Note     Today's date: 2023  Patient name: Max Richardson  : 1950  MRN: [id number]  Referring provider: Umesh Correa MD  Dx:   Encounter Diagnosis     ICD-10-CM    1. Lumbar radiculopathy  M54.16       2. Strain of left trapezius muscle, sequela  S46.812S                      Subjective: Patient notes continued sxs. Objective: See treatment diary below    Assessment: Tolerated treatment well. Patient demonstrated fatigue post treatment, exhibited good technique with therapeutic exercises and would benefit from continued PT. Plan: Continue per plan of care.       Precautions: HNP L 5-S1/pt, cataract sx  Daily Treatment Diary   Manuals        B leg pulls 5 5 5 5' 5 LH       LB/LE's 10 10 5 10' 10 LH       c/s   5 5' 5 LH                    Neuro Re-Ed                                       Ther Ex             Nustep  UE/LE for strength 5' L4 5' L1 6' L4 L4 6'  6'' L4 6' L4       Slant board  4x 20" L3 30" 4x L3 30"x4 4x 4x30"       UBE for endurance/ROM  nv 2/2 2'/2'  2/2 2'/2'       TB rows, shld extension  RTB x10 ea 2x10 ea RTB 2x10 ea 30x rows red 30x rows, RTB       pulleys             B heel slides 30x 30x 30x 30x  30x        Hip abduction 20x blue TB 20x Red TB 30x Red 30x  30x 30x RTB       Hip adduction 20x ball 5" x10 ball 30x 30x 30x 30x       Should shrugs, posterior rolls   20x ea 20x ea         scap retraction   20x 20x         UT stretch   5" 5x 10"x3         Pulley w/ head rotations     20x 20x ea                    Ther Activity                                       Gait Training                                       Modalities             Ice left trap after   10'

## 2023-08-23 ENCOUNTER — OFFICE VISIT (OUTPATIENT)
Dept: PHYSICAL THERAPY | Age: 73
End: 2023-08-23
Payer: MEDICARE

## 2023-08-23 DIAGNOSIS — M54.16 LUMBAR RADICULOPATHY: Primary | ICD-10-CM

## 2023-08-23 DIAGNOSIS — S46.812S STRAIN OF LEFT TRAPEZIUS MUSCLE, SEQUELA: ICD-10-CM

## 2023-08-23 PROCEDURE — 97110 THERAPEUTIC EXERCISES: CPT | Performed by: PHYSICAL THERAPIST

## 2023-08-23 PROCEDURE — 97140 MANUAL THERAPY 1/> REGIONS: CPT | Performed by: PHYSICAL THERAPIST

## 2023-08-23 NOTE — PROGRESS NOTES
Daily Note     Today's date: 2023  Patient name: Magdalene Salamanca  : 1950  MRN: [id number]  Referring provider: Tony Rdz MD  Dx:   Encounter Diagnosis     ICD-10-CM    1. Lumbar radiculopathy  M54.16       2. Strain of left trapezius muscle, sequela  S46.812S           Start Time: 0800  Stop Time: 0845  Total time in clinic (min): 45 minutes    Subjective: patient complains of pain level at 4/10      Objective: See treatment diary below      Assessment: Tolerated treatment fair. Patient demonstrated fatigue post treatment, exhibited good technique with therapeutic exercises and would benefit from continued PT, complains of pain with bending and also lateral hips with walking but decreased pain post MT      Plan: Progress treatment as tolerated.        Precautions: HNP L 5-S1/pt, cataract sx  Daily Treatment Diary   Manuals       B leg pulls 5 5 5 5' 5 LH 5      LB/LE's 10 10 5 10' 10 LH 10      c/s   5 5' 5 LH 5                   Neuro Re-Ed                                       Ther Ex             Nustep  UE/LE for strength 5' L4 5' L1 6' L4 L4 6'  6'' L4 6' L4 10      Slant board  4x 20" L3 30" 4x L3 30"x4 4x 4x30" 4x      UBE for endurance/ROM  nv 2/2 2'/2'  2/2 2'/2' 4      TB rows, shld extension  RTB x10 ea 2x10 ea RTB 2x10 ea 30x rows red 30x rows, RTB 30x      pulleys             B heel slides 30x 30x 30x 30x  30x  30x      Hip abduction 20x blue TB 20x Red TB 30x Red 30x  30x 30x RTB 30/30      Hip adduction 20x ball 5" x10 ball 30x 30x 30x 30x 30x      Should shrugs, posterior rolls   20x ea 20x ea         scap retraction   20x 20x         UT stretch   5" 5x 10"x3         Pulley w/ head rotations     20x 20x ea 20x                   Ther Activity                                       Gait Training                                       Modalities             Ice left trap after   10'

## 2023-08-28 ENCOUNTER — OFFICE VISIT (OUTPATIENT)
Dept: PHYSICAL THERAPY | Age: 73
End: 2023-08-28
Payer: MEDICARE

## 2023-08-28 DIAGNOSIS — S46.812S STRAIN OF LEFT TRAPEZIUS MUSCLE, SEQUELA: ICD-10-CM

## 2023-08-28 DIAGNOSIS — M54.16 LUMBAR RADICULOPATHY: Primary | ICD-10-CM

## 2023-08-28 PROCEDURE — 97110 THERAPEUTIC EXERCISES: CPT

## 2023-08-28 PROCEDURE — 97140 MANUAL THERAPY 1/> REGIONS: CPT

## 2023-08-28 NOTE — PROGRESS NOTES
Daily Note     Today's date: 2023  Patient name: Samantha Dickerson  : 1950  MRN: [id number]  Referring provider: Eulalia Moss MD  Dx:   Encounter Diagnosis     ICD-10-CM    1. Lumbar radiculopathy  M54.16       2. Strain of left trapezius muscle, sequela  S46.812S                      Subjective: patient reports that she was trying to jump onto a tailgate and missed causing her to bruise her L hip. Objective: See treatment diary below      Assessment: Tolerated treatment fair. LLE tightness > compared to the RLE today with manuals. Patient demonstrated fatigue post treatment, exhibited good technique with therapeutic exercises and would benefit from continued PT, complains of pain with bending and also lateral hips with walking but decreased pain post MT      Plan: Progress treatment as tolerated.        Precautions: HNP L 5-S1/pt, cataract sx  Daily Treatment Diary   Manuals      B leg pulls 5 5 5 5' 5 LH 5 5     LB/LE's 10 10 5 10' 10 LH 10 10     c/s   5 5' 5 LH 5 5                  Neuro Re-Ed                                       Ther Ex             Nustep  UE/LE for strength 5' L4 5' L1 6' L4 L4 6'  6'' L4 6' L4 6 5' L4     Slant board  4x 20" L3 30" 4x L3 30"x4 4x 4x30" 4x 4x     UBE for endurance/ROM  nv 2/2 2'/2'  2/2 2'/2' 4 2/2     TB rows, shld extension  RTB x10 ea 2x10 ea RTB 2x10 ea 30x rows red 30x rows, RTB 30x 30x     pulleys             B heel slides 30x 30x 30x 30x  30x  30x 30x     Hip abduction 20x blue TB 20x Red TB 30x Red 30x  30x 30x RTB 30/30 30/30     Hip adduction 20x ball 5" x10 ball 30x 30x 30x 30x 30x 25x     Should shrugs, posterior rolls   20x ea 20x ea         scap retraction   20x 20x         UT stretch   5" 5x 10"x3         Pulley w/ head rotations     20x 20x ea 20x 20x                  Ther Activity                                       Gait Training                                       Modalities             Ice left trap after   10'

## 2023-08-30 ENCOUNTER — OFFICE VISIT (OUTPATIENT)
Dept: PHYSICAL THERAPY | Age: 73
End: 2023-08-30
Payer: MEDICARE

## 2023-08-30 DIAGNOSIS — M54.16 LUMBAR RADICULOPATHY: Primary | ICD-10-CM

## 2023-08-30 DIAGNOSIS — S46.812S STRAIN OF LEFT TRAPEZIUS MUSCLE, SEQUELA: ICD-10-CM

## 2023-08-30 PROCEDURE — 97110 THERAPEUTIC EXERCISES: CPT

## 2023-08-30 PROCEDURE — 97140 MANUAL THERAPY 1/> REGIONS: CPT

## 2023-08-30 NOTE — PROGRESS NOTES
Daily Note     Today's date: 2023  Patient name: Magdalene Salamanca  : 1950  MRN: [id number]  Referring provider: Tony Rdz MD  Dx:   Encounter Diagnosis     ICD-10-CM    1. Lumbar radiculopathy  M54.16       2. Strain of left trapezius muscle, sequela  S46.812S                      Subjective: Patient notes her R UT feels tighter than her L today, notes good relief since last few sessions. Objective: See treatment diary below      Assessment: Emphasis on form with UE TE to reduce UT compensation patterns. Additional cuing provided for proper posture. Tightness present on R UE > than L, positive response to manual interventions. No c/o post session, subjective reports of decreased pain. Patient would benefit from continued PT. Plan: Progress treatment as tolerated.        Precautions: HNP L 5-S1/pt, cataract sx  Daily Treatment Diary   Manuals     B leg pulls 5 5 5 5' 5 LH 5 5 5'    LB/LE's 10 10 5 10' 10 LH 10 10 12'    c/s   5 5' 5 LH 5 5 10'                 Neuro Re-Ed                                       Ther Ex             Nustep  UE/LE for strength 5' L4 5' L1 6' L4 L4 6'  6'' L4 6' L4 6 5' L4 7' L4    Slant board  4x 20" L3 30" 4x L3 30"x4 4x 4x30" 4x 4x 4x    UBE for endurance/ROM  nv 2/2 2'/2'  2/2 2'/2' 4 2/2 2'/2'    TB rows, shld extension  RTB x10 ea 2x10 ea RTB 2x10 ea 30x rows red 30x rows, RTB 30x 30x 30x Red    pulleys             B heel slides 30x 30x 30x 30x  30x  30x 30x 30x    Hip abduction 20x blue TB 20x Red TB 30x Red 30x  30x 30x RTB 30/30 30/30 30#/30x    Hip adduction 20x ball 5" x10 ball 30x 30x 30x 30x 30x 25x 30#/30x    Should shrugs, posterior rolls   20x ea 20x ea     20x posture review    scap retraction   20x 20x         UT stretch   5" 5x 10"x3     manual 10"x3    Pulley w/ head rotations     20x 20x ea 20x 20x 30x ea                 Ther Activity                                       Gait Training Modalities             Ice left trap after   10'

## 2023-09-05 ENCOUNTER — OFFICE VISIT (OUTPATIENT)
Dept: PHYSICAL THERAPY | Age: 73
End: 2023-09-05
Payer: MEDICARE

## 2023-09-05 DIAGNOSIS — S46.812S STRAIN OF LEFT TRAPEZIUS MUSCLE, SEQUELA: ICD-10-CM

## 2023-09-05 DIAGNOSIS — M54.16 LUMBAR RADICULOPATHY: Primary | ICD-10-CM

## 2023-09-05 PROCEDURE — 97110 THERAPEUTIC EXERCISES: CPT

## 2023-09-05 PROCEDURE — 97140 MANUAL THERAPY 1/> REGIONS: CPT

## 2023-09-05 NOTE — PROGRESS NOTES
Daily Note     Today's date: 2023  Patient name: Karen Ritter  : 1950  MRN: [id number]  Referring provider: Diamond Yang MD  Dx:   Encounter Diagnosis     ICD-10-CM    1. Lumbar radiculopathy  M54.16       2. Strain of left trapezius muscle, sequela  S46.812S           Start Time: 1040  Stop Time: 1137  Total time in clinic (min): 57 minutes    Subjective: Reports L shoulder pain today. Also notes some continued LB and L trapezius pain. Objective: See treatment diary below  Patient was seen 1:1 for 38 min     Assessment: Cues for carryover of program and to ensure proper dosage is completed. Tight B hips t/o, especially hamstrings. Limited tolerance to quad stretching in a modified christophre test position, tolerates only gentle OP. Some relief noted at her LB and neck, but continued L shoulder pain. However, patient does note she can lift her arm a little higher post session. Patient would benefit from continued PT. Plan: Continue per plan of care.       Precautions: HNP L 5-S1/pt, cataract sx  Daily Treatment Diary   Manuals     B leg pulls 5 5 5 5' 5 LH 5 5 5' 4'   LB/LE's 10 10 5 10' 10 LH 10 10 12' 8' +quad   c/s   5 5' 5 LH 5 5 10' 8'                Neuro Re-Ed                                       Ther Ex             Nustep  UE/LE for strength 5' L4 5' L1 6' L4 L4 6'  6'' L4 6' L4 6 5' L4 7' L4 L4 7'    Slant board  4x 20" L3 30" 4x L3 30"x4 4x 4x30" 4x 4x 4x 4x    UBE for endurance/ROM  nv 2/2 2'/2'  2/2 2'/2' 4 2/2 2'/2' 2'/2'   TB rows, shld extension  RTB x10 ea 2x10 ea RTB 2x10 ea 30x rows red 30x rows, RTB 30x 30x 30x Red 30x RTB    pulleys             B heel slides 30x 30x 30x 30x  30x  30x 30x 30x 30x   Hip abduction 20x blue TB 20x Red TB 30x Red 30x  30x 30x RTB 30/30 30/30 30#/30x 30# 30x   Hip adduction 20x ball 5" x10 ball 30x 30x 30x 30x 30x 25x 30#/30x 30# 30x   Should shrugs, posterior rolls   20x ea 20x ea     20x posture review    scap retraction   20x 20x         UT stretch   5" 5x 10"x3     manual 10"x3    Pulley w/ head rotations     20x 20x ea 20x 20x 30x ea 30x ea                 Ther Activity                                       Gait Training                                       Modalities             Ice left trap after   10'

## 2023-09-07 ENCOUNTER — APPOINTMENT (OUTPATIENT)
Dept: PHYSICAL THERAPY | Age: 73
End: 2023-09-07
Payer: MEDICARE

## 2023-09-11 ENCOUNTER — APPOINTMENT (OUTPATIENT)
Dept: PHYSICAL THERAPY | Age: 73
End: 2023-09-11
Payer: MEDICARE

## 2023-09-14 ENCOUNTER — OFFICE VISIT (OUTPATIENT)
Dept: PHYSICAL THERAPY | Age: 73
End: 2023-09-14
Payer: MEDICARE

## 2023-09-14 DIAGNOSIS — M54.16 LUMBAR RADICULOPATHY: Primary | ICD-10-CM

## 2023-09-14 DIAGNOSIS — S46.812S STRAIN OF LEFT TRAPEZIUS MUSCLE, SEQUELA: ICD-10-CM

## 2023-09-14 PROCEDURE — 97140 MANUAL THERAPY 1/> REGIONS: CPT | Performed by: PHYSICAL THERAPIST

## 2023-09-14 PROCEDURE — 97110 THERAPEUTIC EXERCISES: CPT | Performed by: PHYSICAL THERAPIST

## 2023-09-14 NOTE — PROGRESS NOTES
Daily Note/Discharge     Today's date: 2023  Patient name: Josh Kim  : 1950  MRN: [id number]  Referring provider: Marquita Pino MD  Dx:   Encounter Diagnosis     ICD-10-CM    1. Lumbar radiculopathy  M54.16       2. Strain of left trapezius muscle, sequela  S46.812S           Start Time: 1001  Stop Time: 1050  Total time in clinic (min): 49 minutes    Subjective: Patient reports she saw MD-ortho for her left shoulder and she got a shot and is feeling much better. She states she is ok to do HEP and indep fitness program. She does feel her back is better since PT. Objective: See treatment diary below      Assessment: Tolerated treatment fair. Patient has improved ROM left shoulder compared to prior to injection. WFL but c/o pain with such. Decreased TB resistance to yellow and provided patient with home TB. Patient agreeable to discharge to HEP and indep fitness program at this time. Partial goals met. Plan:  Discharge PT to HEP.      Precautions: HNP L 5-S1/pt, cataract sx  Daily Treatment Diary   Manuals  9    B leg pulls 2   5' 5 LH 5 5 5' 4'   LB/LE's 4   10' 10 LH 10 10 12' 8' +quad   c/s 4   5' 5 LH 5 5 10' 8'                Neuro Re-Ed                                       Ther Ex             Nustep  UE/LE for strength 10' [id number]   L4 6'  6'' L4 6' L4 6 5' L4 7' L4 L4 7'    Slant board 4x   L3 30"x4 4x 4x30" 4x 4x 4x 4x    UBE for endurance/ROM nt   2'/2'  2/2 2'/2' 4 2/2 2'/2' 2'/2'   TB rows, shld extension [id number] ea   RTB 2x10 ea 30x rows red 30x rows, RTB 30x 30x 30x Red 30x RTB    pulleys             B heel slides 30x   30x  30x  30x 30x 30x 30x   Hip abduction 30# 30x   Red 30x  30x 30x RTB 30/30 30/30 30#/30x 30# 30x   Hip adduction 30# 30x   30x 30x 30x 30x 25x 30#/30x 30# 30x   Should shrugs, posterior rolls    20x ea     20x posture review    scap retraction    20x         UT stretch    10"x3     manual 10"x3    Pulley w/ head rotations     20x 20x ea 20x 20x 30x ea 30x ea    RTC L Enma 20x ea            Ther Activity                                       Gait Training                                       Modalities             Ice left trap after 10'  10'

## 2023-10-11 ENCOUNTER — OFFICE VISIT (OUTPATIENT)
Dept: FAMILY MEDICINE CLINIC | Facility: CLINIC | Age: 73
End: 2023-10-11
Payer: MEDICARE

## 2023-10-11 VITALS
DIASTOLIC BLOOD PRESSURE: 74 MMHG | WEIGHT: 163 LBS | HEIGHT: 61 IN | HEART RATE: 77 BPM | BODY MASS INDEX: 30.78 KG/M2 | SYSTOLIC BLOOD PRESSURE: 134 MMHG | TEMPERATURE: 98.2 F | OXYGEN SATURATION: 97 %

## 2023-10-11 DIAGNOSIS — R35.0 URINARY FREQUENCY: Primary | ICD-10-CM

## 2023-10-11 DIAGNOSIS — B37.31 VAGINAL CANDIDIASIS: ICD-10-CM

## 2023-10-11 DIAGNOSIS — K59.01 SLOW TRANSIT CONSTIPATION: ICD-10-CM

## 2023-10-11 DIAGNOSIS — N81.10 PELVIC ORGAN PROLAPSE QUANTIFICATION STAGE 2 CYSTOCELE: ICD-10-CM

## 2023-10-11 LAB
BACTERIA UR QL AUTO: ABNORMAL /HPF
BILIRUB UR QL STRIP: NEGATIVE
CLARITY UR: CLEAR
COLOR UR: ABNORMAL
GLUCOSE UR STRIP-MCNC: NEGATIVE MG/DL
HGB UR QL STRIP.AUTO: NEGATIVE
KETONES UR STRIP-MCNC: NEGATIVE MG/DL
LEUKOCYTE ESTERASE UR QL STRIP: ABNORMAL
MUCOUS THREADS UR QL AUTO: ABNORMAL
NITRITE UR QL STRIP: NEGATIVE
NON-SQ EPI CELLS URNS QL MICRO: ABNORMAL /HPF
PH UR STRIP.AUTO: 6 [PH]
PROT UR STRIP-MCNC: ABNORMAL MG/DL
RBC #/AREA URNS AUTO: ABNORMAL /HPF
SL AMB  POCT GLUCOSE, UA: ABNORMAL
SL AMB LEUKOCYTE ESTERASE,UA: 70
SL AMB POCT BILIRUBIN,UA: ABNORMAL
SL AMB POCT BLOOD,UA: ABNORMAL
SL AMB POCT CLARITY,UA: ABNORMAL
SL AMB POCT COLOR,UA: YELLOW
SL AMB POCT KETONES,UA: 5
SL AMB POCT NITRITE,UA: ABNORMAL
SL AMB POCT PH,UA: 5
SL AMB POCT SPECIFIC GRAVITY,UA: 1.01
SL AMB POCT URINE PROTEIN: 15
SL AMB POCT UROBILINOGEN: 0.2
SP GR UR STRIP.AUTO: 1.01 (ref 1–1.03)
UROBILINOGEN UR STRIP-ACNC: <2 MG/DL
WBC #/AREA URNS AUTO: ABNORMAL /HPF

## 2023-10-11 PROCEDURE — 87086 URINE CULTURE/COLONY COUNT: CPT | Performed by: FAMILY MEDICINE

## 2023-10-11 PROCEDURE — 81001 URINALYSIS AUTO W/SCOPE: CPT | Performed by: FAMILY MEDICINE

## 2023-10-11 PROCEDURE — 99214 OFFICE O/P EST MOD 30 MIN: CPT | Performed by: FAMILY MEDICINE

## 2023-10-11 PROCEDURE — 81002 URINALYSIS NONAUTO W/O SCOPE: CPT | Performed by: FAMILY MEDICINE

## 2023-10-11 RX ORDER — FLUCONAZOLE 100 MG/1
TABLET ORAL
Qty: 3 TABLET | Refills: 1 | Status: SHIPPED | OUTPATIENT
Start: 2023-10-11 | End: 2023-10-17

## 2023-10-11 RX ORDER — NYSTATIN 100000 U/G
CREAM TOPICAL 2 TIMES DAILY
Qty: 30 G | Refills: 2 | Status: SHIPPED | OUTPATIENT
Start: 2023-10-11

## 2023-10-11 RX ORDER — CEPHALEXIN 500 MG/1
500 CAPSULE ORAL EVERY 12 HOURS SCHEDULED
Qty: 14 CAPSULE | Refills: 0 | Status: SHIPPED | OUTPATIENT
Start: 2023-10-11 | End: 2023-10-18

## 2023-10-11 NOTE — PROGRESS NOTES
Assessment/Plan:       Problem List Items Addressed This Visit          Digestive    Slow transit constipation     Avoid straining add fiber to diet with vaginal prolapse         Relevant Medications    nystatin (MYCOSTATIN) cream       Genitourinary    Pelvic organ prolapse quantification stage 2 cystocele     Recurrence of your lower urinary tract infection with risk from cystocele and prolapse start cephalexin twice daily over the course of the next week check urinalysis with culture         Relevant Medications    nystatin (MYCOSTATIN) cream    Vaginal candidiasis     Diflucan given to be taken every other day during treatment with cephalexin         Relevant Medications    cephalexin (KEFLEX) 500 mg capsule    fluconazole (DIFLUCAN) 100 mg tablet    nystatin (MYCOSTATIN) cream       Other    Urinary frequency - Primary     UTI start abx         Relevant Medications    cephalexin (KEFLEX) 500 mg capsule    fluconazole (DIFLUCAN) 100 mg tablet    nystatin (MYCOSTATIN) cream    Other Relevant Orders    POCT urine dip         Subjective:      Patient ID: Kori Amor is a 72 y.o. female. Patient presents with urinary symptoms and vaginal discharge over the last several days concerned about infection        The following portions of the patient's history were reviewed and updated as appropriate: allergies, current medications, past family history, past medical history, past social history, past surgical history and problem list.    Review of Systems   Constitutional:  Negative for chills, fatigue and fever. HENT:  Negative for congestion, nosebleeds, rhinorrhea, sinus pressure and sore throat. Eyes:  Negative for discharge and redness. Respiratory:  Negative for cough and shortness of breath. Cardiovascular:  Negative for chest pain, palpitations and leg swelling. Gastrointestinal:  Negative for abdominal pain, blood in stool and nausea.    Endocrine: Negative for cold intolerance, heat intolerance and polyuria. Genitourinary:  Positive for pelvic pain and vaginal discharge. Negative for dysuria and frequency. Musculoskeletal:  Negative for arthralgias, back pain and myalgias. Skin:  Negative for rash. Neurological:  Negative for dizziness, weakness and headaches. Hematological:  Negative for adenopathy. Psychiatric/Behavioral:  Negative for behavioral problems and sleep disturbance. The patient is not nervous/anxious. Objective:      /74 (BP Location: Left arm, Patient Position: Sitting)   Pulse 77   Temp 98.2 °F (36.8 °C)   Ht 5' 1" (1.549 m)   Wt 73.9 kg (163 lb)   SpO2 97%   BMI 30.80 kg/m²        Physical Exam  Vitals and nursing note reviewed. Constitutional:       General: She is not in acute distress. Appearance: Normal appearance. She is well-developed. HENT:      Head: Normocephalic and atraumatic. Right Ear: Tympanic membrane and external ear normal.      Left Ear: Tympanic membrane and external ear normal.      Nose: Nose normal.      Mouth/Throat:      Mouth: Mucous membranes are moist.      Pharynx: Oropharynx is clear. No oropharyngeal exudate. Eyes:      General: No scleral icterus. Right eye: No discharge. Left eye: No discharge. Conjunctiva/sclera: Conjunctivae normal.      Pupils: Pupils are equal, round, and reactive to light. Neck:      Thyroid: No thyromegaly. Vascular: No JVD. Cardiovascular:      Rate and Rhythm: Normal rate and regular rhythm. Pulses: Normal pulses. Heart sounds: Normal heart sounds. No murmur heard. Pulmonary:      Effort: Pulmonary effort is normal.      Breath sounds: No wheezing or rales. Chest:      Chest wall: No tenderness. Abdominal:      General: Bowel sounds are normal. There is no distension. Palpations: Abdomen is soft. There is no mass. Tenderness: There is no abdominal tenderness. Musculoskeletal:         General: No tenderness or deformity.  Normal range of motion. Cervical back: Normal range of motion. Lymphadenopathy:      Cervical: No cervical adenopathy. Skin:     General: Skin is warm and dry. Capillary Refill: Capillary refill takes less than 2 seconds. Findings: No rash. Neurological:      General: No focal deficit present. Mental Status: She is alert and oriented to person, place, and time. Mental status is at baseline. Cranial Nerves: No cranial nerve deficit. Coordination: Coordination normal.      Deep Tendon Reflexes: Reflexes are normal and symmetric. Reflexes normal.   Psychiatric:         Mood and Affect: Mood normal.         Behavior: Behavior normal.         Thought Content: Thought content normal.         Judgment: Judgment normal.          Data:    Laboratory Results: I have personally reviewed the pertinent laboratory results/reports   Radiology/Other Diagnostic Testing Results: I have personally reviewed pertinent reports.        Lab Results   Component Value Date    WBC 5.29 06/15/2023    HGB 12.3 06/15/2023    HCT 38.8 06/15/2023    MCV 94 06/15/2023     06/15/2023     Lab Results   Component Value Date    K 3.7 06/15/2023     06/15/2023    CO2 29 06/15/2023    BUN 14 06/15/2023    CREATININE 0.69 06/15/2023    GLUF 94 06/15/2023    CALCIUM 9.1 06/15/2023    AST 16 06/15/2023    ALT 27 06/15/2023    ALKPHOS 105 06/15/2023    EGFR 87 06/15/2023     Lab Results   Component Value Date    CHOLESTEROL 237 (H) 06/15/2023    CHOLESTEROL 218 (H) 12/14/2022    CHOLESTEROL 222 (H) 08/30/2022     Lab Results   Component Value Date    HDL 51 06/15/2023    HDL 52 12/14/2022    HDL 54 08/30/2022     Lab Results   Component Value Date    LDLCALC 161 (H) 06/15/2023    LDLCALC 144 (H) 12/14/2022    LDLCALC 149 (H) 08/30/2022     Lab Results   Component Value Date    TRIG 123 06/15/2023    TRIG 110 12/14/2022    TRIG 94 08/30/2022     No results found for: "CHOLHDL"  Lab Results   Component Value Date [id number] 3.145 06/15/2023     Lab Results   Component Value Date    HGBA1C 5.4 05/30/2022     No results found for: "PSA"    Chad Rose, DO

## 2023-10-11 NOTE — ASSESSMENT & PLAN NOTE
Recurrence of your lower urinary tract infection with risk from cystocele and prolapse start cephalexin twice daily over the course of the next week check urinalysis with culture

## 2023-10-13 LAB — BACTERIA UR CULT: NORMAL

## 2023-11-14 ENCOUNTER — OFFICE VISIT (OUTPATIENT)
Dept: FAMILY MEDICINE CLINIC | Facility: CLINIC | Age: 73
End: 2023-11-14
Payer: MEDICARE

## 2023-11-14 VITALS
HEIGHT: 61 IN | DIASTOLIC BLOOD PRESSURE: 86 MMHG | OXYGEN SATURATION: 98 % | WEIGHT: 161 LBS | HEART RATE: 100 BPM | BODY MASS INDEX: 30.4 KG/M2 | SYSTOLIC BLOOD PRESSURE: 128 MMHG | TEMPERATURE: 97 F | RESPIRATION RATE: 18 BRPM

## 2023-11-14 DIAGNOSIS — J04.0 LARYNGITIS: ICD-10-CM

## 2023-11-14 DIAGNOSIS — K21.9 CHRONIC GERD: ICD-10-CM

## 2023-11-14 DIAGNOSIS — J45.21 MILD INTERMITTENT REACTIVE AIRWAY DISEASE WITH ACUTE EXACERBATION: ICD-10-CM

## 2023-11-14 DIAGNOSIS — J98.8 RESPIRATORY INFECTION: ICD-10-CM

## 2023-11-14 DIAGNOSIS — R05.1 ACUTE COUGH: Primary | ICD-10-CM

## 2023-11-14 LAB
SARS-COV-2 AG UPPER RESP QL IA: NEGATIVE
VALID CONTROL: NORMAL

## 2023-11-14 PROCEDURE — 99214 OFFICE O/P EST MOD 30 MIN: CPT | Performed by: FAMILY MEDICINE

## 2023-11-14 PROCEDURE — 87636 SARSCOV2 & INF A&B AMP PRB: CPT | Performed by: FAMILY MEDICINE

## 2023-11-14 PROCEDURE — 87811 SARS-COV-2 COVID19 W/OPTIC: CPT | Performed by: FAMILY MEDICINE

## 2023-11-14 RX ORDER — ALBUTEROL SULFATE 90 UG/1
2 AEROSOL, METERED RESPIRATORY (INHALATION) EVERY 6 HOURS PRN
Qty: 18 G | Refills: 5 | Status: SHIPPED | OUTPATIENT
Start: 2023-11-14

## 2023-11-14 NOTE — ASSESSMENT & PLAN NOTE
1 week of symptoms after initial congestion cough fever sore throat and now in the last 48 hours laryngitis.   She will use her Pulmicort inhaler and add albuterol

## 2023-11-14 NOTE — ASSESSMENT & PLAN NOTE
Possibly influenza or other viral respiratory infection COVID tested negative today patient will use her Pulmicort and cough medication. Flu testing done.   Albuterol can be added

## 2023-11-14 NOTE — ASSESSMENT & PLAN NOTE
No worsening GERD but continue to monitor for acid reflux to prevent worsening laryngitis and cough.

## 2023-11-14 NOTE — PROGRESS NOTES
Assessment/Plan:       Problem List Items Addressed This Visit          Digestive    Chronic GERD     No worsening GERD but continue to monitor for acid reflux to prevent worsening laryngitis and cough. Relevant Medications    albuterol (Ventolin HFA) 90 mcg/act inhaler       Respiratory    Respiratory infection     Possibly influenza or other viral respiratory infection COVID tested negative today patient will use her Pulmicort and cough medication. Flu testing done. Albuterol can be added         Relevant Medications    albuterol (Ventolin HFA) 90 mcg/act inhaler    Reactive airway disease with acute exacerbation     1 week of symptoms after initial congestion cough fever sore throat and now in the last 48 hours laryngitis. She will use her Pulmicort inhaler and add albuterol         Relevant Medications    albuterol (Ventolin HFA) 90 mcg/act inhaler    Laryngitis     Patient will add albuterol inhaler and continue with current medication regimen and I recommend Chloraseptic spray         Relevant Medications    albuterol (Ventolin HFA) 90 mcg/act inhaler     Other Visit Diagnoses       Acute cough    -  Primary    Relevant Medications    albuterol (Ventolin HFA) 90 mcg/act inhaler    Other Relevant Orders    POCT Rapid Covid Ag (Completed)    Covid/Flu- Office Collect              Subjective:      Patient ID: Mayda Ornelas is a 72 y.o. female. Laryngitis cough fever chills over the last several days worsening    Cough  Pertinent negatives include no chest pain, chills, eye redness, fever, headaches, myalgias, rash, rhinorrhea, sore throat or shortness of breath. The following portions of the patient's history were reviewed and updated as appropriate: allergies, current medications, past family history, past medical history, past social history, past surgical history and problem list.    Review of Systems   Constitutional:  Negative for chills, fatigue and fever.    HENT:  Positive for voice change. Negative for congestion, nosebleeds, rhinorrhea, sinus pressure and sore throat. Eyes:  Negative for discharge and redness. Respiratory:  Positive for cough. Negative for shortness of breath. Cardiovascular:  Negative for chest pain, palpitations and leg swelling. Gastrointestinal:  Negative for abdominal pain, blood in stool and nausea. Endocrine: Negative for cold intolerance, heat intolerance and polyuria. Genitourinary:  Negative for dysuria and frequency. Musculoskeletal:  Negative for arthralgias, back pain and myalgias. Skin:  Negative for rash. Neurological:  Negative for dizziness, weakness and headaches. Hematological:  Negative for adenopathy. Psychiatric/Behavioral:  Negative for behavioral problems and sleep disturbance. The patient is not nervous/anxious. Objective:      /86 (BP Location: Left arm, Patient Position: Sitting, Cuff Size: Standard)   Pulse 100   Temp (!) 97 °F (36.1 °C) (Temporal)   Resp 18   Ht 5' 1" (1.549 m)   Wt 73 kg (161 lb) Comment: covid visit  SpO2 98%   BMI 30.42 kg/m²        Physical Exam  Vitals and nursing note reviewed. Constitutional:       General: She is not in acute distress. Appearance: Normal appearance. She is well-developed. HENT:      Head: Normocephalic and atraumatic. Right Ear: Tympanic membrane and external ear normal.      Left Ear: Tympanic membrane and external ear normal.      Nose: Rhinorrhea present. Mouth/Throat:      Mouth: Mucous membranes are moist.      Pharynx: Oropharynx is clear. Posterior oropharyngeal erythema present. No oropharyngeal exudate. Eyes:      General: No scleral icterus. Right eye: No discharge. Left eye: No discharge. Conjunctiva/sclera: Conjunctivae normal.      Pupils: Pupils are equal, round, and reactive to light. Neck:      Thyroid: No thyromegaly. Vascular: No JVD.    Cardiovascular:      Rate and Rhythm: Normal rate and regular rhythm. Heart sounds: Normal heart sounds. No murmur heard. Pulmonary:      Effort: Pulmonary effort is normal.      Breath sounds: No wheezing or rales. Chest:      Chest wall: No tenderness. Abdominal:      General: Bowel sounds are normal. There is no distension. Palpations: Abdomen is soft. There is no mass. Tenderness: There is no abdominal tenderness. Musculoskeletal:         General: No tenderness or deformity. Normal range of motion. Cervical back: Normal range of motion. Lymphadenopathy:      Cervical: No cervical adenopathy. Skin:     General: Skin is warm and dry. Findings: No rash. Neurological:      General: No focal deficit present. Mental Status: She is alert and oriented to person, place, and time. Cranial Nerves: No cranial nerve deficit. Coordination: Coordination normal.      Deep Tendon Reflexes: Reflexes are normal and symmetric. Reflexes normal.   Psychiatric:         Mood and Affect: Mood normal.         Behavior: Behavior normal.         Thought Content: Thought content normal.         Judgment: Judgment normal.          Data:    Laboratory Results: I have personally reviewed the pertinent laboratory results/reports   Radiology/Other Diagnostic Testing Results: I have personally reviewed pertinent reports.        Lab Results   Component Value Date    WBC 5.29 06/15/2023    HGB 12.3 06/15/2023    HCT 38.8 06/15/2023    MCV 94 06/15/2023     06/15/2023     Lab Results   Component Value Date    K 3.7 06/15/2023     06/15/2023    CO2 29 06/15/2023    BUN 14 06/15/2023    CREATININE 0.69 06/15/2023    GLUF 94 06/15/2023    CALCIUM 9.1 06/15/2023    AST 16 06/15/2023    ALT 27 06/15/2023    ALKPHOS 105 06/15/2023    EGFR 87 06/15/2023     Lab Results   Component Value Date    CHOLESTEROL 237 (H) 06/15/2023    CHOLESTEROL 218 (H) 12/14/2022    CHOLESTEROL 222 (H) 08/30/2022     Lab Results   Component Value Date    HDL 51 06/15/2023 HDL 52 12/14/2022    HDL 54 08/30/2022     Lab Results   Component Value Date    LDLCALC 161 (H) 06/15/2023    LDLCALC 144 (H) 12/14/2022    LDLCALC 149 (H) 08/30/2022     Lab Results   Component Value Date    TRIG 123 06/15/2023    TRIG 110 12/14/2022    TRIG 94 08/30/2022     No results found for: "CHOLHDL"  Lab Results   Component Value Date    [id number] 3.145 06/15/2023     Lab Results   Component Value Date    HGBA1C 5.4 05/30/2022     No results found for: "PSA"    Doc Lindsay DO

## 2023-11-14 NOTE — ASSESSMENT & PLAN NOTE
Patient will add albuterol inhaler and continue with current medication regimen and I recommend Chloraseptic spray

## 2023-11-15 LAB
FLUAV RNA RESP QL NAA+PROBE: NEGATIVE
FLUBV RNA RESP QL NAA+PROBE: NEGATIVE
SARS-COV-2 RNA RESP QL NAA+PROBE: NEGATIVE

## 2023-12-14 ENCOUNTER — APPOINTMENT (OUTPATIENT)
Dept: LAB | Facility: CLINIC | Age: 73
End: 2023-12-14
Payer: MEDICARE

## 2023-12-14 DIAGNOSIS — E78.2 MIXED HYPERLIPIDEMIA: ICD-10-CM

## 2023-12-14 LAB
ALBUMIN SERPL BCP-MCNC: 4.1 G/DL (ref 3.5–5)
ALP SERPL-CCNC: 97 U/L (ref 34–104)
ALT SERPL W P-5'-P-CCNC: 21 U/L (ref 7–52)
ANION GAP SERPL CALCULATED.3IONS-SCNC: 8 MMOL/L
AST SERPL W P-5'-P-CCNC: 23 U/L (ref 13–39)
BILIRUB SERPL-MCNC: 0.51 MG/DL (ref 0.2–1)
BUN SERPL-MCNC: 10 MG/DL (ref 5–25)
CALCIUM SERPL-MCNC: 9.2 MG/DL (ref 8.4–10.2)
CHLORIDE SERPL-SCNC: 100 MMOL/L (ref 96–108)
CHOLEST SERPL-MCNC: 242 MG/DL
CO2 SERPL-SCNC: 30 MMOL/L (ref 21–32)
CREAT SERPL-MCNC: 0.59 MG/DL (ref 0.6–1.3)
GFR SERPL CREATININE-BSD FRML MDRD: 92 ML/MIN/1.73SQ M
GLUCOSE P FAST SERPL-MCNC: 91 MG/DL (ref 65–99)
HDLC SERPL-MCNC: 52 MG/DL
LDLC SERPL CALC-MCNC: 167 MG/DL (ref 0–100)
NONHDLC SERPL-MCNC: 190 MG/DL
POTASSIUM SERPL-SCNC: 4.3 MMOL/L (ref 3.5–5.3)
PROT SERPL-MCNC: 7.1 G/DL (ref 6.4–8.4)
SODIUM SERPL-SCNC: 138 MMOL/L (ref 135–147)
TRIGL SERPL-MCNC: 117 MG/DL
TSH SERPL DL<=0.05 MIU/L-ACNC: 3.37 UIU/ML (ref 0.45–4.5)

## 2023-12-14 PROCEDURE — 84443 ASSAY THYROID STIM HORMONE: CPT

## 2023-12-14 PROCEDURE — 36415 COLL VENOUS BLD VENIPUNCTURE: CPT

## 2023-12-14 PROCEDURE — 80061 LIPID PANEL: CPT

## 2023-12-14 PROCEDURE — 80053 COMPREHEN METABOLIC PANEL: CPT

## 2024-01-26 ENCOUNTER — OFFICE VISIT (OUTPATIENT)
Dept: FAMILY MEDICINE CLINIC | Facility: CLINIC | Age: 74
End: 2024-01-26
Payer: MEDICARE

## 2024-01-26 VITALS
HEART RATE: 82 BPM | DIASTOLIC BLOOD PRESSURE: 82 MMHG | OXYGEN SATURATION: 98 % | TEMPERATURE: 98.3 F | RESPIRATION RATE: 18 BRPM | HEIGHT: 61 IN | BODY MASS INDEX: 30.66 KG/M2 | SYSTOLIC BLOOD PRESSURE: 128 MMHG | WEIGHT: 162.4 LBS

## 2024-01-26 DIAGNOSIS — U07.1 COVID-19 VIRUS INFECTION: ICD-10-CM

## 2024-01-26 DIAGNOSIS — M81.6 LOCALIZED OSTEOPOROSIS WITHOUT CURRENT PATHOLOGICAL FRACTURE: ICD-10-CM

## 2024-01-26 DIAGNOSIS — K21.9 GASTROESOPHAGEAL REFLUX DISEASE WITHOUT ESOPHAGITIS: ICD-10-CM

## 2024-01-26 DIAGNOSIS — J30.2 SEASONAL ALLERGIC RHINITIS, UNSPECIFIED TRIGGER: Primary | ICD-10-CM

## 2024-01-26 DIAGNOSIS — D50.9 IRON DEFICIENCY ANEMIA, UNSPECIFIED IRON DEFICIENCY ANEMIA TYPE: ICD-10-CM

## 2024-01-26 DIAGNOSIS — E78.2 MIXED HYPERLIPIDEMIA: ICD-10-CM

## 2024-01-26 DIAGNOSIS — D69.6 PLATELETS DECREASED (HCC): ICD-10-CM

## 2024-01-26 PROCEDURE — 99214 OFFICE O/P EST MOD 30 MIN: CPT | Performed by: FAMILY MEDICINE

## 2024-01-26 RX ORDER — DICLOFENAC SODIUM 25 MG/1
25 TABLET, DELAYED RELEASE ORAL 2 TIMES DAILY
COMMUNITY

## 2024-01-26 RX ORDER — CALCIUM CARBONATE 500 MG/1
1 TABLET, CHEWABLE ORAL DAILY
COMMUNITY

## 2024-01-26 RX ORDER — ACETAMINOPHEN 500 MG
500 TABLET ORAL EVERY 6 HOURS PRN
COMMUNITY

## 2024-01-26 RX ORDER — FLUTICASONE PROPIONATE 50 MCG
1 SPRAY, SUSPENSION (ML) NASAL DAILY
Qty: 16 G | Refills: 3 | Status: SHIPPED | OUTPATIENT
Start: 2024-01-26

## 2024-01-26 NOTE — PROGRESS NOTES
Assessment/Plan:       Problem List Items Addressed This Visit          Digestive    Gastroesophageal reflux disease without esophagitis     Symptoms stable no change follow-up in 6 months         Relevant Medications    calcium carbonate (TUMS) 500 mg chewable tablet       Musculoskeletal and Integument    Localized osteoporosis without current pathological fracture     Continue with vitamin D calcium and follow-up DEXA scan every 2 years            Other    Platelets decreased (HCC)     Follow-up CBC         COVID-19 virus infection     Post covid congestion in her ear sinuses and sore throat.  Recommend allergy medication Flonase and if not improved contact me in 2 weeks         Mixed hyperlipidemia    Relevant Orders    CBC and differential    Comprehensive metabolic panel    Lipid panel    TSH, 3rd generation with Free T4 reflex    Iron deficiency anemia     Repeat laboratory work in 6 months         Relevant Orders    Iron Panel (Includes Ferritin, Iron Sat%, Iron, and TIBC)     Other Visit Diagnoses       Seasonal allergic rhinitis, unspecified trigger    -  Primary    Relevant Medications    diclofenac sodium (VOLTAREN) 25 MG EC tablet    fluticasone (FLONASE) 50 mcg/act nasal spray              Subjective:      Patient ID: Tarah Meyers is a 73 y.o. female.    Follow-up evaluation for sore throat right ear pressure post COVID symptoms        The following portions of the patient's history were reviewed and updated as appropriate: allergies, current medications, past family history, past medical history, past social history, past surgical history and problem list.    Review of Systems   Constitutional:  Negative for chills, fatigue and fever.   HENT:  Negative for congestion, nosebleeds, rhinorrhea, sinus pressure and sore throat.    Eyes:  Negative for discharge and redness.   Respiratory:  Negative for cough and shortness of breath.    Cardiovascular:  Negative for chest pain, palpitations and leg  "swelling.   Gastrointestinal:  Negative for abdominal pain, blood in stool and nausea.   Endocrine: Negative for cold intolerance, heat intolerance and polyuria.   Genitourinary:  Negative for dysuria and frequency.   Musculoskeletal:  Negative for arthralgias, back pain and myalgias.   Skin:  Negative for rash.   Neurological:  Negative for dizziness, weakness and headaches.   Hematological:  Negative for adenopathy.   Psychiatric/Behavioral:  Negative for behavioral problems and sleep disturbance. The patient is not nervous/anxious.          Objective:      /82 (BP Location: Right arm, Patient Position: Sitting, Cuff Size: Standard)   Pulse 82   Temp 98.3 °F (36.8 °C) (Temporal)   Resp 18   Ht 5' 1\" (1.549 m)   Wt 73.7 kg (162 lb 6.4 oz)   SpO2 98%   BMI 30.69 kg/m²        Physical Exam  Vitals and nursing note reviewed.   Constitutional:       General: She is not in acute distress.     Appearance: Normal appearance. She is well-developed and normal weight.   HENT:      Head: Normocephalic and atraumatic.      Right Ear: Tympanic membrane and external ear normal.      Left Ear: Tympanic membrane and external ear normal.      Nose: Nose normal.      Mouth/Throat:      Mouth: Mucous membranes are moist.      Pharynx: Oropharynx is clear. No oropharyngeal exudate.   Eyes:      General: No scleral icterus.        Right eye: No discharge.         Left eye: No discharge.      Conjunctiva/sclera: Conjunctivae normal.      Pupils: Pupils are equal, round, and reactive to light.   Neck:      Thyroid: No thyromegaly.      Vascular: No JVD.   Cardiovascular:      Rate and Rhythm: Normal rate and regular rhythm.      Heart sounds: Normal heart sounds. No murmur heard.  Pulmonary:      Effort: Pulmonary effort is normal.      Breath sounds: No wheezing or rales.   Chest:      Chest wall: No tenderness.   Abdominal:      General: Bowel sounds are normal. There is no distension.      Palpations: Abdomen is soft. " There is no mass.      Tenderness: There is no abdominal tenderness.   Musculoskeletal:         General: No tenderness or deformity. Normal range of motion.      Cervical back: Normal range of motion.   Lymphadenopathy:      Cervical: No cervical adenopathy.   Skin:     General: Skin is warm and dry.      Findings: No rash.   Neurological:      General: No focal deficit present.      Mental Status: She is alert and oriented to person, place, and time.      Cranial Nerves: No cranial nerve deficit.      Coordination: Coordination normal.      Deep Tendon Reflexes: Reflexes are normal and symmetric. Reflexes normal.   Psychiatric:         Mood and Affect: Mood normal.         Behavior: Behavior normal.         Thought Content: Thought content normal.         Judgment: Judgment normal.          Data:    Laboratory Results: I have personally reviewed the pertinent laboratory results/reports   Radiology/Other Diagnostic Testing Results: I have personally reviewed pertinent reports.       Lab Results   Component Value Date    WBC 5.29 06/15/2023    HGB 12.3 06/15/2023    HCT 38.8 06/15/2023    MCV 94 06/15/2023     06/15/2023     Lab Results   Component Value Date    K 4.3 12/14/2023     12/14/2023    CO2 30 12/14/2023    BUN 10 12/14/2023    CREATININE 0.59 (L) 12/14/2023    GLUF 91 12/14/2023    CALCIUM 9.2 12/14/2023    AST 23 12/14/2023    ALT 21 12/14/2023    ALKPHOS 97 12/14/2023    EGFR 92 12/14/2023     Lab Results   Component Value Date    CHOLESTEROL 242 (H) 12/14/2023    CHOLESTEROL 237 (H) 06/15/2023    CHOLESTEROL 218 (H) 12/14/2022     Lab Results   Component Value Date    HDL 52 12/14/2023    HDL 51 06/15/2023    HDL 52 12/14/2022     Lab Results   Component Value Date    LDLCALC 167 (H) 12/14/2023    LDLCALC 161 (H) 06/15/2023    LDLCALC 144 (H) 12/14/2022     Lab Results   Component Value Date    TRIG 117 12/14/2023    TRIG 123 06/15/2023    TRIG 110 12/14/2022     No results found for:  "\"CHOLHDL\"  Lab Results   Component Value Date    [id number] 3.369 12/14/2023     Lab Results   Component Value Date    HGBA1C 5.4 05/30/2022     No results found for: \"PSA\"    Alexis Blanton, DO      "

## 2024-01-26 NOTE — ASSESSMENT & PLAN NOTE
Post covid congestion in her ear sinuses and sore throat.  Recommend allergy medication Flonase and if not improved contact me in 2 weeks

## 2024-02-07 ENCOUNTER — OFFICE VISIT (OUTPATIENT)
Dept: FAMILY MEDICINE CLINIC | Facility: CLINIC | Age: 74
End: 2024-02-07
Payer: MEDICARE

## 2024-02-07 VITALS
RESPIRATION RATE: 18 BRPM | OXYGEN SATURATION: 99 % | HEIGHT: 61 IN | DIASTOLIC BLOOD PRESSURE: 78 MMHG | BODY MASS INDEX: 30.66 KG/M2 | HEART RATE: 64 BPM | TEMPERATURE: 98.2 F | SYSTOLIC BLOOD PRESSURE: 128 MMHG | WEIGHT: 162.4 LBS

## 2024-02-07 DIAGNOSIS — K21.9 GASTROESOPHAGEAL REFLUX DISEASE WITHOUT ESOPHAGITIS: ICD-10-CM

## 2024-02-07 DIAGNOSIS — U09.9 POST-COVID CHRONIC COUGH: Primary | ICD-10-CM

## 2024-02-07 DIAGNOSIS — R05.3 POST-COVID CHRONIC COUGH: Primary | ICD-10-CM

## 2024-02-07 DIAGNOSIS — U09.9 POST-COVID CHRONIC FATIGUE: ICD-10-CM

## 2024-02-07 DIAGNOSIS — G93.32 POST-COVID CHRONIC FATIGUE: ICD-10-CM

## 2024-02-07 DIAGNOSIS — J45.21 MILD INTERMITTENT REACTIVE AIRWAY DISEASE WITH ACUTE EXACERBATION: ICD-10-CM

## 2024-02-07 DIAGNOSIS — D69.6 PLATELETS DECREASED (HCC): ICD-10-CM

## 2024-02-07 PROCEDURE — 99214 OFFICE O/P EST MOD 30 MIN: CPT | Performed by: FAMILY MEDICINE

## 2024-02-07 RX ORDER — CEPHALEXIN 500 MG/1
500 CAPSULE ORAL EVERY 8 HOURS SCHEDULED
Qty: 21 CAPSULE | Refills: 0 | Status: SHIPPED | OUTPATIENT
Start: 2024-02-07 | End: 2024-02-14

## 2024-02-07 RX ORDER — BENZONATATE 200 MG/1
200 CAPSULE ORAL 3 TIMES DAILY PRN
Qty: 20 CAPSULE | Refills: 1 | Status: SHIPPED | OUTPATIENT
Start: 2024-02-07

## 2024-02-07 NOTE — PROGRESS NOTES
Assessment/Plan:       Problem List Items Addressed This Visit          Digestive    Gastroesophageal reflux disease without esophagitis     Continue PPI now            Respiratory    Reactive airway disease with acute exacerbation     Start Ventolin and benzonatate if not improved add Advair in 2 days patient will contact and follow-up with the office in 48 hours            Other    Platelets decreased (HCC)     Follow up cbc         Relevant Medications    benzonatate (TESSALON) 200 MG capsule    cephalexin (KEFLEX) 500 mg capsule    Post-COVID chronic fatigue     Adding supplements and vitamins         Relevant Medications    benzonatate (TESSALON) 200 MG capsule    cephalexin (KEFLEX) 500 mg capsule    Post-COVID chronic cough - Primary     Patient will start using Ventolin albuterol inhaler now at least 3 times daily and will add benzonatate.  If not improved after 48 hours she will contact the office and we will consider Advair inhaler         Relevant Medications    benzonatate (TESSALON) 200 MG capsule    cephalexin (KEFLEX) 500 mg capsule         Subjective:      Patient ID: Tarah Meyers is a 73 y.o. female.    Ongoing chronic post-COVID cough nonproductive        The following portions of the patient's history were reviewed and updated as appropriate: allergies, current medications, past family history, past medical history, past social history, past surgical history and problem list.    Review of Systems   Constitutional:  Positive for fatigue. Negative for chills and fever.   HENT:  Negative for congestion, nosebleeds, rhinorrhea, sinus pressure and sore throat.    Eyes:  Negative for discharge and redness.   Respiratory:  Positive for cough. Negative for shortness of breath.    Cardiovascular:  Negative for chest pain, palpitations and leg swelling.   Gastrointestinal:  Negative for abdominal pain, blood in stool and nausea.   Endocrine: Negative for cold intolerance, heat intolerance and polyuria.  "  Genitourinary:  Negative for dysuria and frequency.   Musculoskeletal:  Negative for arthralgias, back pain and myalgias.   Skin:  Negative for rash.   Neurological:  Negative for dizziness, weakness and headaches.   Hematological:  Negative for adenopathy.   Psychiatric/Behavioral:  Negative for behavioral problems and sleep disturbance. The patient is not nervous/anxious.          Objective:      /78 (BP Location: Right arm, Patient Position: Sitting, Cuff Size: Standard)   Pulse 64   Temp 98.2 °F (36.8 °C) (Temporal)   Resp 18   Ht 5' 1\" (1.549 m)   Wt 73.7 kg (162 lb 6.4 oz)   SpO2 99%   BMI 30.69 kg/m²        Physical Exam  Vitals and nursing note reviewed.   Constitutional:       General: She is not in acute distress.     Appearance: She is well-developed.   HENT:      Head: Normocephalic and atraumatic.      Right Ear: Tympanic membrane and external ear normal.      Left Ear: Tympanic membrane and external ear normal.      Nose: Nose normal.      Mouth/Throat:      Mouth: Mucous membranes are moist.      Pharynx: Oropharynx is clear. No oropharyngeal exudate.   Eyes:      General: No scleral icterus.        Right eye: No discharge.         Left eye: No discharge.      Conjunctiva/sclera: Conjunctivae normal.      Pupils: Pupils are equal, round, and reactive to light.   Neck:      Thyroid: No thyromegaly.      Vascular: No JVD.   Cardiovascular:      Rate and Rhythm: Normal rate and regular rhythm.      Heart sounds: Normal heart sounds. No murmur heard.  Pulmonary:      Effort: Pulmonary effort is normal.      Breath sounds: No wheezing or rales.   Chest:      Chest wall: No tenderness.   Abdominal:      General: Bowel sounds are normal. There is no distension.      Palpations: Abdomen is soft. There is no mass.      Tenderness: There is no abdominal tenderness.   Musculoskeletal:         General: No tenderness or deformity. Normal range of motion.      Cervical back: Normal range of motion. " "  Lymphadenopathy:      Cervical: No cervical adenopathy.   Skin:     General: Skin is warm and dry.      Findings: No rash.   Neurological:      General: No focal deficit present.      Mental Status: She is alert and oriented to person, place, and time.      Cranial Nerves: No cranial nerve deficit.      Coordination: Coordination normal.      Deep Tendon Reflexes: Reflexes are normal and symmetric. Reflexes normal.   Psychiatric:         Mood and Affect: Mood normal.         Behavior: Behavior normal.         Thought Content: Thought content normal.         Judgment: Judgment normal.          Data:    Laboratory Results: I have personally reviewed the pertinent laboratory results/reports   Radiology/Other Diagnostic Testing Results: I have personally reviewed pertinent reports.       Lab Results   Component Value Date    WBC 5.29 06/15/2023    HGB 12.3 06/15/2023    HCT 38.8 06/15/2023    MCV 94 06/15/2023     06/15/2023     Lab Results   Component Value Date    K 4.3 12/14/2023     12/14/2023    CO2 30 12/14/2023    BUN 10 12/14/2023    CREATININE 0.59 (L) 12/14/2023    GLUF 91 12/14/2023    CALCIUM 9.2 12/14/2023    AST 23 12/14/2023    ALT 21 12/14/2023    ALKPHOS 97 12/14/2023    EGFR 92 12/14/2023     Lab Results   Component Value Date    CHOLESTEROL 242 (H) 12/14/2023    CHOLESTEROL 237 (H) 06/15/2023    CHOLESTEROL 218 (H) 12/14/2022     Lab Results   Component Value Date    HDL 52 12/14/2023    HDL 51 06/15/2023    HDL 52 12/14/2022     Lab Results   Component Value Date    LDLCALC 167 (H) 12/14/2023    LDLCALC 161 (H) 06/15/2023    LDLCALC 144 (H) 12/14/2022     Lab Results   Component Value Date    TRIG 117 12/14/2023    TRIG 123 06/15/2023    TRIG 110 12/14/2022     No results found for: \"CHOLHDL\"  Lab Results   Component Value Date    [id number] 3.369 12/14/2023     Lab Results   Component Value Date    HGBA1C 5.4 05/30/2022     No results found for: \"PSA\"    Alexis Blanton,       "

## 2024-02-07 NOTE — ASSESSMENT & PLAN NOTE
Start Ventolin and benzonatate if not improved add Advair in 2 days patient will contact and follow-up with the office in 48 hours

## 2024-02-08 ENCOUNTER — HOSPITAL ENCOUNTER (OUTPATIENT)
Dept: MAMMOGRAPHY | Facility: HOSPITAL | Age: 74
End: 2024-02-08
Attending: FAMILY MEDICINE
Payer: MEDICARE

## 2024-02-08 DIAGNOSIS — Z12.31 VISIT FOR SCREENING MAMMOGRAM: ICD-10-CM

## 2024-02-08 PROCEDURE — 77067 SCR MAMMO BI INCL CAD: CPT

## 2024-02-08 PROCEDURE — 77063 BREAST TOMOSYNTHESIS BI: CPT

## 2024-06-04 ENCOUNTER — OFFICE VISIT (OUTPATIENT)
Dept: FAMILY MEDICINE CLINIC | Facility: CLINIC | Age: 74
End: 2024-06-04
Payer: MEDICARE

## 2024-06-04 VITALS
OXYGEN SATURATION: 96 % | BODY MASS INDEX: 30.66 KG/M2 | SYSTOLIC BLOOD PRESSURE: 126 MMHG | DIASTOLIC BLOOD PRESSURE: 75 MMHG | HEIGHT: 61 IN | HEART RATE: 72 BPM | WEIGHT: 162.4 LBS | TEMPERATURE: 98 F | RESPIRATION RATE: 18 BRPM

## 2024-06-04 DIAGNOSIS — R23.3 PETECHIAE: Primary | ICD-10-CM

## 2024-06-04 PROCEDURE — G2211 COMPLEX E/M VISIT ADD ON: HCPCS | Performed by: NURSE PRACTITIONER

## 2024-06-04 PROCEDURE — 99213 OFFICE O/P EST LOW 20 MIN: CPT | Performed by: NURSE PRACTITIONER

## 2024-06-04 NOTE — PROGRESS NOTES
OFFICE VISIT  Tarah Meyers 74 y.o. female MRN: [id number]          Assessment / Plan:  Problem List Items Addressed This Visit          Other    Petechiae - Primary     Self limiting, most likely Provoked from recent vacation and increase walking               Reason For Visit / Chief Complaint  Chief Complaint   Patient presents with    Rash     Pt reports rash started approx 4 days ago. [facility] Pt reports legs feel a little heavy with annoying burn.        HPI:  Tarah Meyers is a 74 y.o. female who presents today for a rash, she reports a rash starting Friday. She reports being out in the sun, she reports laying in the sun. Rash in not bothersome.     Historical Information   Past Medical History:   Diagnosis Date    Allergic     Anemia     Arthritis     GERD (gastroesophageal reflux disease)     Herniated disc, cervical     Hyperlipidemia     Stroke (HCC)     Syncope 2023     Past Surgical History:   Procedure Laterality Date    BLADDER REPAIR      sling    CATARACT EXTRACTION, BILATERAL      COLONOSCOPY      EYE SURGERY      REMOVAL OF INTRAUTERINE DEVICE (IUD)      in abd area    TONSILLECTOMY      TUBAL LIGATION      TUMOR REMOVAL      pinky finger 1977    WISDOM TOOTH EXTRACTION       Social History   Social History     Substance and Sexual Activity   Alcohol Use Never     Social History     Substance and Sexual Activity   Drug Use Never     Social History     Tobacco Use   Smoking Status Never   Smokeless Tobacco Never     Family History   Problem Relation Age of Onset    Hypertension Mother         Pulmonary    Osteoporosis Mother     Heart failure Mother     Thyroid disease Mother     Arthritis Mother     Anxiety disorder Mother     Heart attack Father     Hypertension Father     Stroke Father     Dementia Father     Heart disease Father     Thyroid disease Sister     No Known Problems Daughter     No Known Problems Maternal Grandmother     No Known Problems Paternal  Grandmother     No Known Problems Daughter     Mental illness Maternal Aunt     Depression Maternal Aunt     Schizoaffective Disorder  Maternal Aunt     Schizophrenia Maternal Aunt     No Known Problems Maternal Aunt     Breast cancer Maternal Aunt     No Known Problems Maternal Aunt     No Known Problems Paternal Aunt     Suicide Attempts Maternal Grandfather     Colon cancer Maternal Uncle        Meds/Allergies   Allergies   Allergen Reactions    Eggs Or Egg-Derived Products - Food Allergy Other (See Comments)    Etodolac      Other reaction(s): chest pain, dizziness, nausea    Nitrofurantoin Other (See Comments)    Paxlovid [Nirmatrelvir-Ritonavir] Anaphylaxis    Penicillins Other (See Comments)    Sucralfate Dizziness and Headache    Sulfa Antibiotics Hives and Other (See Comments)    Levofloxacin Dizziness    Omeprazole Headache and Fatigue    Pregabalin Chest Pain    Aspirin      Other reaction(s): GI upset    Chocolate - Food Allergy     Famotidine Other (See Comments)    Fluconazole Other (See Comments)    Ibuprofen      Other reaction(s): GI upset    Naproxen      Other reaction(s): GI upset    Penicillin G      Other reaction(s): swollen lips, tingling around mouth    Zithromax Tri-Joseph [Azithromycin] Nausea Only and Dizziness       Meds:    Current Outpatient Medications:     acetaminophen (TYLENOL) 500 mg tablet, Take 500 mg by mouth every 6 (six) hours as needed for mild pain, Disp: , Rfl:     Black Elderberry,Berry-Flower, 575 MG CAPS, Take 1,150 mg by mouth daily, Disp: , Rfl:     calcium carbonate (TUMS) 500 mg chewable tablet, Chew 1 tablet daily Sometimes, Disp: , Rfl:     Camphor-Menthol-Methyl Sal (FLEXALL ULTRA PLUS EX), , Disp: , Rfl:     Cholecalciferol (Vitamin D) 125 MCG (5000 UT) CAPS, , Disp: , Rfl:     Coenzyme Q10 (CoQ10) 100 MG CAPS, CoQ10   daily, Disp: , Rfl:     diphenhydrAMINE HCl (BENADRYL ALLERGY PO), Take by mouth, Disp: , Rfl:     Garlic 1000 MG CAPS, Daily, Disp: , Rfl:      Ginkgo Biloba (GINKOBA PO), Take by mouth, Disp: , Rfl:     loratadine (CLARITIN) 10 mg tablet, Take 10 mg by mouth daily PRN, Disp: , Rfl:     Misc Natural Products (BEET ROOT PO), Take by mouth, Disp: , Rfl:     Misc Natural Products (SUPER GREENS PO), Take by mouth, Disp: , Rfl:     Multiple Vitamins-Minerals (PreserVision/Lutein) CAPS, Take by mouth, Disp: , Rfl:     Multiple Vitamins-Minerals (WOMENS 50+ MULTI VITAMIN/MIN PO), , Disp: , Rfl:     Omega-3 Fatty Acids (Fish Oil) 1000 MG CPDR, Take by mouth, Disp: , Rfl:     polyethylene glycol-propylene glycol (Systane) 0.4-0.3 %, Systane (PF) 0.4 %-0.3 % eye drops in a dropperette, Disp: , Rfl:     Probiotic Product (PROBIOTIC ACIDOPHILUS BEADS PO), Take by mouth, Disp: , Rfl:     albuterol (Ventolin HFA) 90 mcg/act inhaler, Inhale 2 puffs every 6 (six) hours as needed for wheezing (Patient not taking: Reported on 1/26/2024), Disp: 18 g, Rfl: 5    benzonatate (TESSALON) 200 MG capsule, Take 1 capsule (200 mg total) by mouth 3 (three) times a day as needed for cough (Patient not taking: Reported on 6/4/2024), Disp: 20 capsule, Rfl: 1    budesonide (Pulmicort Flexhaler) 90 MCG/ACT inhaler, Inhale 1 puff 2 (two) times a day Rinse mouth after use. (Patient not taking: Reported on 6/4/2024), Disp: 1 each, Rfl: 1    cholecalciferol (VITAMIN D3) 1,000 units tablet, Take 1,000 Units by mouth daily 4,000 (Patient not taking: Reported on 6/4/2024), Disp: , Rfl:     diclofenac sodium (VOLTAREN) 25 MG EC tablet, Take 25 mg by mouth 2 (two) times a day (Patient not taking: Reported on 6/4/2024), Disp: , Rfl:     ferrous sulfate 325 (65 Fe) mg tablet, Take 325 mg by mouth daily with breakfast Unknown dose every other day (Patient not taking: Reported on 6/4/2024), Disp: , Rfl:     fluticasone (FLONASE) 50 mcg/act nasal spray, 1 spray into each nostril daily (Patient not taking: Reported on 6/4/2024), Disp: 16 g, Rfl: 3    M2 ZINC-50 PO, , Disp: , Rfl:     Multiple  "Vitamins-Iron (ONE-TABLET-DAILY/IRON PO), Take 65 mg by mouth (Patient not taking: Reported on 6/4/2024), Disp: , Rfl:     neomycin-polymyxin-dexamethasone (MAXITROL) 0.35%-10,000 units/g-0.1%, , Disp: , Rfl:     nystatin (MYCOSTATIN) cream, Apply topically 2 (two) times a day (Patient not taking: Reported on 6/4/2024), Disp: 30 g, Rfl: 2    OREGANO PO, Take 140 mg by mouth (Patient not taking: Reported on 1/26/2024), Disp: , Rfl:       REVIEW OF SYSTEMS  Review of Systems   Constitutional:  Negative for activity change, chills, fatigue and fever.   HENT:  Negative for congestion, ear discharge, ear pain, sinus pressure, sinus pain, sore throat, tinnitus and trouble swallowing.    Eyes:  Negative for photophobia, pain, discharge, itching and visual disturbance.   Respiratory:  Negative for cough, chest tightness, shortness of breath and wheezing.    Cardiovascular:  Negative for chest pain and leg swelling.   Gastrointestinal:  Negative for abdominal distention, abdominal pain, constipation, diarrhea, nausea and vomiting.   Endocrine: Negative for polydipsia, polyphagia and polyuria.   Genitourinary:  Negative for dysuria and frequency.   Musculoskeletal:  Negative for arthralgias, myalgias, neck pain and neck stiffness.   Skin:  Positive for rash. Negative for color change.   Neurological:  Negative for dizziness, syncope, weakness, numbness and headaches.   Hematological:  Does not bruise/bleed easily.   Psychiatric/Behavioral:  Negative for behavioral problems, confusion, self-injury, sleep disturbance and suicidal ideas. The patient is not nervous/anxious.            Current Vitals:   Blood Pressure: 126/75 (06/04/24 1313)  Pulse: 72 (06/04/24 1313)  Temperature: 98 °F (36.7 °C) (06/04/24 1313)  Temp Source: Tympanic (06/04/24 1313)  Respirations: 18 (06/04/24 1313)  Height: 5' 1\" (154.9 cm) (06/04/24 1313)  Weight - Scale: 73.7 kg (162 lb 6.4 oz) (06/04/24 1313)  SpO2: 96 % (06/04/24 " 1313)  [unfilled]    PHYSICAL EXAMS:  Physical Exam  Vitals and nursing note reviewed.   Constitutional:       Appearance: Normal appearance.   Cardiovascular:      Rate and Rhythm: Normal rate and regular rhythm.      Pulses: Normal pulses.      Heart sounds: Normal heart sounds.   Pulmonary:      Effort: Pulmonary effort is normal.      Breath sounds: Normal breath sounds.   Musculoskeletal:      Cervical back: Normal range of motion and neck supple.   Skin:     Findings: Rash present.   Neurological:      Mental Status: She is alert and oriented to person, place, and time.   Psychiatric:         Mood and Affect: Mood normal.         Behavior: Behavior normal.             Lab, imaging and other studies: I have personally reviewed pertinent reports.  .

## 2024-07-01 ENCOUNTER — RA CDI HCC (OUTPATIENT)
Dept: OTHER | Facility: HOSPITAL | Age: 74
End: 2024-07-01

## 2024-08-23 ENCOUNTER — APPOINTMENT (OUTPATIENT)
Dept: LAB | Facility: CLINIC | Age: 74
End: 2024-08-23
Payer: MEDICARE

## 2024-08-23 DIAGNOSIS — D50.9 IRON DEFICIENCY ANEMIA, UNSPECIFIED IRON DEFICIENCY ANEMIA TYPE: ICD-10-CM

## 2024-08-23 DIAGNOSIS — E78.2 MIXED HYPERLIPIDEMIA: ICD-10-CM

## 2024-08-23 LAB
ALBUMIN SERPL BCG-MCNC: 4.1 G/DL (ref 3.5–5)
ALP SERPL-CCNC: 87 U/L (ref 34–104)
ALT SERPL W P-5'-P-CCNC: 21 U/L (ref 7–52)
ANION GAP SERPL CALCULATED.3IONS-SCNC: 8 MMOL/L (ref 4–13)
AST SERPL W P-5'-P-CCNC: 18 U/L (ref 13–39)
BASOPHILS # BLD AUTO: 0.05 THOUSANDS/ÂΜL (ref 0–0.1)
BASOPHILS NFR BLD AUTO: 1 % (ref 0–1)
BILIRUB SERPL-MCNC: 0.56 MG/DL (ref 0.2–1)
BUN SERPL-MCNC: 13 MG/DL (ref 5–25)
CALCIUM SERPL-MCNC: 9.3 MG/DL (ref 8.4–10.2)
CHLORIDE SERPL-SCNC: 101 MMOL/L (ref 96–108)
CHOLEST SERPL-MCNC: 229 MG/DL
CO2 SERPL-SCNC: 30 MMOL/L (ref 21–32)
CREAT SERPL-MCNC: 0.66 MG/DL (ref 0.6–1.3)
EOSINOPHIL # BLD AUTO: 0.07 THOUSAND/ÂΜL (ref 0–0.61)
EOSINOPHIL NFR BLD AUTO: 1 % (ref 0–6)
ERYTHROCYTE [DISTWIDTH] IN BLOOD BY AUTOMATED COUNT: 12.9 % (ref 11.6–15.1)
FERRITIN SERPL-MCNC: 55 NG/ML (ref 11–307)
GFR SERPL CREATININE-BSD FRML MDRD: 88 ML/MIN/1.73SQ M
GLUCOSE P FAST SERPL-MCNC: 91 MG/DL (ref 65–99)
HCT VFR BLD AUTO: 38.4 % (ref 34.8–46.1)
HDLC SERPL-MCNC: 50 MG/DL
HGB BLD-MCNC: 12.7 G/DL (ref 11.5–15.4)
IMM GRANULOCYTES # BLD AUTO: 0.01 THOUSAND/UL (ref 0–0.2)
IMM GRANULOCYTES NFR BLD AUTO: 0 % (ref 0–2)
IRON SATN MFR SERPL: 24 % (ref 15–50)
IRON SERPL-MCNC: 97 UG/DL (ref 50–212)
LDLC SERPL CALC-MCNC: 157 MG/DL (ref 0–100)
LYMPHOCYTES # BLD AUTO: 1.63 THOUSANDS/ÂΜL (ref 0.6–4.47)
LYMPHOCYTES NFR BLD AUTO: 31 % (ref 14–44)
MCH RBC QN AUTO: 31.3 PG (ref 26.8–34.3)
MCHC RBC AUTO-ENTMCNC: 33.1 G/DL (ref 31.4–37.4)
MCV RBC AUTO: 95 FL (ref 82–98)
MONOCYTES # BLD AUTO: 0.48 THOUSAND/ÂΜL (ref 0.17–1.22)
MONOCYTES NFR BLD AUTO: 9 % (ref 4–12)
NEUTROPHILS # BLD AUTO: 3.07 THOUSANDS/ÂΜL (ref 1.85–7.62)
NEUTS SEG NFR BLD AUTO: 58 % (ref 43–75)
NONHDLC SERPL-MCNC: 179 MG/DL
NRBC BLD AUTO-RTO: 0 /100 WBCS
PLATELET # BLD AUTO: 268 THOUSANDS/UL (ref 149–390)
PMV BLD AUTO: 10.3 FL (ref 8.9–12.7)
POTASSIUM SERPL-SCNC: 4.3 MMOL/L (ref 3.5–5.3)
PROT SERPL-MCNC: 7.2 G/DL (ref 6.4–8.4)
RBC # BLD AUTO: 4.06 MILLION/UL (ref 3.81–5.12)
SODIUM SERPL-SCNC: 139 MMOL/L (ref 135–147)
TIBC SERPL-MCNC: 396 UG/DL (ref 250–450)
TRIGL SERPL-MCNC: 111 MG/DL
TSH SERPL DL<=0.05 MIU/L-ACNC: 2.7 UIU/ML (ref 0.45–4.5)
UIBC SERPL-MCNC: 299 UG/DL (ref 155–355)
WBC # BLD AUTO: 5.31 THOUSAND/UL (ref 4.31–10.16)

## 2024-08-23 PROCEDURE — 82728 ASSAY OF FERRITIN: CPT

## 2024-08-23 PROCEDURE — 84443 ASSAY THYROID STIM HORMONE: CPT

## 2024-08-23 PROCEDURE — 85025 COMPLETE CBC W/AUTO DIFF WBC: CPT

## 2024-08-23 PROCEDURE — 83550 IRON BINDING TEST: CPT

## 2024-08-23 PROCEDURE — 83540 ASSAY OF IRON: CPT

## 2024-08-23 PROCEDURE — 36415 COLL VENOUS BLD VENIPUNCTURE: CPT

## 2024-08-23 PROCEDURE — 80053 COMPREHEN METABOLIC PANEL: CPT

## 2024-08-23 PROCEDURE — 80061 LIPID PANEL: CPT

## 2024-09-05 ENCOUNTER — OFFICE VISIT (OUTPATIENT)
Dept: FAMILY MEDICINE CLINIC | Facility: CLINIC | Age: 74
End: 2024-09-05
Payer: MEDICARE

## 2024-09-05 VITALS
HEART RATE: 90 BPM | DIASTOLIC BLOOD PRESSURE: 70 MMHG | WEIGHT: 161.2 LBS | HEIGHT: 61 IN | BODY MASS INDEX: 30.43 KG/M2 | OXYGEN SATURATION: 96 % | SYSTOLIC BLOOD PRESSURE: 120 MMHG | RESPIRATION RATE: 18 BRPM | TEMPERATURE: 97.8 F

## 2024-09-05 DIAGNOSIS — Z00.00 MEDICARE ANNUAL WELLNESS VISIT, SUBSEQUENT: Primary | ICD-10-CM

## 2024-09-05 DIAGNOSIS — T83.718D EROSION OF BLADDER SUSPENSION MESH, SUBSEQUENT ENCOUNTER: ICD-10-CM

## 2024-09-05 DIAGNOSIS — K21.9 GASTROESOPHAGEAL REFLUX DISEASE WITHOUT ESOPHAGITIS: ICD-10-CM

## 2024-09-05 DIAGNOSIS — R35.0 URINARY FREQUENCY: ICD-10-CM

## 2024-09-05 DIAGNOSIS — E78.2 MIXED HYPERLIPIDEMIA: ICD-10-CM

## 2024-09-05 PROCEDURE — 99214 OFFICE O/P EST MOD 30 MIN: CPT | Performed by: FAMILY MEDICINE

## 2024-09-05 PROCEDURE — G0439 PPPS, SUBSEQ VISIT: HCPCS | Performed by: FAMILY MEDICINE

## 2024-09-05 NOTE — ASSESSMENT & PLAN NOTE
Current symptoms stable overall doing well continuing on all medication and diet regimen no blood in stool or worsening epigastric pain follow-up here in 6 months

## 2024-09-05 NOTE — ASSESSMENT & PLAN NOTE
Mixed hyperlipidemia stable continuing on a good diet plan avoiding saturated fats continue with omega-3 fish oils no changes

## 2024-09-05 NOTE — PROGRESS NOTES
Ambulatory Visit  Name: Tarah Meyers      : 1950      MRN: [id number]  Encounter Provider: Alexis Blanton DO  Encounter Date: 2024   Encounter department: [facility]    Assessment & Plan   1. Medicare annual wellness visit, subsequent  2. Gastroesophageal reflux disease without esophagitis  Assessment & Plan:  Current symptoms stable overall doing well continuing on all medication and diet regimen no blood in stool or worsening epigastric pain follow-up here in 6 months  3. Mixed hyperlipidemia  Assessment & Plan:  Mixed hyperlipidemia stable continuing on a good diet plan avoiding saturated fats continue with omega-3 fish oils no changes  4. Urinary frequency  Assessment & Plan:  Patient working on Kegel exercises good diet plan add cranberry juice continue multiple vitamins work on Kegel exercises to prevent incontinence  5. Erosion of bladder suspension mesh, subsequent encounter  Assessment & Plan:  Patient is stable recommend working on Kegel exercises follow-up with urogynecology as needed       Preventive health issues were discussed with patient, and age appropriate screening tests were ordered as noted in patient's After Visit Summary. Personalized health advice and appropriate referrals for health education or preventive services given if needed, as noted in patient's After Visit Summary.    History of Present Illness     Follow-up evaluation and Medicare well visit today and review of lab work       Patient Care Team:  Alexis Blanton DO as PCP - General (Family Medicine)  Trinidad Gloria PA-C as Physician Assistant (Physician Assistant)    Review of Systems   Constitutional:  Negative for chills, fatigue and fever.   HENT:  Negative for congestion, nosebleeds, rhinorrhea, sinus pressure and sore throat.    Eyes:  Negative for discharge and redness.   Respiratory:  Negative for cough and shortness of breath.    Cardiovascular:  Negative for chest pain,  palpitations and leg swelling.   Gastrointestinal:  Negative for abdominal pain, blood in stool and nausea.   Endocrine: Negative for cold intolerance, heat intolerance and polyuria.   Genitourinary:  Negative for dysuria and frequency.   Musculoskeletal:  Negative for arthralgias, back pain and myalgias.   Skin:  Negative for rash.   Neurological:  Negative for dizziness, weakness and headaches.   Hematological:  Negative for adenopathy.   Psychiatric/Behavioral:  Negative for behavioral problems and sleep disturbance. The patient is not nervous/anxious.      Medical History Reviewed by provider this encounter:  Tobacco  Allergies  Meds  Problems  Med Hx  Surg Hx  Fam Hx       Annual Wellness Visit Questionnaire   Tarah is here for her Initial Wellness visit.     Health Risk Assessment:   Patient rates overall health as good. Patient feels that their physical health rating is same. Patient is satisfied with their life. Eyesight was rated as same. Hearing was rated as much worse. Patient feels that their emotional and mental health rating is same. Patients states they are never, rarely angry. Patient states they are often unusually tired/fatigued. Pain experienced in the last 7 days has been some. Patient's pain rating has been 5/10. Patient states that she has experienced no weight loss or gain in last 6 months.     Depression Screening:   PHQ-2 Score: 1      Fall Risk Screening:   In the past year, patient has experienced: no history of falling in past year      Urinary Incontinence Screening:   Patient has not leaked urine accidently in the last six months.     Home Safety:  Patient does not have trouble with stairs inside or outside of their home. Patient has working smoke alarms and has working carbon monoxide detector. Home safety hazards include: none.     Nutrition:   Current diet is Regular.     Medications:   Patient is currently taking over-the-counter supplements. OTC medications include: see  medication list. Patient is able to manage medications.     Activities of Daily Living (ADLs)/Instrumental Activities of Daily Living (IADLs):   Walk and transfer into and out of bed and chair?: Yes  Dress and groom yourself?: Yes    Bathe or shower yourself?: Yes    Feed yourself? Yes  Do your laundry/housekeeping?: Yes  Manage your money, pay your bills and track your expenses?: Yes  Make your own meals?: Yes    Do your own shopping?: Yes    Previous Hospitalizations:   Any hospitalizations or ED visits within the last 12 months?: No      Advance Care Planning:   Living will: No    Durable POA for healthcare: No    Advanced directive: No      PREVENTIVE SCREENINGS      Cardiovascular Screening:    General: Screening Not Indicated and History Lipid Disorder      Diabetes Screening:     General: Screening Current      Colorectal Cancer Screening:     General: Screening Current      Breast Cancer Screening:     General: Screening Current      Cervical Cancer Screening:    General: Screening Not Indicated      Osteoporosis Screening:    General: Screening Not Indicated and History Osteoporosis      Lung Cancer Screening:     General: Screening Not Indicated      Hepatitis C Screening:    General: Screening Current    Screening, Brief Intervention, and Referral to Treatment (SBIRT)    Screening  Typical number of drinks in a day: 0  Typical number of drinks in a week: 0  Interpretation: Low risk drinking behavior.    AUDIT-C Screenin) How often did you have a drink containing alcohol in the past year? never  2) How many drinks did you have on a typical day when you were drinking in the past year? 0  3) How often did you have 6 or more drinks on one occasion in the past year? never    AUDIT-C Score: 0  Interpretation: Score 0-2 (female): Negative screen for alcohol misuse    Single Item Drug Screening:  How often have you used an illegal drug (including marijuana) or a prescription medication for non-medical  "reasons in the past year? never    Single Item Drug Screen Score: 0  Interpretation: Negative screen for possible drug use disorder    Social Determinants of Health     Financial Resource Strain: Medium Risk (6/21/2023)    Overall Financial Resource Strain (CARDIA)     Difficulty of Paying Living Expenses: Somewhat hard   Food Insecurity: No Food Insecurity (9/5/2024)    Hunger Vital Sign     Worried About Running Out of Food in the Last Year: Never true     Ran Out of Food in the Last Year: Never true   Transportation Needs: No Transportation Needs (9/5/2024)    PRAPARE - Transportation     Lack of Transportation (Medical): No     Lack of Transportation (Non-Medical): No   Housing Stability: Unknown (9/5/2024)    Housing Stability Vital Sign     Unable to Pay for Housing in the Last Year: No     Homeless in the Last Year: No   Utilities: Not At Risk (9/5/2024)    [facility] Utilities     Threatened with loss of utilities: No     No results found.    Objective     /70 (BP Location: Left arm, Patient Position: Sitting, Cuff Size: Standard)   Pulse 90   Temp 97.8 °F (36.6 °C) (Tympanic)   Resp 18   Ht 5' 1\" (1.549 m)   Wt 73.1 kg (161 lb 3.2 oz)   SpO2 96%   BMI 30.46 kg/m²     Physical Exam  Vitals and nursing note reviewed.   Constitutional:       General: She is not in acute distress.     Appearance: Normal appearance. She is well-developed.   HENT:      Head: Normocephalic and atraumatic.      Right Ear: Tympanic membrane and external ear normal.      Left Ear: Tympanic membrane and external ear normal.      Nose: Rhinorrhea present.      Mouth/Throat:      Mouth: Mucous membranes are moist.      Pharynx: Oropharynx is clear. No oropharyngeal exudate.   Eyes:      General: No scleral icterus.        Right eye: No discharge.         Left eye: No discharge.      Conjunctiva/sclera: Conjunctivae normal.      Pupils: Pupils are equal, round, and reactive to light.   Neck:      Thyroid: No thyromegaly.      " Vascular: No JVD.   Cardiovascular:      Rate and Rhythm: Normal rate and regular rhythm.      Heart sounds: Normal heart sounds. No murmur heard.  Pulmonary:      Effort: Pulmonary effort is normal.      Breath sounds: No wheezing or rales.   Chest:      Chest wall: No tenderness.   Abdominal:      General: Bowel sounds are normal. There is no distension.      Palpations: Abdomen is soft. There is no mass.      Tenderness: There is no abdominal tenderness.   Musculoskeletal:         General: No tenderness or deformity. Normal range of motion.      Cervical back: Normal range of motion.   Lymphadenopathy:      Cervical: No cervical adenopathy.   Skin:     General: Skin is warm and dry.      Findings: No rash.   Neurological:      General: No focal deficit present.      Mental Status: She is alert and oriented to person, place, and time.      Cranial Nerves: No cranial nerve deficit.      Coordination: Coordination normal.      Deep Tendon Reflexes: Reflexes are normal and symmetric. Reflexes normal.   Psychiatric:         Mood and Affect: Mood normal.         Behavior: Behavior normal.         Thought Content: Thought content normal.         Judgment: Judgment normal.

## 2024-09-05 NOTE — ASSESSMENT & PLAN NOTE
Patient working on Kegel exercises good diet plan add cranberry juice continue multiple vitamins work on Kegel exercises to prevent incontinence

## 2024-09-24 ENCOUNTER — OFFICE VISIT (OUTPATIENT)
Dept: FAMILY MEDICINE CLINIC | Facility: CLINIC | Age: 74
End: 2024-09-24
Payer: MEDICARE

## 2024-09-24 VITALS
HEIGHT: 61 IN | HEART RATE: 81 BPM | TEMPERATURE: 98.9 F | BODY MASS INDEX: 30.62 KG/M2 | DIASTOLIC BLOOD PRESSURE: 80 MMHG | OXYGEN SATURATION: 96 % | SYSTOLIC BLOOD PRESSURE: 120 MMHG | WEIGHT: 162.2 LBS | RESPIRATION RATE: 18 BRPM

## 2024-09-24 DIAGNOSIS — F40.10 SOCIAL ANXIETY DISORDER: ICD-10-CM

## 2024-09-24 DIAGNOSIS — M54.16 LUMBAR RADICULOPATHY: ICD-10-CM

## 2024-09-24 DIAGNOSIS — Z86.73 HISTORY OF CVA (CEREBROVASCULAR ACCIDENT): ICD-10-CM

## 2024-09-24 DIAGNOSIS — J45.21 MILD INTERMITTENT REACTIVE AIRWAY DISEASE WITH ACUTE EXACERBATION: ICD-10-CM

## 2024-09-24 DIAGNOSIS — J10.1 INFLUENZA A: Primary | ICD-10-CM

## 2024-09-24 PROCEDURE — G2211 COMPLEX E/M VISIT ADD ON: HCPCS | Performed by: FAMILY MEDICINE

## 2024-09-24 PROCEDURE — 99214 OFFICE O/P EST MOD 30 MIN: CPT | Performed by: FAMILY MEDICINE

## 2024-09-24 RX ORDER — OSELTAMIVIR PHOSPHATE 75 MG/1
75 CAPSULE ORAL EVERY 12 HOURS SCHEDULED
COMMUNITY

## 2024-09-24 RX ORDER — FLUCONAZOLE 100 MG/1
100 TABLET ORAL DAILY
Qty: 7 TABLET | Refills: 0 | Status: SHIPPED | OUTPATIENT
Start: 2024-09-24 | End: 2024-10-01

## 2024-09-24 RX ORDER — CEPHALEXIN 500 MG/1
500 CAPSULE ORAL EVERY 6 HOURS SCHEDULED
COMMUNITY

## 2024-09-24 NOTE — ASSESSMENT & PLAN NOTE
Patient will use antibiotics Tamiflu and inhalers if needed she is continuing to slowly improve recommend rest and mild exercise at home    Orders:    fluconazole (DIFLUCAN) 100 mg tablet; Take 1 tablet (100 mg total) by mouth daily for 7 days

## 2024-09-24 NOTE — ASSESSMENT & PLAN NOTE
Lumbar radiculopathy longstanding low back pain patient has difficulty sitting or standing in 1 position.  I recommend continuation of a home stretching exercise program as we discussed and she will follow-up with physical therapy when symptoms flareup    Orders:    fluconazole (DIFLUCAN) 100 mg tablet; Take 1 tablet (100 mg total) by mouth daily for 7 days

## 2024-09-24 NOTE — ASSESSMENT & PLAN NOTE
Recent trip to [place] patient developed fever cough congestion and was taken to the emergency department after she developed severe weakness and was unable to stand on her own and was diagnosed with influenza A temperature up of 101.3.  She was given Tamiflu now and cephalexin and she is finishing the dosing and she should continue to slowly improve.  Jury note will be provided today to keep her out of jury duty now and permanently in light of her medical problems    Orders:    fluconazole (DIFLUCAN) 100 mg tablet; Take 1 tablet (100 mg total) by mouth daily for 7 days

## 2024-09-24 NOTE — ASSESSMENT & PLAN NOTE
History of CVA with episodic memory issues.  Follow-up with me as scheduled at next office visit no change in medication regimen    Orders:    fluconazole (DIFLUCAN) 100 mg tablet; Take 1 tablet (100 mg total) by mouth daily for 7 days

## 2024-09-24 NOTE — ASSESSMENT & PLAN NOTE
Patient fears going out into social events does not require alprazolam or other benzodiazepine but has difficulty with coping.  We discussed this and she we will avoid events when these are not necessary but otherwise prepare for events when needed    Orders:    fluconazole (DIFLUCAN) 100 mg tablet; Take 1 tablet (100 mg total) by mouth daily for 7 days

## 2024-09-24 NOTE — PROGRESS NOTES
Ambulatory Visit  Name: Tarah Meyers      : 1950      MRN: [id number]  Encounter Provider: Alexis Blanton DO  Encounter Date: 2024   Encounter department: [facility]    Assessment & Plan  Influenza A  Recent trip to [place] patient developed fever cough congestion and was taken to the emergency department after she developed severe weakness and was unable to stand on her own and was diagnosed with influenza A temperature up of 101.3.  She was given Tamiflu now and cephalexin and she is finishing the dosing and she should continue to slowly improve.  Jury note will be provided today to keep her out of jury duty now and permanently in light of her medical problems    Orders:    fluconazole (DIFLUCAN) 100 mg tablet; Take 1 tablet (100 mg total) by mouth daily for 7 days    Mild intermittent reactive airway disease with acute exacerbation  Patient will use antibiotics Tamiflu and inhalers if needed she is continuing to slowly improve recommend rest and mild exercise at home    Orders:    fluconazole (DIFLUCAN) 100 mg tablet; Take 1 tablet (100 mg total) by mouth daily for 7 days    Lumbar radiculopathy  Lumbar radiculopathy longstanding low back pain patient has difficulty sitting or standing in 1 position.  I recommend continuation of a home stretching exercise program as we discussed and she will follow-up with physical therapy when symptoms flareup    Orders:    fluconazole (DIFLUCAN) 100 mg tablet; Take 1 tablet (100 mg total) by mouth daily for 7 days    Social anxiety disorder  Patient fears going out into social events does not require alprazolam or other benzodiazepine but has difficulty with coping.  We discussed this and she we will avoid events when these are not necessary but otherwise prepare for events when needed    Orders:    fluconazole (DIFLUCAN) 100 mg tablet; Take 1 tablet (100 mg total) by mouth daily for 7 days    History of CVA (cerebrovascular  "accident)  History of CVA with episodic memory issues.  Follow-up with me as scheduled at next office visit no change in medication regimen    Orders:    fluconazole (DIFLUCAN) 100 mg tablet; Take 1 tablet (100 mg total) by mouth daily for 7 days       History of Present Illness     Patient here for follow-up evaluation for the flu after traveling to [place]    Fatigue  Associated symptoms include fatigue and nausea. Pertinent negatives include no abdominal pain, arthralgias, chest pain, chills, congestion, coughing, fever, headaches, myalgias, rash, sore throat or weakness.   Nausea  Associated symptoms include fatigue and nausea. Pertinent negatives include no abdominal pain, arthralgias, chest pain, chills, congestion, coughing, fever, headaches, myalgias, rash, sore throat or weakness.         Review of Systems   Constitutional:  Positive for fatigue. Negative for chills and fever.   HENT:  Negative for congestion, nosebleeds, rhinorrhea, sinus pressure and sore throat.    Eyes:  Negative for discharge and redness.   Respiratory:  Negative for cough and shortness of breath.    Cardiovascular:  Negative for chest pain, palpitations and leg swelling.   Gastrointestinal:  Positive for nausea. Negative for abdominal pain and blood in stool.   Endocrine: Negative for cold intolerance, heat intolerance and polyuria.   Genitourinary:  Negative for dysuria and frequency.   Musculoskeletal:  Negative for arthralgias, back pain and myalgias.   Skin:  Negative for rash.   Neurological:  Negative for dizziness, weakness and headaches.   Hematological:  Negative for adenopathy.   Psychiatric/Behavioral:  Negative for behavioral problems and sleep disturbance. The patient is not nervous/anxious.            Objective     /80 (BP Location: Left arm, Patient Position: Sitting, Cuff Size: Standard)   Pulse 81   Temp 98.9 °F (37.2 °C) (Tympanic)   Resp 18   Ht 5' 1\" (1.549 m)   Wt 73.6 kg (162 lb 3.2 oz)   SpO2 96%   " BMI 30.65 kg/m²     Physical Exam  Vitals and nursing note reviewed.   Constitutional:       General: She is not in acute distress.     Appearance: Normal appearance. She is well-developed.   HENT:      Head: Normocephalic and atraumatic.      Right Ear: External ear normal.      Left Ear: External ear normal.      Nose: Nose normal.      Mouth/Throat:      Mouth: Mucous membranes are moist.      Pharynx: Oropharynx is clear. No oropharyngeal exudate.   Eyes:      General: No scleral icterus.        Right eye: No discharge.         Left eye: No discharge.      Conjunctiva/sclera: Conjunctivae normal.      Pupils: Pupils are equal, round, and reactive to light.   Neck:      Thyroid: No thyromegaly.      Vascular: No JVD.   Cardiovascular:      Rate and Rhythm: Normal rate and regular rhythm.      Heart sounds: Normal heart sounds. No murmur heard.  Pulmonary:      Effort: Pulmonary effort is normal.      Breath sounds: No wheezing or rales.   Chest:      Chest wall: No tenderness.   Abdominal:      General: Bowel sounds are normal. There is no distension.      Palpations: Abdomen is soft. There is no mass.      Tenderness: There is no abdominal tenderness.   Musculoskeletal:         General: No tenderness or deformity. Normal range of motion.      Cervical back: Normal range of motion.   Lymphadenopathy:      Cervical: No cervical adenopathy.   Skin:     General: Skin is warm and dry.      Findings: No rash.   Neurological:      General: No focal deficit present.      Mental Status: She is alert and oriented to person, place, and time.      Cranial Nerves: No cranial nerve deficit.      Coordination: Coordination normal.      Deep Tendon Reflexes: Reflexes are normal and symmetric. Reflexes normal.   Psychiatric:         Mood and Affect: Mood normal.         Behavior: Behavior normal.         Thought Content: Thought content normal.         Judgment: Judgment normal.

## 2024-10-05 PROBLEM — Z00.00 MEDICARE ANNUAL WELLNESS VISIT, SUBSEQUENT: Status: RESOLVED | Noted: 2021-06-18 | Resolved: 2024-10-05

## 2024-10-24 PROBLEM — J10.1 INFLUENZA A: Status: RESOLVED | Noted: 2024-09-24 | Resolved: 2024-10-24

## 2024-12-28 ENCOUNTER — OFFICE VISIT (OUTPATIENT)
Dept: URGENT CARE | Facility: CLINIC | Age: 74
End: 2024-12-28
Payer: MEDICARE

## 2024-12-28 VITALS
TEMPERATURE: 97.9 F | OXYGEN SATURATION: 96 % | HEART RATE: 80 BPM | RESPIRATION RATE: 18 BRPM | SYSTOLIC BLOOD PRESSURE: 134 MMHG | DIASTOLIC BLOOD PRESSURE: 70 MMHG

## 2024-12-28 DIAGNOSIS — J02.9 PHARYNGITIS, UNSPECIFIED ETIOLOGY: Primary | ICD-10-CM

## 2024-12-28 PROCEDURE — 99213 OFFICE O/P EST LOW 20 MIN: CPT

## 2024-12-28 PROCEDURE — G0463 HOSPITAL OUTPT CLINIC VISIT: HCPCS

## 2024-12-28 RX ORDER — PREDNISONE 50 MG/1
50 TABLET ORAL DAILY
Qty: 5 TABLET | Refills: 0 | Status: SHIPPED | OUTPATIENT
Start: 2024-12-28 | End: 2025-01-02

## 2024-12-28 NOTE — PROGRESS NOTES
[facility]        NAME: Tarah Meyers is a 74 y.o. female  : 1950    MRN: [id number]  DATE: 2024  TIME: 10:06 AM    Assessment and Plan   Pharyngitis, unspecified etiology [J02.9]  1. Pharyngitis, unspecified etiology  predniSONE 50 mg tablet            Patient Instructions     Cepacol throat lozenges for sore throat.    Follow up with PCP in 3-5 days.  Proceed to  ER if symptoms worsen.    If tests have been performed at [facility], our office will contact you with results if changes need to be made to the care plan discussed with you at the visit.  [facility]    Chief Complaint     Chief Complaint   Patient presents with   • Sore Throat     Sore throat nausea right ear pain for 3 days          History of Present Illness       Sore Throat (Sore throat nausea right ear pain for 3 days )      Sore Throat   This is a new problem. The current episode started in the past 7 days. The problem has been gradually worsening. The pain is worse on the right side. There has been no fever. The pain is severe. Associated symptoms include congestion and a hoarse voice.       Review of Systems   Review of Systems   Constitutional: Negative.    HENT:  Positive for congestion, hoarse voice and sore throat.          Current Medications       Current Outpatient Medications:   •  acetaminophen (TYLENOL) 500 mg tablet, Take 500 mg by mouth every 6 (six) hours as needed for mild pain, Disp: , Rfl:   •  Black Elderberry,Berry-Flower, 575 MG CAPS, Take 1,150 mg by mouth daily, Disp: , Rfl:   •  calcium carbonate (TUMS) 500 mg chewable tablet, Chew 1 tablet daily Sometimes, Disp: , Rfl:   •  Camphor-Menthol-Methyl Sal (FLEXALL ULTRA PLUS EX), Sometimes, Disp: , Rfl:   •  Cholecalciferol (Vitamin D) 125 MCG (5000 UT) CAPS, , Disp: , Rfl:   •  Coenzyme Q10 (CoQ10) 100 MG CAPS, CoQ10   daily, Disp: , Rfl:   •  Garlic 1000 MG CAPS, Daily, Disp: , Rfl:   •   loratadine (CLARITIN) 10 mg tablet, Take 10 mg by mouth daily PRN, Disp: , Rfl:   •  M2 ZINC-50 PO, , Disp: , Rfl:   •  Multiple Vitamins-Minerals (PreserVision/Lutein) CAPS, Take by mouth, Disp: , Rfl:   •  Multiple Vitamins-Minerals (WOMENS 50+ MULTI VITAMIN/MIN PO), , Disp: , Rfl:   •  Omega-3 Fatty Acids (Fish Oil) 1000 MG CPDR, Take by mouth, Disp: , Rfl:   •  polyethylene glycol-propylene glycol (Systane) 0.4-0.3 %, Systane (PF) 0.4 %-0.3 % eye drops in a dropperette, Disp: , Rfl:   •  predniSONE 50 mg tablet, Take 1 tablet (50 mg total) by mouth daily for 5 days, Disp: 5 tablet, Rfl: 0  •  Probiotic Product (PROBIOTIC ACIDOPHILUS BEADS PO), Take by mouth, Disp: , Rfl:   •  albuterol (Ventolin HFA) 90 mcg/act inhaler, Inhale 2 puffs every 6 (six) hours as needed for wheezing (Patient not taking: Reported on 12/28/2024), Disp: 18 g, Rfl: 5  •  benzonatate (TESSALON) 200 MG capsule, Take 1 capsule (200 mg total) by mouth 3 (three) times a day as needed for cough (Patient not taking: Reported on 12/28/2024), Disp: 20 capsule, Rfl: 1  •  cephalexin (KEFLEX) 500 mg capsule, Take 500 mg by mouth every 6 (six) hours (Patient not taking: Reported on 12/28/2024), Disp: , Rfl:   •  diclofenac sodium (VOLTAREN) 25 MG EC tablet, Take 25 mg by mouth 2 (two) times a day (Patient not taking: Reported on 12/28/2024), Disp: , Rfl:   •  diphenhydrAMINE HCl (BENADRYL ALLERGY PO), Take by mouth (Patient not taking: Reported on 12/28/2024), Disp: , Rfl:   •  ferrous sulfate 325 (65 Fe) mg tablet, Take 325 mg by mouth daily with breakfast Unknown dose every other day (Patient not taking: Reported on 12/28/2024), Disp: , Rfl:   •  Ginkgo Biloba (GINKOBA PO), Take by mouth (Patient not taking: Reported on 12/28/2024), Disp: , Rfl:   •  Misc Natural Products (BEET ROOT PO), Take by mouth (Patient not taking: Reported on 9/24/2024), Disp: , Rfl:   •  Misc Natural Products (SUPER GREENS PO), Take by mouth, Disp: , Rfl:   •  oseltamivir  (TAMIFLU) 75 mg capsule, Take 75 mg by mouth every 12 (twelve) hours (Patient not taking: Reported on 12/28/2024), Disp: , Rfl:     Current Allergies     Allergies as of 12/28/2024 - Reviewed 12/28/2024   Allergen Reaction Noted   • Eggs or egg-derived products - food allergy Other (See Comments) 11/24/2015   • Etodolac  02/14/2016   • Nitrofurantoin Other (See Comments)    • Paxlovid [nirmatrelvir-ritonavir] Anaphylaxis 06/22/2023   • Penicillins Other (See Comments) 10/11/2023   • Sucralfate Dizziness and Headache 06/22/2020   • Sulfa antibiotics Hives and Other (See Comments)    • Levofloxacin Dizziness    • Omeprazole Headache and Fatigue 06/22/2020   • Pregabalin Chest Pain    • Aspirin     • Chocolate - food allergy  12/12/2019   • Famotidine Other (See Comments) 02/17/2022   • Fluconazole Other (See Comments) 02/17/2022   • Ibuprofen     • Naproxen     • Penicillin g  02/14/2016   • Zithromax tri-umesh [azithromycin] Nausea Only and Dizziness 01/26/2024            The following portions of the patient's history were reviewed and updated as appropriate: allergies, current medications, past family history, past medical history, past social history, past surgical history and problem list.     Past Medical History:   Diagnosis Date   • Allergic    • Anemia    • Arthritis    • GERD (gastroesophageal reflux disease)    • Herniated disc, cervical    • Hyperlipidemia    • Stroke (HCC)    • Syncope 2023       Past Surgical History:   Procedure Laterality Date   • BLADDER REPAIR      sling   • CATARACT EXTRACTION, BILATERAL     • COLONOSCOPY     • EYE SURGERY     • REMOVAL OF INTRAUTERINE DEVICE (IUD)      in abd area   • TONSILLECTOMY     • TUBAL LIGATION     • TUMOR REMOVAL      pinky finger 1977   • WISDOM TOOTH EXTRACTION         Family History   Problem Relation Age of Onset   • Hypertension Mother         Pulmonary   • Osteoporosis Mother    • Heart failure Mother    • Thyroid disease Mother    • Arthritis Mother     • Anxiety disorder Mother    • Heart attack Father    • Hypertension Father    • Stroke Father    • Dementia Father    • Heart disease Father    • Thyroid disease Sister    • No Known Problems Daughter    • No Known Problems Maternal Grandmother    • No Known Problems Paternal Grandmother    • No Known Problems Daughter    • Mental illness Maternal Aunt    • Depression Maternal Aunt    • Schizoaffective Disorder  Maternal Aunt    • Schizophrenia Maternal Aunt    • No Known Problems Maternal Aunt    • Breast cancer Maternal Aunt    • No Known Problems Maternal Aunt    • No Known Problems Paternal Aunt    • Suicide Attempts Maternal Grandfather    • Colon cancer Maternal Uncle          Medications have been verified.        Objective   /70   Pulse 80   Temp 97.9 °F (36.6 °C)   Resp 18   SpO2 96%   No LMP recorded. Patient is postmenopausal.       Physical Exam     Physical Exam  Vitals and nursing note reviewed.   Constitutional:       General: She is not in acute distress.     Appearance: Normal appearance. She is well-developed.   HENT:      Right Ear: Tympanic membrane and external ear normal.      Left Ear: Tympanic membrane and external ear normal.      Nose: Nose normal.      Mouth/Throat:      Pharynx: Posterior oropharyngeal erythema present. No oropharyngeal exudate.   Eyes:      Conjunctiva/sclera: Conjunctivae normal.   Cardiovascular:      Rate and Rhythm: Normal rate and regular rhythm.      Heart sounds: Normal heart sounds. No murmur heard.  Pulmonary:      Effort: Pulmonary effort is normal. No respiratory distress.      Breath sounds: Normal breath sounds. No wheezing or rales.   Chest:      Chest wall: No tenderness.   Musculoskeletal:         General: Normal range of motion.      Cervical back: Normal range of motion and neck supple.   Lymphadenopathy:      Cervical: Cervical adenopathy present.      Right cervical: Superficial cervical adenopathy present.      Left cervical: Superficial  cervical adenopathy present.   Skin:     General: Skin is warm.      Findings: No erythema or rash.   Neurological:      Mental Status: She is alert and oriented to person, place, and time.

## 2024-12-28 NOTE — PATIENT INSTRUCTIONS
Cepacol throat lozenges for sore throat.    Follow up with PCP in 3-5 days.  Proceed to  ER if symptoms worsen.

## 2025-01-03 NOTE — TELEPHONE ENCOUNTER
Cataract Surgery  Pre-Procedure Instructions        Patient Name:  Ellie Bearden    Surgical Procedure:  Cataract left eye  Date:  04/30/2025  Location:  [facility]  Arrival Time:  The hospital will call you several days before surgery with the time.    Before your surgery:     prescription(s) for prednisolone drops at your pharmacy. You will receive a surgery bag from our office.    You need to take a bath or shower and wash your hair either the night before or morning of surgery. Do not use any cosmetics, face creams, perfume or aftershave the morning of surgery. Do not wear any jewelry the day of surgery.    Bring a complete list of your medications to the hospital/surgery center the day of your surgery.    Bring all of your eye drops and the surgery bag along to the [facility]/surgery center the day of your surgery.    The hospital will call you the day before your surgery to let you know what time you will need to arrive for surgery. These calls are made later in the day. The admit times vary and will be between 5:00 am and noon.    Do not eat or drink anything after midnight the night before or the morning of your surgery.           If you have any further questions, please call us at [phone].                  After your surgery:    Begin eye drops in your left eye as soon as you get home from surgery according to the schedule below. Do not stop any drops until Dr. Chavez tells you to.    Medication Breakfast Lunch Dinner Bedtime   Prednisolone X X X X        Keep your scheduled post-operative appointments as scheduled below:  1st Post-op Appt -   2nd Post-op Appt -  3rd Post op Appt -  05/01/2025 9:15 am Dr. Chavez  05/08/2025 2:15 pm Dr. Chavez  05/30/2025 8:10 am Dr. Schultz     Call [phone] if you have any problems, such as significant pain, increasing redness or sudden vision decline anytime following surgery. You can reach someone at this number 24 hours a day. If for  Stop this medication now only use Pepcid AC otc med   Call back next Monday some reason you are not able to, contact the nearest hospital.     Call [phone] Monday through Friday, 8AM-5PM if you have any non-emergency questions.

## 2025-01-04 LAB — EXT SARS-COV-2: NEGATIVE

## 2025-02-24 ENCOUNTER — TELEPHONE (OUTPATIENT)
Dept: FAMILY MEDICINE CLINIC | Facility: CLINIC | Age: 75
End: 2025-02-24

## 2025-03-18 ENCOUNTER — TELEPHONE (OUTPATIENT)
Age: 75
End: 2025-03-18

## 2025-03-18 DIAGNOSIS — Z13.29 SCREENING FOR THYROID DISORDER: ICD-10-CM

## 2025-03-18 DIAGNOSIS — Z13.228 SCREENING FOR METABOLIC DISORDER: ICD-10-CM

## 2025-03-18 DIAGNOSIS — Z13.220 SCREENING FOR LIPID DISORDERS: ICD-10-CM

## 2025-03-18 DIAGNOSIS — Z13.0 SCREENING FOR BLOOD DISEASE: ICD-10-CM

## 2025-03-18 DIAGNOSIS — Z13.220 SCREENING FOR LIPOID DISORDERS: ICD-10-CM

## 2025-03-18 DIAGNOSIS — Z12.31 VISIT FOR SCREENING MAMMOGRAM: ICD-10-CM

## 2025-03-18 NOTE — TELEPHONE ENCOUNTER
Patient has an appointment coming up on 4/3/25.  Please add lab orders as soon as possible.  She would like to get labs done tomorrow.  Thank you.

## 2025-03-19 ENCOUNTER — APPOINTMENT (OUTPATIENT)
Dept: LAB | Facility: CLINIC | Age: 75
End: 2025-03-19
Payer: MEDICARE

## 2025-03-19 DIAGNOSIS — Z13.228 SCREENING FOR METABOLIC DISORDER: ICD-10-CM

## 2025-03-19 DIAGNOSIS — Z13.220 SCREENING FOR LIPID DISORDERS: ICD-10-CM

## 2025-03-19 DIAGNOSIS — Z13.0 SCREENING FOR BLOOD DISEASE: ICD-10-CM

## 2025-03-19 DIAGNOSIS — Z13.29 SCREENING FOR THYROID DISORDER: ICD-10-CM

## 2025-03-19 LAB
ALBUMIN SERPL BCG-MCNC: 4.3 G/DL (ref 3.5–5)
ALP SERPL-CCNC: 103 U/L (ref 34–104)
ALT SERPL W P-5'-P-CCNC: 17 U/L (ref 7–52)
ANION GAP SERPL CALCULATED.3IONS-SCNC: 8 MMOL/L (ref 4–13)
AST SERPL W P-5'-P-CCNC: 21 U/L (ref 13–39)
BASOPHILS # BLD AUTO: 0.02 THOUSANDS/ÂΜL (ref 0–0.1)
BASOPHILS NFR BLD AUTO: 0 % (ref 0–1)
BILIRUB SERPL-MCNC: 0.52 MG/DL (ref 0.2–1)
BUN SERPL-MCNC: 15 MG/DL (ref 5–25)
CALCIUM SERPL-MCNC: 9.4 MG/DL (ref 8.4–10.2)
CHLORIDE SERPL-SCNC: 100 MMOL/L (ref 96–108)
CHOLEST SERPL-MCNC: 223 MG/DL (ref ?–200)
CO2 SERPL-SCNC: 29 MMOL/L (ref 21–32)
CREAT SERPL-MCNC: 0.62 MG/DL (ref 0.6–1.3)
EOSINOPHIL # BLD AUTO: 0.06 THOUSAND/ÂΜL (ref 0–0.61)
EOSINOPHIL NFR BLD AUTO: 1 % (ref 0–6)
ERYTHROCYTE [DISTWIDTH] IN BLOOD BY AUTOMATED COUNT: 13.1 % (ref 11.6–15.1)
GFR SERPL CREATININE-BSD FRML MDRD: 90 ML/MIN/1.73SQ M
GLUCOSE P FAST SERPL-MCNC: 96 MG/DL (ref 65–99)
HCT VFR BLD AUTO: 38.4 % (ref 34.8–46.1)
HDLC SERPL-MCNC: 45 MG/DL
HGB BLD-MCNC: 12.4 G/DL (ref 11.5–15.4)
IMM GRANULOCYTES # BLD AUTO: 0.02 THOUSAND/UL (ref 0–0.2)
IMM GRANULOCYTES NFR BLD AUTO: 0 % (ref 0–2)
LDLC SERPL CALC-MCNC: 148 MG/DL (ref 0–100)
LYMPHOCYTES # BLD AUTO: 1.26 THOUSANDS/ÂΜL (ref 0.6–4.47)
LYMPHOCYTES NFR BLD AUTO: 28 % (ref 14–44)
MCH RBC QN AUTO: 30.4 PG (ref 26.8–34.3)
MCHC RBC AUTO-ENTMCNC: 32.3 G/DL (ref 31.4–37.4)
MCV RBC AUTO: 94 FL (ref 82–98)
MONOCYTES # BLD AUTO: 0.39 THOUSAND/ÂΜL (ref 0.17–1.22)
MONOCYTES NFR BLD AUTO: 9 % (ref 4–12)
NEUTROPHILS # BLD AUTO: 2.83 THOUSANDS/ÂΜL (ref 1.85–7.62)
NEUTS SEG NFR BLD AUTO: 62 % (ref 43–75)
NONHDLC SERPL-MCNC: 178 MG/DL
NRBC BLD AUTO-RTO: 0 /100 WBCS
PLATELET # BLD AUTO: 280 THOUSANDS/UL (ref 149–390)
PMV BLD AUTO: 10.3 FL (ref 8.9–12.7)
POTASSIUM SERPL-SCNC: 3.9 MMOL/L (ref 3.5–5.3)
PROT SERPL-MCNC: 7 G/DL (ref 6.4–8.4)
RBC # BLD AUTO: 4.08 MILLION/UL (ref 3.81–5.12)
SODIUM SERPL-SCNC: 137 MMOL/L (ref 135–147)
TRIGL SERPL-MCNC: 152 MG/DL (ref ?–150)
TSH SERPL DL<=0.05 MIU/L-ACNC: 3.03 UIU/ML (ref 0.45–4.5)
WBC # BLD AUTO: 4.58 THOUSAND/UL (ref 4.31–10.16)

## 2025-03-19 PROCEDURE — 80061 LIPID PANEL: CPT

## 2025-03-19 PROCEDURE — 36415 COLL VENOUS BLD VENIPUNCTURE: CPT

## 2025-03-19 PROCEDURE — 80053 COMPREHEN METABOLIC PANEL: CPT

## 2025-03-19 PROCEDURE — 85025 COMPLETE CBC W/AUTO DIFF WBC: CPT

## 2025-03-19 PROCEDURE — 84443 ASSAY THYROID STIM HORMONE: CPT

## 2025-04-03 ENCOUNTER — OFFICE VISIT (OUTPATIENT)
Dept: FAMILY MEDICINE CLINIC | Facility: CLINIC | Age: 75
End: 2025-04-03
Payer: MEDICARE

## 2025-04-03 VITALS
HEIGHT: 61 IN | OXYGEN SATURATION: 99 % | TEMPERATURE: 97.2 F | HEART RATE: 74 BPM | SYSTOLIC BLOOD PRESSURE: 122 MMHG | WEIGHT: 162.2 LBS | DIASTOLIC BLOOD PRESSURE: 86 MMHG | BODY MASS INDEX: 30.62 KG/M2 | RESPIRATION RATE: 18 BRPM

## 2025-04-03 DIAGNOSIS — K59.01 SLOW TRANSIT CONSTIPATION: ICD-10-CM

## 2025-04-03 DIAGNOSIS — E78.2 MIXED HYPERLIPIDEMIA: ICD-10-CM

## 2025-04-03 DIAGNOSIS — K21.9 GASTROESOPHAGEAL REFLUX DISEASE WITHOUT ESOPHAGITIS: ICD-10-CM

## 2025-04-03 DIAGNOSIS — G43.009 ATYPICAL MIGRAINE: Primary | ICD-10-CM

## 2025-04-03 PROCEDURE — G2211 COMPLEX E/M VISIT ADD ON: HCPCS | Performed by: FAMILY MEDICINE

## 2025-04-03 PROCEDURE — 99214 OFFICE O/P EST MOD 30 MIN: CPT | Performed by: FAMILY MEDICINE

## 2025-04-03 RX ORDER — MAGNESIUM 30 MG
100 TABLET ORAL 2 TIMES DAILY
COMMUNITY

## 2025-04-03 NOTE — ASSESSMENT & PLAN NOTE
Stable mixed hyperlipidemia following a good diet plan avoiding saturated fats preferring herbal products and nonpharmaceutical treatment.  Repeat lipid profile CMP at next office visit in 6 months    Orders:    Comprehensive metabolic panel; Future    CBC and differential; Future    Lipid panel; Future    TSH, 3rd generation with Free T4 reflex; Future

## 2025-04-03 NOTE — ASSESSMENT & PLAN NOTE
Bowels are stable continue with all supplements natural herbal products and vitamins stool softeners and probiotics bowels have been regular and soft    Orders:    Comprehensive metabolic panel; Future    CBC and differential; Future    Lipid panel; Future    TSH, 3rd generation with Free T4 reflex; Future

## 2025-04-03 NOTE — PROGRESS NOTES
Name: Tarah Meyers      : 1950      MRN: [id number]  Encounter Provider: Alexis Blanton DO  Encounter Date: 4/3/2025   Encounter department: On license of [facility]  :  Assessment & Plan  Atypical migraine  Stable no worsening migraine headaches recently doing well off medications using magnesium 30 mg twice daily and this is working out well for her continuing on vitamins    Orders:    Comprehensive metabolic panel; Future    CBC and differential; Future    Lipid panel; Future    TSH, 3rd generation with Free T4 reflex; Future    Slow transit constipation  Bowels are stable continue with all supplements natural herbal products and vitamins stool softeners and probiotics bowels have been regular and soft    Orders:    Comprehensive metabolic panel; Future    CBC and differential; Future    Lipid panel; Future    TSH, 3rd generation with Free T4 reflex; Future    Gastroesophageal reflux disease without esophagitis  Stable off proton pump inhibitors doing well with as needed Tums and calcium carbonate supplements along with herbal products     Orders:    Comprehensive metabolic panel; Future    CBC and differential; Future    Lipid panel; Future    TSH, 3rd generation with Free T4 reflex; Future    Mixed hyperlipidemia  Stable mixed hyperlipidemia following a good diet plan avoiding saturated fats preferring herbal products and nonpharmaceutical treatment.  Repeat lipid profile CMP at next office visit in 6 months    Orders:    Comprehensive metabolic panel; Future    CBC and differential; Future    Lipid panel; Future    TSH, 3rd generation with Free T4 reflex; Future           History of Present Illness   Moved into [facility] in [facility].  Reviewed reports from emergency department from January in [place] while she was up living with her sister.      Review of Systems   Constitutional:  Negative for chills, fatigue and fever.   HENT:  Negative for congestion, nosebleeds,  "rhinorrhea, sinus pressure and sore throat.    Eyes:  Negative for discharge and redness.   Respiratory:  Negative for cough and shortness of breath.    Cardiovascular:  Negative for chest pain, palpitations and leg swelling.   Gastrointestinal:  Negative for abdominal pain, blood in stool and nausea.   Endocrine: Negative for cold intolerance, heat intolerance and polyuria.   Genitourinary:  Negative for dysuria and frequency.   Musculoskeletal:  Negative for arthralgias, back pain and myalgias.   Skin:  Negative for rash.   Neurological:  Negative for dizziness, weakness and headaches.   Hematological:  Negative for adenopathy.   Psychiatric/Behavioral:  Negative for behavioral problems and sleep disturbance. The patient is not nervous/anxious.        Objective   /86 (BP Location: Left arm, Patient Position: Sitting, Cuff Size: Standard)   Pulse 74   Temp (!) 97.2 °F (36.2 °C) (Tympanic)   Resp 18   Ht 5' 1\" (1.549 m)   Wt 73.6 kg (162 lb 3.2 oz)   SpO2 99%   BMI 30.65 kg/m²      Physical Exam  Vitals and nursing note reviewed.   Constitutional:       General: She is not in acute distress.     Appearance: Normal appearance. She is well-developed.   HENT:      Head: Normocephalic and atraumatic.      Right Ear: Tympanic membrane and external ear normal.      Left Ear: Tympanic membrane and external ear normal.      Nose: Nose normal.      Mouth/Throat:      Mouth: Mucous membranes are moist.      Pharynx: Oropharynx is clear. No oropharyngeal exudate.   Eyes:      General: No scleral icterus.        Right eye: No discharge.         Left eye: No discharge.      Conjunctiva/sclera: Conjunctivae normal.      Pupils: Pupils are equal, round, and reactive to light.   Neck:      Thyroid: No thyromegaly.      Vascular: No JVD.   Cardiovascular:      Rate and Rhythm: Normal rate and regular rhythm.      Heart sounds: Normal heart sounds. No murmur heard.  Pulmonary:      Effort: Pulmonary effort is normal.     "  Breath sounds: No wheezing or rales.   Chest:      Chest wall: No tenderness.   Abdominal:      General: Bowel sounds are normal. There is no distension.      Palpations: Abdomen is soft. There is no mass.      Tenderness: There is no abdominal tenderness.   Musculoskeletal:         General: No tenderness or deformity. Normal range of motion.      Cervical back: Normal range of motion.   Lymphadenopathy:      Cervical: No cervical adenopathy.   Skin:     General: Skin is warm and dry.      Findings: No rash.   Neurological:      General: No focal deficit present.      Mental Status: She is alert and oriented to person, place, and time.      Cranial Nerves: No cranial nerve deficit.      Coordination: Coordination normal.      Deep Tendon Reflexes: Reflexes are normal and symmetric. Reflexes normal.   Psychiatric:         Mood and Affect: Mood normal.         Behavior: Behavior normal.         Thought Content: Thought content normal.         Judgment: Judgment normal.

## 2025-04-03 NOTE — ASSESSMENT & PLAN NOTE
Stable off proton pump inhibitors doing well with as needed Tums and calcium carbonate supplements along with herbal products     Orders:    Comprehensive metabolic panel; Future    CBC and differential; Future    Lipid panel; Future    TSH, 3rd generation with Free T4 reflex; Future

## 2025-04-03 NOTE — ASSESSMENT & PLAN NOTE
Stable no worsening migraine headaches recently doing well off medications using magnesium 30 mg twice daily and this is working out well for her continuing on vitamins    Orders:    Comprehensive metabolic panel; Future    CBC and differential; Future    Lipid panel; Future    TSH, 3rd generation with Free T4 reflex; Future

## 2025-08-18 ENCOUNTER — APPOINTMENT (OUTPATIENT)
Dept: LAB | Facility: CLINIC | Age: 75
End: 2025-08-18
Payer: MEDICARE

## 2025-08-18 ENCOUNTER — OFFICE VISIT (OUTPATIENT)
Dept: FAMILY MEDICINE CLINIC | Facility: CLINIC | Age: 75
End: 2025-08-18
Payer: MEDICARE

## 2025-08-18 VITALS
HEART RATE: 84 BPM | HEIGHT: 61 IN | DIASTOLIC BLOOD PRESSURE: 70 MMHG | OXYGEN SATURATION: 96 % | SYSTOLIC BLOOD PRESSURE: 124 MMHG | WEIGHT: 162.8 LBS | RESPIRATION RATE: 20 BRPM | BODY MASS INDEX: 30.73 KG/M2 | TEMPERATURE: 97.4 F

## 2025-08-18 DIAGNOSIS — R55 SYNCOPE, UNSPECIFIED SYNCOPE TYPE: ICD-10-CM

## 2025-08-18 DIAGNOSIS — E78.2 MIXED HYPERLIPIDEMIA: ICD-10-CM

## 2025-08-18 DIAGNOSIS — R53.82 CHRONIC FATIGUE: Primary | ICD-10-CM

## 2025-08-18 DIAGNOSIS — K21.9 GASTROESOPHAGEAL REFLUX DISEASE WITHOUT ESOPHAGITIS: ICD-10-CM

## 2025-08-18 DIAGNOSIS — Z83.49 FAMILY HISTORY OF THYROID DISEASE: ICD-10-CM

## 2025-08-18 DIAGNOSIS — R53.82 CHRONIC FATIGUE: ICD-10-CM

## 2025-08-18 DIAGNOSIS — K59.01 SLOW TRANSIT CONSTIPATION: ICD-10-CM

## 2025-08-18 DIAGNOSIS — G43.009 ATYPICAL MIGRAINE: ICD-10-CM

## 2025-08-18 LAB
BASOPHILS # BLD AUTO: 0.04 THOUSANDS/ÂΜL (ref 0–0.1)
BASOPHILS NFR BLD AUTO: 1 % (ref 0–1)
EOSINOPHIL # BLD AUTO: 0.05 THOUSAND/ÂΜL (ref 0–0.61)
EOSINOPHIL NFR BLD AUTO: 1 % (ref 0–6)
ERYTHROCYTE [DISTWIDTH] IN BLOOD BY AUTOMATED COUNT: 13.2 % (ref 11.6–15.1)
HCT VFR BLD AUTO: 39.1 % (ref 34.8–46.1)
HGB BLD-MCNC: 13 G/DL (ref 11.5–15.4)
IMM GRANULOCYTES # BLD AUTO: 0.02 THOUSAND/UL (ref 0–0.2)
IMM GRANULOCYTES NFR BLD AUTO: 0 % (ref 0–2)
LYMPHOCYTES # BLD AUTO: 1.62 THOUSANDS/ÂΜL (ref 0.6–4.47)
LYMPHOCYTES NFR BLD AUTO: 31 % (ref 14–44)
MCH RBC QN AUTO: 30.6 PG (ref 26.8–34.3)
MCHC RBC AUTO-ENTMCNC: 33.2 G/DL (ref 31.4–37.4)
MCV RBC AUTO: 92 FL (ref 82–98)
MONOCYTES # BLD AUTO: 0.38 THOUSAND/ÂΜL (ref 0.17–1.22)
MONOCYTES NFR BLD AUTO: 7 % (ref 4–12)
NEUTROPHILS # BLD AUTO: 3.21 THOUSANDS/ÂΜL (ref 1.85–7.62)
NEUTS SEG NFR BLD AUTO: 60 % (ref 43–75)
NRBC BLD AUTO-RTO: 0 /100 WBCS
PLATELET # BLD AUTO: 285 THOUSANDS/UL (ref 149–390)
PMV BLD AUTO: 10.1 FL (ref 8.9–12.7)
RBC # BLD AUTO: 4.25 MILLION/UL (ref 3.81–5.12)
WBC # BLD AUTO: 5.32 THOUSAND/UL (ref 4.31–10.16)

## 2025-08-18 PROCEDURE — 36415 COLL VENOUS BLD VENIPUNCTURE: CPT

## 2025-08-18 PROCEDURE — G2211 COMPLEX E/M VISIT ADD ON: HCPCS | Performed by: NURSE PRACTITIONER

## 2025-08-18 PROCEDURE — 86618 LYME DISEASE ANTIBODY: CPT

## 2025-08-18 PROCEDURE — 80053 COMPREHEN METABOLIC PANEL: CPT

## 2025-08-18 PROCEDURE — 80061 LIPID PANEL: CPT

## 2025-08-18 PROCEDURE — 84443 ASSAY THYROID STIM HORMONE: CPT

## 2025-08-18 PROCEDURE — 87468 ANAPLSMA PHGCYTOPHLM AMP PRB: CPT

## 2025-08-18 PROCEDURE — 99214 OFFICE O/P EST MOD 30 MIN: CPT | Performed by: NURSE PRACTITIONER

## 2025-08-18 PROCEDURE — 85025 COMPLETE CBC W/AUTO DIFF WBC: CPT

## 2025-08-19 LAB
ALBUMIN SERPL BCG-MCNC: 4.2 G/DL (ref 3.5–5)
ALP SERPL-CCNC: 93 U/L (ref 34–104)
ALT SERPL W P-5'-P-CCNC: 17 U/L (ref 7–52)
ANION GAP SERPL CALCULATED.3IONS-SCNC: 6 MMOL/L (ref 4–13)
AST SERPL W P-5'-P-CCNC: 18 U/L (ref 13–39)
B BURGDOR IGG+IGM SER QL IA: NEGATIVE
BILIRUB SERPL-MCNC: 0.33 MG/DL (ref 0.2–1)
BUN SERPL-MCNC: 12 MG/DL (ref 5–25)
CALCIUM SERPL-MCNC: 9.2 MG/DL (ref 8.4–10.2)
CHLORIDE SERPL-SCNC: 102 MMOL/L (ref 96–108)
CHOLEST SERPL-MCNC: 240 MG/DL (ref ?–200)
CO2 SERPL-SCNC: 30 MMOL/L (ref 21–32)
CREAT SERPL-MCNC: 0.63 MG/DL (ref 0.6–1.3)
GFR SERPL CREATININE-BSD FRML MDRD: 89 ML/MIN/1.73SQ M
GLUCOSE SERPL-MCNC: 87 MG/DL (ref 65–140)
HDLC SERPL-MCNC: 49 MG/DL
LDLC SERPL CALC-MCNC: 166 MG/DL (ref 0–100)
NONHDLC SERPL-MCNC: 191 MG/DL
POTASSIUM SERPL-SCNC: 4.4 MMOL/L (ref 3.5–5.3)
PROT SERPL-MCNC: 7 G/DL (ref 6.4–8.4)
SODIUM SERPL-SCNC: 138 MMOL/L (ref 135–147)
TRIGL SERPL-MCNC: 127 MG/DL (ref ?–150)
TSH SERPL DL<=0.05 MIU/L-ACNC: 2.78 UIU/ML (ref 0.45–4.5)

## 2025-08-22 LAB — A PHAGOCYTOPH DNA BLD QL NAA+PROBE: NEGATIVE
